# Patient Record
Sex: MALE | HISPANIC OR LATINO | Employment: OTHER | URBAN - METROPOLITAN AREA
[De-identification: names, ages, dates, MRNs, and addresses within clinical notes are randomized per-mention and may not be internally consistent; named-entity substitution may affect disease eponyms.]

---

## 2017-09-09 ENCOUNTER — HOSPITAL ENCOUNTER (OUTPATIENT)
Dept: LAB | Facility: MEDICAL CENTER | Age: 72
End: 2017-09-09
Attending: FAMILY MEDICINE
Payer: MEDICARE

## 2017-09-09 LAB
ALBUMIN SERPL BCP-MCNC: 4.2 G/DL (ref 3.2–4.9)
ALBUMIN/GLOB SERPL: 1.4 G/DL
ALP SERPL-CCNC: 82 U/L (ref 30–99)
ALT SERPL-CCNC: 35 U/L (ref 2–50)
ANION GAP SERPL CALC-SCNC: 6 MMOL/L (ref 0–11.9)
AST SERPL-CCNC: 31 U/L (ref 12–45)
BASOPHILS # BLD AUTO: 0.8 % (ref 0–1.8)
BASOPHILS # BLD: 0.06 K/UL (ref 0–0.12)
BILIRUB SERPL-MCNC: 0.6 MG/DL (ref 0.1–1.5)
BUN SERPL-MCNC: 16 MG/DL (ref 8–22)
CALCIUM SERPL-MCNC: 10.3 MG/DL (ref 8.5–10.5)
CHLORIDE SERPL-SCNC: 105 MMOL/L (ref 96–112)
CHOLEST SERPL-MCNC: 207 MG/DL (ref 100–199)
CO2 SERPL-SCNC: 27 MMOL/L (ref 20–33)
CREAT SERPL-MCNC: 0.84 MG/DL (ref 0.5–1.4)
EOSINOPHIL # BLD AUTO: 0.17 K/UL (ref 0–0.51)
EOSINOPHIL NFR BLD: 2.1 % (ref 0–6.9)
ERYTHROCYTE [DISTWIDTH] IN BLOOD BY AUTOMATED COUNT: 45.1 FL (ref 35.9–50)
GFR SERPL CREATININE-BSD FRML MDRD: >60 ML/MIN/1.73 M 2
GLOBULIN SER CALC-MCNC: 2.9 G/DL (ref 1.9–3.5)
GLUCOSE SERPL-MCNC: 87 MG/DL (ref 65–99)
HCT VFR BLD AUTO: 51.9 % (ref 42–52)
HDLC SERPL-MCNC: 45 MG/DL
HGB BLD-MCNC: 16.8 G/DL (ref 14–18)
IMM GRANULOCYTES # BLD AUTO: 0.03 K/UL (ref 0–0.11)
IMM GRANULOCYTES NFR BLD AUTO: 0.4 % (ref 0–0.9)
LDLC SERPL CALC-MCNC: 140 MG/DL
LYMPHOCYTES # BLD AUTO: 3.22 K/UL (ref 1–4.8)
LYMPHOCYTES NFR BLD: 40.5 % (ref 22–41)
MCH RBC QN AUTO: 33.6 PG (ref 27–33)
MCHC RBC AUTO-ENTMCNC: 32.4 G/DL (ref 33.7–35.3)
MCV RBC AUTO: 103.8 FL (ref 81.4–97.8)
MONOCYTES # BLD AUTO: 0.43 K/UL (ref 0–0.85)
MONOCYTES NFR BLD AUTO: 5.4 % (ref 0–13.4)
NEUTROPHILS # BLD AUTO: 4.04 K/UL (ref 1.82–7.42)
NEUTROPHILS NFR BLD: 50.8 % (ref 44–72)
NRBC # BLD AUTO: 0 K/UL
NRBC BLD AUTO-RTO: 0 /100 WBC
PLATELET # BLD AUTO: 260 K/UL (ref 164–446)
PMV BLD AUTO: 11.9 FL (ref 9–12.9)
POTASSIUM SERPL-SCNC: 4.6 MMOL/L (ref 3.6–5.5)
PROT SERPL-MCNC: 7.1 G/DL (ref 6–8.2)
PSA SERPL-MCNC: 3.92 NG/ML (ref 0–4)
RBC # BLD AUTO: 5 M/UL (ref 4.7–6.1)
SODIUM SERPL-SCNC: 138 MMOL/L (ref 135–145)
TRIGL SERPL-MCNC: 110 MG/DL (ref 0–149)
WBC # BLD AUTO: 8 K/UL (ref 4.8–10.8)

## 2017-09-09 PROCEDURE — 85025 COMPLETE CBC W/AUTO DIFF WBC: CPT

## 2017-09-09 PROCEDURE — 80061 LIPID PANEL: CPT

## 2017-09-09 PROCEDURE — 80053 COMPREHEN METABOLIC PANEL: CPT

## 2017-09-09 PROCEDURE — 84153 ASSAY OF PSA TOTAL: CPT

## 2017-09-09 PROCEDURE — 36415 COLL VENOUS BLD VENIPUNCTURE: CPT

## 2017-09-19 ENCOUNTER — OCCUPATIONAL MEDICINE (OUTPATIENT)
Dept: URGENT CARE | Facility: CLINIC | Age: 72
End: 2017-09-19
Payer: COMMERCIAL

## 2017-09-19 ENCOUNTER — APPOINTMENT (OUTPATIENT)
Dept: RADIOLOGY | Facility: IMAGING CENTER | Age: 72
End: 2017-09-19
Attending: FAMILY MEDICINE
Payer: COMMERCIAL

## 2017-09-19 VITALS
WEIGHT: 150 LBS | BODY MASS INDEX: 27.6 KG/M2 | HEART RATE: 66 BPM | TEMPERATURE: 98.4 F | SYSTOLIC BLOOD PRESSURE: 138 MMHG | HEIGHT: 62 IN | DIASTOLIC BLOOD PRESSURE: 90 MMHG | OXYGEN SATURATION: 99 %

## 2017-09-19 DIAGNOSIS — Z02.1 PRE-EMPLOYMENT DRUG SCREENING: ICD-10-CM

## 2017-09-19 DIAGNOSIS — M25.562 ACUTE PAIN OF LEFT KNEE: ICD-10-CM

## 2017-09-19 LAB
AMP AMPHETAMINE: NORMAL
BREATH ALCOHOL COMMENT: NORMAL
COC COCAINE: NORMAL
INT CON NEG: NEGATIVE
INT CON POS: POSITIVE
MET METHAMPHETAMINES: NORMAL
OPI OPIATES: NORMAL
PCP PHENCYCLIDINE: NORMAL
POC BREATHALIZER: 0 PERCENT (ref 0–0.01)
POC DRUG COMMENT 753798-OCCUPATIONAL HEALTH: NEGATIVE
THC: NORMAL

## 2017-09-19 PROCEDURE — 82075 ASSAY OF BREATH ETHANOL: CPT | Performed by: FAMILY MEDICINE

## 2017-09-19 PROCEDURE — 73564 X-RAY EXAM KNEE 4 OR MORE: CPT | Mod: TC,LT | Performed by: FAMILY MEDICINE

## 2017-09-19 PROCEDURE — 99213 OFFICE O/P EST LOW 20 MIN: CPT | Performed by: FAMILY MEDICINE

## 2017-09-19 PROCEDURE — 80305 DRUG TEST PRSMV DIR OPT OBS: CPT | Performed by: FAMILY MEDICINE

## 2017-09-19 ASSESSMENT — ENCOUNTER SYMPTOMS
JOINT SWELLING: 0
NUMBNESS: 0
WEAKNESS: 0

## 2017-09-19 NOTE — LETTER
"EMPLOYEE’S CLAIM FOR COMPENSATION/ REPORT OF INITIAL TREATMENT  FORM C-4    EMPLOYEE’S CLAIM - PROVIDE ALL INFORMATION REQUESTED   First Name  Mj Last Name  Samara Birthdate                    1945                Sex  male Claim Number   Home Address  [address] Age  72 y.o. Height  1.575 m (5' 2\") Weight  68 kg (150 lb) [facility]     [facility] [place] Zip  [address] Telephone  [phone] (home)    Mailing Address  [address] [facility] [place] Zip  [address] Primary Language Spoken  English    Insurer   Third Party   Employers Insurance   Employee's Occupation (Job Title) When Injury or Occupational Disease Occurred      Employer's Name  [employer]  Telephone  [phone]    Employer Address  [address]  [facility]  Zip  [address]    Date of Injury  9/18/2017               Hour of Injury  2:30 PM Date Employer Notified  9/18/2017 Last Day of Work after Injury or Occupational Disease  9/18/2017 Supervisor to Whom Injury Reported  Daryl Lilly   Address or Location of Accident (if applicable)     What were you doing at the time of accident? (if applicable)  I was using wheat eater when I slipped and hurt my left knee    How did this injury or occupational disease occur? (Be specific an answer in detail. Use additional sheet if necessary)  I slipped and hurt Left knee   If you believe that you have an occupational disease, when did you first have knowledge of the disability and it relationship to your employment?   Witnesses to the Accident  N/A      Nature of Injury or Occupational Disease  Strain  Part(s) of Body Injured or Affected  Knee (L), ,     I certify that the above is true and correct to the best of my knowledge and that I have provided this information in order to obtain the benefits of [place] " [place]  I hereby authorize any physician, chiropractor, surgeon, practitioner, or other person, any hospital, including [facility] or [facility] hospital, any medical service organization, any insurance company, or other institution or organization to release to each other, any medical or other information, including benefits paid or payable, pertinent to this injury or disease, except information relative to diagnosis, treatment and/or counseling for AIDS, psychological conditions, alcohol or controlled substances, for which I must give specific authorization.  A Photostat of this authorization shall be as valid as the original.     Date 9/19/2017   [facility]   Employee’s Signature   THIS REPORT MUST BE COMPLETED AND MAILED WITHIN 3 WORKING DAYS OF TREATMENT   [facility]  Name of Facility  [facility]   Date  9/19/2017 Diagnosis  (M25.562) Acute pain of left knee Is there evidence the injured employee was under the influence of alcohol and/or another controlled substance at the time of accident?   Hour  9:32 AM Description of Injury or Disease  The encounter diagnosis was Acute pain of left knee. No   Treatment  Use over-the-counter pain reliever, such as acetaminophen (Tylenol), ibuprofen (Advil, Motrin) or naproxen (Aleve) as needed; follow package directions for dosing. ACE wrap provided for comfort.  Have you advised the patient to remain off work five days or more? No   X-Ray Findings  Negative   If Yes   From Date  To Date      From information given by the employee, together with medical evidence, can you directly connect this injury or occupational disease as job incurred?  Yes If No Full Duty  Yes Modified Duty     Is additional medical care by a physician indicated?  Yes If Modified Duty, Specify any Limitations / Restrictions     Do you know of any previous injury or disease contributing to this  "condition or occupational disease?                            No   Date  9/19/2017 Print Doctor’s Name Lu Lezama M.D. I certify the employer’s copy of  this form was mailed on:   Address  [address] [employer] Use [employer]     [place] Zip  [address]    Provider’s Tax ID Number  [id number]  Telephone  Dept: [phone]        e-LU Arredondo M.D.   e-Signature: Dr. Heladio Sin, Medical Director Degree  MD        ORIGINAL-TREATING PHYSICIAN OR CHIROPRACTOR    PAGE 2-INSURER/TPA    PAGE 3-EMPLOYER    PAGE 4-EMPLOYEE             Form C-4 (rev10/07)              BRIEF DESCRIPTION OF RIGHTS AND BENEFITS  (Pursuant to [place] 616C.050)    Notice of Injury or Occupational Disease (Incident Report Form C-1): If an injury or occupational disease (OD) arises out of and in the  course of employment, you must provide written notice to your employer as soon as practicable, but no later than 7 days after the accident or  OD. Your employer shall maintain a sufficient supply of the required forms.    Claim for Compensation (Form C-4): If medical treatment is sought, the form C-4 is available at the place of initial treatment. A completed  \"Claim for Compensation\" (Form C-4) must be filed within 90 days after an accident or OD. The treating physician or chiropractor must,  within 3 working days after treatment, complete and mail to the employer, the employer's insurer and third-party , the Claim for  Compensation.    Medical Treatment: If you require medical treatment for your on-the-job injury or OD, you may be required to select a physician or  chiropractor from a list provided by your workers’ compensation insurer, if it has contracted with an Organization for Managed Care (MCO) or  Preferred Provider Organization (PPO) or providers of health care. If your employer has not entered into a contract with an MCO or PPO, you  may select a physician or chiropractor from the Panel of " Physicians and Chiropractors. Any medical costs related to your industrial injury or  OD will be paid by your insurer.    Temporary Total Disability (TTD): If your doctor has certified that you are unable to work for a period of at least 5 consecutive days, or 5  cumulative days in a 20-day period, or places restrictions on you that your employer does not accommodate, you may be entitled to TTD  compensation.    Temporary Partial Disability (TPD): If the wage you receive upon reemployment is less than the compensation for TTD to which you are  entitled, the insurer may be required to pay you TPD compensation to make up the difference. TPD can only be paid for a maximum of 24  months.    Permanent Partial Disability (PPD): When your medical condition is stable and there is an indication of a PPD as a result of your injury or  OD, within 30 days, your insurer must arrange for an evaluation by a rating physician or chiropractor to determine the degree of your PPD. The  amount of your PPD award depends on the date of injury, the results of the PPD evaluation and your age and wage.    Permanent Total Disability (PTD): If you are medically certified by a treating physician or chiropractor as permanently and totally disabled  and have been granted a PTD status by your insurer, you are entitled to receive monthly benefits not to exceed 66 2/3% of your average  monthly wage. The amount of your PTD payments is subject to reduction if you previously received a PPD award.    Vocational Rehabilitation Services: You may be eligible for vocational rehabilitation services if you are unable to return to the job due to a  permanent physical impairment or permanent restrictions as a result of your injury or occupational disease.    Transportation and Per Luc Reimbursement: You may be eligible for travel expenses and per luc associated with medical treatment.    Reopening: You may be able to reopen your claim if your condition  worsens after claim closure.    Appeal Process: If you disagree with a written determination issued by the insurer or the insurer does not respond to your request, you may  appeal to the Department of Administration, , by following the instructions contained in your determination letter. You must  appeal the determination within 70 days from the date of the determination letter at [address], [address], [place], [place]  [address], or [address], [place], [place] [address]. If you disagree with the  decision, you may appeal to the  Department of Administration, . You must file your appeal within 30 days from the date of the  decision  letter at [address], [address], [place], [place] [address], or [address], [address], [place], [place] [address]. If you  disagree with a decision of an , you may file a petition for judicial review with the District Court. You must do so within 30  days of the Appeal Officer’s decision. You may be represented by an  at your own expense or you may contact the [facility] for possible  representation.    [place]  for Injured Workers (NAIW): If you disagree with a  decision, you may request that NAIW represent you  without charge at an  Hearing. For information regarding denial of benefits, you may contact the [facility] at: [address], [place] [address], [phone], or [address], [place] [address], [phone]    To File a Complaint with the Division: If you wish to file a complaint with the  of the Division of Industrial Relations (DIR),  please contact the Workers’ Compensation Section, [address], [address], [place], [place] [address], telephone (969) [phone], or  [address], [place] [address], telephone [phone])  [phone].    For assistance with Workers’ Compensation Issues: you may contact the Office of the Governor Consumer Health Assistance, [address] [place] [address], [address], [place] [address], Toll Free 1-109.616.7152, Web site: http://govcha.[facility].nv., E-mail  [email or web address].nv.                                                                                                                                                                                                                                   __________________________________________________________________                                                                   _________________                Employee Name / Signature                                                                                                                                                       Date                                                                                                                                                                                                     D-2 (rev. 10/07)

## 2017-09-19 NOTE — LETTER
[facility]   [address] [place] [address]  Phone: [phone] - Fax: [phone]        [facility] Progress Report and Disability Certification  Date of Service: 9/19/2017   No Show:  No  Date / Time of Next Visit: 9/22/2017 4:30 PM   Claim Information   Patient Name: Mj Pascual  Claim Number:     Employer: [employer]  Date of Injury: 9/18/2017     Insurer / TPA: Employers Insurance  ID / SSN:     Occupation:   Diagnosis: The encounter diagnosis was Acute pain of left knee.    Medical Information   Related to Industrial Injury? Yes    Subjective Complaints:  DOI 9/18/2017. Patient states he was weed eating with a weed eater and slipped landing on his left knee. Patient noticed intense pain at the time as well as difficulty walking due to pain. No numbness, tingling. No weakness. Pain is exacerbated by walking or bending the left knee. Patient denies previous left knee injuries. No other occupation.   Objective Findings: Tenderness to palpation posterior aspect of left knee more along the medial posterior aspect with mild swelling. Left knee range of motion mildly decreased due to pain. No knee effusion noted.   Pre-Existing Condition(s):     Assessment:   Initial Visit    Status: Additional Care Required  Permanent Disability:No    Plan:      Diagnostics: X-ray    Comments:       Disability Information   Status: Released to Full Duty    From:  9/19/2017  Through: 9/22/2017 Restrictions are: Temporary   Physical Restrictions   Sitting:    Standing:   Stooping:   Bending:     Squatting:   Walking:   Climbing:   Pushing:      Pulling:    Other:    Reaching Above Shoulder (L):   Reaching Above Shoulder (R):       Reaching Below Shoulder (L):    Reaching Below Shoulder (R):      Not to exceed Weight Limits   Carrying(hrs):   Weight Limit(lb):  Lifting(hrs):   Weight  Limit(lb):    Comments:      Repetitive Actions      Hands: i.e. Fine Manipulations from Grasping:     Feet: i.e. Operating Foot Controls:     Driving / Operate Machinery:     Physician Name: Lu Lezama M.D. Physician Signature: LU Salamanca M.D. e-Signature: Dr. Heladio Sin, Medical Director   Clinic Name / Location: [address] [address] Clinic Phone Number: Dept: [phone]   Appointment Time: 8:45 Am Visit Start Time: 9:32 AM   Check-In Time:  8:53 Am Visit Discharge Time: 10:28 PM   Original-Treating Physician or Chiropractor    Page 2-Insurer/TPA    Page 3-Employer    Page 4-Employee

## 2017-09-19 NOTE — PROGRESS NOTES
"Subjective:      Mj Pascual is a 72 y.o. male who presents with Work-Related Injury (fell down at work and hurt L knee,happened yesterday )      DOI 9/18/2017. Patient states he was weed eating with a weed eater and slipped landing on his left knee. Patient noticed intense pain at the time as well as difficulty walking due to pain. No numbness, tingling. No weakness. Pain is exacerbated by walking or bending the left knee. Patient denies previous left knee injuries. No other occupation.     Knee Injury   This is a new problem. The current episode started yesterday. The problem occurs constantly. The problem has been unchanged. Pertinent negatives include no joint swelling, numbness or weakness.       Review of Systems   Musculoskeletal: Negative for joint swelling.   Neurological: Negative for weakness and numbness.          Objective:     /90   Pulse 66   Temp 36.9 °C (98.4 °F)   Ht 1.575 m (5' 2\")   Wt 68 kg (150 lb)   SpO2 99%   BMI 27.44 kg/m²      Physical Exam   Constitutional: He is oriented to person, place, and time. He appears well-developed and well-nourished. No distress.   HENT:   Head: Normocephalic and atraumatic.   Eyes: Conjunctivae are normal. Pupils are equal, round, and reactive to light.   Cardiovascular: Normal rate and regular rhythm.    Pulmonary/Chest: Effort normal and breath sounds normal.   Neurological: He is alert and oriented to person, place, and time.   Skin: Skin is warm and dry.   Psychiatric: He has a normal mood and affect. Thought content normal.   Vitals reviewed.      Tenderness to palpation posterior aspect of left knee more along the medial posterior aspect with mild swelling. Left knee range of motion mildly decreased due to pain. No knee effusion noted.       Assessment/Plan:     1. Acute pain of left knee  Differential diagnosis, natural history, supportive care, and indications for immediate follow-up discussed. Use over-the-counter pain " reliever, such as acetaminophen (Tylenol), ibuprofen (Advil, Motrin) or naproxen (Aleve) as needed; follow package directions for dosing.   - DX-KNEE COMPLETE 4+ LEFT; Future

## 2017-09-22 ENCOUNTER — OCCUPATIONAL MEDICINE (OUTPATIENT)
Dept: URGENT CARE | Facility: CLINIC | Age: 72
End: 2017-09-22
Payer: COMMERCIAL

## 2017-09-22 VITALS
TEMPERATURE: 97.8 F | HEART RATE: 60 BPM | OXYGEN SATURATION: 99 % | BODY MASS INDEX: 27.05 KG/M2 | SYSTOLIC BLOOD PRESSURE: 140 MMHG | HEIGHT: 62 IN | DIASTOLIC BLOOD PRESSURE: 84 MMHG | RESPIRATION RATE: 16 BRPM | WEIGHT: 147 LBS

## 2017-09-22 DIAGNOSIS — S83.92XD SPRAIN OF LEFT KNEE, UNSPECIFIED LIGAMENT, SUBSEQUENT ENCOUNTER: ICD-10-CM

## 2017-09-22 PROCEDURE — 99213 OFFICE O/P EST LOW 20 MIN: CPT | Performed by: PHYSICIAN ASSISTANT

## 2017-09-22 NOTE — LETTER
[facility]  [address] [address]  Phone:  [phone] - Fax:  [phone]   [facility] Progress Report and Disability Certification  Date of Service: 9/22/2017   No Show:  No  Date / Time of Next Visit:  Case Closed   Claim Information   Patient Name: Mj Pascual  Claim Number:     Employer: [employer]  Date of Injury: 9/18/2017     Insurer / TPA: Employers Insurance  ID / SSN:     Occupation:   Diagnosis: The encounter diagnosis was Sprain of left knee, unspecified ligament, subsequent encounter.    Medical Information   Related to Industrial Injury? Yes    Subjective Complaints:  DOI: 9/18/17. Left knee pain following fall. Xrays NEG. Today, much improved. No pain, 0/10. No ROM problems. Taking ASA prn. On work restrictions, has not been to work. But he believes he can do full duty. Ready to be released.    Objective Findings: No tenderness. Range of motion normal. Special tests negative. Patient to walk and perform a full squat without issue. Distal neurovascular intact.   Pre-Existing Condition(s):     Assessment:   Condition Improved    Status: Discharged /  MMI  Permanent Disability:No    Plan:      Diagnostics:      Comments:       Disability Information   Status: Released to Full Duty    From:     Through:   Restrictions are:     Physical Restrictions   Sitting:    Standing:    Stooping:    Bending:      Squatting:    Walking:    Climbing:    Pushing:      Pulling:    Other:    Reaching Above Shoulder (L):   Reaching Above Shoulder (R):       Reaching Below Shoulder (L):    Reaching Below Shoulder (R):      Not to exceed Weight Limits   Carrying(hrs):   Weight Limit(lb):   Lifting(hrs):   Weight  Limit(lb):     Comments:      Repetitive Actions   Hands: i.e. Fine Manipulations from Grasping:     Feet: i.e. Operating Foot Controls:     Driving / Operate Machinery:     Physician Name: Kalyan Ojeda,  OSCAR Physician Signature: NORM Schmidt P.A.-C. e-Signature: Dr. Heladio Sin, Medical Director   Clinic Name / Location: [address] [address] Clinic Phone Number: Dept: [phone]   Appointment Time: 4:30 Pm Visit Start Time: 4:07 PM   Check-In Time:  4:05 Pm Visit Discharge Time:  4:19 PM   Original-Treating Physician or Chiropractor    Page 2-Insurer/TPA    Page 3-Employer    Page 4-Employee

## 2017-09-22 NOTE — PROGRESS NOTES
"Subjective:      Mj Pascual is a 72 y.o. male who presents with Knee Injury ( follow up for Lt. knee)      DOI: 9/18/17. Left knee pain following fall. Xrays NEG. Today, much improved. No pain, 0/10. No ROM problems. Taking ASA prn. On work restrictions, has not been to work. But he believes he can do full duty. Ready to be released.      HPI    ROS    Medications, Allergies, and current problem list reviewed today in Epic     Objective:     /84   Pulse 60   Temp 36.6 °C (97.8 °F)   Resp 16   Ht 1.575 m (5' 2\")   Wt 66.7 kg (147 lb)   SpO2 99%   BMI 26.89 kg/m²      Physical Exam   Constitutional: He is oriented to person, place, and time. He appears well-developed and well-nourished. No distress.   Neurological: He is alert and oriented to person, place, and time.   Skin: Skin is warm and dry. He is not diaphoretic.   Psychiatric: He has a normal mood and affect. His behavior is normal. Judgment and thought content normal.   Nursing note and vitals reviewed.      No tenderness. Range of motion normal. Special tests negative. Patient to walk and perform a full squat without issue. Distal neurovascular intact.       Assessment/Plan:     1. Sprain of left knee, unspecified ligament, subsequent encounter       Exam normal, pain is resolved.  Patient discharged MMI    Please note that this dictation was created using voice recognition software. I have made every reasonable attempt to correct obvious errors, but I expect that there are errors of grammar and possibly content that I did not discover before finalizing the note.    "

## 2018-06-02 ENCOUNTER — HOSPITAL ENCOUNTER (OUTPATIENT)
Dept: LAB | Facility: MEDICAL CENTER | Age: 73
End: 2018-06-02
Attending: FAMILY MEDICINE
Payer: MEDICARE

## 2018-06-02 LAB
ALBUMIN SERPL BCP-MCNC: 4.3 G/DL (ref 3.2–4.9)
ALBUMIN/GLOB SERPL: 1.4 G/DL
ALP SERPL-CCNC: 85 U/L (ref 30–99)
ALT SERPL-CCNC: 26 U/L (ref 2–50)
ANION GAP SERPL CALC-SCNC: 6 MMOL/L (ref 0–11.9)
AST SERPL-CCNC: 25 U/L (ref 12–45)
BASOPHILS # BLD AUTO: 0.8 % (ref 0–1.8)
BASOPHILS # BLD: 0.07 K/UL (ref 0–0.12)
BILIRUB SERPL-MCNC: 0.6 MG/DL (ref 0.1–1.5)
BUN SERPL-MCNC: 17 MG/DL (ref 8–22)
CALCIUM SERPL-MCNC: 9.9 MG/DL (ref 8.5–10.5)
CHLORIDE SERPL-SCNC: 106 MMOL/L (ref 96–112)
CHOLEST SERPL-MCNC: 145 MG/DL (ref 100–199)
CO2 SERPL-SCNC: 28 MMOL/L (ref 20–33)
CREAT SERPL-MCNC: 0.98 MG/DL (ref 0.5–1.4)
CREAT UR-MCNC: 131.6 MG/DL
EOSINOPHIL # BLD AUTO: 0.12 K/UL (ref 0–0.51)
EOSINOPHIL NFR BLD: 1.4 % (ref 0–6.9)
ERYTHROCYTE [DISTWIDTH] IN BLOOD BY AUTOMATED COUNT: 44.7 FL (ref 35.9–50)
EST. AVERAGE GLUCOSE BLD GHB EST-MCNC: 103 MG/DL
GLOBULIN SER CALC-MCNC: 3 G/DL (ref 1.9–3.5)
GLUCOSE SERPL-MCNC: 91 MG/DL (ref 65–99)
HBA1C MFR BLD: 5.2 % (ref 0–5.6)
HCT VFR BLD AUTO: 50.5 % (ref 42–52)
HDLC SERPL-MCNC: 43 MG/DL
HGB BLD-MCNC: 16.2 G/DL (ref 14–18)
IMM GRANULOCYTES # BLD AUTO: 0.04 K/UL (ref 0–0.11)
IMM GRANULOCYTES NFR BLD AUTO: 0.5 % (ref 0–0.9)
LDLC SERPL CALC-MCNC: 79 MG/DL
LYMPHOCYTES # BLD AUTO: 3.37 K/UL (ref 1–4.8)
LYMPHOCYTES NFR BLD: 38 % (ref 22–41)
MCH RBC QN AUTO: 33.5 PG (ref 27–33)
MCHC RBC AUTO-ENTMCNC: 32.1 G/DL (ref 33.7–35.3)
MCV RBC AUTO: 104.6 FL (ref 81.4–97.8)
MICROALBUMIN UR-MCNC: <0.7 MG/DL
MICROALBUMIN/CREAT UR: NORMAL MG/G (ref 0–30)
MONOCYTES # BLD AUTO: 0.5 K/UL (ref 0–0.85)
MONOCYTES NFR BLD AUTO: 5.6 % (ref 0–13.4)
NEUTROPHILS # BLD AUTO: 4.77 K/UL (ref 1.82–7.42)
NEUTROPHILS NFR BLD: 53.7 % (ref 44–72)
NRBC # BLD AUTO: 0 K/UL
NRBC BLD-RTO: 0 /100 WBC
PLATELET # BLD AUTO: 271 K/UL (ref 164–446)
PMV BLD AUTO: 11.6 FL (ref 9–12.9)
POTASSIUM SERPL-SCNC: 4.4 MMOL/L (ref 3.6–5.5)
PROT SERPL-MCNC: 7.3 G/DL (ref 6–8.2)
RBC # BLD AUTO: 4.83 M/UL (ref 4.7–6.1)
SODIUM SERPL-SCNC: 140 MMOL/L (ref 135–145)
TRIGL SERPL-MCNC: 113 MG/DL (ref 0–149)
WBC # BLD AUTO: 8.9 K/UL (ref 4.8–10.8)

## 2018-06-02 PROCEDURE — 83036 HEMOGLOBIN GLYCOSYLATED A1C: CPT

## 2018-06-02 PROCEDURE — 80061 LIPID PANEL: CPT

## 2018-06-02 PROCEDURE — 82570 ASSAY OF URINE CREATININE: CPT

## 2018-06-02 PROCEDURE — 36415 COLL VENOUS BLD VENIPUNCTURE: CPT

## 2018-06-02 PROCEDURE — 85025 COMPLETE CBC W/AUTO DIFF WBC: CPT

## 2018-06-02 PROCEDURE — 82043 UR ALBUMIN QUANTITATIVE: CPT

## 2018-06-02 PROCEDURE — 80053 COMPREHEN METABOLIC PANEL: CPT

## 2019-03-02 ENCOUNTER — HOSPITAL ENCOUNTER (OUTPATIENT)
Dept: LAB | Facility: MEDICAL CENTER | Age: 74
End: 2019-03-02
Attending: FAMILY MEDICINE
Payer: MEDICARE

## 2019-03-02 ENCOUNTER — HOSPITAL ENCOUNTER (OUTPATIENT)
Facility: MEDICAL CENTER | Age: 74
End: 2019-03-02
Payer: MEDICARE

## 2019-03-02 LAB
ALBUMIN SERPL BCP-MCNC: 4.3 G/DL (ref 3.2–4.9)
ALBUMIN/GLOB SERPL: 1.3 G/DL
ALP SERPL-CCNC: 101 U/L (ref 30–99)
ALT SERPL-CCNC: 25 U/L (ref 2–50)
ANION GAP SERPL CALC-SCNC: 8 MMOL/L (ref 0–11.9)
AST SERPL-CCNC: 22 U/L (ref 12–45)
BASOPHILS # BLD AUTO: 0.6 % (ref 0–1.8)
BASOPHILS # BLD: 0.05 K/UL (ref 0–0.12)
BILIRUB SERPL-MCNC: 0.5 MG/DL (ref 0.1–1.5)
BUN SERPL-MCNC: 16 MG/DL (ref 8–22)
CALCIUM SERPL-MCNC: 10.2 MG/DL (ref 8.5–10.5)
CHLORIDE SERPL-SCNC: 106 MMOL/L (ref 96–112)
CHOLEST SERPL-MCNC: 154 MG/DL (ref 100–199)
CO2 SERPL-SCNC: 26 MMOL/L (ref 20–33)
CREAT SERPL-MCNC: 0.81 MG/DL (ref 0.5–1.4)
CREAT UR-MCNC: 148.7 MG/DL
EOSINOPHIL # BLD AUTO: 0.11 K/UL (ref 0–0.51)
EOSINOPHIL NFR BLD: 1.2 % (ref 0–6.9)
ERYTHROCYTE [DISTWIDTH] IN BLOOD BY AUTOMATED COUNT: 46.2 FL (ref 35.9–50)
EST. AVERAGE GLUCOSE BLD GHB EST-MCNC: 103 MG/DL
FASTING STATUS PATIENT QL REPORTED: NORMAL
GLOBULIN SER CALC-MCNC: 3.3 G/DL (ref 1.9–3.5)
GLUCOSE SERPL-MCNC: 98 MG/DL (ref 65–99)
HBA1C MFR BLD: 5.2 % (ref 0–5.6)
HCT VFR BLD AUTO: 52.2 % (ref 42–52)
HDLC SERPL-MCNC: 43 MG/DL
HGB BLD-MCNC: 16.8 G/DL (ref 14–18)
IMM GRANULOCYTES # BLD AUTO: 0.06 K/UL (ref 0–0.11)
IMM GRANULOCYTES NFR BLD AUTO: 0.7 % (ref 0–0.9)
LDLC SERPL CALC-MCNC: 88 MG/DL
LYMPHOCYTES # BLD AUTO: 2.71 K/UL (ref 1–4.8)
LYMPHOCYTES NFR BLD: 30.2 % (ref 22–41)
MCH RBC QN AUTO: 34 PG (ref 27–33)
MCHC RBC AUTO-ENTMCNC: 32.2 G/DL (ref 33.7–35.3)
MCV RBC AUTO: 105.7 FL (ref 81.4–97.8)
MICROALBUMIN UR-MCNC: <0.7 MG/DL
MICROALBUMIN/CREAT UR: NORMAL MG/G (ref 0–30)
MONOCYTES # BLD AUTO: 0.6 K/UL (ref 0–0.85)
MONOCYTES NFR BLD AUTO: 6.7 % (ref 0–13.4)
NEUTROPHILS # BLD AUTO: 5.43 K/UL (ref 1.82–7.42)
NEUTROPHILS NFR BLD: 60.6 % (ref 44–72)
NRBC # BLD AUTO: 0 K/UL
NRBC BLD-RTO: 0 /100 WBC
PLATELET # BLD AUTO: 257 K/UL (ref 164–446)
PMV BLD AUTO: 12.1 FL (ref 9–12.9)
POTASSIUM SERPL-SCNC: 4.6 MMOL/L (ref 3.6–5.5)
PROT SERPL-MCNC: 7.6 G/DL (ref 6–8.2)
PSA SERPL-MCNC: 3.8 NG/ML (ref 0–4)
RBC # BLD AUTO: 4.94 M/UL (ref 4.7–6.1)
SODIUM SERPL-SCNC: 140 MMOL/L (ref 135–145)
TRIGL SERPL-MCNC: 117 MG/DL (ref 0–149)
WBC # BLD AUTO: 9 K/UL (ref 4.8–10.8)

## 2019-03-02 PROCEDURE — 80061 LIPID PANEL: CPT

## 2019-03-02 PROCEDURE — 36415 COLL VENOUS BLD VENIPUNCTURE: CPT

## 2019-03-02 PROCEDURE — 82274 ASSAY TEST FOR BLOOD FECAL: CPT

## 2019-03-02 PROCEDURE — 85025 COMPLETE CBC W/AUTO DIFF WBC: CPT

## 2019-03-02 PROCEDURE — 84153 ASSAY OF PSA TOTAL: CPT

## 2019-03-02 PROCEDURE — 82570 ASSAY OF URINE CREATININE: CPT

## 2019-03-02 PROCEDURE — 83036 HEMOGLOBIN GLYCOSYLATED A1C: CPT

## 2019-03-02 PROCEDURE — 82043 UR ALBUMIN QUANTITATIVE: CPT

## 2019-03-02 PROCEDURE — 80053 COMPREHEN METABOLIC PANEL: CPT

## 2019-03-05 LAB — HEMOCCULT STL QL IA: NEGATIVE

## 2020-02-15 ENCOUNTER — HOSPITAL ENCOUNTER (OUTPATIENT)
Dept: LAB | Facility: MEDICAL CENTER | Age: 75
End: 2020-02-15
Attending: FAMILY MEDICINE
Payer: MEDICARE

## 2020-02-15 LAB
ALBUMIN SERPL BCP-MCNC: 4.1 G/DL (ref 3.2–4.9)
ALBUMIN/GLOB SERPL: 1.4 G/DL
ALP SERPL-CCNC: 91 U/L (ref 30–99)
ALT SERPL-CCNC: 42 U/L (ref 2–50)
ANION GAP SERPL CALC-SCNC: 7 MMOL/L (ref 0–11.9)
AST SERPL-CCNC: 35 U/L (ref 12–45)
BASOPHILS # BLD AUTO: 0.5 % (ref 0–1.8)
BASOPHILS # BLD: 0.04 K/UL (ref 0–0.12)
BILIRUB SERPL-MCNC: 1 MG/DL (ref 0.1–1.5)
BUN SERPL-MCNC: 13 MG/DL (ref 8–22)
CALCIUM SERPL-MCNC: 9.8 MG/DL (ref 8.5–10.5)
CHLORIDE SERPL-SCNC: 108 MMOL/L (ref 96–112)
CHOLEST SERPL-MCNC: 144 MG/DL (ref 100–199)
CO2 SERPL-SCNC: 24 MMOL/L (ref 20–33)
CREAT SERPL-MCNC: 0.7 MG/DL (ref 0.5–1.4)
CREAT UR-MCNC: 82.9 MG/DL
EOSINOPHIL # BLD AUTO: 0.09 K/UL (ref 0–0.51)
EOSINOPHIL NFR BLD: 1.2 % (ref 0–6.9)
ERYTHROCYTE [DISTWIDTH] IN BLOOD BY AUTOMATED COUNT: 47 FL (ref 35.9–50)
EST. AVERAGE GLUCOSE BLD GHB EST-MCNC: 94 MG/DL
FASTING STATUS PATIENT QL REPORTED: NORMAL
GLOBULIN SER CALC-MCNC: 3 G/DL (ref 1.9–3.5)
GLUCOSE SERPL-MCNC: 102 MG/DL (ref 65–99)
HBA1C MFR BLD: 4.9 % (ref 0–5.6)
HCT VFR BLD AUTO: 48.4 % (ref 42–52)
HDLC SERPL-MCNC: 46 MG/DL
HGB BLD-MCNC: 15.7 G/DL (ref 14–18)
IMM GRANULOCYTES # BLD AUTO: 0.02 K/UL (ref 0–0.11)
IMM GRANULOCYTES NFR BLD AUTO: 0.3 % (ref 0–0.9)
LDLC SERPL CALC-MCNC: 79 MG/DL
LYMPHOCYTES # BLD AUTO: 2.41 K/UL (ref 1–4.8)
LYMPHOCYTES NFR BLD: 32.6 % (ref 22–41)
MCH RBC QN AUTO: 34.5 PG (ref 27–33)
MCHC RBC AUTO-ENTMCNC: 32.4 G/DL (ref 33.7–35.3)
MCV RBC AUTO: 106.4 FL (ref 81.4–97.8)
MICROALBUMIN UR-MCNC: <0.7 MG/DL
MICROALBUMIN/CREAT UR: NORMAL MG/G (ref 0–30)
MONOCYTES # BLD AUTO: 0.43 K/UL (ref 0–0.85)
MONOCYTES NFR BLD AUTO: 5.8 % (ref 0–13.4)
NEUTROPHILS # BLD AUTO: 4.4 K/UL (ref 1.82–7.42)
NEUTROPHILS NFR BLD: 59.6 % (ref 44–72)
NRBC # BLD AUTO: 0 K/UL
NRBC BLD-RTO: 0 /100 WBC
PLATELET # BLD AUTO: 261 K/UL (ref 164–446)
PMV BLD AUTO: 11.7 FL (ref 9–12.9)
POTASSIUM SERPL-SCNC: 4.1 MMOL/L (ref 3.6–5.5)
PROT SERPL-MCNC: 7.1 G/DL (ref 6–8.2)
PSA SERPL-MCNC: 4.09 NG/ML (ref 0–4)
RBC # BLD AUTO: 4.55 M/UL (ref 4.7–6.1)
SODIUM SERPL-SCNC: 139 MMOL/L (ref 135–145)
TRIGL SERPL-MCNC: 97 MG/DL (ref 0–149)
WBC # BLD AUTO: 7.4 K/UL (ref 4.8–10.8)

## 2020-02-15 PROCEDURE — 82043 UR ALBUMIN QUANTITATIVE: CPT

## 2020-02-15 PROCEDURE — 83036 HEMOGLOBIN GLYCOSYLATED A1C: CPT

## 2020-02-15 PROCEDURE — 80061 LIPID PANEL: CPT

## 2020-02-15 PROCEDURE — 80053 COMPREHEN METABOLIC PANEL: CPT

## 2020-02-15 PROCEDURE — 84153 ASSAY OF PSA TOTAL: CPT

## 2020-02-15 PROCEDURE — 85025 COMPLETE CBC W/AUTO DIFF WBC: CPT

## 2020-02-15 PROCEDURE — 36415 COLL VENOUS BLD VENIPUNCTURE: CPT

## 2020-02-15 PROCEDURE — 82570 ASSAY OF URINE CREATININE: CPT

## 2020-12-26 ENCOUNTER — HOSPITAL ENCOUNTER (OUTPATIENT)
Dept: LAB | Facility: MEDICAL CENTER | Age: 75
End: 2020-12-26
Attending: FAMILY MEDICINE
Payer: MEDICARE

## 2020-12-26 LAB
ALBUMIN SERPL BCP-MCNC: 4.5 G/DL (ref 3.2–4.9)
ALBUMIN/GLOB SERPL: 1.5 G/DL
ALP SERPL-CCNC: 97 U/L (ref 30–99)
ALT SERPL-CCNC: 31 U/L (ref 2–50)
ANION GAP SERPL CALC-SCNC: 8 MMOL/L (ref 7–16)
AST SERPL-CCNC: 32 U/L (ref 12–45)
BASOPHILS # BLD AUTO: 0.7 % (ref 0–1.8)
BASOPHILS # BLD: 0.06 K/UL (ref 0–0.12)
BILIRUB SERPL-MCNC: 0.7 MG/DL (ref 0.1–1.5)
BUN SERPL-MCNC: 12 MG/DL (ref 8–22)
CALCIUM SERPL-MCNC: 10.1 MG/DL (ref 8.5–10.5)
CHLORIDE SERPL-SCNC: 105 MMOL/L (ref 96–112)
CHOLEST SERPL-MCNC: 153 MG/DL (ref 100–199)
CO2 SERPL-SCNC: 25 MMOL/L (ref 20–33)
CREAT SERPL-MCNC: 0.76 MG/DL (ref 0.5–1.4)
CREAT UR-MCNC: 86.14 MG/DL
EOSINOPHIL # BLD AUTO: 0.13 K/UL (ref 0–0.51)
EOSINOPHIL NFR BLD: 1.5 % (ref 0–6.9)
ERYTHROCYTE [DISTWIDTH] IN BLOOD BY AUTOMATED COUNT: 45.4 FL (ref 35.9–50)
EST. AVERAGE GLUCOSE BLD GHB EST-MCNC: 91 MG/DL
GLOBULIN SER CALC-MCNC: 3 G/DL (ref 1.9–3.5)
GLUCOSE SERPL-MCNC: 94 MG/DL (ref 65–99)
HBA1C MFR BLD: 4.8 % (ref 0–5.6)
HCT VFR BLD AUTO: 53.5 % (ref 42–52)
HDLC SERPL-MCNC: 37 MG/DL
HGB BLD-MCNC: 17.7 G/DL (ref 14–18)
IMM GRANULOCYTES # BLD AUTO: 0.04 K/UL (ref 0–0.11)
IMM GRANULOCYTES NFR BLD AUTO: 0.5 % (ref 0–0.9)
LDLC SERPL CALC-MCNC: 81 MG/DL
LYMPHOCYTES # BLD AUTO: 3.5 K/UL (ref 1–4.8)
LYMPHOCYTES NFR BLD: 39.7 % (ref 22–41)
MCH RBC QN AUTO: 34.7 PG (ref 27–33)
MCHC RBC AUTO-ENTMCNC: 33.1 G/DL (ref 33.7–35.3)
MCV RBC AUTO: 104.9 FL (ref 81.4–97.8)
MICROALBUMIN UR-MCNC: <1.2 MG/DL
MICROALBUMIN/CREAT UR: NORMAL MG/G (ref 0–30)
MONOCYTES # BLD AUTO: 0.45 K/UL (ref 0–0.85)
MONOCYTES NFR BLD AUTO: 5.1 % (ref 0–13.4)
NEUTROPHILS # BLD AUTO: 4.64 K/UL (ref 1.82–7.42)
NEUTROPHILS NFR BLD: 52.5 % (ref 44–72)
NRBC # BLD AUTO: 0 K/UL
NRBC BLD-RTO: 0 /100 WBC
PLATELET # BLD AUTO: 236 K/UL (ref 164–446)
PMV BLD AUTO: 12.1 FL (ref 9–12.9)
POTASSIUM SERPL-SCNC: 4.2 MMOL/L (ref 3.6–5.5)
PROT SERPL-MCNC: 7.5 G/DL (ref 6–8.2)
PSA SERPL-MCNC: 3.34 NG/ML (ref 0–4)
RBC # BLD AUTO: 5.1 M/UL (ref 4.7–6.1)
SODIUM SERPL-SCNC: 138 MMOL/L (ref 135–145)
TRIGL SERPL-MCNC: 174 MG/DL (ref 0–149)
WBC # BLD AUTO: 8.8 K/UL (ref 4.8–10.8)

## 2020-12-26 PROCEDURE — 80053 COMPREHEN METABOLIC PANEL: CPT

## 2020-12-26 PROCEDURE — 83036 HEMOGLOBIN GLYCOSYLATED A1C: CPT

## 2020-12-26 PROCEDURE — 85025 COMPLETE CBC W/AUTO DIFF WBC: CPT

## 2020-12-26 PROCEDURE — 80061 LIPID PANEL: CPT

## 2020-12-26 PROCEDURE — 84153 ASSAY OF PSA TOTAL: CPT

## 2020-12-26 PROCEDURE — 82570 ASSAY OF URINE CREATININE: CPT

## 2020-12-26 PROCEDURE — 36415 COLL VENOUS BLD VENIPUNCTURE: CPT

## 2020-12-26 PROCEDURE — 82043 UR ALBUMIN QUANTITATIVE: CPT

## 2021-01-11 DIAGNOSIS — Z23 NEED FOR VACCINATION: ICD-10-CM

## 2021-01-23 ENCOUNTER — APPOINTMENT (OUTPATIENT)
Dept: RADIOLOGY | Facility: MEDICAL CENTER | Age: 76
DRG: 101 | End: 2021-01-23
Attending: EMERGENCY MEDICINE
Payer: MEDICARE

## 2021-01-23 ENCOUNTER — HOSPITAL ENCOUNTER (OUTPATIENT)
Facility: MEDICAL CENTER | Age: 76
DRG: 101 | End: 2021-01-24
Attending: EMERGENCY MEDICINE | Admitting: STUDENT IN AN ORGANIZED HEALTH CARE EDUCATION/TRAINING PROGRAM
Payer: MEDICARE

## 2021-01-23 ENCOUNTER — OFFICE VISIT (OUTPATIENT)
Dept: URGENT CARE | Facility: CLINIC | Age: 76
End: 2021-01-23
Payer: MEDICARE

## 2021-01-23 VITALS
DIASTOLIC BLOOD PRESSURE: 90 MMHG | SYSTOLIC BLOOD PRESSURE: 150 MMHG | TEMPERATURE: 98 F | HEIGHT: 64 IN | WEIGHT: 152 LBS | BODY MASS INDEX: 25.95 KG/M2 | OXYGEN SATURATION: 96 % | HEART RATE: 72 BPM

## 2021-01-23 DIAGNOSIS — I16.0 HYPERTENSIVE URGENCY: ICD-10-CM

## 2021-01-23 DIAGNOSIS — R53.1 WEAKNESS: ICD-10-CM

## 2021-01-23 DIAGNOSIS — R42 DIZZY SPELLS: ICD-10-CM

## 2021-01-23 DIAGNOSIS — R53.1 GENERAL WEAKNESS: ICD-10-CM

## 2021-01-23 DIAGNOSIS — H53.15 VISUAL DISTORTIONS OF SHAPE AND SIZE: ICD-10-CM

## 2021-01-23 DIAGNOSIS — R20.2 PARESTHESIA: ICD-10-CM

## 2021-01-23 DIAGNOSIS — R42 DIZZINESS: ICD-10-CM

## 2021-01-23 DIAGNOSIS — R03.0 ELEVATED BLOOD PRESSURE READING: ICD-10-CM

## 2021-01-23 DIAGNOSIS — R20.0 NUMBNESS AND TINGLING OF LEFT LEG: ICD-10-CM

## 2021-01-23 DIAGNOSIS — R20.2 NUMBNESS AND TINGLING OF LEFT LEG: ICD-10-CM

## 2021-01-23 PROBLEM — I16.1 HYPERTENSIVE EMERGENCY: Status: ACTIVE | Noted: 2021-01-23

## 2021-01-23 PROBLEM — R79.89 TROPONIN LEVEL ELEVATED: Status: ACTIVE | Noted: 2021-01-23

## 2021-01-23 PROBLEM — D75.1 POLYCYTHEMIA: Status: ACTIVE | Noted: 2021-01-23

## 2021-01-23 PROBLEM — E78.5 DYSLIPIDEMIA: Status: ACTIVE | Noted: 2021-01-23

## 2021-01-23 LAB
25(OH)D3 SERPL-MCNC: 15 NG/ML (ref 30–100)
ALBUMIN SERPL BCP-MCNC: 4.7 G/DL (ref 3.2–4.9)
ALBUMIN/GLOB SERPL: 1.5 G/DL
ALP SERPL-CCNC: 108 U/L (ref 30–99)
ALT SERPL-CCNC: 36 U/L (ref 2–50)
ANION GAP SERPL CALC-SCNC: 8 MMOL/L (ref 7–16)
APPEARANCE UR: CLEAR
APTT PPP: 33.8 SEC (ref 24.7–36)
AST SERPL-CCNC: 34 U/L (ref 12–45)
BASOPHILS # BLD AUTO: 0.6 % (ref 0–1.8)
BASOPHILS # BLD: 0.06 K/UL (ref 0–0.12)
BILIRUB SERPL-MCNC: 0.5 MG/DL (ref 0.1–1.5)
BILIRUB UR QL STRIP.AUTO: NEGATIVE
BUN SERPL-MCNC: 10 MG/DL (ref 8–22)
CALCIUM SERPL-MCNC: 10.5 MG/DL (ref 8.5–10.5)
CHLORIDE SERPL-SCNC: 103 MMOL/L (ref 96–112)
CO2 SERPL-SCNC: 26 MMOL/L (ref 20–33)
COLOR UR: YELLOW
CREAT SERPL-MCNC: 0.77 MG/DL (ref 0.5–1.4)
CREAT UR-MCNC: 31.5 MG/DL
EKG IMPRESSION: NORMAL
EOSINOPHIL # BLD AUTO: 0.17 K/UL (ref 0–0.51)
EOSINOPHIL NFR BLD: 1.8 % (ref 0–6.9)
ERYTHROCYTE [DISTWIDTH] IN BLOOD BY AUTOMATED COUNT: 44.9 FL (ref 35.9–50)
EST. AVERAGE GLUCOSE BLD GHB EST-MCNC: 105 MG/DL
FERRITIN SERPL-MCNC: 517 NG/ML (ref 22–322)
GLOBULIN SER CALC-MCNC: 3.1 G/DL (ref 1.9–3.5)
GLUCOSE SERPL-MCNC: 88 MG/DL (ref 65–99)
GLUCOSE UR STRIP.AUTO-MCNC: NEGATIVE MG/DL
HBA1C MFR BLD: 5.3 % (ref 0–5.6)
HCT VFR BLD AUTO: 53.8 % (ref 42–52)
HGB BLD-MCNC: 17.3 G/DL (ref 14–18)
IMM GRANULOCYTES # BLD AUTO: 0.07 K/UL (ref 0–0.11)
IMM GRANULOCYTES NFR BLD AUTO: 0.7 % (ref 0–0.9)
INR PPP: 1.07 (ref 0.87–1.13)
KETONES UR STRIP.AUTO-MCNC: NEGATIVE MG/DL
LEUKOCYTE ESTERASE UR QL STRIP.AUTO: NEGATIVE
LYMPHOCYTES # BLD AUTO: 3 K/UL (ref 1–4.8)
LYMPHOCYTES NFR BLD: 32.1 % (ref 22–41)
MCH RBC QN AUTO: 33.3 PG (ref 27–33)
MCHC RBC AUTO-ENTMCNC: 32.2 G/DL (ref 33.7–35.3)
MCV RBC AUTO: 103.7 FL (ref 81.4–97.8)
MICRO URNS: NORMAL
MICROALBUMIN UR-MCNC: <1.2 MG/DL
MICROALBUMIN/CREAT UR: NORMAL MG/G (ref 0–30)
MONOCYTES # BLD AUTO: 0.67 K/UL (ref 0–0.85)
MONOCYTES NFR BLD AUTO: 7.2 % (ref 0–13.4)
NEUTROPHILS # BLD AUTO: 5.39 K/UL (ref 1.82–7.42)
NEUTROPHILS NFR BLD: 57.6 % (ref 44–72)
NITRITE UR QL STRIP.AUTO: NEGATIVE
NRBC # BLD AUTO: 0 K/UL
NRBC BLD-RTO: 0 /100 WBC
PH UR STRIP.AUTO: 7 [PH] (ref 5–8)
PLATELET # BLD AUTO: 261 K/UL (ref 164–446)
PMV BLD AUTO: 11.7 FL (ref 9–12.9)
POTASSIUM SERPL-SCNC: 4.3 MMOL/L (ref 3.6–5.5)
PROT SERPL-MCNC: 7.8 G/DL (ref 6–8.2)
PROT UR QL STRIP: NEGATIVE MG/DL
PROTHROMBIN TIME: 14.2 SEC (ref 12–14.6)
RBC # BLD AUTO: 5.19 M/UL (ref 4.7–6.1)
RBC UR QL AUTO: NEGATIVE
SARS-COV-2 RNA RESP QL NAA+PROBE: NOTDETECTED
SODIUM SERPL-SCNC: 137 MMOL/L (ref 135–145)
SP GR UR STRIP.AUTO: 1.01
SPECIMEN SOURCE: NORMAL
T4 FREE SERPL-MCNC: 0.9 NG/DL (ref 0.93–1.7)
TROPONIN T SERPL-MCNC: 39 NG/L (ref 6–19)
TSH SERPL DL<=0.005 MIU/L-ACNC: 5.66 UIU/ML (ref 0.38–5.33)
UROBILINOGEN UR STRIP.AUTO-MCNC: 0.2 MG/DL
VIT B12 SERPL-MCNC: 1341 PG/ML (ref 211–911)
WBC # BLD AUTO: 9.4 K/UL (ref 4.8–10.8)

## 2021-01-23 PROCEDURE — 84439 ASSAY OF FREE THYROXINE: CPT

## 2021-01-23 PROCEDURE — 93005 ELECTROCARDIOGRAM TRACING: CPT

## 2021-01-23 PROCEDURE — U0003 INFECTIOUS AGENT DETECTION BY NUCLEIC ACID (DNA OR RNA); SEVERE ACUTE RESPIRATORY SYNDROME CORONAVIRUS 2 (SARS-COV-2) (CORONAVIRUS DISEASE [COVID-19]), AMPLIFIED PROBE TECHNIQUE, MAKING USE OF HIGH THROUGHPUT TECHNOLOGIES AS DESCRIBED BY CMS-2020-01-R: HCPCS

## 2021-01-23 PROCEDURE — 70498 CT ANGIOGRAPHY NECK: CPT

## 2021-01-23 PROCEDURE — 96372 THER/PROPH/DIAG INJ SC/IM: CPT

## 2021-01-23 PROCEDURE — A9270 NON-COVERED ITEM OR SERVICE: HCPCS | Performed by: STUDENT IN AN ORGANIZED HEALTH CARE EDUCATION/TRAINING PROGRAM

## 2021-01-23 PROCEDURE — 700102 HCHG RX REV CODE 250 W/ 637 OVERRIDE(OP): Performed by: STUDENT IN AN ORGANIZED HEALTH CARE EDUCATION/TRAINING PROGRAM

## 2021-01-23 PROCEDURE — 700117 HCHG RX CONTRAST REV CODE 255: Performed by: EMERGENCY MEDICINE

## 2021-01-23 PROCEDURE — 700101 HCHG RX REV CODE 250: Performed by: EMERGENCY MEDICINE

## 2021-01-23 PROCEDURE — 99214 OFFICE O/P EST MOD 30 MIN: CPT | Performed by: NURSE PRACTITIONER

## 2021-01-23 PROCEDURE — 700111 HCHG RX REV CODE 636 W/ 250 OVERRIDE (IP): Performed by: STUDENT IN AN ORGANIZED HEALTH CARE EDUCATION/TRAINING PROGRAM

## 2021-01-23 PROCEDURE — 99220 PR INITIAL OBSERVATION CARE,LEVL III: CPT | Performed by: STUDENT IN AN ORGANIZED HEALTH CARE EDUCATION/TRAINING PROGRAM

## 2021-01-23 PROCEDURE — 85730 THROMBOPLASTIN TIME PARTIAL: CPT

## 2021-01-23 PROCEDURE — 84443 ASSAY THYROID STIM HORMONE: CPT

## 2021-01-23 PROCEDURE — G0378 HOSPITAL OBSERVATION PER HR: HCPCS

## 2021-01-23 PROCEDURE — 82043 UR ALBUMIN QUANTITATIVE: CPT

## 2021-01-23 PROCEDURE — 99285 EMERGENCY DEPT VISIT HI MDM: CPT

## 2021-01-23 PROCEDURE — 96374 THER/PROPH/DIAG INJ IV PUSH: CPT

## 2021-01-23 PROCEDURE — 36415 COLL VENOUS BLD VENIPUNCTURE: CPT

## 2021-01-23 PROCEDURE — 70496 CT ANGIOGRAPHY HEAD: CPT

## 2021-01-23 PROCEDURE — 82728 ASSAY OF FERRITIN: CPT

## 2021-01-23 PROCEDURE — 82570 ASSAY OF URINE CREATININE: CPT

## 2021-01-23 PROCEDURE — 85025 COMPLETE CBC W/AUTO DIFF WBC: CPT

## 2021-01-23 PROCEDURE — 81003 URINALYSIS AUTO W/O SCOPE: CPT

## 2021-01-23 PROCEDURE — 83036 HEMOGLOBIN GLYCOSYLATED A1C: CPT

## 2021-01-23 PROCEDURE — 80053 COMPREHEN METABOLIC PANEL: CPT

## 2021-01-23 PROCEDURE — 84484 ASSAY OF TROPONIN QUANT: CPT

## 2021-01-23 PROCEDURE — 82607 VITAMIN B-12: CPT

## 2021-01-23 PROCEDURE — 93005 ELECTROCARDIOGRAM TRACING: CPT | Performed by: EMERGENCY MEDICINE

## 2021-01-23 PROCEDURE — 82306 VITAMIN D 25 HYDROXY: CPT

## 2021-01-23 PROCEDURE — 71045 X-RAY EXAM CHEST 1 VIEW: CPT

## 2021-01-23 PROCEDURE — 85610 PROTHROMBIN TIME: CPT

## 2021-01-23 PROCEDURE — U0005 INFEC AGEN DETEC AMPLI PROBE: HCPCS

## 2021-01-23 RX ORDER — LABETALOL HYDROCHLORIDE 5 MG/ML
10 INJECTION, SOLUTION INTRAVENOUS EVERY 4 HOURS PRN
Status: DISCONTINUED | OUTPATIENT
Start: 2021-01-23 | End: 2021-01-24 | Stop reason: HOSPADM

## 2021-01-23 RX ORDER — LISINOPRIL 10 MG/1
10 TABLET ORAL
Status: DISCONTINUED | OUTPATIENT
Start: 2021-01-23 | End: 2021-01-23

## 2021-01-23 RX ORDER — HYDRALAZINE HYDROCHLORIDE 10 MG/1
10 TABLET, FILM COATED ORAL EVERY 8 HOURS PRN
Status: DISCONTINUED | OUTPATIENT
Start: 2021-01-23 | End: 2021-01-24 | Stop reason: HOSPADM

## 2021-01-23 RX ORDER — ASPIRIN 81 MG
81 TABLET,CHEWABLE ORAL EVERY MORNING
COMMUNITY
Start: 2021-01-12 | End: 2022-01-31

## 2021-01-23 RX ORDER — AMOXICILLIN 250 MG
2 CAPSULE ORAL 2 TIMES DAILY
Status: DISCONTINUED | OUTPATIENT
Start: 2021-01-23 | End: 2021-01-24 | Stop reason: HOSPADM

## 2021-01-23 RX ORDER — ATORVASTATIN CALCIUM 10 MG/1
10 TABLET, FILM COATED ORAL EVERY MORNING
COMMUNITY
Start: 2020-12-15 | End: 2021-03-01 | Stop reason: SDUPTHER

## 2021-01-23 RX ORDER — CYANOCOBALAMIN (VITAMIN B-12) 1000 MCG
1000 TABLET, EXTENDED RELEASE ORAL DAILY
COMMUNITY
Start: 2021-01-13 | End: 2021-01-25

## 2021-01-23 RX ORDER — CAPTOPRIL 12.5 MG/1
12.5 TABLET ORAL EVERY 8 HOURS PRN
Status: DISCONTINUED | OUTPATIENT
Start: 2021-01-23 | End: 2021-01-23

## 2021-01-23 RX ORDER — POLYETHYLENE GLYCOL 3350 17 G/17G
1 POWDER, FOR SOLUTION ORAL
Status: DISCONTINUED | OUTPATIENT
Start: 2021-01-23 | End: 2021-01-24 | Stop reason: HOSPADM

## 2021-01-23 RX ORDER — AMLODIPINE BESYLATE 10 MG/1
10 TABLET ORAL
Status: DISCONTINUED | OUTPATIENT
Start: 2021-01-23 | End: 2021-01-24 | Stop reason: HOSPADM

## 2021-01-23 RX ORDER — LABETALOL HYDROCHLORIDE 5 MG/ML
10 INJECTION, SOLUTION INTRAVENOUS
Status: DISCONTINUED | OUTPATIENT
Start: 2021-01-23 | End: 2021-01-23

## 2021-01-23 RX ORDER — LISINOPRIL 10 MG/1
10 TABLET ORAL
Status: DISCONTINUED | OUTPATIENT
Start: 2021-01-23 | End: 2021-01-24 | Stop reason: HOSPADM

## 2021-01-23 RX ORDER — BISACODYL 10 MG
10 SUPPOSITORY, RECTAL RECTAL
Status: DISCONTINUED | OUTPATIENT
Start: 2021-01-23 | End: 2021-01-24 | Stop reason: HOSPADM

## 2021-01-23 RX ORDER — AMLODIPINE BESYLATE 5 MG/1
5 TABLET ORAL
Status: DISCONTINUED | OUTPATIENT
Start: 2021-01-23 | End: 2021-01-23

## 2021-01-23 RX ADMIN — IOHEXOL 80 ML: 350 INJECTION, SOLUTION INTRAVENOUS at 16:18

## 2021-01-23 RX ADMIN — LABETALOL HYDROCHLORIDE 10 MG: 5 INJECTION, SOLUTION INTRAVENOUS at 16:58

## 2021-01-23 RX ADMIN — LISINOPRIL 10 MG: 10 TABLET ORAL at 18:38

## 2021-01-23 RX ADMIN — DOCUSATE SODIUM 50 MG AND SENNOSIDES 8.6 MG 2 TABLET: 8.6; 5 TABLET, FILM COATED ORAL at 18:38

## 2021-01-23 RX ADMIN — AMLODIPINE BESYLATE 10 MG: 10 TABLET ORAL at 18:38

## 2021-01-23 RX ADMIN — ENOXAPARIN SODIUM 40 MG: 40 INJECTION SUBCUTANEOUS at 18:38

## 2021-01-23 ASSESSMENT — ENCOUNTER SYMPTOMS
SPEECH CHANGE: 0
NAUSEA: 0
VOMITING: 0
PHOTOPHOBIA: 0
SENSORY CHANGE: 1
FEVER: 0
GASTROINTESTINAL NEGATIVE: 1
SORE THROAT: 0
SHORTNESS OF BREATH: 0
NECK PAIN: 0
HEADACHES: 1
EYE PAIN: 0
MYALGIAS: 0
EYE DISCHARGE: 0
DOUBLE VISION: 0
CONSTITUTIONAL NEGATIVE: 1
HEADACHES: 1
RESPIRATORY NEGATIVE: 1
CHILLS: 0
WEAKNESS: 0
SENSORY CHANGE: 1
TINGLING: 1
ABDOMINAL PAIN: 0
MUSCULOSKELETAL NEGATIVE: 1
FOCAL WEAKNESS: 0
LOSS OF CONSCIOUSNESS: 0
TINGLING: 1
DIZZINESS: 1
DIZZINESS: 1
CARDIOVASCULAR NEGATIVE: 1
BACK PAIN: 0
EYES NEGATIVE: 1
PSYCHIATRIC NEGATIVE: 1
COUGH: 0
EYE REDNESS: 0
BLURRED VISION: 1
TREMORS: 0

## 2021-01-23 ASSESSMENT — LIFESTYLE VARIABLES
ON A TYPICAL DAY WHEN YOU DRINK ALCOHOL HOW MANY DRINKS DO YOU HAVE: 0
HOW MANY TIMES IN THE PAST YEAR HAVE YOU HAD 5 OR MORE DRINKS IN A DAY: 0
TOTAL SCORE: 0
SUBSTANCE_ABUSE: 0
EVER HAD A DRINK FIRST THING IN THE MORNING TO STEADY YOUR NERVES TO GET RID OF A HANGOVER: NO
TOTAL SCORE: 0
ALCOHOL_USE: NO
EVER FELT BAD OR GUILTY ABOUT YOUR DRINKING: NO
AVERAGE NUMBER OF DAYS PER WEEK YOU HAVE A DRINK CONTAINING ALCOHOL: 0
CONSUMPTION TOTAL: NEGATIVE
HAVE YOU EVER FELT YOU SHOULD CUT DOWN ON YOUR DRINKING: NO
TOTAL SCORE: 0
DO YOU DRINK ALCOHOL: NO
HAVE PEOPLE ANNOYED YOU BY CRITICIZING YOUR DRINKING: NO

## 2021-01-23 ASSESSMENT — PATIENT HEALTH QUESTIONNAIRE - PHQ9
1. LITTLE INTEREST OR PLEASURE IN DOING THINGS: NOT AT ALL
SUM OF ALL RESPONSES TO PHQ9 QUESTIONS 1 AND 2: 0
2. FEELING DOWN, DEPRESSED, IRRITABLE, OR HOPELESS: NOT AT ALL

## 2021-01-23 ASSESSMENT — PAIN SCALES - WONG BAKER: WONGBAKER_NUMERICALRESPONSE: DOESN'T HURT AT ALL

## 2021-01-23 ASSESSMENT — FIBROSIS 4 INDEX
FIB4 SCORE: 1.63
FIB4 SCORE: 1.83
FIB4 SCORE: 1.83

## 2021-01-23 ASSESSMENT — PAIN DESCRIPTION - PAIN TYPE: TYPE: ACUTE PAIN

## 2021-01-23 NOTE — ED NOTES
Pt to red 6, agree with triage note.  Pt son at bedside helping with translation.  Pt son reports that they went to  and are concerned of possible stroke.  Neuro intact.  No c/o dizziness at this time.  No recent falls.  Pt BP elevated, no hx.  IV access obtained.  Blood drawn and sent to lab.   ERP to see.

## 2021-01-23 NOTE — PROGRESS NOTES
"Mj Pascual is a 75 y.o. male who presents for Dizziness (was told by his pcp his bp was high, left foot tingle and numbness, x5days)    [place] [employer]  # [id number] name: Nicole      KAZ dtr in law   HPI this is a new problem.  Mj is a 75-year-old male who presents with complaints of dizziness, high blood pressure, left side of his body tingling with intermittent numbness, headache, generalized weakness for 5 days.  He was seen by his PCP on jan 13.  He was told his BP\" was high\" but that he did not need medications at this time and that he could exercise more.. Yesterday at home his blood pressure was 144/71.  He spoke with his primary care provider about his blood pressure and his symptoms of numbness and tingling and was told that he is \"too stressed\".  He said he was told to stop wearing an continue to exercise.  He has been exercising everyday. He has been walking daily.   But for the past 5 days he is \"feeling bad\".  He has had some vision problems \" seeing things bigger\". . Left side of body and foot numb and tingling.  This occurs every 15 minutes and lasts for a few minutes at a time.  He has been feeling dizzy intermittently.  When he tries to open a door he feels that he is having a hard time seeing where the doorknob is and feels that he is unable to open the door due to lack of strength.  He also reports that he ends up walking toward the right side-- like drifting.  He feels everything that he reaches for he cannot reach out and touch easily.  He does sleep well at night without any difficulty.  Denies chest pain, shortness of breath, nausea, vomiting.  He has had no recent illness.  Treatments tried none.  No other aggravating or alleviating factors.    Review of Systems   Constitutional: Negative for chills, fever and malaise/fatigue.   HENT: Negative for congestion and sore throat.    Eyes: Positive for blurred vision. Negative for double vision, photophobia, pain, " "discharge and redness.   Respiratory: Negative for cough and shortness of breath.    Gastrointestinal: Negative for abdominal pain, nausea and vomiting.   Musculoskeletal: Negative for back pain, myalgias and neck pain.   Neurological: Positive for dizziness, tingling, sensory change and headaches. Negative for speech change and focal weakness.   Endo/Heme/Allergies: Negative for environmental allergies.   Psychiatric/Behavioral: Negative for substance abuse.       No Known Allergies  No past medical history on file.  No past surgical history on file.  No family history on file.  Social History     Tobacco Use   • Smoking status: Never Smoker   • Smokeless tobacco: Never Used   Substance Use Topics   • Alcohol use: No     Patient Active Problem List   Diagnosis   • TIA (transient ischemic attack)   • Cerebral infarction (HCC)     Current Outpatient Medications on File Prior to Visit   Medication Sig Dispense Refill   • atorvastatin (LIPITOR) 10 MG Tab Take 10 mg by mouth.     • ASPIRIN LOW DOSE 81 MG Chew Tab chewable tablet      • SV VITAMIN B-12 ER 1000 MCG Tab CR      • oxcarbazepine (TRILEPTAL) 150 MG TABS Take 1 Tab by mouth 2 times a day. 60 Tab 11   • meloxicam (MOBIC) 7.5 MG Tab Take 1 Tab by mouth every day. (Patient not taking: Reported on 1/23/2021) 20 Tab 0   • carbamazepine SR (TEGRETOL XR) 200 MG TB12 extended-release tablet Take 1 Tab by mouth every 12 hours. (Patient not taking: Reported on 1/23/2021) 60 Tab 3     No current facility-administered medications on file prior to visit.           Objective:     /90 (BP Location: Right arm, Patient Position: Sitting, BP Cuff Size: Adult)   Pulse 72   Temp 36.7 °C (98 °F) (Temporal)   Ht 1.62 m (5' 3.78\")   Wt 68.9 kg (152 lb)   SpO2 96%   BMI 26.27 kg/m²     Physical Exam  Vitals signs and nursing note reviewed.   Constitutional:       General: He is not in acute distress.     Appearance: Normal appearance. He is normal weight. He is not " ill-appearing or toxic-appearing.   HENT:      Head: Normocephalic.      Right Ear: Tympanic membrane, ear canal and external ear normal.      Left Ear: Tympanic membrane, ear canal and external ear normal.      Nose: Nose normal.      Mouth/Throat:      Lips: Pink.      Mouth: Mucous membranes are moist.   Eyes:      Extraocular Movements: Extraocular movements intact.      Conjunctiva/sclera: Conjunctivae normal.      Pupils: Pupils are equal, round, and reactive to light.   Neck:      Musculoskeletal: Full passive range of motion without pain, normal range of motion and neck supple.   Cardiovascular:      Rate and Rhythm: Normal rate and regular rhythm. Occasional extrasystoles are present.     Pulses: Normal pulses.      Heart sounds: Normal heart sounds.   Pulmonary:      Effort: Pulmonary effort is normal.   Musculoskeletal: Normal range of motion.      Right lower leg: No edema.      Left lower leg: No edema.   Lymphadenopathy:      Cervical: No cervical adenopathy.      Upper Body:      Right upper body: No supraclavicular adenopathy.      Left upper body: No supraclavicular adenopathy.   Skin:     General: Skin is warm and dry.      Capillary Refill: Capillary refill takes less than 2 seconds.      Findings: No rash.   Neurological:      General: No focal deficit present.      Mental Status: He is alert and oriented to person, place, and time.      Cranial Nerves: Cranial nerves are intact.      Sensory: Sensation is intact.      Motor: Motor function is intact.      Coordination: Romberg sign negative. Coordination normal. Finger-Nose-Finger Test abnormal. Impaired rapid alternating movements.      Gait: Gait is intact. Gait normal.      Deep Tendon Reflexes:      Reflex Scores:       Patellar reflexes are 2+ on the right side and 2+ on the left side.     Comments: Normal facial expressions  No delay in verbal expression   focuses attention during exam without difficulty  No disorientation   Clear and  coherent speech   Normal coordination  Demonstrates normal anticipated and appropriate emotions    During exam he became somewhat disoriented and had a hard time finding the chair after performing a Romberg test.  He kept turning around in circles instead of sitting down.  He was not following directions.         Psychiatric:         Attention and Perception: Attention and perception normal.         Mood and Affect: Mood normal.         Behavior: Behavior normal.         Thought Content: Thought content normal.         Assessment /Associated Orders:      1. Dizzy spells     2. Visual distortions of shape and size     3. Elevated blood pressure reading     4. Numbness and tingling of left leg     5. General weakness           Medical Decision Making:      VSS at today's acute urgent care visit.  Patient requires further evaluation and management of his acute symptoms.  I believe he requires emergency room evaluation.  I am transferring his care to [facility].    I personally reviewed prior external notes and test results pertinent to today's visit.     [facility] transfer center  called to arrange transfer to higher level of care.  Pt is to be transported via POV with family .   I have reiterated to patient that although an Urgent Care to ER transfer was made this will not necessarily expedite the ER process          Please note that this dictation was created using voice recognition software. I have made a reasonable attempt to correct obvious errors, but I expect that there are errors of grammar and possibly content that I did not discover before finalizing the note.    This note was electronically signed by Isa MARISCAL, Urgent Care

## 2021-01-23 NOTE — ED PROVIDER NOTES
ED Provider Note      CHIEF COMPLAINT  Chief Complaint   Patient presents with   • Dizziness       HPI  Mj Cornejo is a 75 y.o. male with a history of hyperlipidemia who presents with complaints of dizziness along with some numbness and tingling to the left side of his body for the past 1 week.  The patient says it feels like he is off balance, and if he gets worse he might pass out.  He was seen at [facility] with no diagnosis obtained.  The follow-up with his primary care physician yesterday who thought it was due to stress.  The patient was noted be mildly hypertensive with blood pressure in the 140s, but was not started on any blood pressure medications.  The patient went to the urgent care today because of the complaints of numbness and tingling and difficulty with his balance, and was sent to the emerge department for further evaluation.  The patient denies any pain including any headache, neck pain, chest pain, back pain, abdominal pain.  He has had no nausea, vomiting, or diarrhea.  He describes the numbness tingling and is going from the left side of his face all the way down to his left foot.  He has felt off balance and his son notes that he needs some assistance to help walk.  The patient has had no fever, shaking chills, sore throat, cough, congestion, any difficulty breathing.  He has had no urinary symptoms or diarrhea.    REVIEW OF SYSTEMS  See HPI for further details. All other systems are negative.     PAST MEDICAL HISTORY  History reviewed. No pertinent past medical history.    FAMILY HISTORY  History reviewed. No pertinent family history.    SOCIAL HISTORY  Social History     Tobacco Use   • Smoking status: Never Smoker   • Smokeless tobacco: Never Used   Substance Use Topics   • Alcohol use: No   • Drug use: No      Social History     Substance and Sexual Activity   Drug Use No       SURGICAL HISTORY  History reviewed. No pertinent surgical history.    CURRENT  "MEDICATIONS  Home Medications     Reviewed by Morris Loera (Pharmacy Intern) on 01/23/21 at 1634  Med List Status: Complete    Medication Last Dose Status    ASPIRIN LOW DOSE 81 MG Chew Tab chewable tablet 1/22/2021 Active    atorvastatin (LIPITOR) 10 MG Tab 1/23/2021 Active    carbamazepine SR (TEGRETOL XR) 200 MG TB12 extended-release tablet NOT TAKING Active    meloxicam (MOBIC) 7.5 MG Tab NOT TAKING Active    oxcarbazepine (TRILEPTAL) 150 MG TABS 1/23/2021 Active    SV VITAMIN B-12 ER 1000 MCG Tab CR 1/22/2021 Active                ALLERGIES  No Known Allergies    PHYSICAL EXAM0  VITAL SIGNS: Blood Pressure 155/72   Pulse 71   Temperature 36.4 °C (97.6 °F) (Temporal)   Respiration 16   Height 1.626 m (5' 4\")   Weight 71.7 kg (158 lb)   Oxygen Saturation 94%   Body Mass Index 27.12 kg/m²   Constitutional: Awake, alert, in no acute distress, Non-toxic appearance.   HENT: Atraumatic. Bilateral external ears normal, mucous membranes moist, throat nonerythematous without exudates, nose is normal.  Eyes: PERRL, EOMI, conjunctiva moist, noninjected.  Neck: Nontender, Normal range of motion, No nuchal rigidity, No stridor.   Lymphatic: No lymphadenopathy noted.   Cardiovascular: Regular rate and rhythm, no murmurs, rubs, gallops.  Thorax & Lungs:  Good breath sounds bilaterally, no wheezes, rales, or retractions.  No chest tenderness.  Abdomen: Bowel sounds normal, Soft, nontender, nondistended, no rebound, guarding, masses.  Back: No CVA or spinal tenderness.  Extremities: Intact distal pulses, No edema, No tenderness.   Skin: Warm, Dry, No rashes.   Musculoskeletal: No joint swelling or tenderness.  Neurologic: Alert & oriented x 3, cranial nerves II through XII intact, speech is fluent, no facial asymmetry, sensory shows subjective numbness to the left face, left arm, and left lower extremity.  And motor function shows 5/5 strength to the upper and lower extremities, no pronator drift, alternating " finger movements are intact.  Psychiatric: Affect normal, Judgment normal, Mood normal.     EKG  EKG Interpretation:  Interpreted by me    Rhythm:  Normal sinus rhythm   Rate: 77  Intervals: Normal  Axis: Left axis deviation  Ectopy: none  Conduction: Right bundle branch block, left anterior fascicular block  ST Segments: no evidence of elevation or depression  T Waves: no acute change  Q Waves: none  Clinical Impression: No acute injury or ischemic pattern.   Comparison to previous EKG from December 2014 shows no acute changes other than the right bundle branch block.    LABS  Labs Reviewed   CBC WITH DIFFERENTIAL - Abnormal; Notable for the following components:       Result Value    Hematocrit 53.8 (*)     .7 (*)     MCH 33.3 (*)     MCHC 32.2 (*)     All other components within normal limits    Narrative:     Indicate which anticoagulants the patient is on:->UNKNOWN   COMP METABOLIC PANEL - Abnormal; Notable for the following components:    Alkaline Phosphatase 108 (*)     All other components within normal limits    Narrative:     Indicate which anticoagulants the patient is on:->UNKNOWN   TROPONIN - Abnormal; Notable for the following components:    Troponin T 39 (*)     All other components within normal limits    Narrative:     Indicate which anticoagulants the patient is on:->UNKNOWN   PROTHROMBIN TIME    Narrative:     Indicate which anticoagulants the patient is on:->UNKNOWN   APTT    Narrative:     Indicate which anticoagulants the patient is on:->UNKNOWN   SARS-COV-2, PCR (IN-HOUSE)    Narrative:     Have you been in close contact with a person who is suspected  or known to be positive for COVID-19 within the last 30 days  (e.g. last seen that person < 30 days ago)->Unknown   ESTIMATED GFR    Narrative:     Indicate which anticoagulants the patient is on:->UNKNOWN       All labs reviewed by me.      RADIOLOGY/PROCEDURES  CT-CTA HEAD WITH & W/O-POST PROCESS   Final Result      1.  No evidence of  intracranial vascular occlusion or aneurysm.      2.  Again seen calcification within the right posterior parietal region consistent with old postinflammatory change.      CT-CTA NECK WITH & W/O-POST PROCESSING   Final Result      1.  No evidence of carotid or vertebral dissection or occlusion.      2.  Atherosclerotic plaque at the carotid bifurcations bilaterally. This results in less than 50% diameter narrowing.      DX-CHEST-PORTABLE (1 VIEW)   Final Result      Bibasilar atelectasis.          The radiologist's interpretations of all radiological studies have been reviewed by me.        COURSE & MEDICAL DECISION MAKING  Pertinent Labs & Imaging studies reviewed. (See chart for details)  The patient presents with strokelike symptoms.  His symptoms have been going on for the past week, and he is outside the window for TPA.  He was noted to be quite hypertensive on arrival, and his blood pressure trended down without intervention.  His NIH stroke scale at this time is 0.  CT angiogram of the head and neck were negative for any acute findings.  Chest x-ray shows bibasilar atelectasis.  EKG shows a sinus rhythm.    CBC showed a white count 9400, hemoglobin 17.3, normal differential, chemistry profile normal, troponin 39, INR 1.07.  On reexamination the patient remains neurologically intact.  However his blood pressure was elevated in the 180s again and he was given labetalol 10 mg IV.  Findings were discussed with the patient and his son.  Given his symptoms he will be admitted.  Case was discussed with the Hospitalist.    FINAL IMPRESSION  1. Dizziness    2. Paresthesia    3. Weakness    4. Hypertensive urgency          Electronically signed by: Len Herbert M.D., 1/23/2021 2:59 PM

## 2021-01-23 NOTE — ED TRIAGE NOTES
Pt to triage with his son, pt was recently seen at Urgent Care today, c/o lt leg pain / dizziness/ increasing BP, pt has had dizziness that started 1 week ago, was also seen at [facility] yesterday. Pt sent from Urgent Care for further evaluation. EKG done in triage

## 2021-01-23 NOTE — ED NOTES
Pt now c/o left leg numbness.  Same at home with coming and going of numbness.  Decreased strength now to left leg.  No other neurological findings.

## 2021-01-24 VITALS
SYSTOLIC BLOOD PRESSURE: 141 MMHG | DIASTOLIC BLOOD PRESSURE: 78 MMHG | HEIGHT: 64 IN | WEIGHT: 150.13 LBS | OXYGEN SATURATION: 94 % | RESPIRATION RATE: 17 BRPM | BODY MASS INDEX: 25.63 KG/M2 | TEMPERATURE: 98.9 F | HEART RATE: 85 BPM

## 2021-01-24 PROBLEM — I16.1 HYPERTENSIVE EMERGENCY: Status: RESOLVED | Noted: 2021-01-23 | Resolved: 2021-01-24

## 2021-01-24 PROBLEM — R42 DIZZINESS: Status: RESOLVED | Noted: 2021-01-23 | Resolved: 2021-01-24

## 2021-01-24 PROBLEM — R79.89 TROPONIN LEVEL ELEVATED: Status: RESOLVED | Noted: 2021-01-23 | Resolved: 2021-01-24

## 2021-01-24 LAB
ALBUMIN SERPL BCP-MCNC: 4 G/DL (ref 3.2–4.9)
ALBUMIN/GLOB SERPL: 1.5 G/DL
ALP SERPL-CCNC: 97 U/L (ref 30–99)
ALT SERPL-CCNC: 26 U/L (ref 2–50)
ANION GAP SERPL CALC-SCNC: 10 MMOL/L (ref 7–16)
AST SERPL-CCNC: 25 U/L (ref 12–45)
BASOPHILS # BLD AUTO: 0.7 % (ref 0–1.8)
BASOPHILS # BLD: 0.07 K/UL (ref 0–0.12)
BILIRUB SERPL-MCNC: 0.7 MG/DL (ref 0.1–1.5)
BUN SERPL-MCNC: 12 MG/DL (ref 8–22)
CALCIUM SERPL-MCNC: 10.2 MG/DL (ref 8.5–10.5)
CHLORIDE SERPL-SCNC: 105 MMOL/L (ref 96–112)
CHOLEST SERPL-MCNC: 121 MG/DL (ref 100–199)
CO2 SERPL-SCNC: 22 MMOL/L (ref 20–33)
CREAT SERPL-MCNC: 0.73 MG/DL (ref 0.5–1.4)
EOSINOPHIL # BLD AUTO: 0.19 K/UL (ref 0–0.51)
EOSINOPHIL NFR BLD: 2 % (ref 0–6.9)
ERYTHROCYTE [DISTWIDTH] IN BLOOD BY AUTOMATED COUNT: 44.5 FL (ref 35.9–50)
GLOBULIN SER CALC-MCNC: 2.7 G/DL (ref 1.9–3.5)
GLUCOSE SERPL-MCNC: 101 MG/DL (ref 65–99)
HCT VFR BLD AUTO: 49.3 % (ref 42–52)
HDLC SERPL-MCNC: 30 MG/DL
HGB BLD-MCNC: 16.2 G/DL (ref 14–18)
IMM GRANULOCYTES # BLD AUTO: 0.05 K/UL (ref 0–0.11)
IMM GRANULOCYTES NFR BLD AUTO: 0.5 % (ref 0–0.9)
LDLC SERPL CALC-MCNC: 41 MG/DL
LYMPHOCYTES # BLD AUTO: 2.28 K/UL (ref 1–4.8)
LYMPHOCYTES NFR BLD: 24.1 % (ref 22–41)
MCH RBC QN AUTO: 33.7 PG (ref 27–33)
MCHC RBC AUTO-ENTMCNC: 32.9 G/DL (ref 33.7–35.3)
MCV RBC AUTO: 102.5 FL (ref 81.4–97.8)
MONOCYTES # BLD AUTO: 0.71 K/UL (ref 0–0.85)
MONOCYTES NFR BLD AUTO: 7.5 % (ref 0–13.4)
NEUTROPHILS # BLD AUTO: 6.16 K/UL (ref 1.82–7.42)
NEUTROPHILS NFR BLD: 65.2 % (ref 44–72)
NRBC # BLD AUTO: 0 K/UL
NRBC BLD-RTO: 0 /100 WBC
PLATELET # BLD AUTO: 247 K/UL (ref 164–446)
PMV BLD AUTO: 11.5 FL (ref 9–12.9)
POTASSIUM SERPL-SCNC: 3.9 MMOL/L (ref 3.6–5.5)
PROT SERPL-MCNC: 6.7 G/DL (ref 6–8.2)
RBC # BLD AUTO: 4.81 M/UL (ref 4.7–6.1)
SODIUM SERPL-SCNC: 137 MMOL/L (ref 135–145)
TRIGL SERPL-MCNC: 250 MG/DL (ref 0–149)
WBC # BLD AUTO: 9.5 K/UL (ref 4.8–10.8)

## 2021-01-24 PROCEDURE — 85025 COMPLETE CBC W/AUTO DIFF WBC: CPT

## 2021-01-24 PROCEDURE — A9270 NON-COVERED ITEM OR SERVICE: HCPCS | Performed by: STUDENT IN AN ORGANIZED HEALTH CARE EDUCATION/TRAINING PROGRAM

## 2021-01-24 PROCEDURE — 80053 COMPREHEN METABOLIC PANEL: CPT

## 2021-01-24 PROCEDURE — 96372 THER/PROPH/DIAG INJ SC/IM: CPT

## 2021-01-24 PROCEDURE — 700102 HCHG RX REV CODE 250 W/ 637 OVERRIDE(OP): Performed by: NURSE PRACTITIONER

## 2021-01-24 PROCEDURE — G0378 HOSPITAL OBSERVATION PER HR: HCPCS

## 2021-01-24 PROCEDURE — 80061 LIPID PANEL: CPT

## 2021-01-24 PROCEDURE — 700111 HCHG RX REV CODE 636 W/ 250 OVERRIDE (IP): Performed by: STUDENT IN AN ORGANIZED HEALTH CARE EDUCATION/TRAINING PROGRAM

## 2021-01-24 PROCEDURE — 700102 HCHG RX REV CODE 250 W/ 637 OVERRIDE(OP): Performed by: STUDENT IN AN ORGANIZED HEALTH CARE EDUCATION/TRAINING PROGRAM

## 2021-01-24 PROCEDURE — A9270 NON-COVERED ITEM OR SERVICE: HCPCS | Performed by: NURSE PRACTITIONER

## 2021-01-24 PROCEDURE — 99217 PR OBSERVATION CARE DISCHARGE: CPT | Performed by: INTERNAL MEDICINE

## 2021-01-24 RX ORDER — MECLIZINE HYDROCHLORIDE 25 MG/1
25 TABLET ORAL 3 TIMES DAILY PRN
Qty: 30 TAB | Refills: 0 | Status: ON HOLD | OUTPATIENT
Start: 2021-01-24 | End: 2021-01-28

## 2021-01-24 RX ORDER — AMLODIPINE BESYLATE 10 MG/1
5 TABLET ORAL DAILY
Qty: 15 TAB | Refills: 0 | Status: SHIPPED | OUTPATIENT
Start: 2021-01-24 | End: 2021-02-19 | Stop reason: SDUPTHER

## 2021-01-24 RX ORDER — MECLIZINE HYDROCHLORIDE 25 MG/1
25 TABLET ORAL 3 TIMES DAILY PRN
Status: DISCONTINUED | OUTPATIENT
Start: 2021-01-24 | End: 2021-01-24 | Stop reason: HOSPADM

## 2021-01-24 RX ADMIN — ENOXAPARIN SODIUM 40 MG: 40 INJECTION SUBCUTANEOUS at 04:57

## 2021-01-24 RX ADMIN — MECLIZINE HYDROCHLORIDE 25 MG: 25 TABLET ORAL at 10:38

## 2021-01-24 RX ADMIN — HYDRALAZINE HYDROCHLORIDE 10 MG: 10 TABLET, FILM COATED ORAL at 10:43

## 2021-01-24 ASSESSMENT — PATIENT HEALTH QUESTIONNAIRE - PHQ9
SUM OF ALL RESPONSES TO PHQ9 QUESTIONS 1 AND 2: 0
1. LITTLE INTEREST OR PLEASURE IN DOING THINGS: NOT AT ALL
2. FEELING DOWN, DEPRESSED, IRRITABLE, OR HOPELESS: NOT AT ALL

## 2021-01-24 ASSESSMENT — PAIN DESCRIPTION - PAIN TYPE
TYPE: ACUTE PAIN
TYPE: ACUTE PAIN

## 2021-01-24 ASSESSMENT — PAIN SCALES - WONG BAKER
WONGBAKER_NUMERICALRESPONSE: DOESN'T HURT AT ALL
WONGBAKER_NUMERICALRESPONSE: DOESN'T HURT AT ALL

## 2021-01-24 NOTE — DISCHARGE SUMMARY
Discharge Summary    CHIEF COMPLAINT ON ADMISSION  Chief Complaint   Patient presents with   • Dizziness       Reason for Admission  Dizziness     Admission Date  1/23/2021    CODE STATUS  Full Code    HPI & HOSPITAL COURSE  Mr. Cornejo is a 75 y.o. 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  Patient seen recently by PCP where he was noted for hypertension.  As this was his first reading hold off on initiating blood pressure medication and will monitoring patient.  Patient has been taking his blood pressure at home and reports his pressures are in the higher side.  He does correlate worsening of his neurological symptoms with high blood pressure readings. At the emergency department, vital signs with hypertension with SBP in the 190s.  Patient saturating well on room air. CBC without leukocytosis or anemia but noted for polycythemia.  Chemistry normal.  EKG with normal sinus rhythm.  Troponin at 39.  Head and neck CTA normal.  Chest x-ray with bilateral atelectasis.  He received 1 dose of labetalol and was admitted for further work-up and management.    Patient seen and examined prior to discharged.  Meclizine was added in to patient's regimen of which improved patient's dizziness symptoms.  Patient's SBP stabilized within 130-140s.  Patient highly recommended to follow-up with PCP.  Patient given 7-day prescription for meclizine and follow-up with PCP.  Amlodipine was also added for his blood pressure due to admission of hypertensive urgency.  Patient and daughter was given instructions prior to being discharged.  All questions and concerns answered prior to being discharged.  Patient discharged home.        Therefore, he is discharged in good and stable condition to  home with close outpatient follow-up.    The patient recovered much more quickly than anticipated on admission.    Discharge Date  01/24/21      FOLLOW UP ITEMS POST DISCHARGE  Please call [phone] to schedule PCP appointment for patient.    Required specialty appointments include:       Discharge Instructions per JOHANA Guido  -Follow-up with PCP  -Prescribed meclizine for 7 days  -Added amlodipine 5 mg daily  -Patient needs to monitor blood pressure  -Patient instructed to move slowly from one position to the other    DIET: Cardiac    ACTIVITY: As tolerated    DIAGNOSIS: Dizziness    Return to ER if symptoms persist, chest pain, palpitations, shortness of breath, numbness, tingling, weakness, and high fevers.    DISCHARGE DIAGNOSES  Principal Problem (Resolved):    Hypertensive emergency POA: Unknown  Active Problems:    Dyslipidemia POA: Unknown    Polycythemia POA: Unknown  Resolved Problems:    Dizziness POA: Unknown    Troponin level elevated POA: Unknown      FOLLOW UP  No future appointments.  Maryanne Caballero M.D.  [address]  [place] [facility] [address]  [phone]    Schedule an appointment as soon as possible for a visit in 1 week        MEDICATIONS ON DISCHARGE     Medication List      START taking these medications      Instructions   amLODIPine 10 MG Tabs  Commonly known as: NORVASC   Take 0.5 Tabs by mouth every day for 30 days.  Dose: 5 mg     meclizine 25 MG Tabs  Commonly known as: ANTIVERT   Take 1 Tab by mouth 3 times a day as needed for Dizziness for up to 7 days.  Dose: 25 mg        CONTINUE taking these medications      Instructions   Aspirin Low Dose 81 MG Chew chewable tablet  Generic drug: aspirin   Chew 81 mg every day.  Dose: 81 mg     atorvastatin 10 MG Tabs  Commonly known as: LIPITOR   Take 10 mg by mouth every day.  Dose: 10 mg     carbamazepine  MG Tb12 extended-release tablet  Commonly known as: TEGRETOL XR   Take 1 Tab by mouth every 12  hours.  Dose: 200 mg     meloxicam 7.5 MG Tabs  Commonly known as: MOBIC   Take 1 Tab by mouth every day.  Dose: 7.5 mg     OXcarbazepine 150 MG Tabs  Commonly known as: TRILEPTAL   Take 1 Tab by mouth 2 times a day.  Dose: 150 mg     SV Vitamin B-12 ER 1000 MCG Tbcr  Generic drug: Cyanocobalamin   Take 1,000 mcg by mouth every day.  Dose: 1,000 mcg            Allergies  No Known Allergies    DIET  Orders Placed This Encounter   Procedures   • Diet Order Diet: Cardiac     Standing Status:   Standing     Number of Occurrences:   1     Order Specific Question:   Diet:     Answer:   Cardiac [6]       ACTIVITY  As tolerated and directed by skilled nursing.  Weight bearing as tolerated    CONSULTATIONS  NONE    PROCEDURES  NONE    IMAGING    CT-CTA HEAD WITH & W/O-POST PROCESS   Final Result      1.  No evidence of intracranial vascular occlusion or aneurysm.      2.  Again seen calcification within the right posterior parietal region consistent with old postinflammatory change.      CT-CTA NECK WITH & W/O-POST PROCESSING   Final Result      1.  No evidence of carotid or vertebral dissection or occlusion.      2.  Atherosclerotic plaque at the carotid bifurcations bilaterally. This results in less than 50% diameter narrowing.      DX-CHEST-PORTABLE (1 VIEW)   Final Result      Bibasilar atelectasis.            LABORATORY  Lab Results   Component Value Date    SODIUM 137 01/24/2021    POTASSIUM 3.9 01/24/2021    CHLORIDE 105 01/24/2021    CO2 22 01/24/2021    GLUCOSE 101 (H) 01/24/2021    BUN 12 01/24/2021    CREATININE 0.73 01/24/2021        Lab Results   Component Value Date    WBC 9.5 01/24/2021    HEMOGLOBIN 16.2 01/24/2021    HEMATOCRIT 49.3 01/24/2021    PLATELETCT 247 01/24/2021        Total time of the discharge process exceeds 36 minutes.  ====================================================================================  Please note that this dictation was created using voice recognition software. I have made  every reasonable attempt to correct obvious errors, but there may be errors of grammar and possibly content that I did not discover before finalizing the note.    Electronically signed by:  MARCY Berman, MSN, APRN, FNP-C  Hospitalist Services  [facility]  [phone]  [email or web address]  01/24/21    152

## 2021-01-24 NOTE — DISCHARGE INSTRUCTIONS
Discharge Instructions    Discharged to home by car with relative. Discharged via walking, hospital escort: Yes.  Special equipment needed: Not Applicable    Be sure to schedule a follow-up appointment with your primary care doctor or any specialists as instructed.     Discharge Plan:   Diet Plan: Discussed  Activity Level: Discussed  Confirmed Follow up Appointment: Patient to Call and Schedule Appointment  Confirmed Symptoms Management: Discussed  Medication Reconciliation Updated: Yes  Influenza Vaccine Indication: Patient Refuses    I understand that a diet low in cholesterol, fat, and sodium is recommended for good health. Unless I have been given specific instructions below for another diet, I accept this instruction as my diet prescription.   Other diet: Regular Diet    Special Instructions: None    · Is patient discharged on Warfarin / Coumadin?   No     Depression / Suicide Risk    As you are discharged from this [facility] facility, it is important to learn how to keep safe from harming yourself.    Recognize the warning signs:  · Abrupt changes in personality, positive or negative- including increase in energy   · Giving away possessions  · Change in eating patterns- significant weight changes-  positive or negative  · Change in sleeping patterns- unable to sleep or sleeping all the time   · Unwillingness or inability to communicate  · Depression  · Unusual sadness, discouragement and loneliness  · Talk of wanting to die  · Neglect of personal appearance   · Rebelliousness- reckless behavior  · Withdrawal from people/activities they love  · Confusion- inability to concentrate     If you or a loved one observes any of these behaviors or has concerns about self-harm, here's what you can do:  · Talk about it- your feelings and reasons for harming yourself  · Remove any means that you might use to hurt yourself (examples: pills, rope, extension cords, firearm)  · Get professional help from the community  (Mental Health, Substance Abuse, psychological counseling)  · Do not be alone:Call your Safe Contact- someone whom you trust who will be there for you.  · Call your local CRISIS HOTLINE 378-1099 or 588-535-0760  · Call your local Children's Mobile Crisis Response Team [place] (186) 686-0327 or www.myhomemove  · Call the toll free National Suicide Prevention Hotlines   · National Suicide Prevention Lifeline 550-648-TKIN (4784)  · [employer] Network 800-SUICIDE (845-7377)        FOLLOW UP ITEMS POST DISCHARGE  Please call [phone] to schedule PCP appointment for patient.    Required specialty appointments include:       Discharge Instructions per India Berman, A.P.R.N.  -Follow-up with PCP  -Prescribed meclizine for 7 days  -Added amlodipine 5 mg daily  -Patient needs to monitor blood pressure  -Patient instructed to move slowly from one position to the other    DIET: Cardiac    ACTIVITY: As tolerated    DIAGNOSIS: Dizziness    Return to ER if symptoms persist, chest pain, palpitations, shortness of breath, numbness, tingling, weakness, and high fevers.

## 2021-01-24 NOTE — PROGRESS NOTES
Received in bed, aox4, Slovak  speaking,  sr with  RBAFSHAN on monitor. Assessment as per CDU. Call light within reach. Needs attended. Plan of care discussed and understood.

## 2021-01-24 NOTE — PROGRESS NOTES
Discuss plan of care. Talk with his son and discuss medication . Negative orthostatic. Continue monitoring BP.

## 2021-01-24 NOTE — PROGRESS NOTES
Resting in the recliner, comfortable. No complaints made. Call light within reach. To continue to monitor.

## 2021-01-24 NOTE — PROGRESS NOTES
Tele room called, pt. Is having frequent pacs. With complaints of dizziness on ambulation, with 1 person assist. To continue to monitor.

## 2021-01-24 NOTE — ED NOTES
Med rec complete per pt and family at bedside with RX bottles  Bottles reviewed and returned  Allergies reviewed - NKDA  No ABX in last 14 days

## 2021-01-24 NOTE — H&P
Hospital Medicine History & Physical Note    Date of Service  1/23/2021    Primary Care Physician  Maryanne Caballero M.D.    Consultants  None    Code Status  Prior    Chief Complaint  Chief Complaint   Patient presents with   • Dizziness       History of Presenting Illness  75 y.o. 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  Patient seen recently by PCP where he was noted for hypertension.  As this was his first reading hold off on initiating blood pressure medication and will monitoring patient.  Patient has been taking his blood pressure at home and reports his pressures are in the higher side.  He does correlate worsening of his neurological symptoms with high blood pressure readings. At the emergency department, vital signs with hypertension with SBP in the 190s.  Patient saturating well on room air. CBC without leukocytosis or anemia but noted for polycythemia.  Chemistry normal.  EKG with normal sinus rhythm.  Troponin at 39.  Head and neck CTA normal.  Chest x-ray with bilateral atelectasis.  He received 1 dose of labetalol and was admitted for further work-up and management.    Review of Systems  Review of Systems   Constitutional: Negative.    HENT: Negative.    Eyes: Negative.    Respiratory: Negative.    Cardiovascular: Negative.    Gastrointestinal: Negative.    Genitourinary: Negative.    Musculoskeletal: Negative.    Skin: Negative.    Neurological: Positive for dizziness, tingling, sensory change and headaches. Negative for tremors, loss of consciousness and weakness.   Endo/Heme/Allergies: Negative.    Psychiatric/Behavioral: Negative.        Past Medical History  Dyslipidemia    Surgical History  Denied    Family History  Mother had stroke    Social  History   reports that he has never smoked. He has never used smokeless tobacco. He reports that he does not drink alcohol or use drugs.    Allergies  No Known Allergies    Medications  Prior to Admission Medications   Prescriptions Last Dose Informant Patient Reported? Taking?   ASPIRIN LOW DOSE 81 MG Chew Tab chewable tablet 1/22/2021 at unknown Rx Bottle (For Med Information) Yes No   Sig: Chew 81 mg every day.   SV VITAMIN B-12 ER 1000 MCG Tab CR 1/22/2021 at unknown Rx Bottle (For Med Information) Yes No   Sig: Take 1,000 mcg by mouth every day.   atorvastatin (LIPITOR) 10 MG Tab 1/23/2021 at am Rx Bottle (For Med Information) Yes No   Sig: Take 10 mg by mouth every day.   carbamazepine SR (TEGRETOL XR) 200 MG TB12 extended-release tablet NOT TAKING at NOT TAKING Rx Bottle (For Med Information) No No   Sig: Take 1 Tab by mouth every 12 hours.   Patient not taking: Reported on 1/23/2021   meloxicam (MOBIC) 7.5 MG Tab NOT TAKING at NOT TAKING Rx Bottle (For Med Information) No No   Sig: Take 1 Tab by mouth every day.   Patient not taking: Reported on 1/23/2021   oxcarbazepine (TRILEPTAL) 150 MG TABS 1/23/2021 at am Rx Bottle (For Med Information) No No   Sig: Take 1 Tab by mouth 2 times a day.      Facility-Administered Medications: None       Physical Exam  Temp:  [36.4 °C (97.6 °F)] 36.4 °C (97.6 °F)  Pulse:  [66-77] 71  Resp:  [16] 16  BP: (155-193)/() 155/72  SpO2:  [93 %-96 %] 94 %    Physical Exam  Constitutional:       General: He is not in acute distress.     Appearance: Normal appearance. He is not ill-appearing, toxic-appearing or diaphoretic.   HENT:      Head: Normocephalic and atraumatic.      Nose: Nose normal. No congestion.      Mouth/Throat:      Mouth: Mucous membranes are moist.   Eyes:      Extraocular Movements: Extraocular movements intact.      Pupils: Pupils are equal, round, and reactive to light.   Neck:      Musculoskeletal: Normal range of motion and neck supple.    Cardiovascular:      Rate and Rhythm: Normal rate and regular rhythm.      Pulses: Normal pulses.      Heart sounds: Normal heart sounds.   Pulmonary:      Effort: Pulmonary effort is normal.      Breath sounds: Normal breath sounds.   Abdominal:      General: Bowel sounds are normal.      Palpations: Abdomen is soft.      Tenderness: There is no abdominal tenderness.   Musculoskeletal: Normal range of motion.         General: No swelling.   Skin:     General: Skin is warm.      Coloration: Skin is not jaundiced.   Neurological:      General: No focal deficit present.      Mental Status: He is alert and oriented to person, place, and time. Mental status is at baseline.      Cranial Nerves: No cranial nerve deficit.   Psychiatric:         Mood and Affect: Mood normal.         Behavior: Behavior normal.         Thought Content: Thought content normal.         Judgment: Judgment normal.         Laboratory:  Recent Labs     01/23/21  1427   WBC 9.4   RBC 5.19   HEMOGLOBIN 17.3   HEMATOCRIT 53.8*   .7*   MCH 33.3*   MCHC 32.2*   RDW 44.9   PLATELETCT 261   MPV 11.7     Recent Labs     01/23/21  1427   SODIUM 137   POTASSIUM 4.3   CHLORIDE 103   CO2 26   GLUCOSE 88   BUN 10   CREATININE 0.77   CALCIUM 10.5     Recent Labs     01/23/21  1427   ALTSGPT 36   ASTSGOT 34   ALKPHOSPHAT 108*   TBILIRUBIN 0.5   GLUCOSE 88     Recent Labs     01/23/21  1427   APTT 33.8   INR 1.07     No results for input(s): NTPROBNP in the last 72 hours.      Recent Labs     01/23/21  1427   TROPONINT 39*       Imaging:  CT-CTA HEAD WITH & W/O-POST PROCESS   Final Result      1.  No evidence of intracranial vascular occlusion or aneurysm.      2.  Again seen calcification within the right posterior parietal region consistent with old postinflammatory change.      CT-CTA NECK WITH & W/O-POST PROCESSING   Final Result      1.  No evidence of carotid or vertebral dissection or occlusion.      2.  Atherosclerotic plaque at the carotid  bifurcations bilaterally. This results in less than 50% diameter narrowing.      DX-CHEST-PORTABLE (1 VIEW)   Final Result      Bibasilar atelectasis.            Assessment/Plan:  I anticipate this patient will require at least two midnights for appropriate medical management, necessitating inpatient admission.    * Hypertensive emergency  Assessment & Plan  SBP in the 190s at the ED  Complicated by high troponins  Head and neck imaging normal  Received 1 dose of labetalol  Admit to medical observation  Start lisinopril  Start amlodipine  Captopril 3 times daily as needed  Labetalol as needed  Pending UA and microalbumin  Pending lipid profile  Labs in a.m.    Dizziness  Assessment & Plan  Reporting 1 week history of worsening dizziness  Describes as imbalance and feeling the room spinning  Patient and son relating worsening of symptoms with hypertension  Likely secondary to hypertensive emergency  Admit to medical floor  Blood pressure management  PT on a.m.    Troponin level elevated  Assessment & Plan  Troponin at 39  Patient chest pain-free  EKG with normal sinus rhythm  Trend troponin    Dyslipidemia  Assessment & Plan  Continue home statin regimen    Polycythemia  Assessment & Plan  Hct>49 for years  Remote history of smoking  CBC not suggestive of relative phosphatemia  Pulse ox at rest normal  Normal renal and liver function  Etiology still unclear  Current neurological symptoms likely secondary to hypertensive emergency  Control hypertension first, then consider hyperviscosity etiologies  Pending 6-minute walk test    DVT prophylaxis Lovenox   PACU

## 2021-01-24 NOTE — CARE PLAN
Problem: Communication  Goal: The ability to communicate needs accurately and effectively will improve  Outcome: MET     Problem: Safety  Goal: Will remain free from injury  Outcome: MET     Problem: Medication  Goal: Compliance with prescribed medication will improve  Outcome: MET     Problem: Knowledge Deficit  Goal: Knowledge of disease process/condition, treatment plan, diagnostic tests, and medications will improve  Outcome: MET

## 2021-01-24 NOTE — ASSESSMENT & PLAN NOTE
Reporting 1 week history of worsening dizziness  Describes as imbalance and feeling the room spinning  Patient and son relating worsening of symptoms with hypertension  Likely secondary to hypertensive emergency  Admit to medical floor  Blood pressure management  PT on a.m.

## 2021-01-24 NOTE — PROGRESS NOTES
With complaints of dizziness on the way back from  the bathroom bp re- checked 119/64, sr with pac, to continue to monitor.

## 2021-01-24 NOTE — ASSESSMENT & PLAN NOTE
SBP in the 190s at the ED  Complicated by high troponins  Head and neck imaging normal  Received 1 dose of labetalol  Admit to medical observation  Start lisinopril  Start amlodipine  Captopril 3 times daily as needed  Labetalol as needed  Pending UA and microalbumin  Pending lipid profile  Labs in a.m.

## 2021-01-24 NOTE — CARE PLAN
Problem: Safety  Goal: Will remain free from falls  Outcome: MET     Problem: Psychosocial Needs:  Goal: Level of anxiety will decrease  Outcome: MET     Problem: Knowledge Deficit  Goal: Knowledge of the prescribed therapeutic regimen will improve  Outcome: MET

## 2021-01-24 NOTE — ASSESSMENT & PLAN NOTE
Hct>49 for years  Remote history of smoking  CBC not suggestive of relative phosphatemia  Pulse ox at rest normal  Normal renal and liver function  Etiology still unclear  Current neurological symptoms likely secondary to hypertensive emergency  Control hypertension first, then consider hyperviscosity etiologies  Pending 6-minute walk test

## 2021-01-25 ENCOUNTER — HOSPITAL ENCOUNTER (INPATIENT)
Facility: MEDICAL CENTER | Age: 76
LOS: 1 days | DRG: 101 | End: 2021-01-28
Attending: EMERGENCY MEDICINE | Admitting: STUDENT IN AN ORGANIZED HEALTH CARE EDUCATION/TRAINING PROGRAM
Payer: MEDICARE

## 2021-01-25 ENCOUNTER — APPOINTMENT (OUTPATIENT)
Dept: RADIOLOGY | Facility: MEDICAL CENTER | Age: 76
DRG: 101 | End: 2021-01-25
Attending: EMERGENCY MEDICINE
Payer: MEDICARE

## 2021-01-25 DIAGNOSIS — R53.1 WEAKNESS: ICD-10-CM

## 2021-01-25 PROBLEM — R56.9 SEIZURE-LIKE ACTIVITY (HCC): Status: ACTIVE | Noted: 2021-01-25

## 2021-01-25 LAB
ALBUMIN SERPL BCP-MCNC: 4.3 G/DL (ref 3.2–4.9)
ALBUMIN/GLOB SERPL: 1.4 G/DL
ALP SERPL-CCNC: 112 U/L (ref 30–99)
ALT SERPL-CCNC: 29 U/L (ref 2–50)
ANION GAP SERPL CALC-SCNC: 10 MMOL/L (ref 7–16)
APPEARANCE UR: CLEAR
AST SERPL-CCNC: 35 U/L (ref 12–45)
BASOPHILS # BLD AUTO: 0.5 % (ref 0–1.8)
BASOPHILS # BLD: 0.06 K/UL (ref 0–0.12)
BILIRUB SERPL-MCNC: 0.5 MG/DL (ref 0.1–1.5)
BILIRUB UR QL STRIP.AUTO: NEGATIVE
BUN SERPL-MCNC: 14 MG/DL (ref 8–22)
CALCIUM SERPL-MCNC: 10.5 MG/DL (ref 8.5–10.5)
CHLORIDE SERPL-SCNC: 100 MMOL/L (ref 96–112)
CO2 SERPL-SCNC: 22 MMOL/L (ref 20–33)
COLOR UR: YELLOW
CREAT SERPL-MCNC: 1.06 MG/DL (ref 0.5–1.4)
EKG IMPRESSION: NORMAL
EOSINOPHIL # BLD AUTO: 0.16 K/UL (ref 0–0.51)
EOSINOPHIL NFR BLD: 1.4 % (ref 0–6.9)
ERYTHROCYTE [DISTWIDTH] IN BLOOD BY AUTOMATED COUNT: 44 FL (ref 35.9–50)
GLOBULIN SER CALC-MCNC: 3.1 G/DL (ref 1.9–3.5)
GLUCOSE SERPL-MCNC: 99 MG/DL (ref 65–99)
GLUCOSE UR STRIP.AUTO-MCNC: NEGATIVE MG/DL
HCT VFR BLD AUTO: 51.7 % (ref 42–52)
HGB BLD-MCNC: 17.4 G/DL (ref 14–18)
IMM GRANULOCYTES # BLD AUTO: 0.08 K/UL (ref 0–0.11)
IMM GRANULOCYTES NFR BLD AUTO: 0.7 % (ref 0–0.9)
KETONES UR STRIP.AUTO-MCNC: NEGATIVE MG/DL
LEUKOCYTE ESTERASE UR QL STRIP.AUTO: NEGATIVE
LYMPHOCYTES # BLD AUTO: 3.42 K/UL (ref 1–4.8)
LYMPHOCYTES NFR BLD: 29.6 % (ref 22–41)
MCH RBC QN AUTO: 34.1 PG (ref 27–33)
MCHC RBC AUTO-ENTMCNC: 33.7 G/DL (ref 33.7–35.3)
MCV RBC AUTO: 101.4 FL (ref 81.4–97.8)
MICRO URNS: NORMAL
MONOCYTES # BLD AUTO: 0.7 K/UL (ref 0–0.85)
MONOCYTES NFR BLD AUTO: 6.1 % (ref 0–13.4)
NEUTROPHILS # BLD AUTO: 7.15 K/UL (ref 1.82–7.42)
NEUTROPHILS NFR BLD: 61.7 % (ref 44–72)
NITRITE UR QL STRIP.AUTO: NEGATIVE
NRBC # BLD AUTO: 0 K/UL
NRBC BLD-RTO: 0 /100 WBC
PH UR STRIP.AUTO: 6.5 [PH] (ref 5–8)
PLATELET # BLD AUTO: 254 K/UL (ref 164–446)
PMV BLD AUTO: 11.7 FL (ref 9–12.9)
POTASSIUM SERPL-SCNC: 4.3 MMOL/L (ref 3.6–5.5)
PROT SERPL-MCNC: 7.4 G/DL (ref 6–8.2)
PROT UR QL STRIP: NEGATIVE MG/DL
RBC # BLD AUTO: 5.1 M/UL (ref 4.7–6.1)
RBC UR QL AUTO: NEGATIVE
SODIUM SERPL-SCNC: 132 MMOL/L (ref 135–145)
SP GR UR STRIP.AUTO: 1.01
TROPONIN T SERPL-MCNC: 31 NG/L (ref 6–19)
UROBILINOGEN UR STRIP.AUTO-MCNC: 0.2 MG/DL
WBC # BLD AUTO: 11.6 K/UL (ref 4.8–10.8)

## 2021-01-25 PROCEDURE — 96374 THER/PROPH/DIAG INJ IV PUSH: CPT

## 2021-01-25 PROCEDURE — 99220 PR INITIAL OBSERVATION CARE,LEVL III: CPT | Performed by: INTERNAL MEDICINE

## 2021-01-25 PROCEDURE — 700111 HCHG RX REV CODE 636 W/ 250 OVERRIDE (IP): Performed by: EMERGENCY MEDICINE

## 2021-01-25 PROCEDURE — 36415 COLL VENOUS BLD VENIPUNCTURE: CPT

## 2021-01-25 PROCEDURE — 80053 COMPREHEN METABOLIC PANEL: CPT

## 2021-01-25 PROCEDURE — G0378 HOSPITAL OBSERVATION PER HR: HCPCS

## 2021-01-25 PROCEDURE — 81003 URINALYSIS AUTO W/O SCOPE: CPT

## 2021-01-25 PROCEDURE — U0005 INFEC AGEN DETEC AMPLI PROBE: HCPCS

## 2021-01-25 PROCEDURE — 70450 CT HEAD/BRAIN W/O DYE: CPT

## 2021-01-25 PROCEDURE — 71045 X-RAY EXAM CHEST 1 VIEW: CPT

## 2021-01-25 PROCEDURE — 85025 COMPLETE CBC W/AUTO DIFF WBC: CPT

## 2021-01-25 PROCEDURE — 84484 ASSAY OF TROPONIN QUANT: CPT

## 2021-01-25 PROCEDURE — U0003 INFECTIOUS AGENT DETECTION BY NUCLEIC ACID (DNA OR RNA); SEVERE ACUTE RESPIRATORY SYNDROME CORONAVIRUS 2 (SARS-COV-2) (CORONAVIRUS DISEASE [COVID-19]), AMPLIFIED PROBE TECHNIQUE, MAKING USE OF HIGH THROUGHPUT TECHNOLOGIES AS DESCRIBED BY CMS-2020-01-R: HCPCS

## 2021-01-25 PROCEDURE — 93005 ELECTROCARDIOGRAM TRACING: CPT | Performed by: EMERGENCY MEDICINE

## 2021-01-25 PROCEDURE — 99285 EMERGENCY DEPT VISIT HI MDM: CPT

## 2021-01-25 RX ORDER — CARBAMAZEPINE 100 MG/1
200 TABLET, EXTENDED RELEASE ORAL EVERY 12 HOURS
Status: DISCONTINUED | OUTPATIENT
Start: 2021-01-25 | End: 2021-01-26

## 2021-01-25 RX ORDER — BISACODYL 10 MG
10 SUPPOSITORY, RECTAL RECTAL
Status: DISCONTINUED | OUTPATIENT
Start: 2021-01-25 | End: 2021-01-28 | Stop reason: HOSPADM

## 2021-01-25 RX ORDER — ASPIRIN 81 MG/1
81 TABLET, CHEWABLE ORAL EVERY MORNING
Status: DISCONTINUED | OUTPATIENT
Start: 2021-01-26 | End: 2021-01-28 | Stop reason: HOSPADM

## 2021-01-25 RX ORDER — AMOXICILLIN 250 MG
2 CAPSULE ORAL 2 TIMES DAILY
Status: DISCONTINUED | OUTPATIENT
Start: 2021-01-25 | End: 2021-01-28 | Stop reason: HOSPADM

## 2021-01-25 RX ORDER — MELOXICAM 7.5 MG/1
7.5 TABLET ORAL DAILY
Status: DISCONTINUED | OUTPATIENT
Start: 2021-01-26 | End: 2021-01-25

## 2021-01-25 RX ORDER — OXCARBAZEPINE 150 MG/1
150 TABLET, FILM COATED ORAL 2 TIMES DAILY
Status: DISCONTINUED | OUTPATIENT
Start: 2021-01-25 | End: 2021-01-26

## 2021-01-25 RX ORDER — LORAZEPAM 2 MG/ML
4 INJECTION INTRAMUSCULAR
Status: DISCONTINUED | OUTPATIENT
Start: 2021-01-25 | End: 2021-01-28 | Stop reason: HOSPADM

## 2021-01-25 RX ORDER — SODIUM CHLORIDE 9 MG/ML
INJECTION, SOLUTION INTRAVENOUS CONTINUOUS
Status: DISCONTINUED | OUTPATIENT
Start: 2021-01-25 | End: 2021-01-28

## 2021-01-25 RX ORDER — LORAZEPAM 2 MG/ML
1 INJECTION INTRAMUSCULAR ONCE
Status: COMPLETED | OUTPATIENT
Start: 2021-01-25 | End: 2021-01-25

## 2021-01-25 RX ORDER — POLYETHYLENE GLYCOL 3350 17 G/17G
1 POWDER, FOR SOLUTION ORAL
Status: DISCONTINUED | OUTPATIENT
Start: 2021-01-25 | End: 2021-01-28 | Stop reason: HOSPADM

## 2021-01-25 RX ORDER — ATORVASTATIN CALCIUM 10 MG/1
10 TABLET, FILM COATED ORAL EVERY MORNING
Status: DISCONTINUED | OUTPATIENT
Start: 2021-01-26 | End: 2021-01-28 | Stop reason: HOSPADM

## 2021-01-25 RX ADMIN — LORAZEPAM 1 MG: 2 INJECTION INTRAMUSCULAR; INTRAVENOUS at 16:36

## 2021-01-25 ASSESSMENT — ENCOUNTER SYMPTOMS
DIARRHEA: 0
SEIZURES: 0
ORTHOPNEA: 0
SPUTUM PRODUCTION: 0
STRIDOR: 0
COUGH: 0
WEIGHT LOSS: 0
ABDOMINAL PAIN: 0
NAUSEA: 0
HEARTBURN: 0
BLURRED VISION: 0
PALPITATIONS: 0
CHILLS: 0
INSOMNIA: 0
DIZZINESS: 0
EYE DISCHARGE: 0
FOCAL WEAKNESS: 0
EYE REDNESS: 0
NERVOUS/ANXIOUS: 0
BACK PAIN: 0
DEPRESSION: 0
VOMITING: 0
FEVER: 0
EYE PAIN: 0
SHORTNESS OF BREATH: 0
HEADACHES: 0
NECK PAIN: 0
MYALGIAS: 0

## 2021-01-25 ASSESSMENT — FIBROSIS 4 INDEX: FIB4 SCORE: 1.49

## 2021-01-25 NOTE — ED PROVIDER NOTES
ED Provider Note    Scribed for Savage Scott M.D. by Tanner Melchor. 1/25/2021, 3:44 PM.    Primary care provider: Maryanne Caballero M.D.  Means of arrival: Walk-In  History obtained from: Patient  History limited by: None    CHIEF COMPLAINT  Chief Complaint   Patient presents with   • Extremity Weakness     Left leg weakness that started Saturday along with high blood pressure. Seen at [facility] and was discharged. Patient then had a seizure yesterday and fell. Today the patient is presenting with the same left leg weakness       HPI  Mj Cornejo is a 75 y.o. male who presents to the Emergency Department for evaluation of left sided weakness, onset 11PM last night. Since the time of onset the patient has been having intermittent episodes of left sided convulsions and weakness. Patient has history of epilepsy. Taking oxycarbazepine 150mg BID. In addition the patient's son states that he witnessed his father experience a fall today. Patient notes associated dry mouth following the episodes. Patient denies abominal pain, chest pain, or cold symptoms. Patient recently discharged, yesterday, from the hospital for hypertensive emergency where he was experiencing 1 week of dizziness and generalized numbness. During his stay these symptoms were not present.      While at the bedside, the patient's nurse witnessed an episode of the convulsions describing it as his head turning to the left and his left arm became rigid. During this time he was conscious and able to talk.     REVIEW OF SYSTEMS  As above otherwise all other systems are negative    PAST MEDICAL HISTORY   has a past medical history of Hypercholesteremia and Hypertension.    SURGICAL HISTORY  patient denies any surgical history    SOCIAL HISTORY  Social History     Tobacco Use   • Smoking status: Never Smoker   • Smokeless tobacco: Never Used   Substance Use Topics   • Alcohol use: No   • Drug use: No      Social History     Substance and Sexual  "Activity   Drug Use No       FAMILY HISTORY  History reviewed. No pertinent family history.    CURRENT MEDICATIONS  Home Medications     Reviewed by Morris Hightower (Pharmacy Tech) on 01/25/21 at 1823  Med List Status: Complete   Medication Last Dose Status   amLODIPine (NORVASC) 10 MG Tab 1/25/2021 Active   ASPIRIN LOW DOSE 81 MG Chew Tab chewable tablet 1/25/2021 Active   atorvastatin (LIPITOR) 10 MG Tab 1/25/2021 Active   carbamazepine SR (TEGRETOL XR) 200 MG TB12 extended-release tablet Not Taking Active   meclizine (ANTIVERT) 25 MG Tab 1/25/2021 Active   meloxicam (MOBIC) 7.5 MG Tab Not Taking Active   oxcarbazepine (TRILEPTAL) 150 MG TABS 1/25/2021 Active                ALLERGIES  No Known Allergies    PHYSICAL EXAM  VITAL SIGNS: /73   Pulse 100   Temp 37.3 °C (99.1 °F) (Temporal)   Resp 16   Ht 1.626 m (5' 4\")   Wt 70.3 kg (155 lb)   SpO2 94%   BMI 26.61 kg/m²     oxycarbazepine 150 bid.     Left leg weak 3/5 strenght lle 55 ru rl tadeo cranialy nerves heart murmur equivical babinskis in lower extrem.     Constitutional: Well developed, Well nourished, No acute distress, Non-toxic appearance.   HENT: Normocephalic, Atraumatic, Bilateral external ears normal, oropharynx moist, No oral exudates, Nose normal.   Eyes:conjunctiva is normal, there are no signs of exudate.   Neck: Supple, no meningeal signs.  Lymphatic: No lymphadenopathy noted.   Cardiovascular: 3/6 left sternal border murmur, Regular rate and rhythm, gallops or rubs.   Thorax & Lungs: No respiratory distress. Breathing comfortably. Lungs are clear to auscultation bilaterally, there are no wheezes no rales. Chest wall is nontender.  Abdomen: Soft, nontender, nondistended. Bowel sounds are present.   Skin: Warm, Dry, No erythema,   Back: No tenderness, No CVA tenderness.  Musculoskeletal: Good range of motion in all major joints. No tenderness to palpation or major deformities noted. Intact distal pulses, no clubbing, no cyanosis, no " edema,   Neurologic: left leg weakness 3/5 strength, strength in RLE RUE and OLIVER 5/5, Cranial nerves 2-12 intact, equivocal babinski's in lower extremities, Alert & oriented x 3, Moving all extremities. No gross abnormalities.    Psychiatric: Affect normal, Judgment normal, Mood normal.     LABS  Results for orders placed or performed during the hospital encounter of 01/25/21   CBC with Differential   Result Value Ref Range    WBC 11.6 (H) 4.8 - 10.8 K/uL    RBC 5.10 4.70 - 6.10 M/uL    Hemoglobin 17.4 14.0 - 18.0 g/dL    Hematocrit 51.7 42.0 - 52.0 %    .4 (H) 81.4 - 97.8 fL    MCH 34.1 (H) 27.0 - 33.0 pg    MCHC 33.7 33.7 - 35.3 g/dL    RDW 44.0 35.9 - 50.0 fL    Platelet Count 254 164 - 446 K/uL    MPV 11.7 9.0 - 12.9 fL    Neutrophils-Polys 61.70 44.00 - 72.00 %    Lymphocytes 29.60 22.00 - 41.00 %    Monocytes 6.10 0.00 - 13.40 %    Eosinophils 1.40 0.00 - 6.90 %    Basophils 0.50 0.00 - 1.80 %    Immature Granulocytes 0.70 0.00 - 0.90 %    Nucleated RBC 0.00 /100 WBC    Neutrophils (Absolute) 7.15 1.82 - 7.42 K/uL    Lymphs (Absolute) 3.42 1.00 - 4.80 K/uL    Monos (Absolute) 0.70 0.00 - 0.85 K/uL    Eos (Absolute) 0.16 0.00 - 0.51 K/uL    Baso (Absolute) 0.06 0.00 - 0.12 K/uL    Immature Granulocytes (abs) 0.08 0.00 - 0.11 K/uL    NRBC (Absolute) 0.00 K/uL   Complete Metabolic Panel (CMP)   Result Value Ref Range    Sodium 132 (L) 135 - 145 mmol/L    Potassium 4.3 3.6 - 5.5 mmol/L    Chloride 100 96 - 112 mmol/L    Co2 22 20 - 33 mmol/L    Anion Gap 10.0 7.0 - 16.0    Glucose 99 65 - 99 mg/dL    Bun 14 8 - 22 mg/dL    Creatinine 1.06 0.50 - 1.40 mg/dL    Calcium 10.5 8.5 - 10.5 mg/dL    AST(SGOT) 35 12 - 45 U/L    ALT(SGPT) 29 2 - 50 U/L    Alkaline Phosphatase 112 (H) 30 - 99 U/L    Total Bilirubin 0.5 0.1 - 1.5 mg/dL    Albumin 4.3 3.2 - 4.9 g/dL    Total Protein 7.4 6.0 - 8.2 g/dL    Globulin 3.1 1.9 - 3.5 g/dL    A-G Ratio 1.4 g/dL   Troponin STAT   Result Value Ref Range    Troponin T 31 (H) 6 -  19 ng/L   URINALYSIS,CULTURE IF INDICATED    Specimen: Urine   Result Value Ref Range    Color Yellow     Character Clear     Specific Gravity 1.010 <1.035    Ph 6.5 5.0 - 8.0    Glucose Negative Negative mg/dL    Ketones Negative Negative mg/dL    Protein Negative Negative mg/dL    Bilirubin Negative Negative    Urobilinogen, Urine 0.2 Negative    Nitrite Negative Negative    Leukocyte Esterase Negative Negative    Occult Blood Negative Negative    Micro Urine Req see below    ESTIMATED GFR   Result Value Ref Range    GFR If African American >60 >60 mL/min/1.73 m 2    GFR If Non African American >60 >60 mL/min/1.73 m 2   SARS-CoV-2 PCR (24 hour In-House): Collect NP swab in VTM    Specimen: Respirate   Result Value Ref Range    SARS-CoV-2 Source NP Swab    EKG   Result Value Ref Range    Report       [facility] Emergency Dept.    Test Date:  2021  Pt Name:    PAYAL SIEGEL     Department: ER  MRN:        [id number]                      Room:       [facility]  Gender:     Male                         Technician: [id number]  :        1945                   Requested By:KOBI LEAL  Order #:    [id number]                    Reading MD: KOBI LEAL MD    Measurements  Intervals                                Axis  Rate:       85                           P:          58  MI:         140                          QRS:        -57  QRSD:       124                          T:          12  QT:         388  QTc:        462    Interpretive Statements  SINUS RHYTHM  MULTIPLE ATRIAL PREMATURE COMPLEXES  RBBB AND LAFB  Compared to ECG 2021 14:02:19  No significant changes  Electronically Signed On 2021 18:01:19 PST by KOBI LEAL MD       All labs reviewed by me.    EKG  Interpreted by me as shown above.     RADIOLOGY  CT-HEAD W/O   Final Result      1.  No evidence of acute hemorrhage or mass   2.  Redemonstrated RIGHT parietal calcification      DX-CHEST-PORTABLE (1  VIEW)   Final Result      No acute cardiac or pulmonary abnormalities are identified.      MR-BRAIN-W/O    (Results Pending)     The radiologist's interpretation of all radiological studies have been reviewed by me.    COURSE & MEDICAL DECISION MAKING  Pertinent Labs & Imaging studies reviewed. (See chart for details)    3:44 PM - Patient seen and examined at bedside. Discussed the plan of care that includes imaging and blood testing. Informed them the time this will take and my intention to contact neurology. Ordered DX chest, Carbamazepine, UA culture if indicated, CBC w/, CMP, Troponin and EKG to evaluate his symptoms. The differential diagnoses include but are not limited to: Seizure vs. Intercerebral bleed from hypertensive urgency.      3:57 PM - Reviewed CT of head and neck from patient's recent hospitalization.     4:06 PM - Ordered Ativan injection 1mg. Ordered CT head w/o.    5:21 PM - Patient's radiology has been returned and reviewed.     5:24 PM - Patient's labs have been returned and reviewed.     5:25 PM - Reviewed the patient's past and current EKG.     6:01 PM - Ordered SARS CoV-2 for further evaluation.     6:02 PM - Patient was reevaluated at bedside. Discussed lab and radiology results with the patient and informed them of the findings. Discussed that the patient is to be hospitalized for further testing considering his episodes and weakness. Patient understanding and consenting.      6:39 PM Paged Hospitalist.     6:44 PM - I spoke to Dr. Zuleta (hospitalist) regarding the patient’s pertinent abnormalities. Dr. Zuleta agrees to evaluate the patient for hospitalization.    Decision Making:  Patient presents emerge department for evaluation.  Clinically the patient is just weak on the left side unknown timing for this they even have a hard time telling me when this started.  The patient was just recently in the hospital had a CT angiogram of the neck and the head so I did not repeat this I did repeat  the CT of the head is a patient has been having high blood pressure as a possible that the patient may have had an intercerebral hemorrhage.  CT scan shows no signs of hemorrhage.  At this point I did give him some Ativan and he is improved still showing the left-sided weakness so it is possible the patient may have had a CVA so I do the patient should be admitted to the hospital for further evaluation of this.  Patient states she has been having multiple seizures are/muscular spasms he is conscious during the episodes so I am not sure that they are truly seizures.  I did order for Tegretol level is currently pending.  At this point the patient will be admitted for further treatment and care.    DISPOSITION:  Patient will be hospitalized by Dr. Zuleta in guarded condition.     FINAL IMPRESSION  1. Weakness          Tanner DICKERSON (Scribe), am scribing for, and in the presence of, Savage Scott M.D..    Electronically signed by: Tanner Melchor (Estephaniaibe), 1/25/2021    Savage DICKERSON M.D. personally performed the services described in this documentation, as scribed by Tanner Melchor in my presence, and it is both accurate and complete. C    The note accurately reflects work and decisions made by me.  Savage Scott M.D.  1/25/2021  10:16 PM

## 2021-01-25 NOTE — ED TRIAGE NOTES
Chief Complaint   Patient presents with   • Extremity Weakness     Left leg weakness that started Saturday along with high blood pressure. Seen at [facility] and was discharged. Patient then had a seizure yesterday and fell. Today the patient is presenting with the same left leg weakness     Patient BIB EMS for the above complaint. The patient does not have any worse symptoms than when the onset occurred on Saturday. The patient does not have any pain and is neurologically intact other then isolated left leg weakness. IV established and labs drawn.

## 2021-01-26 ENCOUNTER — APPOINTMENT (OUTPATIENT)
Dept: RADIOLOGY | Facility: MEDICAL CENTER | Age: 76
DRG: 101 | End: 2021-01-26
Attending: INTERNAL MEDICINE
Payer: MEDICARE

## 2021-01-26 LAB
ALBUMIN SERPL BCP-MCNC: 3.8 G/DL (ref 3.2–4.9)
ALBUMIN/GLOB SERPL: 1.4 G/DL
ALP SERPL-CCNC: 97 U/L (ref 30–99)
ALT SERPL-CCNC: 27 U/L (ref 2–50)
ANION GAP SERPL CALC-SCNC: 9 MMOL/L (ref 7–16)
AST SERPL-CCNC: 30 U/L (ref 12–45)
BILIRUB SERPL-MCNC: 0.8 MG/DL (ref 0.1–1.5)
BUN SERPL-MCNC: 13 MG/DL (ref 8–22)
CALCIUM SERPL-MCNC: 9.5 MG/DL (ref 8.5–10.5)
CHLORIDE SERPL-SCNC: 103 MMOL/L (ref 96–112)
CO2 SERPL-SCNC: 23 MMOL/L (ref 20–33)
CREAT SERPL-MCNC: 0.8 MG/DL (ref 0.5–1.4)
ERYTHROCYTE [DISTWIDTH] IN BLOOD BY AUTOMATED COUNT: 46.4 FL (ref 35.9–50)
GLOBULIN SER CALC-MCNC: 2.7 G/DL (ref 1.9–3.5)
GLUCOSE SERPL-MCNC: 95 MG/DL (ref 65–99)
HCT VFR BLD AUTO: 50 % (ref 42–52)
HGB BLD-MCNC: 16.5 G/DL (ref 14–18)
MCH RBC QN AUTO: 34.4 PG (ref 27–33)
MCHC RBC AUTO-ENTMCNC: 33 G/DL (ref 33.7–35.3)
MCV RBC AUTO: 104.4 FL (ref 81.4–97.8)
PLATELET # BLD AUTO: 233 K/UL (ref 164–446)
PMV BLD AUTO: 11.2 FL (ref 9–12.9)
POTASSIUM SERPL-SCNC: 3.8 MMOL/L (ref 3.6–5.5)
PROT SERPL-MCNC: 6.5 G/DL (ref 6–8.2)
RBC # BLD AUTO: 4.79 M/UL (ref 4.7–6.1)
SARS-COV-2 RNA RESP QL NAA+PROBE: NOTDETECTED
SODIUM SERPL-SCNC: 135 MMOL/L (ref 135–145)
SPECIMEN SOURCE: NORMAL
WBC # BLD AUTO: 7.9 K/UL (ref 4.8–10.8)

## 2021-01-26 PROCEDURE — 96375 TX/PRO/DX INJ NEW DRUG ADDON: CPT

## 2021-01-26 PROCEDURE — 700105 HCHG RX REV CODE 258: Performed by: INTERNAL MEDICINE

## 2021-01-26 PROCEDURE — G0378 HOSPITAL OBSERVATION PER HR: HCPCS

## 2021-01-26 PROCEDURE — 700111 HCHG RX REV CODE 636 W/ 250 OVERRIDE (IP): Performed by: INTERNAL MEDICINE

## 2021-01-26 PROCEDURE — 36415 COLL VENOUS BLD VENIPUNCTURE: CPT

## 2021-01-26 PROCEDURE — A9270 NON-COVERED ITEM OR SERVICE: HCPCS | Performed by: STUDENT IN AN ORGANIZED HEALTH CARE EDUCATION/TRAINING PROGRAM

## 2021-01-26 PROCEDURE — 700102 HCHG RX REV CODE 250 W/ 637 OVERRIDE(OP): Performed by: INTERNAL MEDICINE

## 2021-01-26 PROCEDURE — 95700 EEG CONT REC W/VID EEG TECH: CPT | Performed by: PSYCHIATRY & NEUROLOGY

## 2021-01-26 PROCEDURE — 85027 COMPLETE CBC AUTOMATED: CPT

## 2021-01-26 PROCEDURE — 4A00X4Z MEASUREMENT OF CENTRAL NERVOUS ELECTRICAL ACTIVITY, EXTERNAL APPROACH: ICD-10-PCS | Performed by: PSYCHIATRY & NEUROLOGY

## 2021-01-26 PROCEDURE — 700105 HCHG RX REV CODE 258: Performed by: STUDENT IN AN ORGANIZED HEALTH CARE EDUCATION/TRAINING PROGRAM

## 2021-01-26 PROCEDURE — 96372 THER/PROPH/DIAG INJ SC/IM: CPT

## 2021-01-26 PROCEDURE — 700111 HCHG RX REV CODE 636 W/ 250 OVERRIDE (IP): Performed by: STUDENT IN AN ORGANIZED HEALTH CARE EDUCATION/TRAINING PROGRAM

## 2021-01-26 PROCEDURE — 95720 EEG PHY/QHP EA INCR W/VEEG: CPT | Performed by: PSYCHIATRY & NEUROLOGY

## 2021-01-26 PROCEDURE — 99205 OFFICE O/P NEW HI 60 MIN: CPT | Mod: 25,GC | Performed by: PSYCHIATRY & NEUROLOGY

## 2021-01-26 PROCEDURE — 99225 PR SUBSEQUENT OBSERVATION CARE,LEVEL II: CPT | Performed by: STUDENT IN AN ORGANIZED HEALTH CARE EDUCATION/TRAINING PROGRAM

## 2021-01-26 PROCEDURE — A9270 NON-COVERED ITEM OR SERVICE: HCPCS | Performed by: INTERNAL MEDICINE

## 2021-01-26 PROCEDURE — 700102 HCHG RX REV CODE 250 W/ 637 OVERRIDE(OP): Performed by: STUDENT IN AN ORGANIZED HEALTH CARE EDUCATION/TRAINING PROGRAM

## 2021-01-26 PROCEDURE — 80053 COMPREHEN METABOLIC PANEL: CPT

## 2021-01-26 PROCEDURE — 95714 VEEG EA 12-26 HR UNMNTR: CPT | Performed by: PSYCHIATRY & NEUROLOGY

## 2021-01-26 RX ORDER — OXCARBAZEPINE 300 MG/1
600 TABLET, FILM COATED ORAL 2 TIMES DAILY
Status: DISCONTINUED | OUTPATIENT
Start: 2021-01-26 | End: 2021-01-27

## 2021-01-26 RX ORDER — LEVETIRACETAM 500 MG/1
500 TABLET ORAL 2 TIMES DAILY
Status: DISCONTINUED | OUTPATIENT
Start: 2021-01-27 | End: 2021-01-27

## 2021-01-26 RX ADMIN — ENOXAPARIN SODIUM 40 MG: 40 INJECTION SUBCUTANEOUS at 06:11

## 2021-01-26 RX ADMIN — ATORVASTATIN CALCIUM 10 MG: 10 TABLET, FILM COATED ORAL at 06:11

## 2021-01-26 RX ADMIN — OXCARBAZEPINE 600 MG: 300 TABLET, FILM COATED ORAL at 20:01

## 2021-01-26 RX ADMIN — CARBAMAZEPINE 200 MG: 100 TABLET, EXTENDED RELEASE ORAL at 11:05

## 2021-01-26 RX ADMIN — DOCUSATE SODIUM 50 MG AND SENNOSIDES 8.6 MG 2 TABLET: 8.6; 5 TABLET, FILM COATED ORAL at 06:12

## 2021-01-26 RX ADMIN — DOCUSATE SODIUM 50 MG AND SENNOSIDES 8.6 MG 2 TABLET: 8.6; 5 TABLET, FILM COATED ORAL at 17:59

## 2021-01-26 RX ADMIN — OXCARBAZEPINE 150 MG: 150 TABLET, FILM COATED ORAL at 08:16

## 2021-01-26 RX ADMIN — ASPIRIN 81 MG: 81 TABLET, CHEWABLE ORAL at 06:11

## 2021-01-26 RX ADMIN — SODIUM CHLORIDE: 9 INJECTION, SOLUTION INTRAVENOUS at 00:33

## 2021-01-26 RX ADMIN — OXCARBAZEPINE 150 MG: 150 TABLET, FILM COATED ORAL at 00:29

## 2021-01-26 RX ADMIN — CARBAMAZEPINE 200 MG: 100 TABLET, EXTENDED RELEASE ORAL at 00:29

## 2021-01-26 RX ADMIN — SODIUM CHLORIDE 2070 MG: 9 INJECTION, SOLUTION INTRAVENOUS at 16:00

## 2021-01-26 ASSESSMENT — COGNITIVE AND FUNCTIONAL STATUS - GENERAL
STANDING UP FROM CHAIR USING ARMS: A LITTLE
MOVING FROM LYING ON BACK TO SITTING ON SIDE OF FLAT BED: A LITTLE
MOBILITY SCORE: 18
WALKING IN HOSPITAL ROOM: A LITTLE
SUGGESTED CMS G CODE MODIFIER MOBILITY: CK
TOILETING: A LITTLE
SUGGESTED CMS G CODE MODIFIER DAILY ACTIVITY: CJ
MOVING TO AND FROM BED TO CHAIR: A LITTLE
CLIMB 3 TO 5 STEPS WITH RAILING: A LOT
HELP NEEDED FOR BATHING: A LITTLE
DAILY ACTIVITIY SCORE: 21
DRESSING REGULAR LOWER BODY CLOTHING: A LITTLE

## 2021-01-26 ASSESSMENT — FIBROSIS 4 INDEX: FIB4 SCORE: 1.86

## 2021-01-26 ASSESSMENT — ENCOUNTER SYMPTOMS
VOMITING: 0
DEPRESSION: 0
HEADACHES: 0
DIZZINESS: 0
FEVER: 0
PALPITATIONS: 0
COUGH: 0
NAUSEA: 0
HEMOPTYSIS: 0

## 2021-01-26 NOTE — PROCEDURES
VIDEO ELECTROENCEPHALOGRAM REPORT      Referring provider: Dr. Jonas.     DOS:   1/26/2021 (recording for 20 hrs and 31 minutes).       INDICATION:  Mj Cornejo 75 y.o. male presenting with seizures.     CURRENT ANTIEPILEPTIC REGIMEN: Carbamazepine, Oxcarbazepine. Levetiracetam was added during the study.      TECHNIQUE: A 30-channel,  continuous video electroencephalogram (VEEG) was performed in accordance with the international 10-20 system. This digital study was reviewed in bipolar and referential montages. The recording examined the patient during wakeful, drowsy and sleep states.     The EEG was set up and taken down by a Neurodiagnostic technologist who performed education to patient and staff.     A minimum but not limited to 23 electrodes and 23 channel recording was setup and performed by Neurodiagnostic technologist.    Impedances, electrode integrity, and technical impressions were documented a minimum of every 2-24 hour period by a Neurodiagnostic Technologist and reviewed by Interpreting physician.     DESCRIPTION OF THE RECORD:  During the awake state, background shows symmetrical 8 Hz alpha activity posteriorly with amplitude of 70 mV.  There was reactivity with eye opening/closure.  Normal anterior-posterior gradient was noted with faster beta frequencies seen anteriorly.  During drowsiness, high-amplitude delta frequencies were seen.    During the sleep state, background shows diffuse high-amplitude 4-5 Hz delta activity.  Symmetrical high-amplitude sleep spindles and vertex sharp activities were seen in the central leads.    ACTIVATION PROCEDURES:   Not performed.     ICTAL AND/OR INTERICTAL FINDINGS:   Over 100 focal right posterior quadrant seizures were captured during the study. No significant changes with addition of Levetiracetam and frequent seizures were still noted overnight. On eeg, seizures start as fast sharply contoured beta / alpha activity in the right posterior  quadrant, with evolution to rhythmic sharply contoured theta / delta activity and some spreading within the right hemisphere anteriorly, but not contralaterally, remaining focalized in the right hemisphere. Seizures lasted an average of 30-60 seconds. There was post ictal attenuated background on the eeg over the right posterior quadrant. Clinically, on video patient exhibits head deviation the left (in most seizures), and may appear less responsive and or mildly confused during the events, but does not lose consciousness and no motor activity noted.     EVENT(S):  See above.     EKG: sampling review of EKG recording demonstrated a sinus arhythmia.       INTERPRETATION:   This is an abnormal continuous video electroencephalogram recording in the awake, drowsy and sleep state.  Over 100 focal right posterior quadrant seizures were captured during the study. The seizures were non convulsive and often exhibited head deviation to the left and/or mild confusion or decreased responsiveness. No significant improvement with addition of Levetiracetam, as frequent seizures were still noted overnight.  The findings suggest an underlying area of increased cortical irritability and structural abnormality. Clinical and radiological correlation is recommended.    Of note, a sinus arrhythmia was noted on the ekg lead.     Updates provided to Dr. Loredo, and Dr. Jonas.         Morris Florence MD   Epilepsy and Neurodiagnostics.   Clinical  of Neurology [facility].   Diplomate in Neurology, Epilepsy, and Electrodiagnostic Medicine.   Office: [phone]  Fax: [phone]

## 2021-01-26 NOTE — ED NOTES
Med rec complete per historical from DC on 1/24/21- reviewed information with family member at bedside. SHARAN

## 2021-01-26 NOTE — CARE PLAN
Problem: Safety  Goal: Will remain free from injury  Outcome: PROGRESSING AS EXPECTED  Note: Safety precautions in place. Patient demonstrates appropriate use of call light.      Problem: Knowledge Deficit  Goal: Knowledge of the prescribed therapeutic regimen will improve  Outcome: PROGRESSING AS EXPECTED  Note: Education provided. All questions answered and concerns addressed at this time. Son at bedside upon arrival to unit. Son used to help translate.  required.

## 2021-01-26 NOTE — CONSULTS
CC:  convulsions    Date of Admission: 1/25/2021    Today's Date: 01/26/21    Consulting Physician: Dr. Gertrude M.D.      HPI:    Mj Cornejo is a 75 y.o. 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 He states he was sleeping at home when his left LE became stiff he became unconscious and started convulsing. Episode lasted ~30 seconds. States he takes his anti-epileptic medications daily and previous episode was 21 years ago. Reports to have chronic decreased sensation and intermittent stiffness of left lower extremity. Denies urinary/fecal incontinence, tongue-biting. No recent hx of illness, denies other neurological symptoms. He has not seen a neurologist in years.    ROS:   PERTINENT POSITIVES IN HPI  All other systems were reviewed and were negative.       Past Medical History:   Past Medical History:   Diagnosis Date   • Hypercholesteremia    • Hypertension        Past Surgical History: History reviewed. No pertinent surgical history.    Social History:   Social History     Socioeconomic History   • Marital status:      Spouse name: Not on file   • Number of children: Not on file   • Years of education: Not on file   • Highest education level: Not on file   Occupational History   • Not on file   Social Needs   • Financial resource strain: Not on file   • Food insecurity     Worry: Not on file     Inability: Not on file   • Transportation needs     Medical: Not on file     Non-medical: Not on file   Tobacco Use   • Smoking status: Never Smoker   • Smokeless tobacco: Never Used   Substance and Sexual Activity   • Alcohol use: No   • Drug use: No   • Sexual activity: Not on file   Lifestyle   • Physical activity     Days per week: Not on file     Minutes per session: Not on file   • Stress: Not on file   Relationships   • Social connections     Talks on phone: Not on file     Gets  together: Not on file     Attends [employer] service: Not on file     Active member of club or organization: Not on file     Attends meetings of clubs or organizations: Not on file     Relationship status: Not on file   • Intimate partner violence     Fear of current or ex partner: Not on file     Emotionally abused: Not on file     Physically abused: Not on file     Forced sexual activity: Not on file   Other Topics Concern   • Not on file   Social History Narrative   • Not on file       Family History: History reviewed. No pertinent family history.    HISTORIES AS ABOVE ARE INCOMPLETE IF PATIENT IS INTUBATED, OR UNABLE TO COMMUNICATE OR ELUCIDATE.    Allergies: No Known Allergies      Current Facility-Administered Medications:   •  aspirin (ASA) chewable tab 81 mg, 81 mg, Oral, Sancho BREWER M.D., 81 mg at 01/26/21 0611  •  atorvastatin (LIPITOR) tablet 10 mg, 10 mg, Oral, DANIELLA, Sancho Zuleta M.D., 10 mg at 01/26/21 0611  •  carBAMazepine SR (TEGRETOL XR) tablet 200 mg, 200 mg, Oral, Q12HRS, Sancho Zuleta M.D., 200 mg at 01/26/21 1105  •  OXcarbazepine (TRILEPTAL) tablet 150 mg, 150 mg, Oral, BID, Sancho Zuleta M.D., 150 mg at 01/26/21 0816  •  senna-docusate (PERICOLACE or SENOKOT S) 8.6-50 MG per tablet 2 Tab, 2 Tab, Oral, BID, 2 Tab at 01/26/21 0612 **AND** polyethylene glycol/lytes (MIRALAX) PACKET 1 Packet, 1 Packet, Oral, QDAY PRN **AND** magnesium hydroxide (MILK OF MAGNESIA) suspension 30 mL, 30 mL, Oral, QDAY PRN **AND** bisacodyl (DULCOLAX) suppository 10 mg, 10 mg, Rectal, QDAY PRN, Sancho Zuleta M.D.  •  NS infusion, , Intravenous, Continuous, Sancho Zuleta M.D., Last Rate: 75 mL/hr at 01/26/21 0033, New Bag at 01/26/21 0033  •  enoxaparin (LOVENOX) inj 40 mg, 40 mg, Subcutaneous, DAILY, Sancho Zuleta M.D., 40 mg at 01/26/21 0611  •  LORazepam (ATIVAN) injection 4 mg, 4 mg, Intravenous, Q10 MIN PRN, Sancho Zuleta M.D.      PHYSICAL EXAM    Vitals:    01/26/21 0000 01/26/21 0400 01/26/21 0600 01/26/21 0755   BP:  "113/71 135/85  132/71   Pulse: 73 72  75   Resp: 14 17  16   Temp: 36.6 °C (97.8 °F) 36.4 °C (97.5 °F)  36.7 °C (98.1 °F)   TempSrc: Temporal Temporal  Temporal   SpO2: 94% 96%  94%   Weight:   69.1 kg (152 lb 5.4 oz)    Height:   1.62 m (5' 3.78\")        AAOx2 (cannot recall time)  Motor: 4/4 LLE and UE  Sense: Decreased sensation to touch LLE  Coordination: Ataxia demonstrated by orbiting of left hand, pronator drift of left hand    Labs:  Recent Labs     01/24/21 0025 01/25/21  1600 01/26/21  0531   WBC 9.5 11.6* 7.9   RBC 4.81 5.10 4.79   HEMOGLOBIN 16.2 17.4 16.5   HEMATOCRIT 49.3 51.7 50.0   .5* 101.4* 104.4*   MCH 33.7* 34.1* 34.4*   MCHC 32.9* 33.7 33.0*   RDW 44.5 44.0 46.4   PLATELETCT 247 254 233   MPV 11.5 11.7 11.2     Recent Labs     01/24/21  0025 01/25/21  1600 01/26/21  0531   SODIUM 137 132* 135   POTASSIUM 3.9 4.3 3.8   CHLORIDE 105 100 103   CO2 22 22 23   GLUCOSE 101* 99 95   BUN 12 14 13   CREATININE 0.73 1.06 0.80   CALCIUM 10.2 10.5 9.5     Recent Labs     01/23/21  1427   APTT 33.8   INR 1.07             Recent Labs     01/24/21  0025   TRIGLYCERIDE 250*   HDL 30*   LDL 41     Recent Labs     01/24/21  0025 01/25/21  1600 01/26/21  0531   SODIUM 137 132* 135   POTASSIUM 3.9 4.3 3.8   CHLORIDE 105 100 103   CO2 22 22 23   GLUCOSE 101* 99 95   BUN 12 14 13     Recent Labs     01/24/21  0025 01/25/21  1600 01/26/21  0531   SODIUM 137 132* 135   POTASSIUM 3.9 4.3 3.8   CHLORIDE 105 100 103   CO2 22 22 23   BUN 12 14 13   CREATININE 0.73 1.06 0.80   CALCIUM 10.2 10.5 9.5     Recent Labs     01/23/21  1427   APTT 33.8   INR 1.07     No results found for this or any previous visit.           Imaging: neuroimaging reviewed and directly visualized by me  CT-HEAD W/O   Final Result      1.  No evidence of acute hemorrhage or mass   2.  Redemonstrated RIGHT parietal calcification      DX-CHEST-PORTABLE (1 VIEW)   Final Result      No acute cardiac or pulmonary abnormalities are identified.    "   MR-BRAIN-W/O    (Results Pending)       Impression:  #focal seizure, occipital lobe  Pt is a 74 y/o with a PMH of htn, hld , focal-occipital lobe epilepsy taking oxcarbamazepine and oxcarbazepine, who presented to the hospital with concerns of epileptic event, CT negative in the ED, metabolic panel wnl    -follow up result of EEG monitoring  -we are placing orders for loading dose of keppra 30mg/kg, followed by 500 mg BID keppra maintenance dose  -continue oxcarbazepine  -discontinue carbamezapine (in same drug class as oxcarbazepine)

## 2021-01-26 NOTE — H&P
Hospital Medicine History & Physical Note    Date of Service  1/25/2021    Primary Care Physician  Maryanne Caballero M.D.    Consultants  neuro    Code Status  Full Code    Chief Complaint  Chief Complaint   Patient presents with   • Extremity Weakness     Left leg weakness that started Saturday along with high blood pressure. Seen at [facility] and was discharged. Patient then had a seizure yesterday and fell. Today the patient is presenting with the same left leg weakness       History of Presenting Illness  75 y.o. male past medical history of hypertension, dyslipidemia who presented 1/25/2021 with seizure-like activity.  Patient is Italian-speaking only.  According to him and family member he started noticing that he had abnormal movement started on the left side and moved towards the whole body.  Episode lasted 20 to 30 sec and resolve on its own.  It happened multiple times since last night.  Never had similar symptoms before.  Patient denies any tongue biting, urinary or bowel incontinence.  But according to the family member they think that he is having seizure.  The patient also stated there before the symptoms started he can feel it is coming.  In the ER CT scan of the head was done and it was negative.  He will be admitted for observation.    Review of Systems  Review of Systems   Constitutional: Negative for chills, fever and weight loss.   HENT: Negative for congestion and nosebleeds.    Eyes: Negative for blurred vision, pain, discharge and redness.   Respiratory: Negative for cough, sputum production, shortness of breath and stridor.    Cardiovascular: Negative for chest pain, palpitations and orthopnea.   Gastrointestinal: Negative for abdominal pain, diarrhea, heartburn, nausea and vomiting.   Genitourinary: Negative for dysuria, frequency and urgency.   Musculoskeletal: Negative for back pain, myalgias and neck pain.   Skin: Negative for itching and rash.   Neurological: Negative for dizziness, focal  weakness, seizures and headaches.   Psychiatric/Behavioral: Negative for depression. The patient is not nervous/anxious and does not have insomnia.        Past Medical History   has a past medical history of Hypercholesteremia and Hypertension.    Surgical History  Reviewed and no pertinent past surgical history    Family History  Reviewed and no pertinent family history    Social History   reports that he has never smoked. He has never used smokeless tobacco. He reports that he does not drink alcohol or use drugs.    Allergies  No Known Allergies    Medications  Prior to Admission Medications   Prescriptions Last Dose Informant Patient Reported? Taking?   ASPIRIN LOW DOSE 81 MG Chew Tab chewable tablet 1/25/2021 at 0900 Rx Bottle (For Med Information) Yes No   Sig: Chew 81 mg every morning.   amLODIPine (NORVASC) 10 MG Tab 1/25/2021 at 0900  No No   Sig: Take 0.5 Tabs by mouth every day for 30 days.   atorvastatin (LIPITOR) 10 MG Tab 1/25/2021 at 0900 Rx Bottle (For Med Information) Yes No   Sig: Take 10 mg by mouth every morning.   carbamazepine SR (TEGRETOL XR) 200 MG TB12 extended-release tablet Not Taking at Unknown time Rx Bottle (For Med Information) No No   Sig: Take 1 Tab by mouth every 12 hours.   Patient not taking: Reported on 1/25/2021   meclizine (ANTIVERT) 25 MG Tab 1/25/2021 at 0900  No No   Sig: Take 1 Tab by mouth 3 times a day as needed for Dizziness for up to 7 days.   meloxicam (MOBIC) 7.5 MG Tab Not Taking at Unknown time Rx Bottle (For Med Information) No No   Sig: Take 1 Tab by mouth every day.   Patient not taking: Reported on 1/25/2021   oxcarbazepine (TRILEPTAL) 150 MG TABS 1/25/2021 at 0900 Rx Bottle (For Med Information) No No   Sig: Take 1 Tab by mouth 2 times a day.      Facility-Administered Medications: None       Physical Exam  Temp:  [37.3 °C (99.1 °F)] 37.3 °C (99.1 °F)  Pulse:  [] 88  Resp:  [16-36] 25  BP: (127-163)/(67-73) 127/67  SpO2:  [91 %-94 %] 93 %    Physical  Exam  Vitals signs reviewed.   Constitutional:       General: He is not in acute distress.     Appearance: Normal appearance.   HENT:      Head: Normocephalic and atraumatic.      Nose: No congestion or rhinorrhea.   Eyes:      Extraocular Movements: Extraocular movements intact.      Pupils: Pupils are equal, round, and reactive to light.   Neck:      Musculoskeletal: Normal range of motion and neck supple.   Cardiovascular:      Rate and Rhythm: Normal rate and regular rhythm.      Pulses: Normal pulses.   Pulmonary:      Effort: Pulmonary effort is normal. No respiratory distress.      Breath sounds: Normal breath sounds.   Abdominal:      General: Bowel sounds are normal. There is no distension.      Palpations: Abdomen is soft.      Tenderness: There is no abdominal tenderness.   Musculoskeletal:         General: No swelling or tenderness.   Skin:     General: Skin is warm.      Findings: No erythema.   Neurological:      General: No focal deficit present.      Mental Status: He is alert and oriented to person, place, and time.         Laboratory:  Recent Labs     01/23/21 1427 01/24/21 0025 01/25/21  1600   WBC 9.4 9.5 11.6*   RBC 5.19 4.81 5.10   HEMOGLOBIN 17.3 16.2 17.4   HEMATOCRIT 53.8* 49.3 51.7   .7* 102.5* 101.4*   MCH 33.3* 33.7* 34.1*   MCHC 32.2* 32.9* 33.7   RDW 44.9 44.5 44.0   PLATELETCT 261 247 254   MPV 11.7 11.5 11.7     Recent Labs     01/23/21 1427 01/24/21  0025 01/25/21  1600   SODIUM 137 137 132*   POTASSIUM 4.3 3.9 4.3   CHLORIDE 103 105 100   CO2 26 22 22   GLUCOSE 88 101* 99   BUN 10 12 14   CREATININE 0.77 0.73 1.06   CALCIUM 10.5 10.2 10.5     Recent Labs     01/23/21  1427 01/24/21  0025 01/25/21  1600   ALTSGPT 36 26 29   ASTSGOT 34 25 35   ALKPHOSPHAT 108* 97 112*   TBILIRUBIN 0.5 0.7 0.5   GLUCOSE 88 101* 99     Recent Labs     01/23/21 1427   APTT 33.8   INR 1.07     No results for input(s): NTPROBNP in the last 72 hours.  Recent Labs     01/24/21  0025   TRIGLYCERIDE  250*   HDL 30*   LDL 41     Recent Labs     01/23/21  1427 01/25/21  1600   TROPONINT 39* 31*       Imaging:  CT-HEAD W/O   Final Result      1.  No evidence of acute hemorrhage or mass   2.  Redemonstrated RIGHT parietal calcification      DX-CHEST-PORTABLE (1 VIEW)   Final Result      No acute cardiac or pulmonary abnormalities are identified.      MR-BRAIN-W/O    (Results Pending)         Assessment/Plan:  I anticipate this patient is appropriate for observation status at this time.    Seizure-like activity (HCC)- (present on admission)  Assessment & Plan  We'll get EEG with video  MRI ordered  Seizure precaution  IV Ativan as needed as needed  ERP consulted neurology    Dyslipidemia- (present on admission)  Assessment & Plan  Continue statin

## 2021-01-26 NOTE — CARE PLAN
Problem: Safety  Goal: Will remain free from injury  Note: Bed locked and in lowest position. Call light and personal belongings in reach. Bed alarm in place. Hourly rounding.       Problem: Venous Thromboembolism (VTW)/Deep Vein Thrombosis (DVT) Prevention:  Goal: Patient will participate in Venous Thrombosis (VTE)/Deep Vein Thrombosis (DVT)Prevention Measures  Note: Lovenox for DVT prophylaxis. Pt currently refusing SCDs, will continue to reinforce education throughout shift. Encouraging mobilization as tolerated.

## 2021-01-26 NOTE — ASSESSMENT & PLAN NOTE
EEG consistent with several occipital lobe seizure.   Evaluated by neurology  Patient placed on continuous EEG monitoring. Loaded with Keppra.   Oxcarbazepine discontinued  Currently on oral Keppra and Depakote  Ativan as needed for seizure

## 2021-01-26 NOTE — PROGRESS NOTES
With patient’s permission (if able), completed daily phone call to designated support person, river Mj  Discussed patient condition and plan of care. All questions answered.  Follow up requested: Test results

## 2021-01-26 NOTE — PROGRESS NOTES
Patient arrived to [facility] via transport. Seizure and fall precautions in place.

## 2021-01-27 ENCOUNTER — PATIENT OUTREACH (OUTPATIENT)
Dept: HEALTH INFORMATION MANAGEMENT | Facility: OTHER | Age: 76
End: 2021-01-27

## 2021-01-27 PROBLEM — I15.9 SECONDARY HYPERTENSION: Status: ACTIVE | Noted: 2021-01-27

## 2021-01-27 PROBLEM — I10 ESSENTIAL HYPERTENSION: Status: ACTIVE | Noted: 2021-01-27

## 2021-01-27 PROCEDURE — 700111 HCHG RX REV CODE 636 W/ 250 OVERRIDE (IP): Performed by: INTERNAL MEDICINE

## 2021-01-27 PROCEDURE — 700105 HCHG RX REV CODE 258: Performed by: INTERNAL MEDICINE

## 2021-01-27 PROCEDURE — A9270 NON-COVERED ITEM OR SERVICE: HCPCS | Performed by: PSYCHIATRY & NEUROLOGY

## 2021-01-27 PROCEDURE — 70551 MRI BRAIN STEM W/O DYE: CPT

## 2021-01-27 PROCEDURE — 770006 HCHG ROOM/CARE - MED/SURG/GYN SEMI*

## 2021-01-27 PROCEDURE — 700102 HCHG RX REV CODE 250 W/ 637 OVERRIDE(OP): Performed by: STUDENT IN AN ORGANIZED HEALTH CARE EDUCATION/TRAINING PROGRAM

## 2021-01-27 PROCEDURE — A9270 NON-COVERED ITEM OR SERVICE: HCPCS | Performed by: INTERNAL MEDICINE

## 2021-01-27 PROCEDURE — 700111 HCHG RX REV CODE 636 W/ 250 OVERRIDE (IP): Performed by: PSYCHIATRY & NEUROLOGY

## 2021-01-27 PROCEDURE — 95720 EEG PHY/QHP EA INCR W/VEEG: CPT | Performed by: PSYCHIATRY & NEUROLOGY

## 2021-01-27 PROCEDURE — 95714 VEEG EA 12-26 HR UNMNTR: CPT | Performed by: PSYCHIATRY & NEUROLOGY

## 2021-01-27 PROCEDURE — 99233 SBSQ HOSP IP/OBS HIGH 50: CPT | Mod: 25,GC | Performed by: PSYCHIATRY & NEUROLOGY

## 2021-01-27 PROCEDURE — 96372 THER/PROPH/DIAG INJ SC/IM: CPT

## 2021-01-27 PROCEDURE — A9270 NON-COVERED ITEM OR SERVICE: HCPCS | Performed by: STUDENT IN AN ORGANIZED HEALTH CARE EDUCATION/TRAINING PROGRAM

## 2021-01-27 PROCEDURE — 700102 HCHG RX REV CODE 250 W/ 637 OVERRIDE(OP): Performed by: INTERNAL MEDICINE

## 2021-01-27 PROCEDURE — 700102 HCHG RX REV CODE 250 W/ 637 OVERRIDE(OP): Performed by: PSYCHIATRY & NEUROLOGY

## 2021-01-27 PROCEDURE — 700105 HCHG RX REV CODE 258: Performed by: PSYCHIATRY & NEUROLOGY

## 2021-01-27 PROCEDURE — 4A00X4Z MEASUREMENT OF CENTRAL NERVOUS ELECTRICAL ACTIVITY, EXTERNAL APPROACH: ICD-10-PCS | Performed by: PSYCHIATRY & NEUROLOGY

## 2021-01-27 RX ORDER — DIVALPROEX SODIUM 500 MG/1
500 TABLET, DELAYED RELEASE ORAL 2 TIMES DAILY
Status: DISCONTINUED | OUTPATIENT
Start: 2021-01-27 | End: 2021-01-28 | Stop reason: HOSPADM

## 2021-01-27 RX ORDER — AMLODIPINE BESYLATE 5 MG/1
10 TABLET ORAL
Status: DISCONTINUED | OUTPATIENT
Start: 2021-01-27 | End: 2021-01-28 | Stop reason: HOSPADM

## 2021-01-27 RX ORDER — LEVETIRACETAM 500 MG/1
1000 TABLET ORAL 2 TIMES DAILY
Status: DISCONTINUED | OUTPATIENT
Start: 2021-01-27 | End: 2021-01-28 | Stop reason: HOSPADM

## 2021-01-27 RX ORDER — ACETAMINOPHEN 325 MG/1
650 TABLET ORAL EVERY 4 HOURS PRN
Status: DISCONTINUED | OUTPATIENT
Start: 2021-01-27 | End: 2021-01-28 | Stop reason: HOSPADM

## 2021-01-27 RX ADMIN — DOCUSATE SODIUM 50 MG AND SENNOSIDES 8.6 MG 2 TABLET: 8.6; 5 TABLET, FILM COATED ORAL at 05:17

## 2021-01-27 RX ADMIN — LEVETIRACETAM 500 MG: 500 TABLET ORAL at 05:17

## 2021-01-27 RX ADMIN — ASPIRIN 81 MG: 81 TABLET, CHEWABLE ORAL at 05:17

## 2021-01-27 RX ADMIN — ACETAMINOPHEN 650 MG: 325 TABLET, FILM COATED ORAL at 15:03

## 2021-01-27 RX ADMIN — ATORVASTATIN CALCIUM 10 MG: 10 TABLET, FILM COATED ORAL at 05:17

## 2021-01-27 RX ADMIN — AMLODIPINE BESYLATE 10 MG: 5 TABLET ORAL at 15:40

## 2021-01-27 RX ADMIN — ENOXAPARIN SODIUM 40 MG: 40 INJECTION SUBCUTANEOUS at 05:19

## 2021-01-27 RX ADMIN — SODIUM CHLORIDE: 9 INJECTION, SOLUTION INTRAVENOUS at 08:12

## 2021-01-27 RX ADMIN — OXCARBAZEPINE 600 MG: 300 TABLET, FILM COATED ORAL at 05:17

## 2021-01-27 RX ADMIN — LEVETIRACETAM 1000 MG: 500 TABLET ORAL at 17:10

## 2021-01-27 RX ADMIN — DIVALPROEX SODIUM 500 MG: 500 TABLET, DELAYED RELEASE ORAL at 18:29

## 2021-01-27 RX ADMIN — SODIUM CHLORIDE 2000 MG: 9 INJECTION, SOLUTION INTRAVENOUS at 11:07

## 2021-01-27 SDOH — ECONOMIC STABILITY: FOOD INSECURITY: WITHIN THE PAST 12 MONTHS, YOU WORRIED THAT YOUR FOOD WOULD RUN OUT BEFORE YOU GOT MONEY TO BUY MORE.: NEVER TRUE

## 2021-01-27 SDOH — ECONOMIC STABILITY: INCOME INSECURITY: HOW HARD IS IT FOR YOU TO PAY FOR THE VERY BASICS LIKE FOOD, HOUSING, MEDICAL CARE, AND HEATING?: SOMEWHAT HARD

## 2021-01-27 SDOH — ECONOMIC STABILITY: TRANSPORTATION INSECURITY
IN THE PAST 12 MONTHS, HAS LACK OF TRANSPORTATION KEPT YOU FROM MEETINGS, WORK, OR FROM GETTING THINGS NEEDED FOR DAILY LIVING?: NO

## 2021-01-27 SDOH — ECONOMIC STABILITY: FOOD INSECURITY: WITHIN THE PAST 12 MONTHS, THE FOOD YOU BOUGHT JUST DIDN'T LAST AND YOU DIDN'T HAVE MONEY TO GET MORE.: NEVER TRUE

## 2021-01-27 SDOH — ECONOMIC STABILITY: TRANSPORTATION INSECURITY
IN THE PAST 12 MONTHS, HAS THE LACK OF TRANSPORTATION KEPT YOU FROM MEDICAL APPOINTMENTS OR FROM GETTING MEDICATIONS?: NO

## 2021-01-27 ASSESSMENT — PAIN DESCRIPTION - PAIN TYPE
TYPE: ACUTE PAIN

## 2021-01-27 NOTE — CARE PLAN
Problem: Safety  Goal: Will remain free from falls  Outcome: PROGRESSING AS EXPECTED  Note: Purposeful hourly rounding in place. Educated pt on use of call light. Call light within reach, bed in lowest and locked position.      Problem: Knowledge Deficit  Goal: Knowledge of disease process/condition, treatment plan, diagnostic tests, and medications will improve  Outcome: PROGRESSING AS EXPECTED  Note: Updated pt on POC for seizures and VEEG monitoring.

## 2021-01-27 NOTE — PROGRESS NOTES
Hospital Medicine Daily Progress Note    Date of Service  1/27/2021    Chief Complaint  75 y.o. male admitted 1/25/2021 with left-sided weakness     Hospital Course  75 male with past medical history of hypertension, dyslipidemia presented to ED with seizure-like episode.  Subsequent CT head in the ED negative for  acute stroke.  Admitted under stroke unit for monitoring     Interval Problem Update  1/26/2020  Labs unremarkable.  EEG consistent with several occipital lobe seizure.  Patient placed on continuous EEG monitoring.  Loaded with Keppra.     1/27/2020- Turkmen  used    No new episode of seizure.  Under continuous EEG monitor.  EEG remarkable for more than 100 seizure overnight.  MRI consistent with a small chronic hemorrhagic at posterior right parietal lobe likely related with seizure activity.  Antiseizure medication adjusted by neurology.   DVT prophylaxis to be continued as hemorrhage  chronic in nature as per neurology.    Discussed the patient's son regarding patient current medical condition and treatment plan.    Consultants/Specialty  Neurology     Code Status  Full Code     Disposition  Acute for now  Depending on PT evaluation at the time of discharge        Review of Systems  Review of Systems   Constitutional: Negative for fever.   Respiratory: Negative for cough and hemoptysis.    Cardiovascular: Negative for chest pain and palpitations.   Gastrointestinal: Negative for nausea and vomiting.   Genitourinary: Negative for dysuria.   Neurological: Negative for dizziness and headaches.   Psychiatric/Behavioral: Negative for depression.         Physical Exam  Temp:  [36.4 °C (97.5 °F)-36.9 °C (98.4 °F)] 36.9 °C (98.4 °F)  Pulse:  [72-88] 77  Resp:  [14-25] 16  BP: (113-135)/(59-85) 126/71  SpO2:  [91 %-96 %] 93 %     Physical Exam  Constitutional:       Appearance: Normal appearance.   HENT:      Head: Normocephalic.      Right Ear: Tympanic membrane normal.      Nose: Nose  normal.      Mouth/Throat:      Mouth: Mucous membranes are moist.   Eyes:      Pupils: Pupils are equal, round, and reactive to light.   Cardiovascular:      Rate and Rhythm: Normal rate and regular rhythm.      Pulses: Normal pulses.   Pulmonary:      Effort: Pulmonary effort is normal.      Breath sounds: Normal breath sounds.   Skin:     General: Skin is warm.   Neurological:      General: No focal deficit present.      Mental Status: He is alert and oriented to person, place, and time.      Cranial Nerves: No cranial nerve deficit.      Sensory: No sensory deficit.   Fluids    Intake/Output Summary (Last 24 hours) at 1/27/2021 1458  Last data filed at 1/27/2021 1000  Gross per 24 hour   Intake 480 ml   Output 500 ml   Net -20 ml       Laboratory  Recent Labs     01/25/21  1600 01/26/21  0531   WBC 11.6* 7.9   RBC 5.10 4.79   HEMOGLOBIN 17.4 16.5   HEMATOCRIT 51.7 50.0   .4* 104.4*   MCH 34.1* 34.4*   MCHC 33.7 33.0*   RDW 44.0 46.4   PLATELETCT 254 233   MPV 11.7 11.2     Recent Labs     01/25/21  1600 01/26/21  0531   SODIUM 132* 135   POTASSIUM 4.3 3.8   CHLORIDE 100 103   CO2 22 23   GLUCOSE 99 95   BUN 14 13   CREATININE 1.06 0.80   CALCIUM 10.5 9.5                   Imaging  MR-BRAIN-W/O   Final Result      1.  Small chronic area of hemorrhage in the posterior right parietal lobe. Surrounding this area is cortical hyperintensity on T2 and DWI images which is likely related to seizure activity given the history.   2.  Age-related cerebral atrophy and chronic microvascular ischemic type changes.   3.  No acute intracranial hemorrhage.      CT-HEAD W/O   Final Result      1.  No evidence of acute hemorrhage or mass   2.  Redemonstrated RIGHT parietal calcification      DX-CHEST-PORTABLE (1 VIEW)   Final Result      No acute cardiac or pulmonary abnormalities are identified.           Assessment/Plan  Essential hypertension  Assessment & Plan  Continue home medication    Seizure-like activity (HCC)-  (present on admission)  Assessment & Plan  EEG consistent with several occipital lobe seizure.   Evaluated by neurology  Patient placed on continuous EEG monitoring. Loaded with Keppra.   Oxcarbazepine discontinued  Currently on oral Keppra and Depakote  Ativan as needed for seizure    Dyslipidemia- (present on admission)  Assessment & Plan  Continue statin       VTE prophylaxis: lovenox

## 2021-01-27 NOTE — PROCEDURES
VIDEO ELECTROENCEPHALOGRAM REPORT        Referring provider: Dr. Jonas.      DOS:   1/27/2021 (recording for 23 hrs and 25 minutes).         INDICATION:  jM Cornejo 75 y.o. male presenting with seizures.      CURRENT ANTIEPILEPTIC REGIMEN: Levetiracetam. Valproic Acid was added during the study.      TECHNIQUE: A 30-channel,  continuous video electroencephalogram (VEEG) was performed in accordance with the international 10-20 system. This digital study was reviewed in bipolar and referential montages. The recording examined the patient during wakeful, drowsy and sleep states.      The EEG was set up and taken down by a Neurodiagnostic technologist who performed education to patient and staff.      A minimum but not limited to 23 electrodes and 23 channel recording was setup and performed by Neurodiagnostic technologist.     Impedances, electrode integrity, and technical impressions were documented a minimum of every 2-24 hour period by a Neurodiagnostic Technologist and reviewed by Interpreting physician.      DESCRIPTION OF THE RECORD:  During the awake state, background shows symmetrical 8 Hz alpha activity posteriorly with amplitude of 70 mV.  There was reactivity with eye opening/closure.  Normal anterior-posterior gradient was noted with faster beta frequencies seen anteriorly.  During drowsiness, high-amplitude delta frequencies were seen.     During the sleep state, background shows diffuse high-amplitude 4-5 Hz delta activity.  Symmetrical high-amplitude sleep spindles and vertex sharp activities were seen in the central leads.     ACTIVATION PROCEDURES:   Not performed.      ICTAL AND/OR INTERICTAL FINDINGS:   A handful of focal right posterior quadrant seizures were captured during the first few hours of the study. On eeg, seizures start as fast sharply contoured beta / alpha activity in the right posterior quadrant, with evolution to rhythmic sharply contoured theta / delta activity and some  spreading within the right hemisphere anteriorly, but not contralaterally, remaining focalized in the right hemisphere. Seizures were brief, and lasted an average of 30 seconds. There was post ictal attenuated background on the eeg over the right posterior quadrant. Clinically, on video patient exhibited head deviation the left (in most seizures), and may appear less responsive and or mildly confused during the events, but did not lose consciousness and no motor activity noted. There has been significant improvement with addition of Valproic Acid, with no seizures captured in the last several hours of the recording.      EVENT(S):  See above.      EKG: sampling review of EKG recording demonstrated a sinus arhythmia.         INTERPRETATION:   This is an abnormal continuous video electroencephalogram recording in the awake, drowsy and sleep state.  A handful of focal right posterior quadrant seizures were captured during the first few hours of the study. Seizures were brief, and lasted an average of 30 seconds. There has been significant improvement with addition of Valproic Acid, with no further seizures captured in the last several hours of the recording. The findings suggest an underlying area of increased cortical irritability and structural abnormality. Clinical and radiological correlation is recommended.     Of note, a sinus arrhythmia was noted on the ekg lead.      Updates provided to Dr. Jonas.            Morris Florence MD   Epilepsy and Neurodiagnostics.   Clinical  of Neurology [facility].   Diplomate in Neurology, Epilepsy, and Electrodiagnostic Medicine.   Office: [phone]  Fax: [phone]

## 2021-01-27 NOTE — CARE PLAN
Problem: Safety  Goal: Will remain free from injury  Outcome: PROGRESSING AS EXPECTED  Note: Safety precautions in place. Continue with hourly rounding.      Problem: Knowledge Deficit  Goal: Knowledge of the prescribed therapeutic regimen will improve  Outcome: PROGRESSING AS EXPECTED  Note:  used for communication. All questions answered and concerns addressed at this time.

## 2021-01-27 NOTE — CONSULTS
CC:  convulsions    Date of Admission: 1/25/2021    Today's Date: 01/26/21    Consulting Physician: Dr. Gertrude M.D.      HPI:    Mj Cornejo is a 75 y.o. 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 He states he was sleeping at home when his left LE became stiff he became unconscious and started convulsing. Episode lasted ~30 seconds. States he takes his anti-epileptic medications daily and previous episode was 21 years ago. Reports to have chronic decreased sensation and intermittent stiffness of left lower extremity. Denies urinary/fecal incontinence, tongue-biting. No recent hx of illness, denies other neurological symptoms. He has not seen a neurologist in years.    ROS:   PERTINENT POSITIVES IN HPI  All other systems were reviewed and were negative.       Past Medical History:   Past Medical History:   Diagnosis Date   • Hypercholesteremia    • Hypertension        Past Surgical History: History reviewed. No pertinent surgical history.    Social History:   Social History     Socioeconomic History   • Marital status:      Spouse name: Not on file   • Number of children: Not on file   • Years of education: Not on file   • Highest education level: Not on file   Occupational History   • Not on file   Social Needs   • Financial resource strain: Not on file   • Food insecurity     Worry: Not on file     Inability: Not on file   • Transportation needs     Medical: Not on file     Non-medical: Not on file   Tobacco Use   • Smoking status: Never Smoker   • Smokeless tobacco: Never Used   Substance and Sexual Activity   • Alcohol use: No   • Drug use: No   • Sexual activity: Not on file   Lifestyle   • Physical activity     Days per week: Not on file     Minutes per session: Not on file   • Stress: Not on file   Relationships   • Social connections     Talks on phone: Not on file     Gets  together: Not on file     Attends [employer] service: Not on file     Active member of club or organization: Not on file     Attends meetings of clubs or organizations: Not on file     Relationship status: Not on file   • Intimate partner violence     Fear of current or ex partner: Not on file     Emotionally abused: Not on file     Physically abused: Not on file     Forced sexual activity: Not on file   Other Topics Concern   • Not on file   Social History Narrative   • Not on file       Family History: History reviewed. No pertinent family history.    HISTORIES AS ABOVE ARE INCOMPLETE IF PATIENT IS INTUBATED, OR UNABLE TO COMMUNICATE OR ELUCIDATE.    Allergies: No Known Allergies      Current Facility-Administered Medications:   •  levETIRAcetam (KEPPRA) tablet 1,000 mg, 1,000 mg, Oral, BID, Lenin Wiggins M.D.  •  OXcarbazepine (TRILEPTAL) tablet 600 mg, 600 mg, Oral, BID, Tess Monzon M.D., 600 mg at 01/27/21 0517  •  aspirin (ASA) chewable tab 81 mg, 81 mg, Oral, Sancho BREWER M.D., 81 mg at 01/27/21 0517  •  atorvastatin (LIPITOR) tablet 10 mg, 10 mg, Oral, Sancho BREWER M.D., 10 mg at 01/27/21 0517  •  senna-docusate (PERICOLACE or SENOKOT S) 8.6-50 MG per tablet 2 Tab, 2 Tab, Oral, BID, 2 Tab at 01/27/21 0517 **AND** polyethylene glycol/lytes (MIRALAX) PACKET 1 Packet, 1 Packet, Oral, QDAY PRN **AND** magnesium hydroxide (MILK OF MAGNESIA) suspension 30 mL, 30 mL, Oral, QDAY PRN **AND** bisacodyl (DULCOLAX) suppository 10 mg, 10 mg, Rectal, QDAY PRN, Sancho Zuleta M.D.  •  NS infusion, , Intravenous, Continuous, Sancho Zuleta M.D., Last Rate: 75 mL/hr at 01/27/21 0812, New Bag at 01/27/21 0812  •  enoxaparin (LOVENOX) inj 40 mg, 40 mg, Subcutaneous, DAILY, Sancho Zuleta M.D., 40 mg at 01/27/21 0519  •  LORazepam (ATIVAN) injection 4 mg, 4 mg, Intravenous, Q10 MIN PRN, Sancho Zuleta M.D.      PHYSICAL EXAM    Vitals:    01/26/21 1620 01/26/21 2000 01/27/21 0400 01/27/21 0800   BP: 129/72 128/59 121/55  133/75   Pulse: 82 82 71 77   Resp: 16 16 17 16   Temp: 37.1 °C (98.8 °F) 36.7 °C (98.1 °F) 36.3 °C (97.4 °F) 36.9 °C (98.5 °F)   TempSrc: Temporal Temporal Temporal Temporal   SpO2: 93% 93% 93% 93%   Weight:       Height:           AAOx2 (cannot recall time)  Motor: 4/5 LLE and UE  Sense: Decreased sensation to touch LLE  Coordination: Ataxia demonstrated by orbiting of left hand, pronator drift of left hand    Labs:  Recent Labs     01/25/21  1600 01/26/21  0531   WBC 11.6* 7.9   RBC 5.10 4.79   HEMOGLOBIN 17.4 16.5   HEMATOCRIT 51.7 50.0   .4* 104.4*   MCH 34.1* 34.4*   MCHC 33.7 33.0*   RDW 44.0 46.4   PLATELETCT 254 233   MPV 11.7 11.2     Recent Labs     01/25/21  1600 01/26/21  0531   SODIUM 132* 135   POTASSIUM 4.3 3.8   CHLORIDE 100 103   CO2 22 23   GLUCOSE 99 95   BUN 14 13   CREATININE 1.06 0.80   CALCIUM 10.5 9.5                     Recent Labs     01/25/21  1600 01/26/21  0531   SODIUM 132* 135   POTASSIUM 4.3 3.8   CHLORIDE 100 103   CO2 22 23   GLUCOSE 99 95   BUN 14 13     Recent Labs     01/25/21  1600 01/26/21  0531   SODIUM 132* 135   POTASSIUM 4.3 3.8   CHLORIDE 100 103   CO2 22 23   BUN 14 13   CREATININE 1.06 0.80   CALCIUM 10.5 9.5         No results found for this or any previous visit.           Imaging: neuroimaging reviewed and directly visualized by me  MR-BRAIN-W/O   Final Result      1.  Small chronic area of hemorrhage in the posterior right parietal lobe. Surrounding this area is cortical hyperintensity on T2 and DWI images which is likely related to seizure activity given the history.   2.  Age-related cerebral atrophy and chronic microvascular ischemic type changes.   3.  No acute intracranial hemorrhage.      CT-HEAD W/O   Final Result      1.  No evidence of acute hemorrhage or mass   2.  Redemonstrated RIGHT parietal calcification      DX-CHEST-PORTABLE (1 VIEW)   Final Result      No acute cardiac or pulmonary abnormalities are identified.          Impression:  #focal  seizure, occipital lobe  Pt is a 74 y/o with a PMH of htn, hld , focal-occipital lobe epilepsy taking oxcarbamazepine and oxcarbazepine, who presented to the hospital with concerns of epileptic event, CT negative in the ED, metabolic panel wnl, EEG remarkable for >100 seizures overnight, MRI consistent with small chronic hemorrhagic area of hemorrhage at posterior right parietal lobe with cortical hyperintensity on T2/DWI related to seizure activity per impression, now started on Keppra instead of Carbamezapine     Recent results as above.    -uptitrating keppra dosing to 1000mg BID  -continue oxcarbazepine

## 2021-01-27 NOTE — PROGRESS NOTES
[facility] Medicine Daily Progress Note    Date of Service  1/26/2021    Chief Complaint  75 y.o. male admitted 1/25/2021 with left-sided weakness    Hospital Course  75 male with past medical history of hypertension, dyslipidemia presented to ED with seizure-like episode.  Subsequent CT head in the ED negative for  acute stroke.  Admitted under stroke unit for monitoring    Interval Problem Update  Vital stable  Labs unremarkable.  EEG consistent with several occipital lobe seizure.  Patient placed on continuous EEG monitoring.  Loaded with Keppra.    Consultants/Specialty  Neurology    Code Status  Full Code    Disposition  Acute for now  Depending on PT evaluation at the time of discharge      Review of Systems  Review of Systems   Constitutional: Negative for fever.   Respiratory: Negative for cough and hemoptysis.    Cardiovascular: Negative for chest pain and palpitations.   Gastrointestinal: Negative for nausea and vomiting.   Genitourinary: Negative for dysuria.   Neurological: Negative for dizziness and headaches.   Psychiatric/Behavioral: Negative for depression.        Physical Exam  Temp:  [36.4 °C (97.5 °F)-36.9 °C (98.4 °F)] 36.9 °C (98.4 °F)  Pulse:  [72-88] 77  Resp:  [14-25] 16  BP: (113-135)/(59-85) 126/71  SpO2:  [91 %-96 %] 93 %    Physical Exam  Constitutional:       Appearance: Normal appearance.   HENT:      Head: Normocephalic.      Right Ear: Tympanic membrane normal.      Nose: Nose normal.      Mouth/Throat:      Mouth: Mucous membranes are moist.   Eyes:      Pupils: Pupils are equal, round, and reactive to light.   Cardiovascular:      Rate and Rhythm: Normal rate and regular rhythm.      Pulses: Normal pulses.   Pulmonary:      Effort: Pulmonary effort is normal.      Breath sounds: Normal breath sounds.   Skin:     General: Skin is warm.   Neurological:      General: No focal deficit present.      Mental Status: He is alert and oriented to person, place, and time.      Cranial Nerves: No  cranial nerve deficit.      Sensory: No sensory deficit.         Fluids    Intake/Output Summary (Last 24 hours) at 1/26/2021 1638  Last data filed at 1/26/2021 1200  Gross per 24 hour   Intake 60 ml   Output 800 ml   Net -740 ml       Laboratory  Recent Labs     01/24/21  0025 01/25/21  1600 01/26/21  0531   WBC 9.5 11.6* 7.9   RBC 4.81 5.10 4.79   HEMOGLOBIN 16.2 17.4 16.5   HEMATOCRIT 49.3 51.7 50.0   .5* 101.4* 104.4*   MCH 33.7* 34.1* 34.4*   MCHC 32.9* 33.7 33.0*   RDW 44.5 44.0 46.4   PLATELETCT 247 254 233   MPV 11.5 11.7 11.2     Recent Labs     01/24/21  0025 01/25/21  1600 01/26/21  0531   SODIUM 137 132* 135   POTASSIUM 3.9 4.3 3.8   CHLORIDE 105 100 103   CO2 22 22 23   GLUCOSE 101* 99 95   BUN 12 14 13   CREATININE 0.73 1.06 0.80   CALCIUM 10.2 10.5 9.5             Recent Labs     01/24/21  0025   TRIGLYCERIDE 250*   HDL 30*   LDL 41       Imaging  CT-HEAD W/O   Final Result      1.  No evidence of acute hemorrhage or mass   2.  Redemonstrated RIGHT parietal calcification      DX-CHEST-PORTABLE (1 VIEW)   Final Result      No acute cardiac or pulmonary abnormalities are identified.      MR-BRAIN-W/O    (Results Pending)        Assessment/Plan  Seizure-like activity (HCC)- (present on admission)  Assessment & Plan  EEG consistent with several occipital lobe seizure.   Evaluated by neurology  Patient placed on continuous EEG monitoring. Loaded with Keppra.   Currently on oral Keppra and oxcarbazepine  Ativan as needed for seizure    Dyslipidemia- (present on admission)  Assessment & Plan  Continue statin       VTE prophylaxis: lovenox

## 2021-01-28 ENCOUNTER — TELEPHONE (OUTPATIENT)
Dept: NEUROLOGY | Facility: MEDICAL CENTER | Age: 76
End: 2021-01-28

## 2021-01-28 ENCOUNTER — PHARMACY VISIT (OUTPATIENT)
Dept: PHARMACY | Facility: MEDICAL CENTER | Age: 76
End: 2021-01-28
Payer: COMMERCIAL

## 2021-01-28 VITALS
OXYGEN SATURATION: 93 % | DIASTOLIC BLOOD PRESSURE: 70 MMHG | TEMPERATURE: 97 F | BODY MASS INDEX: 26.01 KG/M2 | HEART RATE: 83 BPM | HEIGHT: 64 IN | RESPIRATION RATE: 17 BRPM | WEIGHT: 152.34 LBS | SYSTOLIC BLOOD PRESSURE: 127 MMHG

## 2021-01-28 DIAGNOSIS — G40.109 LOCALIZATION-RELATED EPILEPSY (HCC): ICD-10-CM

## 2021-01-28 DIAGNOSIS — Z09 HOSPITAL DISCHARGE FOLLOW-UP: ICD-10-CM

## 2021-01-28 PROBLEM — R56.9 SEIZURE-LIKE ACTIVITY (HCC): Status: RESOLVED | Noted: 2021-01-25 | Resolved: 2021-01-28

## 2021-01-28 LAB
ANION GAP SERPL CALC-SCNC: 12 MMOL/L (ref 7–16)
BASOPHILS # BLD AUTO: 0.7 % (ref 0–1.8)
BASOPHILS # BLD: 0.06 K/UL (ref 0–0.12)
BUN SERPL-MCNC: 7 MG/DL (ref 8–22)
CALCIUM SERPL-MCNC: 9.1 MG/DL (ref 8.5–10.5)
CHLORIDE SERPL-SCNC: 107 MMOL/L (ref 96–112)
CK SERPL-CCNC: 308 U/L (ref 0–154)
CO2 SERPL-SCNC: 21 MMOL/L (ref 20–33)
CREAT SERPL-MCNC: 0.7 MG/DL (ref 0.5–1.4)
EOSINOPHIL # BLD AUTO: 0.19 K/UL (ref 0–0.51)
EOSINOPHIL NFR BLD: 2.3 % (ref 0–6.9)
ERYTHROCYTE [DISTWIDTH] IN BLOOD BY AUTOMATED COUNT: 44.7 FL (ref 35.9–50)
GLUCOSE SERPL-MCNC: 91 MG/DL (ref 65–99)
HCT VFR BLD AUTO: 44.6 % (ref 42–52)
HGB BLD-MCNC: 15 G/DL (ref 14–18)
IMM GRANULOCYTES # BLD AUTO: 0.04 K/UL (ref 0–0.11)
IMM GRANULOCYTES NFR BLD AUTO: 0.5 % (ref 0–0.9)
LYMPHOCYTES # BLD AUTO: 2.19 K/UL (ref 1–4.8)
LYMPHOCYTES NFR BLD: 26.6 % (ref 22–41)
MCH RBC QN AUTO: 34.7 PG (ref 27–33)
MCHC RBC AUTO-ENTMCNC: 33.6 G/DL (ref 33.7–35.3)
MCV RBC AUTO: 103.2 FL (ref 81.4–97.8)
MONOCYTES # BLD AUTO: 0.67 K/UL (ref 0–0.85)
MONOCYTES NFR BLD AUTO: 8.1 % (ref 0–13.4)
NEUTROPHILS # BLD AUTO: 5.08 K/UL (ref 1.82–7.42)
NEUTROPHILS NFR BLD: 61.8 % (ref 44–72)
NRBC # BLD AUTO: 0 K/UL
NRBC BLD-RTO: 0 /100 WBC
PLATELET # BLD AUTO: 208 K/UL (ref 164–446)
PMV BLD AUTO: 11.4 FL (ref 9–12.9)
POTASSIUM SERPL-SCNC: 4.2 MMOL/L (ref 3.6–5.5)
RBC # BLD AUTO: 4.32 M/UL (ref 4.7–6.1)
SODIUM SERPL-SCNC: 140 MMOL/L (ref 135–145)
WBC # BLD AUTO: 8.2 K/UL (ref 4.8–10.8)

## 2021-01-28 PROCEDURE — A9270 NON-COVERED ITEM OR SERVICE: HCPCS | Performed by: PSYCHIATRY & NEUROLOGY

## 2021-01-28 PROCEDURE — 97165 OT EVAL LOW COMPLEX 30 MIN: CPT

## 2021-01-28 PROCEDURE — 99232 SBSQ HOSP IP/OBS MODERATE 35: CPT | Performed by: PSYCHIATRY & NEUROLOGY

## 2021-01-28 PROCEDURE — A9270 NON-COVERED ITEM OR SERVICE: HCPCS | Performed by: INTERNAL MEDICINE

## 2021-01-28 PROCEDURE — 700111 HCHG RX REV CODE 636 W/ 250 OVERRIDE (IP): Performed by: INTERNAL MEDICINE

## 2021-01-28 PROCEDURE — 85025 COMPLETE CBC W/AUTO DIFF WBC: CPT

## 2021-01-28 PROCEDURE — RXMED WILLOW AMBULATORY MEDICATION CHARGE: Performed by: STUDENT IN AN ORGANIZED HEALTH CARE EDUCATION/TRAINING PROGRAM

## 2021-01-28 PROCEDURE — 80048 BASIC METABOLIC PNL TOTAL CA: CPT

## 2021-01-28 PROCEDURE — 36415 COLL VENOUS BLD VENIPUNCTURE: CPT

## 2021-01-28 PROCEDURE — 82550 ASSAY OF CK (CPK): CPT

## 2021-01-28 PROCEDURE — 99239 HOSP IP/OBS DSCHRG MGMT >30: CPT | Performed by: STUDENT IN AN ORGANIZED HEALTH CARE EDUCATION/TRAINING PROGRAM

## 2021-01-28 PROCEDURE — 700102 HCHG RX REV CODE 250 W/ 637 OVERRIDE(OP): Performed by: INTERNAL MEDICINE

## 2021-01-28 PROCEDURE — 700102 HCHG RX REV CODE 250 W/ 637 OVERRIDE(OP): Performed by: STUDENT IN AN ORGANIZED HEALTH CARE EDUCATION/TRAINING PROGRAM

## 2021-01-28 PROCEDURE — A9270 NON-COVERED ITEM OR SERVICE: HCPCS | Performed by: STUDENT IN AN ORGANIZED HEALTH CARE EDUCATION/TRAINING PROGRAM

## 2021-01-28 PROCEDURE — 97161 PT EVAL LOW COMPLEX 20 MIN: CPT

## 2021-01-28 PROCEDURE — 700102 HCHG RX REV CODE 250 W/ 637 OVERRIDE(OP): Performed by: PSYCHIATRY & NEUROLOGY

## 2021-01-28 RX ORDER — LEVETIRACETAM 1000 MG/1
1000 TABLET ORAL 2 TIMES DAILY
Qty: 60 TAB | Refills: 0 | Status: SHIPPED | OUTPATIENT
Start: 2021-01-28 | End: 2021-02-02

## 2021-01-28 RX ORDER — DIVALPROEX SODIUM 500 MG/1
500 TABLET, DELAYED RELEASE ORAL 2 TIMES DAILY
Qty: 60 TAB | Refills: 0 | Status: SHIPPED | OUTPATIENT
Start: 2021-01-28 | End: 2021-02-02

## 2021-01-28 RX ADMIN — AMLODIPINE BESYLATE 10 MG: 5 TABLET ORAL at 04:27

## 2021-01-28 RX ADMIN — DIVALPROEX SODIUM 500 MG: 500 TABLET, DELAYED RELEASE ORAL at 04:28

## 2021-01-28 RX ADMIN — ACETAMINOPHEN 650 MG: 325 TABLET, FILM COATED ORAL at 01:49

## 2021-01-28 RX ADMIN — LEVETIRACETAM 1000 MG: 500 TABLET ORAL at 04:28

## 2021-01-28 RX ADMIN — ASPIRIN 81 MG: 81 TABLET, CHEWABLE ORAL at 04:28

## 2021-01-28 RX ADMIN — ENOXAPARIN SODIUM 40 MG: 40 INJECTION SUBCUTANEOUS at 04:27

## 2021-01-28 RX ADMIN — ATORVASTATIN CALCIUM 10 MG: 10 TABLET, FILM COATED ORAL at 04:28

## 2021-01-28 ASSESSMENT — COGNITIVE AND FUNCTIONAL STATUS - GENERAL
HELP NEEDED FOR BATHING: A LITTLE
STANDING UP FROM CHAIR USING ARMS: A LITTLE
CLIMB 3 TO 5 STEPS WITH RAILING: A LITTLE
MOBILITY SCORE: 21
SUGGESTED CMS G CODE MODIFIER DAILY ACTIVITY: CJ
SUGGESTED CMS G CODE MODIFIER MOBILITY: CJ
WALKING IN HOSPITAL ROOM: A LITTLE
DRESSING REGULAR LOWER BODY CLOTHING: A LITTLE
DAILY ACTIVITIY SCORE: 21
TOILETING: A LITTLE

## 2021-01-28 ASSESSMENT — PAIN DESCRIPTION - PAIN TYPE
TYPE: ACUTE PAIN

## 2021-01-28 ASSESSMENT — GAIT ASSESSMENTS
DISTANCE (FEET): 150
GAIT LEVEL OF ASSIST: SUPERVISED
DEVIATION: SHUFFLED GAIT

## 2021-01-28 ASSESSMENT — ACTIVITIES OF DAILY LIVING (ADL): TOILETING: INDEPENDENT

## 2021-01-28 NOTE — THERAPY
Physical Therapy   Initial Evaluation     Patient Name: Mj Cornejo  Age:  75 y.o., Sex:  male  Medical Record #: [id number]  Today's Date: 1/28/2021     Precautions: Fall Risk    Assessment  Patient is 75 y.o. male who presented 1/25/2021 with seizure-like activity.  Upon admit, pt found to have EEG consistent with several occipital lobe seizure.  Patient with PMH including HTN and dislipidemia.  Patient is primarily [place] speaking.  Today patient with B LE strength grossly 5/5.  Patient able to ambulate approx. 150 feet in hallway without AD.  Patient's son reports that patient walks with FWW at baseline but patient declined use today.  Patient has no further mobility needs.    Plan    Recommend Physical Therapy for Evaluation only.    DC Equipment Recommendations: None (Pt owns FWW)  Discharge Recommendations: Anticipate that the patient will have no further physical therapy needs after discharge from the hospital.        Objective     01/28/21 1401   Prior Living Situation   Prior Services Continuous (24 Hour) Care Giving Family   Housing / Facility 1 Story House   Steps Into Home 0   Equipment Owned Front-Wheel Walker   Lives with - Patient's Self Care Capacity Spouse;Adult Children   Prior Level of Functional Mobility   Bed Mobility Independent   Transfer Status Independent   Ambulation Independent   Distance Ambulation (Feet) (household)   Assistive Devices Used Front-Wheel Walker   Cognition    Cognition / Consciousness WDL   Level of Consciousness Alert   Active ROM Lower Body    Active ROM Lower Body  WDL   Strength Lower Body   Comments B LE grossly 5/5    Neurological Concerns   Neurological Concerns No   Balance Assessment   Sitting Balance (Static) Good   Sitting Balance (Dynamic) Fair +   Standing Balance (Static) Fair +   Standing Balance (Dynamic) Fair +   Weight Shift Sitting Good   Weight Shift Standing Fair   Gait Analysis   Gait Level Of Assist Supervised   Assistive Device None    Distance (Feet) 150   # of Times Distance was Traveled 1   Deviation Shuffled Gait   Weight Bearing Status No restrictions   Bed Mobility    Supine to Sit (Up in chair pre session)   Sit to Supine (up in chair post session)   Functional Mobility   Sit to Stand Supervised   Bed, Chair, Wheelchair Transfer Supervised   Transfer Method Stand Step   How much help from another person does the patient currently need...   6 clicks Mobility Score 21

## 2021-01-28 NOTE — CARE PLAN
Problem: Communication  Goal: The ability to communicate needs accurately and effectively will improve  Outcome: PROGRESSING AS EXPECTED  Note: Patient communicates needs with staff appropriately.  needed.      Problem: Safety  Goal: Will remain free from injury  Outcome: PROGRESSING AS EXPECTED  Note: Safety precautions in place. Patient demonstrates appropriate use of call light.

## 2021-01-28 NOTE — CARE PLAN
Problem: Venous Thromboembolism (VTW)/Deep Vein Thrombosis (DVT) Prevention:  Goal: Patient will participate in Venous Thrombosis (VTE)/Deep Vein Thrombosis (DVT)Prevention Measures  Outcome: PROGRESSING AS EXPECTED  Intervention: Assess and monitor for anticoagulation complications  Note: Lovenox implemented for dvt prophylaxis, no s/s of complications     Problem: Pain Management  Goal: Pain level will decrease to patient's comfort goal  Outcome: PROGRESSING AS EXPECTED  Intervention: Follow pain managment plan developed in collaboration with patient and Interdisciplinary Team  Note: Declined pain, resting in bed comfortably

## 2021-01-28 NOTE — DISCHARGE PLANNING
Meds-to-Beds: Discharge prescription orders listed below delivered to patient's bedside. RN notified. Patient counseled in [place] with assistance of his son Mj. Patient elected to have co-payment billed to patient account.      Mj Orona   Home Medication Instructions MAURICIO:[id number]    Printed on:01/28/21 7894   Medication Information                      divalproex (DEPAKOTE) 500 MG Tablet Delayed Response  Take 1 Tab by mouth 2 Times a Day for 30 days.             levetiracetam (KEPPRA) 1000 MG tablet  Take 1 Tab by mouth 2 Times a Day for 30 days.               Yareli Broussard, PharmD

## 2021-01-28 NOTE — DISCHARGE SUMMARY
Discharge Summary    CHIEF COMPLAINT ON ADMISSION  Chief Complaint   Patient presents with   • Extremity Weakness     Left leg weakness that started Saturday along with high blood pressure. Seen at [facility] and was discharged. Patient then had a seizure yesterday and fell. Today the patient is presenting with the same left leg weakness       Reason for Admission  EMS     Admission Date  1/25/2021    CODE STATUS  Full Code    HPI & HOSPITAL COURSE  75 male with past medical history of hypertension, dyslipidemia presented to ED with seizure-like episode.  Subsequent CT head in the ED negative for  acute stroke.  Admitted under stroke unit for monitoring.  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  Medication regimen was adjusted by neurology to Keppra and Depakote .  Cleared by neurology for discharge with outpatient follow-up.  Patient evaluated by PT/OT recommended no further therapy.  Discharge plan was discussed with the patient and patient's son in details.      Therefore, he is discharged in good and stable condition to home with close outpatient follow-up.    The patient met 2-midnight criteria for an inpatient stay at the time of discharge.    Discharge Date  1/28/2021          DISCHARGE DIAGNOSES  Active Problems:    Dyslipidemia POA: Yes    Secondary hypertension POA: Unknown      Overview: Continue home medication    Essential hypertension POA: Unknown  Resolved Problems:    Seizure-like activity (HCC) POA: Yes      FOLLOW UP  Future Appointments   Date Time Provider Department Center   2/1/2021 11:00 AM Morris Florence M.D. [facility] None     No follow-up provider specified.    MEDICATIONS ON DISCHARGE     Medication List      START taking these medications      Instructions   divalproex 500 MG Tbec  Commonly known as: DEPAKOTE   Take 1 Tab by  mouth 2 Times a Day for 30 days.  Dose: 500 mg     levetiracetam 1000 MG tablet  Commonly known as: KEPPRA   Take 1 Tab by mouth 2 Times a Day for 30 days.  Dose: 1,000 mg        CONTINUE taking these medications      Instructions   amLODIPine 10 MG Tabs  Commonly known as: NORVASC   Take 0.5 Tabs by mouth every day for 30 days.  Dose: 5 mg     Aspirin Low Dose 81 MG Chew chewable tablet  Generic drug: aspirin   Chew 81 mg every morning.  Dose: 81 mg     atorvastatin 10 MG Tabs  Commonly known as: LIPITOR   Take 10 mg by mouth every morning.  Dose: 10 mg        STOP taking these medications    carbamazepine  MG Tb12 extended-release tablet  Commonly known as: TEGRETOL XR     meclizine 25 MG Tabs  Commonly known as: ANTIVERT     meloxicam 7.5 MG Tabs  Commonly known as: MOBIC     OXcarbazepine 150 MG Tabs  Commonly known as: TRILEPTAL            Allergies  No Known Allergies    DIET  Orders Placed This Encounter   Procedures   • Diet Order Diet: Regular     Standing Status:   Standing     Number of Occurrences:   1     Order Specific Question:   Diet:     Answer:   Regular [1]       ACTIVITY  As tolerated.  Weight bearing as tolerated    CONSULTATIONS  Neurology     PROCEDURES  EEG     LABORATORY  Lab Results   Component Value Date    SODIUM 140 01/28/2021    POTASSIUM 4.2 01/28/2021    CHLORIDE 107 01/28/2021    CO2 21 01/28/2021    GLUCOSE 91 01/28/2021    BUN 7 (L) 01/28/2021    CREATININE 0.70 01/28/2021        Lab Results   Component Value Date    WBC 8.2 01/28/2021    HEMOGLOBIN 15.0 01/28/2021    HEMATOCRIT 44.6 01/28/2021    PLATELETCT 208 01/28/2021        Total time of the discharge process exceeds 32  minutes.

## 2021-01-28 NOTE — DISCHARGE INSTRUCTIONS
Seizure, Adult  A seizure is a sudden burst of abnormal electrical activity in the brain. Seizures usually last from 30 seconds to 2 minutes. They can cause many different symptoms.  Usually, seizures are not harmful unless they last a long time.  What are the causes?  Common causes of this condition include:  · Fever or infection.  · Conditions that affect the brain, such as:  ? A brain abnormality that you were born with.  ? A brain or head injury.  ? Bleeding in the brain.  ? A tumor.  ? Stroke.  ? Brain disorders such as autism or cerebral palsy.  · Low blood sugar.  · Conditions that are passed from parent to child (are inherited).  · Problems with substances, such as:  ? Having a reaction to a drug or a medicine.  ? Suddenly stopping the use of a substance (withdrawal).  In some cases, the cause may not be known. A person who has repeated seizures over time without a clear cause has a condition called epilepsy.  What increases the risk?  You are more likely to get this condition if you have:  · A family history of epilepsy.  · Had a seizure in the past.  · A brain disorder.  · A history of head injury, lack of oxygen at birth, or strokes.  What are the signs or symptoms?  There are many types of seizures. The symptoms vary depending on the type of seizure you have. Examples of symptoms during a seizure include:  · Shaking (convulsions).  · Stiffness in the body.  · Passing out (losing consciousness).  · Head nodding.  · Staring.  · Not responding to sound or touch.  · Loss of bladder control and bowel control.  Some people have symptoms right before and right after a seizure happens.  Symptoms before a seizure may include:  · Fear.  · Worry (anxiety).  · Feeling like you may vomit (nauseous).  · Feeling like the room is spinning (vertigo).  · Feeling like you saw or heard something before (déjà vu).  · Odd tastes or smells.  · Changes in how you see. You may see flashing lights or spots.  Symptoms after a  seizure happens can include:  · Confusion.  · Sleepiness.  · Headache.  · Weakness on one side of the body.  How is this treated?  Most seizures will stop on their own in under 5 minutes. In these cases, no treatment is needed. Seizures that last longer than 5 minutes will usually need treatment. Treatment can include:  · Medicines given through an IV tube.  · Avoiding things that are known to cause your seizures. These can include medicines that you take for another condition.  · Medicines to treat epilepsy.  · Surgery to stop the seizures. This may be needed if medicines do not help.  Follow these instructions at home:  Medicines  · Take over-the-counter and prescription medicines only as told by your doctor.  · Do not eat or drink anything that may keep your medicine from working, such as alcohol.  Activity  · Do not do any activities that would be dangerous if you had another seizure, like driving or swimming. Wait until your doctor says it is safe for you to do them.  · If you live in the [place] ask your local DMV (department of [place]) when you can drive.  · Get plenty of rest.  Teaching others  Teach friends and family what to do when you have a seizure. They should:  · Lay you on the ground.  · Protect your head and body.  · Loosen any tight clothing around your neck.  · Turn you on your side.  · Not hold you down.  · Not put anything into your mouth.  · Know whether or not you need emergency care.  · Stay with you until you are better.    General instructions  · Contact your doctor each time you have a seizure.  · Avoid anything that gives you seizures.  · Keep a seizure diary. Write down:  ? What you think caused each seizure.  ? What you remember about each seizure.  · Keep all follow-up visits as told by your doctor. This is important.  Contact a doctor if:  · You have another seizure.  · You have seizures more often.  · There is any change in what happens during your seizures.  · You keep having  seizures with treatment.  · You have symptoms of being sick or having an infection.  Get help right away if:  · You have a seizure that:  ? Lasts longer than 5 minutes.  ? Is different than seizures you had before.  ? Makes it harder to breathe.  ? Happens after you hurt your head.  · You have any of these symptoms after a seizure:  ? Not being able to speak.  ? Not being able to use a part of your body.  ? Confusion.  ? A bad headache.  · You have two or more seizures in a row.  · You do not wake up right after a seizure.  · You get hurt during a seizure.  These symptoms may be an emergency. Do not wait to see if the symptoms will go away. Get medical help right away. Call your local emergency services (911 in the [place] Do not drive yourself to the hospital.  Summary  · Seizures usually last from 30 seconds to 2 minutes. Usually, they are not harmful unless they last a long time.  · Do not eat or drink anything that may keep your medicine from working, such as alcohol.  · Teach friends and family what to do when you have a seizure.  · Contact your doctor each time you have a seizure.  This information is not intended to replace advice given to you by your health care provider. Make sure you discuss any questions you have with your health care provider.  Document Released: 06/05/2009 Document Revised: 03/06/2020 Document Reviewed: 03/06/2020  Elsevier Patient Education © 2020 Elsevier Inc.  Convulsiones en los adultos  Seizure, Adult  Theresa convulsión es theresa ráfaga repentina de actividad eléctrica anormal en el cerebro. Las convulsiones generalmente ortiz entre 30 segundos y 2 minutos. Pueden provocar muchos síntomas diferentes.  Generalmente, las convulsiones no causan daño, excepto si ortiz mucho tiempo.  ¿Cuáles son las causas?  Las causas más frecuentes de esta afección incluyen las siguientes:  · Fiebre o infección.  · Trastornos que afectan el cerebro, yovana los siguientes:  ? Theresa anormalidad cerebral con la que  se nació.  ? Zoraida lesión en la priscilla o el cerebro.  ? Hemorragia cerebral.  ? Un tumor.  ? Accidente cerebrovascular.  ? Trastornos cerebrales tales yovana autismo o parálisis cerebral.  · Bajo nivel de glucosa en la sal.  · Enfermedades que se transmiten de padres a hijos (son hereditarias).  · Problemas con sustancias, por ejemplo:  ? Tener zoraida reacción a zoraida droga o un medicamento.  ? Interrupción repentina del uso de zoraida sustancia (abstinencia).  En algunos casos, es posible que la causa se desconozca. Zoraida persona que tiene convulsiones que se repiten con el transcurso del tiempo sin zoraida causa aparente sufre un trastorno llamado “epilepsia”.  ¿Qué incrementa el riesgo?  Es más probable que tenga esta afección si:  · Tiene antecedentes familiares de epilepsia.  · Tuvo zoraida convulsión en el pasado.  · Tiene un trastorno cerebral.  · Tiene antecedentes de lesión en la priscilla, falta de oxígeno en el nacimiento o accidentes cerebrovasculares.  ¿Cuáles son los signos o los síntomas?  Hay muchos tipos de convulsiones. Los síntomas varían según el tipo de convulsión que tenga. Algunos ejemplos de síntomas que se producen george zoraida convulsión son los siguientes:  · Sacudidas (convulsiones).  · Rigidez del cuerpo.  · Desmayo (pérdida de la conciencia).  · Movimientos de asentimiento con la priscilla.  · Mirar fijamente.  · No responder a los sonidos o al tacto.  · Pérdida del control del intestino y de la vejiga.  Algunas personas tienen síntomas inmediatamente antes e inmediatamente después de que se produzca la convulsión.  Los síntomas previos a zoraida convulsión pueden ser los siguientes:  · Temor.  · Preocupación (ansiedad).  · Sensación de que va a vomitar (náuseas).  · Sensación de que la habitación da vueltas (vértigo).  · Sensación de que ya zhao u oyó algo antes (déjà vu).  · Percepción de sabores u olores extraños.  · Cambios en la forma de christina. Quizás madhuri manchas o luces intermitentes.  Los síntomas que se producen  después de zoraida convulsión pueden incluir:  · Confusión.  · Somnolencia.  · Dolor de priscilla.  · Debilidad en un lado del cuerpo.  ¿Cómo se trata?  La mayoría de las convulsiones se detienen solas en menos de 5 minutos. En algunos los casos, no se necesita tratamiento. Las convulsiones que ortiz más de 5 minutos generalmente necesitan tratamiento. Entre las opciones de tratamiento se incluyen las siguientes:  · Medicamentos a través de zoraida vía intravenosa (IV).  · Evitar las cosas que se sabe que causan las convulsiones. Estas pueden incluir medicamentos que sourav para otra afección.  · Medicamentos para tratar la epilepsia.  · Cirugía para detener las convulsiones. Puede ser necesaria si los medicamentos no son eficaces.  Siga estas indicaciones en tello casa:  Medicamentos  · [place] los medicamentos de venta parminder y los recetados solamente yovana se lo haya indicado el médico.  · No coma ni gerber nada que pueda alterar el funcionamiento del medicamento, yovana el alcohol.  Actividad  · No oscar ninguna actividad que sería peligrosa si tuviera otra convulsión, yovana conducir o nadar. Espere hasta que el médico le diga que es seguro que oscar esas actividades.  · Si vive en los [place], consulte al DMV (Departamento de Vehículos Motorizados) local cuándo puede volver a conducir.  · Descanse mucho.  Enseñarles a otros  Enseñe a juan amigos y familiares lo que deben hacer si usted tiene zoraida convulsión. Ellos deben hacer lo siguiente:  · Recostarlo en el suelo.  · Protegerle la priscilla y el cuerpo.  · Aflojar la ropa apretada alrededor de tello soto.  · Recostarlo sobre un lado.  · No sostenerlo presionándolo hacia abajo.  · No colocarle nada en la boca.  · Saber si usted necesita atención de emergencia o no.  · Permanecer con usted hasta que se sienta mejor.    Indicaciones generales  · Comuníquese con el médico cada vez que tenga zoraida convulsión.  · Evite todo lo que le cause convulsiones.  · Lleve un diario de juan convulsiones.  Escriba los siguientes datos:  ? Lo que usted tyron que causó cada convulsión.  ? Lo que recuerda sobre cada convulsión.  · Concurra a todas las visitas de seguimiento yovana se lo haya indicado el médico. [place] es importante.  Comuníquese con un médico si:  · Tiene otra convulsión.  · Tiene convulsiones con mayor frecuencia.  · Se producen cambios en lo que ocurre george las convulsiones.  · Continúa teniendo convulsiones con el tratamiento.  · Tiene síntomas de estar enfermo o de tener zoraida infección.  Solicite ayuda inmediatamente si:  · Tiene zoraida convulsión que:  ? Dura más de 5 minutos.  ? Es diferente a las convulsiones que tuvo antes.  ? Le dificulta la respiración.  ? Sucede después de lastimarse la priscilla.  · Tiene alguno de estos síntomas después de zoraida convulsión:  ? Imposibilidad de hablar.  ? Imposibilidad de usar zoraida parte del cuerpo.  ? Confusión.  ? Dolor de priscilla muy intenso.  · Tiene dos o más convulsiones consecutivas.  · No se despierta inmediatamente después de zoraida convulsión.  · Se lesiona george zoraida convulsión.  Estos síntomas pueden indicar zoraida emergencia. No espere a christina si los síntomas desaparecen. Solicite atención médica de inmediato. Comuníquese con el servicio de emergencias de tello localidad (911 en los [place]). No conduzca por juan propios medios hasta el hospital.  Resumen  · Las convulsiones generalmente ortiz entre 30 segundos y 2 minutos. No suelen ser dañinas, excepto si ortiz mucho tiempo.  · No coma ni gerber nada que pueda alterar el funcionamiento del medicamento, yovana el alcohol.  · Enseñe a juan amigos y familiares lo que deben hacer si usted tiene zoraida convulsión.  · Comuníquese con el médico cada vez que tenga zoraida convulsión.  Esta información no tiene yovana fin reemplazar el consejo del médico. Asegúrese de hacerle al médico cualquier pregunta que tenga.  Document Released: 01/20/2012 Document Revised: 04/08/2020 Document Reviewed: 04/08/2020  Elsevier Patient Education ©  2020 Elsevier Inc.  Discharge Instructions    Discharged to home by car with escort. Discharged via wheelchair, hospital escort: Yes.  Special equipment needed: Not Applicable    Be sure to schedule a follow-up appointment with your primary care doctor or any specialists as instructed.     Discharge Plan:   Diet Plan: Discussed  Activity Level: Discussed  Confirmed Follow up Appointment: No (Comments)  Confirmed Symptoms Management: Discussed  Medication Reconciliation Updated: Yes    I understand that a diet low in cholesterol, fat, and sodium is recommended for good health. Unless I have been given specific instructions below for another diet, I accept this instruction as my diet prescription.   Other diet: regular    Special Instructions: None    · Is patient discharged on Warfarin / Coumadin?   No     Depression / Suicide Risk    As you are discharged from this [facility] facility, it is important to learn how to keep safe from harming yourself.    Recognize the warning signs:  · Abrupt changes in personality, positive or negative- including increase in energy   · Giving away possessions  · Change in eating patterns- significant weight changes-  positive or negative  · Change in sleeping patterns- unable to sleep or sleeping all the time   · Unwillingness or inability to communicate  · Depression  · Unusual sadness, discouragement and loneliness  · Talk of wanting to die  · Neglect of personal appearance   · Rebelliousness- reckless behavior  · Withdrawal from people/activities they love  · Confusion- inability to concentrate     If you or a loved one observes any of these behaviors or has concerns about self-harm, here's what you can do:  · Talk about it- your feelings and reasons for harming yourself  · Remove any means that you might use to hurt yourself (examples: pills, rope, extension cords, firearm)  · Get professional help from the community (Mental Health, Substance Abuse, psychological  counseling)  · Do not be alone:Call your Safe Contact- someone whom you trust who will be there for you.  · Call your local CRISIS HOTLINE 370-9805 or 574-429-4547  · Call your local Children's Mobile Crisis Response Team [place] (561) 129-9719 or www.[employer]  · Call the toll free National Suicide Prevention Hotlines   · National Suicide Prevention Lifeline 182-300-BQGF (9149)  · National Hope Line Network 800-SUICIDE (383-8547)

## 2021-01-28 NOTE — PROGRESS NOTES
[facility] Neurology Progress Note:     Interval History: No acute events overnight. No complaints today. States feels well.     Objective:   Vitals:    01/27/21 1700 01/27/21 2000 01/28/21 0400 01/28/21 0736   BP: 131/63 126/68 124/63 127/70   Pulse: 80 71 76 83   Resp: 16 16 17 17   Temp: 36.8 °C (98.3 °F) 36.2 °C (97.2 °F) 36.4 °C (97.5 °F) 36.1 °C (97 °F)   TempSrc: Temporal Temporal Temporal Oral   SpO2: 95% 94% 92% 93%   Weight:       Height:           Labs:     Lab Results   Component Value Date/Time    PROTHROMBTM 14.2 01/23/2021 02:27 PM    INR 1.07 01/23/2021 02:27 PM      Lab Results   Component Value Date/Time    WBC 8.2 01/28/2021 02:13 AM    RBC 4.32 (L) 01/28/2021 02:13 AM    HEMOGLOBIN 15.0 01/28/2021 02:13 AM    HEMATOCRIT 44.6 01/28/2021 02:13 AM    .2 (H) 01/28/2021 02:13 AM    MCH 34.7 (H) 01/28/2021 02:13 AM    MCHC 33.6 (L) 01/28/2021 02:13 AM    MPV 11.4 01/28/2021 02:13 AM    NEUTSPOLYS 61.80 01/28/2021 02:13 AM    LYMPHOCYTES 26.60 01/28/2021 02:13 AM    MONOCYTES 8.10 01/28/2021 02:13 AM    EOSINOPHILS 2.30 01/28/2021 02:13 AM    BASOPHILS 0.70 01/28/2021 02:13 AM      Lab Results   Component Value Date/Time    SODIUM 140 01/28/2021 02:13 AM    POTASSIUM 4.2 01/28/2021 02:13 AM    CHLORIDE 107 01/28/2021 02:13 AM    CO2 21 01/28/2021 02:13 AM    GLUCOSE 91 01/28/2021 02:13 AM    BUN 7 (L) 01/28/2021 02:13 AM    CREATININE 0.70 01/28/2021 02:13 AM      Lab Results   Component Value Date/Time    CHOLSTRLTOT 121 01/24/2021 12:25 AM    LDL 41 01/24/2021 12:25 AM    HDL 30 (A) 01/24/2021 12:25 AM    TRIGLYCERIDE 250 (H) 01/24/2021 12:25 AM       Lab Results   Component Value Date/Time    ALKPHOSPHAT 97 01/26/2021 05:31 AM    ASTSGOT 30 01/26/2021 05:31 AM    ALTSGPT 27 01/26/2021 05:31 AM    TBILIRUBIN 0.8 01/26/2021 05:31 AM        Imaging/Testing:   No new imaging to review    cEEG from 1/27/21 through 1/28/21 reviewed and discussed w/ interpreting neurologist. Resolution of seizures  noted.     Physical Exam:     General: 76 y/o male in bed in NAD  Cardio: Normal S1/S2. No peripheral edema.   Pulm: CTAX2. No respiratory distress.   Skin: Warm, dry, no rashes or lesions   Psychiatric: Appropriate affect. No active psychosis.  HEENT: Atraumatic head, normal sclera and conjunctiva, moist oral mucosa. No lid lag.  Abdomen: Soft, non tender. No masses or hepatosplenomegaly.    Neurologic:  Mental Status:  AAOx4. Able to follow commands/cross midline. Speech fluent/nondysarthric. Language functions intact. No neglect/apraxia.  Cranial Nerves:  PERRL. EOMi. Face symmetric  Motor:  Normal muscle tone and bulk. Moves all extremities symmetrically.   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 Medication regimen was changed to Keppra/Depakote with resolution of seizures. Will continue this regimen as an outpatient. Will be followed up by neurology as outpatient.    Plan:  -Continue Depakote 500mg BID.  -Continue Keppra 1000mg BID.  -Signing off. Please call w/ further questions or concerns.  -From a neurological perspective no reason to keep the patient in house.   -Plan discussed with consulting physician and patient's nurse.     Lenin Wiggins M.D., Diplomat of the American Board of Psychiatry and Neurology  Diplomat of [facility]   Assistant Clinical Professor, [facility] Neurology Consultant

## 2021-01-28 NOTE — THERAPY
Occupational Therapy   Initial Evaluation     Patient Name: Mj Cornejo  Age:  75 y.o., Sex:  male  Medical Record #: [id number]  Today's Date: 1/28/2021     Precautions  Precautions: Fall Risk    Assessment  Patient is 75 y.o. 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  Appears to be near baseline, son present, pt and son deny any further concerns.    Plan    Recommend Occupational Therapy for Evaluation only     DC Equipment Recommendations: None  Discharge Recommendations: Anticipate that the patient will have no further occupational therapy needs after discharge from the hospital     Subjective    Pleasant and cooperative     Objective       01/28/21 1321   Prior Living Situation   Prior Services Home-Independent   Housing / Facility 1 Story House   Steps Into Home 1   Bathroom Set up Bathtub / Shower Combination;Walk In Shower;Shower Curtain;Shower Glass Doors;Shower Chair;Grab Bars   Equipment Owned Tub / Shower Seat;Grab Bar(s) In Tub / Shower   Lives with - Patient's Self Care Capacity Adult Children;Spouse   Comments Pt lives with his wife, son, son's wife. Someone is home all the time   Prior Level of ADL Function   Self Feeding Independent   Grooming / Hygiene Independent   Bathing Independent   Dressing Independent   Toileting Independent   Prior Level of IADL Function   Medication Management Independent   Laundry Independent   Kitchen Mobility Independent   Finances Independent   Home Management Independent   Shopping Requires Assist   Prior Level Of Mobility Independent Without Device in Community;Independent Without Device in Home   Driving / Transportation Relatives / Others Provide Transportation   Occupation (Pre-Hospital Vocational) Retired Due To Age   Cognition    Cognition / Consciousness WDL   Level of Consciousness Alert   Comments very pleasant and cooperative, utilized  [id number]   Balance Assessment   Sitting Balance (Static) Fair  +   Sitting Balance (Dynamic) Fair   Standing Balance (Static) Fair +   Standing Balance (Dynamic) Fair   Weight Shift Sitting Fair   Weight Shift Standing Fair   Comments no AD   Bed Mobility    Comments in chair pre/post   ADL Assessment   Grooming Supervision;Standing  (oral care)   Lower Body Dressing Supervision  (don pajama pants, don/doff shoes)   Toileting Supervision  (simulated)   Functional Mobility   Sit to Stand Supervised   Bed, Chair, Wheelchair Transfer Supervised   Toilet Transfers Supervised   Mobility Chair>BR>Chair   Comments no AD, able to demo item retrieval from low surface w/o LOB

## 2021-01-28 NOTE — TELEPHONE ENCOUNTER
Called to confirm appt daughter in law states pt is still in hospital. Gave sons number Mj [phone] he answered and then hung up, called again and went to voicemail. LV confirming appt.

## 2021-01-29 ENCOUNTER — PATIENT OUTREACH (OUTPATIENT)
Dept: HEALTH INFORMATION MANAGEMENT | Facility: OTHER | Age: 76
End: 2021-01-29

## 2021-02-02 ENCOUNTER — OFFICE VISIT (OUTPATIENT)
Dept: NEUROLOGY | Facility: MEDICAL CENTER | Age: 76
End: 2021-02-02
Attending: PSYCHIATRY & NEUROLOGY
Payer: MEDICARE

## 2021-02-02 VITALS
OXYGEN SATURATION: 97 % | BODY MASS INDEX: 25.97 KG/M2 | DIASTOLIC BLOOD PRESSURE: 64 MMHG | SYSTOLIC BLOOD PRESSURE: 120 MMHG | HEIGHT: 64 IN | TEMPERATURE: 97.4 F | WEIGHT: 152.12 LBS | HEART RATE: 75 BPM

## 2021-02-02 DIAGNOSIS — T42.75XA ADVERSE EFFECT OF ANTIEPILEPTIC, INITIAL ENCOUNTER: ICD-10-CM

## 2021-02-02 DIAGNOSIS — R53.81 MALAISE AND FATIGUE: ICD-10-CM

## 2021-02-02 DIAGNOSIS — E55.9 VITAMIN D DEFICIENCY: ICD-10-CM

## 2021-02-02 DIAGNOSIS — H02.403 PTOSIS, BILATERAL: ICD-10-CM

## 2021-02-02 DIAGNOSIS — Z09 HOSPITAL DISCHARGE FOLLOW-UP: ICD-10-CM

## 2021-02-02 DIAGNOSIS — G40.109 LOCALIZATION-RELATED EPILEPSY (HCC): ICD-10-CM

## 2021-02-02 DIAGNOSIS — G40.119 PHARMACORESISTANT INTRACTABLE FOCAL EPILEPSY (HCC): ICD-10-CM

## 2021-02-02 DIAGNOSIS — D75.89 MACROCYTOSIS WITHOUT ANEMIA: ICD-10-CM

## 2021-02-02 DIAGNOSIS — R53.83 MALAISE AND FATIGUE: ICD-10-CM

## 2021-02-02 PROCEDURE — 99215 OFFICE O/P EST HI 40 MIN: CPT | Performed by: PSYCHIATRY & NEUROLOGY

## 2021-02-02 PROCEDURE — G2212 PROLONG OUTPT/OFFICE VIS: HCPCS | Performed by: PSYCHIATRY & NEUROLOGY

## 2021-02-02 RX ORDER — LEVETIRACETAM 500 MG/1
2000 TABLET, FILM COATED, EXTENDED RELEASE ORAL DAILY
Qty: 120 TAB | Refills: 11 | Status: SHIPPED | OUTPATIENT
Start: 2021-02-02 | End: 2021-07-22 | Stop reason: SDUPTHER

## 2021-02-02 RX ORDER — DIVALPROEX SODIUM 500 MG/1
1000 TABLET, EXTENDED RELEASE ORAL
Qty: 60 TAB | Refills: 11 | Status: SHIPPED | OUTPATIENT
Start: 2021-02-02 | End: 2021-07-22 | Stop reason: SDUPTHER

## 2021-02-02 ASSESSMENT — FIBROSIS 4 INDEX: FIB4 SCORE: 2.08

## 2021-02-02 ASSESSMENT — PATIENT HEALTH QUESTIONNAIRE - PHQ9: CLINICAL INTERPRETATION OF PHQ2 SCORE: 0

## 2021-02-02 NOTE — PROGRESS NOTES
Community Health Worker Intake    • Contact information provided to Mj Cornejo   • Has PCP appointment scheduled for: Wednesday, February 3 at 4:40 pm   • Outpatient assessment completed.  • Did the patient receive medications post discharge: Yes    VERONIQUE De La Fuente reached out to pt via TC to f/u after d/c. Pt mentioned he was doing well and taking his medications as directed, no side-effects so far. He was notified about power assistance application and food bank flyer being mailed and was very appreciative. He has yet to receive a call from [facility] regarding the in-home doctor visit but does have a PCP with Dr. Caballero tomorrow for a f/u. Pt also has an appt later today with neurology and will have transportation there. Pt declined speaking with CCM RN and pharmacists and does not need additional resources/services at this time.      Plan: d/c due to all goals met 2/2.

## 2021-02-03 NOTE — PROGRESS NOTES
Chief Complaint   Patient presents with   • New Patient     epilepsy       Problem List Items Addressed This Visit     None      Visit Diagnoses     Pharmacoresistant intractable focal epilepsy (HCC)        Relevant Medications    levetiracetam (KEPPRA) 500 MG TABLET SR 24 HR    divalproex ER (DEPAKOTE ER) 500 MG TABLET SR 24 HR    Localization-related epilepsy (HCC)        Relevant Medications    levetiracetam (KEPPRA) 500 MG TABLET SR 24 HR    divalproex ER (DEPAKOTE ER) 500 MG TABLET SR 24 HR    Other Relevant Orders    CBC WITH DIFFERENTIAL    Comp Metabolic Panel    VITAMIN D,25 HYDROXY    AMMONIA    VALPROIC ACID    REFERRAL TO NEURODIAGNOSTICS (EEG,EP,EMG/NCS/DBS)    VITAMIN B1    VITAMIN B6    TSH    FOLATE    Vitamin D deficiency        Relevant Orders    VITAMIN D,25 HYDROXY    VITAMIN B1    VITAMIN B6    TSH    FOLATE    Ptosis, bilateral        Relevant Orders    Miscellaneous Test    MUSK ANTIBODY    VITAMIN B1    VITAMIN B6    TSH    FOLATE    Macrocytosis without anemia        Relevant Orders    VITAMIN B1    VITAMIN B6    TSH    FOLATE    Malaise and fatigue        Relevant Orders    TSH    Adverse effect of antiepileptic, initial encounter        Hospital discharge follow-up              History of present illness:  Mj Cornejo 75 y.o. male presents today to establish care in the epilepsy clinic after hospital discharge.  He presents here today in the company of his daughter-in-law.  He lives at home with his son and his daughter-in-law.  He is originally from [place].  He states he started having seizures about 10 years ago.  At that time he has had 2 types of seizures once that were focal, involving abnormal sensations in the left leg and also sometimes also producing jerking movements of the left side.  He is rarely had generalized tonic-clonic seizures, but did have some onset in the remote past.  He indicates that his seizures were very well controlled for many years, he was on a  combination of carbamazepine and oxcarbazepine.  He was seen in neurology clinic many years ago, but was lost to follow-up thereafter, and has not seen a neurologist in many years.  About a couple weeks ago, he started having frequent seizures, initially with abnormal ascending sensation involving electricity-like feeling from his left foot all the way up to involve the entire leg.  Then he also developed jerking movements of the left side, during some of these episodes he appeared to be confused and not interactive with family, but is still was conscious.  Then he had a bowel 5 back-to-back generalized tonic-clonic seizures without recovery in about an hour of time, and was eventually brought to the emergency department for evaluation at that time.  He was found to be in focal right posterior quadrant status epilepticus, and was monitored inpatient with video EEG for couple of days.  He also underwent a repeated CT of the brain and a repeated MRI of the brain.  He was evaluated inpatient by Dr. Sumi Wiggins, and his medications were eventually switched to a combination of levetiracetam at 1000 mg twice a day and valproic acid 500 mg twice a day, with significant documented seizure improvement on EEG.  His imaging in the hospital was abnormal, however the patient denies any history of stroke or hemorrhages.  He does not suffer from headaches, and denies any history of clear head injuries in the past.  He is at baseline at this point, but he believes that he is having side effects from the new medications, he appears to be excessively drowsy and tired during the daytime.  Both the patient and daughter-in-law are wondering if we can switch the medications around to adjust for the side effects.  His mood is good, he is not depressed or suicidal.  He does not drive at this point, since admission to the hospital.  He works as a , currently out of a job until somewhere in April.  He denies any other neurological  complaints, also denies any respiratory symptoms or febrile symptoms recently.    The patient gave no apparent reason and of a possible trigger for worsening of the seizures recently, to warrant acute admission.  The patient denies the use of alcohol or any illegal drugs, but admits to having a remote history of alcohol abuse, but has been sober for over 10 years now.        Past medical history:   Past Medical History:   Diagnosis Date   • Hypercholesteremia    • Hypertension        Past surgical history:   History reviewed. No pertinent surgical history.    Family history:   History reviewed. No pertinent family history.    Social history:   Social History     Socioeconomic History   • Marital status:      Spouse name: Not on file   • Number of children: Not on file   • Years of education: Not on file   • Highest education level: Not on file   Occupational History   • Not on file   Social Needs   • Financial resource strain: Somewhat hard   • Food insecurity     Worry: Never true     Inability: Never true   • Transportation needs     Medical: No     Non-medical: No   Tobacco Use   • Smoking status: Never Smoker   • Smokeless tobacco: Never Used   Substance and Sexual Activity   • Alcohol use: No   • Drug use: No   • Sexual activity: Not on file   Lifestyle   • Physical activity     Days per week: Not on file     Minutes per session: Not on file   • Stress: Not on file   Relationships   • Social connections     Talks on phone: Not on file     Gets together: Not on file     Attends Buddhism service: Not on file     Active member of club or organization: Not on file     Attends meetings of clubs or organizations: Not on file     Relationship status: Not on file   • Intimate partner violence     Fear of current or ex partner: Not on file     Emotionally abused: Not on file     Physically abused: Not on file     Forced sexual activity: Not on file   Other Topics Concern   • Not on file   Social History  "Narrative   • Not on file       Current medications:   Current Outpatient Medications   Medication   • levetiracetam (KEPPRA) 500 MG TABLET SR 24 HR   • divalproex ER (DEPAKOTE ER) 500 MG TABLET SR 24 HR   • amLODIPine (NORVASC) 10 MG Tab   • atorvastatin (LIPITOR) 10 MG Tab   • ASPIRIN LOW DOSE 81 MG Chew Tab chewable tablet     No current facility-administered medications for this visit.        Medication Allergy:  No Known Allergies      Review of systems:   As reviewed in today's history.    Physical examination:   Vitals:    02/02/21 1302   BP: 120/64   BP Location: Left arm   Patient Position: Sitting   BP Cuff Size: Adult   Pulse: 75   Temp: 36.3 °C (97.4 °F)   TempSrc: Temporal   SpO2: 97%   Weight: 69 kg (152 lb 1.9 oz)   Height: 1.62 m (5' 3.78\")     General: Patient in no acute distress, pleasant and cooperative.  HEENT: Normocephalic, no signs of acute trauma.   Neck: supple, no meningeal signs or carotid bruits. There is normal range of motion. No tenderness on exam.   Chest: clear to auscultation. No cough.   CV: RRR, no murmurs.   Abdomen: soft, non distended or tender.   Skin: no signs of acute rashes or trauma.   Musculoskeletal: joints exhibit full range of motion, without any pain to palpation. There are no signs of joint or muscle swelling. There is no tenderness to deep palpation of muscles.   Psychiatric: No hallucinatory behavior. Denies symptoms of depression or suicidal ideation. Mood and affect appear normal on exam.     NEUROLOGICAL EXAM:   Mental status, orientation: Awake, alert and fully oriented.   Speech and language: speech is clear and fluent. The patient is able to name, repeat and comprehend.   Memory: There is intact recollection of recent and remote events.   Cranial nerve exam: Pupils are 3-4 mm bilaterally and equally reactive to light and accommodation. Visual fields are intact by confrontation. There is no nystagmus on primary or secondary gaze. Intact full EOM in all " directions of gaze. Face appears symmetric. Sensation in the face is intact to light touch. Uvula is midline. Palate elevates symmetrically. Tongue is midline and without any signs of tongue biting or fasciculations.Sternocleidomastoid muscles exhibit is normal strength bilaterally. Shoulder shrug is intact bilaterally.   Motor exam: Strength is 5/5 in all extremities. Tone is normal. No abnormal movements were seen on exam.   Sensory exam reveals normal sense of light touch, proprioception, vibration and pinprick in all extremities.   Deep tendon reflexes: Diminished throughout. Plantar responses are flexor. There is no clonus.   Coordination: shows a normal finger-nose-finger. Normal rapidly alternating movements.   Gait: The patient was able to get up from seated position on first attempt without requiring assistance. Found to be steady when walking. Movements showed decreased arm swing bilaterally. The patient was able to turn without difficulties or tendency to fall. Romberg exam showed mild swaying in all directions.        ANCILLARY DATA REVIEWED:       Lab Data Review:  Reviewed in chart.  Microcytosis was noted on CBC.    Records reviewed:   Chart reviewed, including admission records.  I have discussed this case with Dr. Sumi Wiggins.    Imaging:   MRI brain without, 1/27/2021:  1.  Small chronic area of hemorrhage in the posterior right parietal lobe. Surrounding this area is cortical hyperintensity on T2 and DWI images which is likely related to seizure activity given the history.  2.  Age-related cerebral atrophy and chronic microvascular ischemic type changes.  3.  No acute intracranial hemorrhage.    CT brain without, 1/25/2021:  1.  No evidence of acute hemorrhage or mass  2.  Redemonstrated RIGHT parietal calcification      EEG:  Continuous video EEG, 1/26/2021:  INTERPRETATION:   This is an abnormal continuous video electroencephalogram   recording in the awake, drowsy and sleep state.  Over 100  focal   right posterior quadrant seizures were captured during the study.   The seizures were non convulsive and often exhibited head   deviation to the left and/or mild confusion or decreased   responsiveness. No significant improvement with addition of   Levetiracetam, as frequent seizures were still noted overnight.     The findings suggest an underlying area of increased cortical   irritability and structural abnormality. Clinical and   radiological correlation is recommended.     Of note, a sinus arrhythmia was noted on the ekg lead.           ASSESSMENT AND PLAN:  1. Pharmacoresistant intractable focal epilepsy (HCC)    2. Localization-related epilepsy (HCC)  - CBC WITH DIFFERENTIAL; Future  - Comp Metabolic Panel; Future  - VITAMIN D,25 HYDROXY; Future  - AMMONIA; Future  - VALPROIC ACID; Future  - levetiracetam (KEPPRA) 500 MG TABLET SR 24 HR; Take 4 Tabs by mouth every day. At bedtime.  Dispense: 120 Tab; Refill: 11  - divalproex ER (DEPAKOTE ER) 500 MG TABLET SR 24 HR; Take 2 Tabs by mouth every bedtime.  Dispense: 60 Tab; Refill: 11  - REFERRAL TO NEURODIAGNOSTICS (EEG,EP,EMG/NCS/DBS)  - VITAMIN B1; Future  - VITAMIN B6; Future  - TSH; Future  - FOLATE; Future    3. Vitamin D deficiency  - VITAMIN D,25 HYDROXY; Future  - VITAMIN B1; Future  - VITAMIN B6; Future  - TSH; Future  - FOLATE; Future    4. Ptosis, bilateral  - Miscellaneous Test; Future  - MUSK ANTIBODY; Future  - VITAMIN B1; Future  - VITAMIN B6; Future  - TSH; Future  - FOLATE; Future    5. Macrocytosis without anemia  - VITAMIN B1; Future  - VITAMIN B6; Future  - TSH; Future  - FOLATE; Future    6. Malaise and fatigue  - TSH; Future    7. Adverse effect of antiepileptic, initial encounter    8. Hospital discharge follow-up       CLINICAL DISCUSSION:   Suspect focal onset pharmaco-resistant epilepsy.  Epilepsy to believed to be structural in nature, likely related to abnormality on imaging on the right occipital region.  I personally reviewed  images of MRI of the brain and CT of the brain recently obtained in the hospital admission, and compared them to prior studies that were done on this patient.  The findings appear to be chronic and stable.  Overwhelmingly I believe that this was a calcification rather than a bleed.  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  The patient and family were educated on the process.  The patient had been seen by neurology in the past, but unfortunately was lost to follow-up.  He was previously on a combination of carbamazepine and oxcarbazepine, however presented in status epilepticus to [facility].  He had several back-to-back generalized tonic-clonic seizures, and then frequent focal seizures were captured in the right posterior quadrant, with intermittent episodes of focal right posterior status epilepticus.  The patient was ultimately switched from oxcarbazepine and carbamazepine to levetiracetam and valproic acid.  He is currently on levetiracetam at 1000 mg twice a day, and valproic acid 500 mg twice a day.  He is experiencing daytime side effects related to the morning doses of levetiracetam and valproic acid, consisting of increased drowsiness, and fatigue.  We discussed adjustments on his seizure medications, unfortunately given recent status epilepticus and the fact that these medications appear to be working, I do not recommend lowering the dose at this point.  We could potentially switch him over to other medications that may not be associated with daytime drowsiness, or we can change his IR formulations for ER formulations to take at bedtime, with the hope that his daytime (morning dose related) side effects will resolve, as expected.  The patient and his family have agreed to switch from IR to ER formulations, new  prescriptions were sent to the pharmacy.    New AED regimen:  Levetiracetam ER 2000 mg p.o. nightly  Valproic acid ER 1000 mg p.o. nightly    Instructions were provided in detail to patient and family as to transitioning IR formulation to ER formulations.  They verbalized understanding.    The patient will undergo blood work, including blood work for macrocytosis without anemia.  He has a history of alcohol abuse, but has been sober for over 10 years.  We will be checking B related vitamins.  I would also like to obtain ammonia level and Depakote level.    The patient will undergo a 24-hour ambulatory EEG in order to rule out subclinical seizures, and to assess efficacy of current AED regimen.    He is not driving at this time, and will remain as such.    Compliance was discussed extensively.    The patient has authorized his daughter-in-law to access his medical information, and for us to contact her to relay any medical information to and also information regarding appointments.        FOLLOW-UP:   6 weeks or sooner.        EDUCATION AND COUNSELING:  -Education was provided to the patient and/or family regarding diagnosis and prognosis. The chronic and unpredictable nature of the condition were discussed. There is increased risk for additional events, which may carry potential for significant injuries and death. Discussed frequent seizure triggers: sleep deprivation, medication non-compliance, use of illegal drugs/alcohol, stress, and others.   -We reviewed in detail the current antiepileptic regimen. Potential side effects of antiepileptics were discussed at length, including but no limited to: hypersensitivity reactions (rash and others, some of which can be fatal), visual field changes (some of which may be irreversible), glaucoma, diplopia, kidney stones, osteopenia/osteoporosis/bone fractures, hyperthermia/anhydrosis, hyponatremia, tremors/abnormal movements, ataxia, dizziness, fatigue, increased risk for  falls, risk for cardiac arrhythmias and syncope, weight changes, hair loss, gastrointestinal side effects(hepatitis, pancreatitis, gastritis, ulcers, gingival hypertrophy/bleeding, drowsiness, sedation, memory loss, trouble finding words, anxiety/nervousness, increased risk for suicide, increased risk for depression, and psychosis.   -We also reviewed drug-drug interactions and their potential effect on seizure control and medication side effects.    -Recommend chronic vitamin D supplementation and regular exercise (if not contraindicated).   -Patient/family educated on risk for SUDEP (Sudden Death in Epilepsy). Counseling was provided on the importance of strict medication and follow up compliance. The patient/family understand the risks associated with non-adherence with the medical plan as outlined, including but not limited to an increased risk for breakthrough seizures, which may contribute to injuries, disability, status epilepticus, and even death.   -Counseling was also provided on potential effects of alcohol and other drugs, which may lower seizure threshold and/or affect the metabolism of antiepileptic drugs. We recommend avoidance of alcohol and illegal drugs.  Denies use.  -Avoid sleep deprivation.   -We extensively discussed the aspects related to safety in drivers who suffer from epilepsy. The patient is encourage to report to the Division of Motor Vehicles of any condition and/or spells related to confusion, disorientation, and/or loss of awareness and/or loss of consciousness; as these may pose a safety issue if they occur while operating a motor vehicle. The patient and/or family are ultimately responsible for exercising caution and abiding to regulations in place.  The patient has been advised not to drive for the time being, until cleared by me.  -Other seizure precautions were discussed at length, including no diving, no skydiving, no climbing or exposure to unprotected heights, no unsupervised  swimming, no Jacuzzi or bathing in bathtubs or deep bodies of water. The patient/family have been advised about risks for operating any machinery while suffering from seizures / syncope / epilepsy and/or while taking antiepileptic drugs.   -The patient understands and agrees that due to the complexity of his/her diagnosis, results of any testing and further recommendations will typically be discussed/made during a face to face encounter in my office. The patient and/or family further understands it is their responsibility to keep proper follow up.     Patient/family agree with plan, as outlined.         Morris Florence MD   Epilepsy and Neurodiagnostics.   Clinical  of Neurology [facility].   Diplomate in Neurology, Epilepsy, and Electrodiagnostic Medicine.   Office: [phone]  Fax: [phone]    BILLING DOCUMENTATION:     I have performed physical exam and reviewed and updated ROS and plan today 2/2/2021. In review of that note, there are no new changes except as documented above.     Counseling:  I spent greater than 50% time face-to-face time of a total of 75 minutes visit. Over 50% of the time of the visit today was spent on counseling and or coordination of care wtih the patient and/or family, with greater than 50% of the total discussing my assessment and plan as stated above.

## 2021-02-19 ENCOUNTER — TELEPHONE (OUTPATIENT)
Dept: NEUROLOGY | Facility: MEDICAL CENTER | Age: 76
End: 2021-02-19

## 2021-02-19 DIAGNOSIS — I10 ESSENTIAL HYPERTENSION: ICD-10-CM

## 2021-02-19 RX ORDER — AMLODIPINE BESYLATE 10 MG/1
10 TABLET ORAL
Qty: 30 TABLET | Refills: 2 | Status: SHIPPED | OUTPATIENT
Start: 2021-02-19 | End: 2021-03-01

## 2021-02-19 NOTE — TELEPHONE ENCOUNTER
Pt/ daughter in law are requesting a refill of the amlodipine 10 mg, pt is currently in process of switching pcp's and has 4 days left of the medication. Are you able to fill?

## 2021-02-26 ENCOUNTER — TELEPHONE (OUTPATIENT)
Dept: SCHEDULING | Facility: IMAGING CENTER | Age: 76
End: 2021-02-26

## 2021-03-01 ENCOUNTER — HOSPITAL ENCOUNTER (OUTPATIENT)
Dept: LAB | Facility: MEDICAL CENTER | Age: 76
End: 2021-03-01
Attending: PSYCHIATRY & NEUROLOGY
Payer: MEDICARE

## 2021-03-01 ENCOUNTER — OFFICE VISIT (OUTPATIENT)
Dept: MEDICAL GROUP | Facility: PHYSICIAN GROUP | Age: 76
End: 2021-03-01
Payer: MEDICARE

## 2021-03-01 VITALS
SYSTOLIC BLOOD PRESSURE: 160 MMHG | TEMPERATURE: 98 F | RESPIRATION RATE: 18 BRPM | BODY MASS INDEX: 26.46 KG/M2 | OXYGEN SATURATION: 96 % | DIASTOLIC BLOOD PRESSURE: 80 MMHG | HEIGHT: 64 IN | WEIGHT: 155 LBS | HEART RATE: 82 BPM

## 2021-03-01 DIAGNOSIS — G40.109 LOCALIZATION-RELATED EPILEPSY (HCC): ICD-10-CM

## 2021-03-01 DIAGNOSIS — Z12.11 SCREENING FOR COLORECTAL CANCER: ICD-10-CM

## 2021-03-01 DIAGNOSIS — E78.5 DYSLIPIDEMIA: ICD-10-CM

## 2021-03-01 DIAGNOSIS — Z12.12 SCREENING FOR COLORECTAL CANCER: ICD-10-CM

## 2021-03-01 DIAGNOSIS — Z76.89 ESTABLISHING CARE WITH NEW DOCTOR, ENCOUNTER FOR: ICD-10-CM

## 2021-03-01 DIAGNOSIS — R53.81 MALAISE AND FATIGUE: ICD-10-CM

## 2021-03-01 DIAGNOSIS — I10 ESSENTIAL HYPERTENSION: ICD-10-CM

## 2021-03-01 DIAGNOSIS — D75.89 MACROCYTOSIS WITHOUT ANEMIA: ICD-10-CM

## 2021-03-01 DIAGNOSIS — E55.9 VITAMIN D DEFICIENCY: ICD-10-CM

## 2021-03-01 DIAGNOSIS — R53.83 MALAISE AND FATIGUE: ICD-10-CM

## 2021-03-01 DIAGNOSIS — G40.119 PHARMACORESISTANT INTRACTABLE FOCAL EPILEPSY (HCC): ICD-10-CM

## 2021-03-01 DIAGNOSIS — H02.403 PTOSIS, BILATERAL: ICD-10-CM

## 2021-03-01 LAB
ALBUMIN SERPL BCP-MCNC: 4.2 G/DL (ref 3.2–4.9)
ALBUMIN/GLOB SERPL: 1.2 G/DL
ALP SERPL-CCNC: 102 U/L (ref 30–99)
ALT SERPL-CCNC: 26 U/L (ref 2–50)
AMMONIA PLAS-SCNC: 22 UMOL/L (ref 11–45)
ANION GAP SERPL CALC-SCNC: 13 MMOL/L (ref 7–16)
AST SERPL-CCNC: 24 U/L (ref 12–45)
BASOPHILS # BLD AUTO: 1.2 % (ref 0–1.8)
BASOPHILS # BLD: 0.11 K/UL (ref 0–0.12)
BILIRUB SERPL-MCNC: 0.4 MG/DL (ref 0.1–1.5)
BUN SERPL-MCNC: 13 MG/DL (ref 8–22)
CALCIUM SERPL-MCNC: 10.3 MG/DL (ref 8.5–10.5)
CHLORIDE SERPL-SCNC: 103 MMOL/L (ref 96–112)
CO2 SERPL-SCNC: 25 MMOL/L (ref 20–33)
CREAT SERPL-MCNC: 0.68 MG/DL (ref 0.5–1.4)
EOSINOPHIL # BLD AUTO: 0.27 K/UL (ref 0–0.51)
EOSINOPHIL NFR BLD: 3 % (ref 0–6.9)
ERYTHROCYTE [DISTWIDTH] IN BLOOD BY AUTOMATED COUNT: 45.7 FL (ref 35.9–50)
FASTING STATUS PATIENT QL REPORTED: NORMAL
GLOBULIN SER CALC-MCNC: 3.6 G/DL (ref 1.9–3.5)
GLUCOSE SERPL-MCNC: 103 MG/DL (ref 65–99)
HCT VFR BLD AUTO: 54.5 % (ref 42–52)
HGB BLD-MCNC: 17.5 G/DL (ref 14–18)
IMM GRANULOCYTES # BLD AUTO: 0.07 K/UL (ref 0–0.11)
IMM GRANULOCYTES NFR BLD AUTO: 0.8 % (ref 0–0.9)
LYMPHOCYTES # BLD AUTO: 3.74 K/UL (ref 1–4.8)
LYMPHOCYTES NFR BLD: 41.5 % (ref 22–41)
MCH RBC QN AUTO: 34.2 PG (ref 27–33)
MCHC RBC AUTO-ENTMCNC: 32.1 G/DL (ref 33.7–35.3)
MCV RBC AUTO: 106.4 FL (ref 81.4–97.8)
MONOCYTES # BLD AUTO: 0.59 K/UL (ref 0–0.85)
MONOCYTES NFR BLD AUTO: 6.5 % (ref 0–13.4)
NEUTROPHILS # BLD AUTO: 4.24 K/UL (ref 1.82–7.42)
NEUTROPHILS NFR BLD: 47 % (ref 44–72)
NRBC # BLD AUTO: 0 K/UL
NRBC BLD-RTO: 0 /100 WBC
PLATELET # BLD AUTO: 215 K/UL (ref 164–446)
PMV BLD AUTO: 13.2 FL (ref 9–12.9)
POTASSIUM SERPL-SCNC: 3.7 MMOL/L (ref 3.6–5.5)
PROT SERPL-MCNC: 7.8 G/DL (ref 6–8.2)
RBC # BLD AUTO: 5.12 M/UL (ref 4.7–6.1)
SODIUM SERPL-SCNC: 141 MMOL/L (ref 135–145)
VALPROATE SERPL-MCNC: 70.3 UG/ML (ref 50–100)
WBC # BLD AUTO: 9 K/UL (ref 4.8–10.8)

## 2021-03-01 PROCEDURE — 82140 ASSAY OF AMMONIA: CPT

## 2021-03-01 PROCEDURE — 84207 ASSAY OF VITAMIN B-6: CPT

## 2021-03-01 PROCEDURE — 36415 COLL VENOUS BLD VENIPUNCTURE: CPT

## 2021-03-01 PROCEDURE — 82306 VITAMIN D 25 HYDROXY: CPT

## 2021-03-01 PROCEDURE — 82746 ASSAY OF FOLIC ACID SERUM: CPT

## 2021-03-01 PROCEDURE — 80164 ASSAY DIPROPYLACETIC ACD TOT: CPT

## 2021-03-01 PROCEDURE — 83516 IMMUNOASSAY NONANTIBODY: CPT

## 2021-03-01 PROCEDURE — 85025 COMPLETE CBC W/AUTO DIFF WBC: CPT

## 2021-03-01 PROCEDURE — 84443 ASSAY THYROID STIM HORMONE: CPT

## 2021-03-01 PROCEDURE — 99214 OFFICE O/P EST MOD 30 MIN: CPT | Performed by: FAMILY MEDICINE

## 2021-03-01 PROCEDURE — 83519 RIA NONANTIBODY: CPT

## 2021-03-01 PROCEDURE — 84425 ASSAY OF VITAMIN B-1: CPT

## 2021-03-01 PROCEDURE — 80053 COMPREHEN METABOLIC PANEL: CPT

## 2021-03-01 RX ORDER — AMLODIPINE BESYLATE 5 MG/1
5 TABLET ORAL DAILY
Qty: 100 TABLET | Refills: 3 | Status: SHIPPED | OUTPATIENT
Start: 2021-03-01 | End: 2021-10-27

## 2021-03-01 RX ORDER — ATORVASTATIN CALCIUM 10 MG/1
10 TABLET, FILM COATED ORAL EVERY MORNING
Qty: 100 TABLET | Refills: 3 | Status: SHIPPED | OUTPATIENT
Start: 2021-03-01 | End: 2022-03-28

## 2021-03-01 ASSESSMENT — FIBROSIS 4 INDEX: FIB4 SCORE: 2.08

## 2021-03-01 NOTE — PROGRESS NOTES
Annual Health Assessment Questions:    1.  Are you currently engaging in any exercise or physical activity? No    2.  How would you describe your mood or emotional well-being today? good    3.  Have you had any falls in the last year? No    4.  Have you noticed any problems with your balance or had difficulty walking? No    5.  In the last six months have you experienced any leakage of urine? No    6. DPA/Advanced Directive: Patient does not have an Advanced Directive.  A packet and workshop information was given on Advanced Directives.      CC:    Chief Complaint   Patient presents with   • Medication Refill       HISTORY OF THE PRESENT ILLNESS: Patient is a 75 y.o. male. This pleasant patient is here today to establish care with his daughter in law and discuss refills. His prior PCP was Dr Maryanne Caballero.    Essential hypertension  Chronic issue.  Checks bp at home at home with systolic  range of 140s. Denies any headaches or chest pain.    Pharmacoresistant intractable focal epilepsy (HCC)  Chronic.states he has had epilepsy for the last 25 years.  Was hospitalized in January when he was having frequent seizures.  Antiepileptic medications were adjusted and states hasn't had a seizure since then.  Continues to tolerate these meds.  Denies any somnolence or fatigue.     Dyslipidemia  Chronic medical diagnosis.  Continues to take low-dose aspirin and Lipitor 10 mg daily.      Allergies: Patient has no known allergies.    Current Outpatient Medications Ordered in Epic   Medication Sig Dispense Refill   • amLODIPine (NORVASC) 5 MG Tab Take 1 tablet by mouth every day. 100 tablet 3   • atorvastatin (LIPITOR) 10 MG Tab Take 1 tablet by mouth every morning. 100 tablet 3   • levetiracetam (KEPPRA) 500 MG TABLET SR 24 HR Take 4 Tabs by mouth every day. At bedtime. 120 Tab 11   • divalproex ER (DEPAKOTE ER) 500 MG TABLET SR 24 HR Take 2 Tabs by mouth every bedtime. 60 Tab 11   • ASPIRIN LOW DOSE 81 MG Chew Tab chewable  "tablet Chew 81 mg every morning.       No current [facility]-ordered facility-administered medications on file.       Past Medical History:   Diagnosis Date   • Hypercholesteremia    • Hypertension    • Seizure (HCC)        History reviewed. No pertinent surgical history.    Social History     Tobacco Use   • Smoking status: Former Smoker     Types: Cigarettes     Quit date:      Years since quittin.1   • Smokeless tobacco: Never Used   Substance Use Topics   • Alcohol use: No   • Drug use: No       Social History     Social History Narrative   • Not on file       Family History   Problem Relation Age of Onset   • Stroke Mother        Health Maintenance: FIt test ordered today      Objective:     Exam:   /80 (BP Location: Left arm, Patient Position: Sitting, BP Cuff Size: Adult)   Pulse 82   Temp 36.7 °C (98 °F) (Temporal)   Resp 18   Ht 1.626 m (5' 4\")   Wt 70.3 kg (155 lb)   SpO2 96%   BMI 26.61 kg/m²   Body mass index is 26.61 kg/m².  Wt Readings from Last 4 Encounters:   21 70.3 kg (155 lb)   21 69 kg (152 lb 1.9 oz)   21 69.1 kg (152 lb 5.4 oz)   21 69.1 kg (152 lb 5.4 oz)     General: Normal appearing. No distress. Appropriately groomed.  HEENT: Normocephalic. Eyes conjunctiva clear lids   Neck: Supple, Thyroid is not enlarged.   Pulmonary: Clear to ausculation.  Normal effort. No rales, ronchi, or wheezing.  Cardiovascular: Regular rate and rhythm, no lower extremity edema  Abdomen: Soft, nontender, nondistended. Normal bowel sounds.   Neurologic: Grossly nonfocal  Skin: Warm and dry.  No obvious lesions.  Musculoskeletal: Normal gait. No extremity cyanosis, clubbing, or edema.  Psych: Normal mood and affect. Alert and oriented x3. Judgment and insight is normal.      Assessment & Plan:   75 y.o. male with the following -    1. Establishing care with new doctor, encounter for  Transferring care over from Dr Caballero.  We will obtain records.     2. Dyslipidemia  -Chronic " medical diagnosis.  Previous labs from January 21 have total cholesterol of 121 and LDL at 41 with elevation to triglycerides to 250.  Has been taking Lipitor 10 mg daily.  Requesting refills.    Atorvastatin (LIPITOR) 10 MG Tab; Take 1 tablet by mouth every morning.  Dispense: 100 tablet; Refill: 3    3. Essential hypertension  Chronic medical diagnosis.  Blood pressure today is elevated at 160/80.  Daughter-in-law states that patient did not take his medications this morning as he was fasting and had to have labs done.  Precautions discussed with both.  Amlodipine refilled.  Encouraged to start checking blood pressures at home follow-up with me in 4 to 6 weeks.  - amLODIPine (NORVASC) 5 MG Tab; Take 1 tablet by mouth every day.  Dispense: 100 tablet; Refill: 3    4. Pharmacoresistant intractable focal epilepsy (HCC)  Chronic medical diagnosis.  Continues to follow-up with neurology, Dr. Florence.  Has an appointment coming up on March 8.  Continues to take Depakote  mg 2 tablets at bedtime.  Also taking Keppra 500 mg 4 tablets at bedtime.    5. Screening for colorectal cancer  - OCCULT BLOOD FECES IMMUNOASSAY (FIT); Future       Return in about 6 weeks (around 4/12/2021).    Please note that this dictation was created using voice recognition software. I have made every reasonable attempt to correct obvious errors, but I expect that there are errors of grammar and possibly content that I did not discover before finalizing the note.

## 2021-03-01 NOTE — LETTER
[facility]  Viviana Whitfield M.D.  [address]  [place] [address]  Fax: [phone]   Authorization for Release/Disclosure of   Protected Health Information   Name: MJ CORNEJO : 1945 SSN: [id number]   Address: [address]   [place] [address] Phone:    [phone] (home)    I authorize the entity listed below to release/disclose the PHI below to:   [facility]/Viviana Whitfield M.D. and Viviana Whitfield M.D.   Provider or Entity Name:  Maryanne Caballero MD   Address   City, State, Zip   Phone:      Fax:     Reason for request: continuity of care   Information to be released:    [  ] LAST COLONOSCOPY,  including any PATH REPORT and follow-up  [  ] LAST FIT/COLOGUARD RESULT [  ] LAST DEXA  [  ] LAST MAMMOGRAM  [  ] LAST PAP  [  ] LAST LABS [  ] RETINA EXAM REPORT  [  ] IMMUNIZATION RECORDS  [  y] Release all info      [  ] Check here and initial the line next to each item to release ALL health information INCLUDING  _____ Care and treatment for drug and / or alcohol abuse  _____ HIV testing, infection status, or AIDS  _____ Genetic Testing    DATES OF SERVICE OR TIME PERIOD TO BE DISCLOSED: _____________  I understand and acknowledge that:  * This Authorization may be revoked at any time by you in writing, except if your health information has already been used or disclosed.  * Your health information that will be used or disclosed as a result of you signing this authorization could be re-disclosed by the recipient. If this occurs, your re-disclosed health information may no longer be protected by State or Federal laws.  * You may refuse to sign this Authorization. Your refusal will not affect your ability to obtain treatment.  * This Authorization becomes effective upon signing and will  on (date) __________.      If no date is indicated, this Authorization will  one (1) year from the signature date.    Name: Mj Cornejo    Signature:   Date:     3/1/2021       PLEASE  FAX REQUESTED RECORDS BACK TO: [phone]

## 2021-03-01 NOTE — ASSESSMENT & PLAN NOTE
Chronic issue.  Checks bp at home at home with systolic  range of 140s. Denies any headaches or chest pain.

## 2021-03-02 ENCOUNTER — TELEPHONE (OUTPATIENT)
Dept: NEUROLOGY | Facility: MEDICAL CENTER | Age: 76
End: 2021-03-02

## 2021-03-02 ENCOUNTER — NON-PROVIDER VISIT (OUTPATIENT)
Dept: NEUROLOGY | Facility: MEDICAL CENTER | Age: 76
End: 2021-03-02
Attending: PSYCHIATRY & NEUROLOGY
Payer: MEDICARE

## 2021-03-02 ENCOUNTER — TELEPHONE (OUTPATIENT)
Dept: MEDICAL GROUP | Facility: PHYSICIAN GROUP | Age: 76
End: 2021-03-02

## 2021-03-02 DIAGNOSIS — G40.109 LOCALIZATION-RELATED EPILEPSY (HCC): ICD-10-CM

## 2021-03-02 DIAGNOSIS — E55.9 VITAMIN D DEFICIENCY: ICD-10-CM

## 2021-03-02 DIAGNOSIS — R79.89 ELEVATED TSH: ICD-10-CM

## 2021-03-02 LAB
25(OH)D3 SERPL-MCNC: 16 NG/ML (ref 30–100)
FOLATE SERPL-MCNC: 7 NG/ML
TSH SERPL DL<=0.005 MIU/L-ACNC: 9.9 UIU/ML (ref 0.38–5.33)

## 2021-03-02 PROCEDURE — 95719 EEG PHYS/QHP EA INCR W/O VID: CPT | Performed by: PSYCHIATRY & NEUROLOGY

## 2021-03-02 PROCEDURE — 95700 EEG CONT REC W/VID EEG TECH: CPT | Performed by: PSYCHIATRY & NEUROLOGY

## 2021-03-02 PROCEDURE — 95708 EEG WO VID EA 12-26HR UNMNTR: CPT | Performed by: PSYCHIATRY & NEUROLOGY

## 2021-03-02 RX ORDER — ERGOCALCIFEROL 1.25 MG/1
50000 CAPSULE ORAL
Qty: 12 CAPSULE | Refills: 1 | Status: SHIPPED | OUTPATIENT
Start: 2021-03-02 | End: 2021-07-22 | Stop reason: SDUPTHER

## 2021-03-02 NOTE — TELEPHONE ENCOUNTER
Please call patient's daughter and let her know that I have reviewed Dr russo's notes.  His thyroid labs were elevated.  I have placed orders for further testing to see if he needs to be on medication for his thyroid.  These are nonfasting labs he  can get these drawn at his earliest convenience.  Once we have these results, we can decide on medication.    Thank You,  Dr Whitfield

## 2021-03-02 NOTE — TELEPHONE ENCOUNTER
Good afternoon Dr. Whitfield,    Pt's daughter called stating that, pt was informed by the neurologist,that his labs were abnormal. So, he recommended to schedule an appt with you. However, are not appts available at this time. So, pt would like to know, if you can prescribe him something.Please advice.      Thank you,    Daughter requested a call back at :[phone]

## 2021-03-02 NOTE — TELEPHONE ENCOUNTER
From: Morris Florence M.D.   Sent: 3/2/2021  12:49 PM PST   To: Shirley Buckner, Terrence Ass't   Subject: labs                                             Please call pt's daughter and notify that thyroid level is abnormal and likely he has hypothyroidism and will need supplement and fu for that and will need to see pcp soon for it. Not something we treat.     Also, Vit D is low and I sent RX to pharmacy for weekly supplement for six months.   Thanks,   RA       Called spoke with Amy (wife) ,let her know to have son or daughter call us back.

## 2021-03-02 NOTE — PROCEDURES
24 HR AMBULATORY ELECTROENCEPHALOGRAM REPORT      Referring provider: Dr. Whitfield.     DOS:   3/2/2021 (recorded for 23 hours and 3 minutes).    INDICATION:  Mj Cornejo 75 y.o. male presenting with seizures, history of status epilepticus.  Rule out subclinical seizures.    CURRENT ANTIEPILEPTIC REGIMEN: Levetiracetam ER 2000 mg nightly, valproic acid ER 1000 mg nightly.     TECHNIQUE: A 30-channel, 24 hrs ambulatory electroencephalogram (aEEG) was performed in accordance with the international 10-20 system. This digital study was reviewed in bipolar and referential montages. The recording examined the patient during wakeful, drowsy and sleep states.     The EEG was set up and taken down by a Neurodiagnostic technologist who performed education to patient and staff.     A minimum but not limited to 23 electrodes and 23 channel recording was setup and performed by Neurodiagnostic technologist.    Impedances, electrode integrity, and technical impressions were documented a minimum of every 2-24 hour period by a Neurodiagnostic Technologist and reviewed by Interpreting physician.       DESCRIPTION OF THE RECORD:  During the awake state, background shows symmetrical 9 Hz alpha activity posteriorly with amplitude of 70 mV.  There was reactivity with eye opening/closure.  Normal anterior-posterior gradient was noted with faster beta frequencies seen anteriorly.  During drowsiness, high-amplitude delta frequencies were seen.    During the sleep state, background shows diffuse high-amplitude 4-5 Hz delta activity.  Symmetrical high-amplitude sleep spindles and vertex sharp activities were seen in the central leads.    ACTIVATION PROCEDURES:   Not performed.    ICTAL AND/OR INTERICTAL FINDINGS:   Rare left temporal sharps were noted, with phase reversal and maximal amplitude at T3.  The patient is known to have had documented EEG seizures on the right posterior quadrant, none were captured during this study.      EVENT(S): None.    EKG: sampling review of EKG recording demonstrated a sinus arhythmia.       INTERPRETATION:   This is an abnormal 24 hours ambulatory electroencephalogram recording in the awake, drowsy and sleep state. Rare left temporal sharps were noted, with phase reversal and maximal amplitude at T3.  The patient is known to have had documented EEG seizures on the right posterior quadrant in past EEGs, however none were captured during this study.  The findings suggest underlying area of increased cortical irritability.  No events were reported during the study. Clinical and radiological correlation is recommended.      Morris Florence MD   Epilepsy and Neurodiagnostics.   Clinical  of Neurology [facility].   Diplomate in Neurology, Epilepsy, and Electrodiagnostic Medicine.   Office: [phone]  Fax: [phone]

## 2021-03-02 NOTE — TELEPHONE ENCOUNTER
Aliyah  [phone]  Called spoke with daughter and let her know per dr Florence, verbally understood.

## 2021-03-03 ENCOUNTER — NON-PROVIDER VISIT (OUTPATIENT)
Dept: NEUROLOGY | Facility: MEDICAL CENTER | Age: 76
End: 2021-03-03
Attending: PSYCHIATRY & NEUROLOGY
Payer: MEDICARE

## 2021-03-03 DIAGNOSIS — G40.109 LOCALIZATION-RELATED EPILEPSY (HCC): ICD-10-CM

## 2021-03-03 LAB
ACHR BLOCK AB/ACHR TOTAL SFR SER: 0 % (ref 0–26)
MUSK AB SER-SCNC: 0 NMOL/L (ref 0–0.03)

## 2021-03-03 PROCEDURE — 95708 EEG WO VID EA 12-26HR UNMNTR: CPT | Performed by: PSYCHIATRY & NEUROLOGY

## 2021-03-03 PROCEDURE — 99999 PR NO CHARGE: CPT | Performed by: PSYCHIATRY & NEUROLOGY

## 2021-03-03 NOTE — TELEPHONE ENCOUNTER
Rosa called patients daughter and translated for Nepali to inform her that Mj needs to go and get this thyroid tested at the lab.

## 2021-03-04 LAB
VIT B1 BLD-MCNC: 186 NMOL/L (ref 70–180)
VIT B6 SERPL-MCNC: 24.9 NMOL/L (ref 20–125)

## 2021-03-08 ENCOUNTER — OFFICE VISIT (OUTPATIENT)
Dept: NEUROLOGY | Facility: MEDICAL CENTER | Age: 76
End: 2021-03-08
Attending: PSYCHIATRY & NEUROLOGY
Payer: MEDICARE

## 2021-03-08 VITALS
TEMPERATURE: 98.1 F | WEIGHT: 156.53 LBS | OXYGEN SATURATION: 94 % | SYSTOLIC BLOOD PRESSURE: 120 MMHG | HEIGHT: 60 IN | HEART RATE: 74 BPM | RESPIRATION RATE: 16 BRPM | BODY MASS INDEX: 30.73 KG/M2 | DIASTOLIC BLOOD PRESSURE: 68 MMHG

## 2021-03-08 DIAGNOSIS — I49.9 CARDIAC ARRHYTHMIA, UNSPECIFIED CARDIAC ARRHYTHMIA TYPE: ICD-10-CM

## 2021-03-08 DIAGNOSIS — I10 ESSENTIAL HYPERTENSION: ICD-10-CM

## 2021-03-08 DIAGNOSIS — Z13.31 SCREENING FOR DEPRESSION: ICD-10-CM

## 2021-03-08 DIAGNOSIS — D75.89 MACROCYTOSIS WITHOUT ANEMIA: ICD-10-CM

## 2021-03-08 DIAGNOSIS — G40.109 LOCALIZATION-RELATED EPILEPSY (HCC): ICD-10-CM

## 2021-03-08 DIAGNOSIS — T45.515D ADVERSE EFFECT OF ANTICOAGULANT, SUBSEQUENT ENCOUNTER: ICD-10-CM

## 2021-03-08 DIAGNOSIS — Z02.4 ENCOUNTER FOR EXAMINATION FOR DRIVING LICENSE: ICD-10-CM

## 2021-03-08 DIAGNOSIS — G40.119 PHARMACORESISTANT INTRACTABLE FOCAL EPILEPSY (HCC): ICD-10-CM

## 2021-03-08 DIAGNOSIS — R79.89 ABNORMAL THYROID BLOOD TEST: ICD-10-CM

## 2021-03-08 DIAGNOSIS — E55.9 VITAMIN D DEFICIENCY: ICD-10-CM

## 2021-03-08 DIAGNOSIS — H02.403 PTOSIS, BILATERAL: ICD-10-CM

## 2021-03-08 DIAGNOSIS — Z09 HOSPITAL DISCHARGE FOLLOW-UP: ICD-10-CM

## 2021-03-08 DIAGNOSIS — E66.3 OVERWEIGHT: ICD-10-CM

## 2021-03-08 PROCEDURE — 99215 OFFICE O/P EST HI 40 MIN: CPT | Performed by: PSYCHIATRY & NEUROLOGY

## 2021-03-08 PROCEDURE — 99211 OFF/OP EST MAY X REQ PHY/QHP: CPT | Performed by: PSYCHIATRY & NEUROLOGY

## 2021-03-08 ASSESSMENT — FIBROSIS 4 INDEX: FIB4 SCORE: 1.64

## 2021-03-09 NOTE — PROGRESS NOTES
Chief Complaint   Patient presents with   • Follow-Up     Pharmacoresistant intractable focal epilepsy (       Problem List Items Addressed This Visit     Essential hypertension    Pharmacoresistant intractable focal epilepsy (HCC)      Other Visit Diagnoses     Overweight        Relevant Orders    Patient identified as having weight management issue.  Appropriate orders and counseling given.    Localization-related epilepsy (HCC)        Vitamin D deficiency        Macrocytosis without anemia        Hospital discharge follow-up        Ptosis, bilateral        Abnormal thyroid blood test        Screening for depression        Encounter for examination for driving license        Adverse effect of anticoagulant, subsequent encounter        Cardiac arrhythmia, unspecified cardiac arrhythmia type        Relevant Orders    REFERRAL TO CARDIOLOGY          Interim history:  Mj Cornejo returns to the office in follow-up, in the company of his son.  The patient had blood work, as well as an EEG ambulatory.  He remains seizure-free.  We switch his Keppra and Depakote from IR to ER formulations, he indicates he is tolerating the medications a lot better, and denies any current side effects.  He is fully compliant with the medications, uses a pillbox at home.  He is not driving, but would like to resume driving.  He only drive to and back from work, scheduled to start working towards the end of the month.  The patient's son attest to compliance and seizure freedom.  The patient started vitamin D supplementation.  His mood is good, he is not depressed or suicidal.  No chest pain no palpitations.  Denies any other complaints.          Past medical history:   Past Medical History:   Diagnosis Date   • Hypercholesteremia    • Hypertension    • Seizure (HCC)        Past surgical history:   History reviewed. No pertinent surgical history.    Family history:   Family History   Problem Relation Age of Onset   • Stroke  Mother        Social history:   Social History     Socioeconomic History   • Marital status:      Spouse name: Not on file   • Number of children: Not on file   • Years of education: Not on file   • Highest education level: Not on file   Occupational History   • Not on file   Tobacco Use   • Smoking status: Former Smoker     Types: Cigarettes     Quit date:      Years since quittin.1   • Smokeless tobacco: Never Used   Substance and Sexual Activity   • Alcohol use: No   • Drug use: No   • Sexual activity: Not on file     Comment: , lives with wife and son, 2 sons   Other Topics Concern   • Not on file   Social History Narrative   • Not on file     Social Determinants of Health     Financial Resource Strain: Medium Risk   • Difficulty of Paying Living Expenses: Somewhat hard   Food Insecurity: No Food Insecurity   • Worried About Running Out of Food in the Last Year: Never true   • Ran Out of Food in the Last Year: Never true   Transportation Needs: No Transportation Needs   • Lack of Transportation (Medical): No   • Lack of Transportation (Non-Medical): No   Physical Activity:    • Days of Exercise per Week:    • Minutes of Exercise per Session:    Stress:    • Feeling of Stress :    Social Connections:    • Frequency of Communication with Friends and Family:    • Frequency of Social Gatherings with Friends and Family:    • Attends Mu-ism Services:    • Active Member of Clubs or Organizations:    • Attends Club or Organization Meetings:    • Marital Status:    Intimate Partner Violence:    • Fear of Current or Ex-Partner:    • Emotionally Abused:    • Physically Abused:    • Sexually Abused:        Current medications:   Current Outpatient Medications   Medication   • vitamin D, Ergocalciferol, (DRISDOL) 1.25 MG (72784 UT) Cap capsule   • amLODIPine (NORVASC) 5 MG Tab   • atorvastatin (LIPITOR) 10 MG Tab   • levetiracetam (KEPPRA) 500 MG TABLET SR 24 HR   • divalproex ER (DEPAKOTE ER) 500  MG TABLET SR 24 HR   • ASPIRIN LOW DOSE 81 MG Chew Tab chewable tablet     No current facility-administered medications for this visit.       Medication Allergy:  No Known Allergies      Review of systems:   As reviewed in today's history.    Physical examination:   Vitals:    03/08/21 1119   BP: 120/68   BP Location: Left arm   Patient Position: Sitting   BP Cuff Size: Adult   Pulse: 74   Resp: 16   Temp: 36.7 °C (98.1 °F)   TempSrc: Temporal   SpO2: 94%   Weight: 71 kg (156 lb 8.4 oz)   Height: 1.524 m (5')     General: Patient in no acute distress, pleasant and cooperative.  HEENT: Normocephalic, no signs of acute trauma.   Neck: supple, no meningeal signs or carotid bruits. There is normal range of motion. No tenderness on exam.   Chest: clear to auscultation. No cough.   CV: RRR, no murmurs.   Skin: no signs of acute rashes or trauma.   Musculoskeletal: joints exhibit full range of motion, without any pain to palpation. There are no signs of joint or muscle swelling. There is no tenderness to deep palpation of muscles.   Psychiatric: No hallucinatory behavior. Denies symptoms of depression or suicidal ideation. Mood and affect appear normal on exam.     NEUROLOGICAL EXAM:   Mental status, orientation: Awake, alert and fully oriented.   Speech and language: speech is clear and fluent. The patient is able to name, repeat and comprehend.   Memory: There is intact recollection of recent and remote events.   Cranial nerve exam: Pupils are 3-4 mm bilaterally and equally reactive to light and accommodation. Visual fields are intact by confrontation. There is no nystagmus on primary or secondary gaze. Intact full EOM in all directions of gaze. Face appears symmetric. Sensation in the face is intact to light touch. Uvula is midline. Palate elevates symmetrically. Tongue is midline and without any signs of tongue biting or fasciculations.Sternocleidomastoid muscles exhibit is normal strength bilaterally. Shoulder shrug  is intact bilaterally.   Motor exam: Strength is 5/5 in all extremities. Tone is normal. No abnormal movements were seen on exam.   Sensory exam reveals normal sense of light touch in all extremities.   Deep tendon reflexes: Diminished throughout. Plantar responses are flexor. There is no clonus.   Coordination: shows a normal finger-nose-finger. Normal rapidly alternating movements.   Gait: The patient was able to get up from seated position on first attempt without requiring assistance. Found to be steady when walking. Movements were fluid with normal arm swing. The patient was able to turn without difficulties or tendency to fall.         ANCILLARY DATA REVIEWED:       Lab Data Review:  Reviewed in chart.  Vitamin D deficiency, started supplementation.  Elevated TSH.    Records reviewed:       Imaging:   MRI brain without, 1/27/2021:  1.  Small chronic area of hemorrhage in the posterior right parietal lobe. Surrounding this area is cortical hyperintensity on T2 and DWI images which is likely related to seizure activity given the history.  2.  Age-related cerebral atrophy and chronic microvascular ischemic type changes.  3.  No acute intracranial hemorrhage.     CT brain without, 1/25/2021:  1.  No evidence of acute hemorrhage or mass  2.  Redemonstrated RIGHT parietal calcification        EEG:  Continuous video EEG, 1/26/2021:  INTERPRETATION:   This is an abnormal continuous video electroencephalogram   recording in the awake, drowsy and sleep state.  Over 100 focal   right posterior quadrant seizures were captured during the study.   The seizures were non convulsive and often exhibited head   deviation to the left and/or mild confusion or decreased   responsiveness. No significant improvement with addition of   Levetiracetam, as frequent seizures were still noted overnight.     The findings suggest an underlying area of increased cortical   irritability and structural abnormality. Clinical and   radiological  correlation is recommended.   Of note, a sinus arrhythmia was noted on the ekg lead.      24-hour ambulatory EEG, 3/2/2021:  INTERPRETATION:   This is an abnormal 24 hours ambulatory electroencephalogram   recording in the awake, drowsy and sleep state. Rare left   temporal sharps were noted, with phase reversal and maximal   amplitude at T3.  The patient is known to have had documented EEG   seizures on the right posterior quadrant in past EEGs, however   none were captured during this study.  The findings suggest   underlying area of increased cortical irritability.  No events   were reported during the study. Clinical and radiological   correlation is recommended.   EKG: sampling review of EKG recording demonstrated a sinus arhythmia.               ASSESSMENT AND PLAN:  1. Overweight  - Patient identified as having weight management issue.  Appropriate orders and counseling given.    2. Localization-related epilepsy (HCC)    3. Vitamin D deficiency    4. Macrocytosis without anemia    5. Essential hypertension    6. Hospital discharge follow-up    7. Ptosis, bilateral    8. Abnormal thyroid blood test    9. Pharmacoresistant intractable focal epilepsy (HCC)    10. Screening for depression    11. Encounter for examination for driving license    12. Adverse effect of anticoagulant, subsequent encounter    13. Cardiac arrhythmia, unspecified cardiac arrhythmia type  - REFERRAL TO CARDIOLOGY       CLINICAL DISCUSSION:   Focal onset, suspected pharmaco-resistant epilepsy.  Epilepsy to believed to be structural in nature, likely related to abnormality on imaging on the right occipital region.  I personally reviewed images of MRI of the brain and CT of the brain recently obtained in the hospital admission, and compared them to prior studies that were done on this patient.  The findings appear to be chronic and stable.  Overwhelmingly I believe that this was a calcification rather than a bleed.  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  The patient and family were educated on the process.  The patient had been seen by neurology in the past, but unfortunately was lost to follow-up.  He was previously on a combination of carbamazepine and oxcarbazepine, however presented in status epilepticus to [facility].  He had several back-to-back generalized tonic-clonic seizures, and then frequent focal seizures were captured in the right posterior quadrant, with intermittent episodes of focal right posterior status epilepticus.  The patient was ultimately switched from oxcarbazepine and carbamazepine to levetiracetam and valproic acid.    When I saw him last, he was on levetiracetam at 1000 mg twice a day, and valproic acid 500 mg twice a day, but was experiencing side effects, particularly daytime drowsiness.  The medications were switched over to an ER formulation, and takes them at bedtime, previously reported side effects are now resolved.     Current AED regimen:  Levetiracetam ER 2000 mg p.o. nightly  Valproic acid ER 1000 mg p.o. nightly    Valproic acid was therapeutic.     Microcytosis without anemia.  Denies recent alcohol consumption.  Denies chronic alcohol use.  This may be related to Depakote?     Recent ambulatory EEG did not show any seizures, and significant improvement from prior EEGs.  Incidentally contralateral sharps were noted on EEG.     He is not currently driving, indicates that he was never reported to the DMV, he would like to resume driving towards the end of the month, as he needs to return to work and is his only means to get to work.  Patient is fully compliant, and remains seizure-free.  Okay to resume driving at the end of March 2021, as discussed.  Patient to stop driving immediately, should he have any episodes of confusion and or  seizure activity.  Patient and son agreed with plan.     Compliance was discussed extensively.  Uses pillbox at home.     Patient has authorized his son and daughter-in-law to have access to his health information, and to be able to make appointments on his behalf.     TSH was elevated, following with PCP.  Suggestive of hypothyroidism.    Vitamin D deficiency, started with supplementation.    Incidental, mild cardiac arrhythmia was captured on ambulatory EEG.  Patient has been referred to cardiology, this was also present on prior EEGs.  Patient appears asymptomatic at this point.         FOLLOW-UP:   Return in about 3 months (around 6/8/2021).      EDUCATION AND COUNSELING:  -Education was provided to the patient and/or family regarding diagnosis and prognosis. The chronic and unpredictable nature of the condition were discussed. There is increased risk for additional events, which may carry potential for significant injuries and death. Discussed frequent seizure triggers: sleep deprivation, medication non-compliance, use of illegal drugs/alcohol, stress, and others.   -We reviewed in detail the current antiepileptic regimen. Potential side effects of antiepileptics were discussed at length, including but no limited to: hypersensitivity reactions (rash and others, some of which can be fatal), visual field changes (some of which may be irreversible), glaucoma, diplopia, kidney stones, osteopenia/osteoporosis/bone fractures, hyperthermia/anhydrosis, hyponatremia, tremors/abnormal movements, ataxia, dizziness, fatigue, increased risk for falls, risk for cardiac arrhythmias and syncope, weight changes, hair loss, gastrointestinal side effects(hepatitis, pancreatitis, gastritis, ulcers, gingival hypertrophy/bleeding, drowsiness, sedation, memory loss, trouble finding words, anxiety/nervousness, increased risk for suicide, increased risk for depression, and psychosis.   -We also reviewed drug-drug interactions and their  potential effect on seizure control and medication side effects.    -Recommend chronic vitamin D supplementation and regular exercise (if not contraindicated).   -Patient/family educated on risk for SUDEP (Sudden Death in Epilepsy). Counseling was provided on the importance of strict medication and follow up compliance. The patient/family understand the risks associated with non-adherence with the medical plan as outlined, including but not limited to an increased risk for breakthrough seizures, which may contribute to injuries, disability, status epilepticus, and even death.   -Counseling was also provided on potential effects of alcohol and other drugs, which may lower seizure threshold and/or affect the metabolism of antiepileptic drugs. We recommend avoidance of alcohol and illegal drugs.  Denies use.  -Avoid sleep deprivation.   -We extensively discussed the aspects related to safety in drivers who suffer from epilepsy. The patient is encourage to report to the Division of Motor Vehicles of any condition and/or spells related to confusion, disorientation, and/or loss of awareness and/or loss of consciousness; as these may pose a safety issue if they occur while operating a motor vehicle. The patient and/or family are ultimately responsible for exercising caution and abiding to regulations in place.   -Other seizure precautions were discussed at length, including no diving, no skydiving, no climbing or exposure to unprotected heights, no unsupervised swimming, no Jacuzzi or bathing in bathtubs or deep bodies of water. The patient/family have been advised about risks for operating any machinery while suffering from seizures / syncope / epilepsy and/or while taking antiepileptic drugs.   -The patient understands and agrees that due to the complexity of his/her diagnosis, results of any testing and further recommendations will typically be discussed/made during a face to face encounter in my office. The patient  and/or family further understands it is their responsibility to keep proper follow up.     Patient/family agree with plan, as outlined.         Morris Florence MD   Epilepsy and Neurodiagnostics.   Clinical  of Neurology [facility].   Diplomate in Neurology, Epilepsy, and Electrodiagnostic Medicine.   Office: [phone]  Fax: [phone]      BILLING DOCUMENTATION:     I have performed physical exam and reviewed and updated ROS and plan today 3/8/2021. In review of that note, there are no new changes except as documented above.     Counseling:  I spent greater than 50% time face-to-face time of a total of 45 minutes visit. Over 50% of the time of the visit today was spent on counseling and or coordination of care wtih the patient and/or family, with greater than 50% of the total discussing my assessment and plan as stated above.

## 2021-03-11 ENCOUNTER — OFFICE VISIT (OUTPATIENT)
Dept: CARDIOLOGY | Facility: MEDICAL CENTER | Age: 76
End: 2021-03-11
Attending: PSYCHIATRY & NEUROLOGY
Payer: MEDICARE

## 2021-03-11 VITALS
HEIGHT: 64 IN | DIASTOLIC BLOOD PRESSURE: 72 MMHG | SYSTOLIC BLOOD PRESSURE: 142 MMHG | HEART RATE: 71 BPM | BODY MASS INDEX: 26.46 KG/M2 | WEIGHT: 155 LBS | OXYGEN SATURATION: 94 % | RESPIRATION RATE: 14 BRPM

## 2021-03-11 DIAGNOSIS — I49.9 CARDIAC ARRHYTHMIA, UNSPECIFIED CARDIAC ARRHYTHMIA TYPE: ICD-10-CM

## 2021-03-11 LAB — EKG IMPRESSION: NORMAL

## 2021-03-11 PROCEDURE — 99204 OFFICE O/P NEW MOD 45 MIN: CPT | Performed by: INTERNAL MEDICINE

## 2021-03-11 PROCEDURE — 93000 ELECTROCARDIOGRAM COMPLETE: CPT | Performed by: INTERNAL MEDICINE

## 2021-03-11 ASSESSMENT — ENCOUNTER SYMPTOMS
NAUSEA: 0
FEVER: 0
EYE DISCHARGE: 0
BRUISES/BLEEDS EASILY: 0
LOSS OF CONSCIOUSNESS: 0
MYALGIAS: 0
CHILLS: 0
HEMOPTYSIS: 0
VOMITING: 0
EYE PAIN: 0
BLURRED VISION: 0
COUGH: 0
NERVOUS/ANXIOUS: 0
WHEEZING: 0
SEIZURES: 1
DEPRESSION: 0
PALPITATIONS: 0
ABDOMINAL PAIN: 0
SPEECH CHANGE: 0

## 2021-03-11 ASSESSMENT — FIBROSIS 4 INDEX: FIB4 SCORE: 1.64

## 2021-03-12 NOTE — PROGRESS NOTES
Chief Complaint   Patient presents with   • Arrhythmia       Subjective:   Mj Cornejo is a 75 y.o. male who presents today in consult on request of Dr. Florence secondary to asymptomatic sinus arrhythmia seen on EEG.  Denies any syncope or presyncope.  History of seizures.  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  Again patient without symptoms.  Denies smoking or excess alcohol use.  Accompanied by his daughter.  We did use a .    Past Medical History:   Diagnosis Date   • Hypercholesteremia    • Hypertension    • Seizure (HCC)      History reviewed. No pertinent surgical history.  Family History   Problem Relation Age of Onset   • Stroke Mother      Social History     Socioeconomic History   • Marital status:      Spouse name: Not on file   • Number of children: Not on file   • Years of education: Not on file   • Highest education level: Not on file   Occupational History   • Not on file   Tobacco Use   • Smoking status: Former Smoker     Types: Cigarettes     Quit date:      Years since quittin.2   • Smokeless tobacco: Never Used   Substance and Sexual Activity   • Alcohol use: No   • Drug use: No   • Sexual activity: Not on file     Comment: , lives with wife and son, 2 sons   Other Topics Concern   • Not on file   Social History Narrative   • Not on file     Social Determinants of Health     Financial Resource Strain: Medium Risk   • Difficulty of Paying Living Expenses: Somewhat hard   Food Insecurity: No Food Insecurity   • Worried About Running Out of Food in the Last Year: Never true   • Ran Out of Food in the Last Year: Never true   Transportation Needs: No Transportation Needs   • Lack of Transportation (Medical): No   • Lack of Transportation (Non-Medical): No   Physical Activity:    • Days of Exercise per Week:    • Minutes of Exercise per Session:    Stress:    • Feeling of Stress :    Social Connections:    •  Frequency of Communication with Friends and Family:    • Frequency of Social Gatherings with Friends and Family:    • Attends Bahai Services:    • Active Member of Clubs or Organizations:    • Attends Club or Organization Meetings:    • Marital Status:    Intimate Partner Violence:    • Fear of Current or Ex-Partner:    • Emotionally Abused:    • Physically Abused:    • Sexually Abused:      No Known Allergies  Outpatient Encounter Medications as of 3/11/2021   Medication Sig Dispense Refill   • vitamin D, Ergocalciferol, (DRISDOL) 1.25 MG (65126 UT) Cap capsule Take 1 capsule by mouth every 7 days for 180 days. 12 capsule 1   • amLODIPine (NORVASC) 5 MG Tab Take 1 tablet by mouth every day. 100 tablet 3   • atorvastatin (LIPITOR) 10 MG Tab Take 1 tablet by mouth every morning. 100 tablet 3   • levetiracetam (KEPPRA) 500 MG TABLET SR 24 HR Take 4 Tabs by mouth every day. At bedtime. 120 Tab 11   • divalproex ER (DEPAKOTE ER) 500 MG TABLET SR 24 HR Take 2 Tabs by mouth every bedtime. 60 Tab 11   • ASPIRIN LOW DOSE 81 MG Chew Tab chewable tablet Chew 81 mg every morning.       No facility-administered encounter medications on file as of 3/11/2021.     Review of Systems   Constitutional: Negative for chills and fever.   HENT: Negative for congestion.    Eyes: Negative for blurred vision, pain and discharge.   Respiratory: Negative for cough, hemoptysis and wheezing.    Cardiovascular: Negative for chest pain and palpitations.   Gastrointestinal: Negative for abdominal pain, nausea and vomiting.   Musculoskeletal: Negative for joint pain and myalgias.   Skin: Negative for itching and rash.   Neurological: Positive for seizures. Negative for speech change and loss of consciousness.   Endo/Heme/Allergies: Does not bruise/bleed easily.   Psychiatric/Behavioral: Negative for depression. The patient is not nervous/anxious.    All other systems reviewed and are negative.       Objective:   /72 (BP Location: Right  "arm, Patient Position: Sitting, BP Cuff Size: Adult)   Pulse 71   Resp 14   Ht 1.626 m (5' 4\")   Wt 70.3 kg (155 lb)   SpO2 94%   BMI 26.61 kg/m²     Physical Exam   Constitutional: He is oriented to person, place, and time. He appears well-developed and well-nourished.   HENT:   Head: Normocephalic and atraumatic.   Eyes: EOM are normal.   Cardiovascular: Normal rate, regular rhythm and intact distal pulses.  Occasional extrasystoles are present. Exam reveals no gallop and no friction rub.   No murmur heard.  Pulmonary/Chest: Effort normal and breath sounds normal.   Abdominal: Soft.   Musculoskeletal:         General: No edema. Normal range of motion.      Cervical back: Normal range of motion and neck supple.   Neurological: He is alert and oriented to person, place, and time.   Skin: Skin is warm and dry.   Psychiatric: He has a normal mood and affect. His behavior is normal. Judgment and thought content normal.       Assessment:     1. Cardiac arrhythmia, unspecified cardiac arrhythmia type  EKG    ZIO PATCH MONITOR    EC-ECHOCARDIOGRAM COMPLETE W/O CONT       Medical Decision Making:  Today's Assessment / Status / Plan:   1.  Asymptomatic sinus arrhythmia with some PACs.  Check Zio patch. Doubt anything needs to be done.  2.  Right bundle branch block left anterior block check echocardiogram.  3.  Question previous CVA.  May benefit from an ILR if recommended by neurology to r/o asymptomatic a fib.    "

## 2021-04-08 ENCOUNTER — TELEPHONE (OUTPATIENT)
Dept: MEDICAL GROUP | Facility: PHYSICIAN GROUP | Age: 76
End: 2021-04-08

## 2021-04-08 NOTE — TELEPHONE ENCOUNTER
ESTABLISHED PATIENT PRE-VISIT PLANNING     Patient was NOT contacted to complete PVP.     Note: Patient will not be contacted if there is no indication to call.     1.  Reviewed notes from the last few office visits within the medical group: Yes    2.  If any orders were placed at last visit or intended to be done for this visit (i.e. 6 mos follow-up), do we have Results/Consult Notes?         •  Labs - Colorectal screening not yet completed  Note: If patient appointment is for lab review and patient did not complete labs, check with provider if OK to reschedule patient until labs completed.       •  Imaging - Imaging was not ordered at last office visit.       •  Referrals - No referrals were ordered at last office visit.    3. Is this appointment scheduled as a Hospital Follow-Up? No    4.  Immunizations were updated in Epic using Reconcile Outside Information activity? Yes    5.  Patient is due for the following Health Maintenance Topics:   Health Maintenance Due   Topic Date Due   • IMM DTaP/Tdap/Td Vaccine (1 - Tdap) Never done   • IMM ZOSTER VACCINES (1 of 2) Never done   • IMM PNEUMOCOCCAL VACCINE: 65+ Years (1 of 1 - PPSV23) Never done   • COLON CANCER SCREENING ANNUAL FIT  03/02/2020       6.  AHA (Pulse8) form printed for Provider? Email sent to [facility] requesting form

## 2021-04-10 ENCOUNTER — HOSPITAL ENCOUNTER (OUTPATIENT)
Dept: LAB | Facility: MEDICAL CENTER | Age: 76
End: 2021-04-10
Attending: FAMILY MEDICINE
Payer: MEDICARE

## 2021-04-10 ENCOUNTER — HOSPITAL ENCOUNTER (OUTPATIENT)
Facility: MEDICAL CENTER | Age: 76
End: 2021-04-10
Attending: FAMILY MEDICINE
Payer: MEDICARE

## 2021-04-10 DIAGNOSIS — R79.89 ELEVATED TSH: ICD-10-CM

## 2021-04-10 LAB — TSH SERPL DL<=0.005 MIU/L-ACNC: 4.12 UIU/ML (ref 0.38–5.33)

## 2021-04-10 PROCEDURE — 84443 ASSAY THYROID STIM HORMONE: CPT

## 2021-04-10 PROCEDURE — 82274 ASSAY TEST FOR BLOOD FECAL: CPT

## 2021-04-10 PROCEDURE — 36415 COLL VENOUS BLD VENIPUNCTURE: CPT

## 2021-04-12 ENCOUNTER — OFFICE VISIT (OUTPATIENT)
Dept: MEDICAL GROUP | Facility: PHYSICIAN GROUP | Age: 76
End: 2021-04-12
Payer: MEDICARE

## 2021-04-12 VITALS
SYSTOLIC BLOOD PRESSURE: 144 MMHG | WEIGHT: 157 LBS | OXYGEN SATURATION: 96 % | DIASTOLIC BLOOD PRESSURE: 88 MMHG | BODY MASS INDEX: 26.8 KG/M2 | RESPIRATION RATE: 14 BRPM | HEIGHT: 64 IN | TEMPERATURE: 99 F | HEART RATE: 75 BPM

## 2021-04-12 DIAGNOSIS — G89.29 CHRONIC RIGHT-SIDED LOW BACK PAIN WITH RIGHT-SIDED SCIATICA: ICD-10-CM

## 2021-04-12 DIAGNOSIS — R94.6 ABNORMAL RESULTS OF THYROID FUNCTION STUDIES: ICD-10-CM

## 2021-04-12 DIAGNOSIS — I10 ESSENTIAL HYPERTENSION: ICD-10-CM

## 2021-04-12 DIAGNOSIS — M54.41 CHRONIC RIGHT-SIDED LOW BACK PAIN WITH RIGHT-SIDED SCIATICA: ICD-10-CM

## 2021-04-12 PROBLEM — M54.40 CHRONIC RIGHT-SIDED LOW BACK PAIN WITH SCIATICA: Status: ACTIVE | Noted: 2021-04-12

## 2021-04-12 PROCEDURE — 99214 OFFICE O/P EST MOD 30 MIN: CPT | Performed by: FAMILY MEDICINE

## 2021-04-12 ASSESSMENT — FIBROSIS 4 INDEX: FIB4 SCORE: 1.64

## 2021-04-12 NOTE — ASSESSMENT & PLAN NOTE
Chronic.   Going on for the last 2 years.  No hx injury or trauma.  Noticed it at work. 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

## 2021-04-12 NOTE — PROGRESS NOTES
CC:   Chief Complaint   Patient presents with   • Hypertension   • Results     labs          HPI:   Mj presents today with his daughter for follow up for hypertension and lab results. In the interim, he Saw cardiology, Dr. Jacques Lehman on March 11 and Robyn patch and echocardiogram ordered.  Saw neurology, Dr. Campos on March 8 and was referred to cardiology at that time  Currently taking Keppra ER 2000 mg nightly and valproic ER 1000 nightly.  Labs were noted to have increase in TSH and asked to follow-up with PCP.    Essential hypertension  Chronic medical diagnosis.  Blood pressure today is 144/88.takes amlodipine 5mg in the morning.  Home systolics in the 130-140s.     Chronic right-sided low back pain with sciatica  Chronic.   Going on for the last 2 years.  No hx injury or trauma.  Noticed it at work. 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     Abnormal results of thyroid function studies  Chronic medical diagnosis.  His recent thyroid labs 2 days ago do have TSH in normal range.  States that he has never been on any sort of thyroid medications in the past.      Current Outpatient Medications Ordered in Epic   Medication Sig Dispense Refill   • vitamin D, Ergocalciferol, (DRISDOL) 1.25 MG (14040 UT) Cap capsule Take 1 capsule by mouth every 7 days for 180 days. 12 capsule 1   • amLODIPine (NORVASC) 5 MG Tab Take 1 tablet by mouth every day. 100 tablet 3   • atorvastatin (LIPITOR) 10 MG Tab Take 1 tablet by mouth every morning. 100 tablet 3   • levetiracetam (KEPPRA) 500 MG TABLET SR 24 HR Take 4 Tabs by mouth every day. At bedtime. 120 Tab 11   • divalproex ER (DEPAKOTE ER) 500 MG TABLET SR 24 HR Take 2 Tabs by mouth every bedtime. 60 Tab 11   • ASPIRIN LOW DOSE 81 MG Chew Tab chewable tablet Chew 81 mg every morning.       No current [facility]-ordered facility-administered medications on file.       Past Medical History:   Diagnosis Date   • Hypercholesteremia    • Hypertension    •  "Seizure (HCC)        Social History     Tobacco Use   • Smoking status: Former Smoker     Types: Cigarettes     Quit date:      Years since quittin.2   • Smokeless tobacco: Never Used   Substance Use Topics   • Alcohol use: No   • Drug use: No       Allergies:  Patient has no known allergies.      Objective:       Exam:  /88 (BP Location: Left arm, Patient Position: Sitting, BP Cuff Size: Adult)   Pulse 75   Temp 37.2 °C (99 °F) (Temporal)   Resp 14   Ht 1.626 m (5' 4\")   Wt 71.2 kg (157 lb)   SpO2 96%   BMI 26.95 kg/m²   Body mass index is 26.95 kg/m².  Wt Readings from Last 4 Encounters:   21 71.2 kg (157 lb)   21 70.3 kg (155 lb)   21 71 kg (156 lb 8.4 oz)   21 70.3 kg (155 lb)       Gen: Alert and oriented, No apparent distress. Appropriately groomed.  Neck: Neck is supple without lymphadenopathy.No thyromegaly.   Lungs: Normal effort, CTA bilaterally, no wheezes, rhonchi, or rales  CV: Regular rate and rhythm. No Lower extremity edema  Skin: No rash noted.      Assessment & Plan:     75 y.o. male with the following -     1. Essential hypertension  Chronic. Not well controlled.  bp parameters d/w patient and daughter.  Encouraged to start checking blood pressures at home and to MyChart me the readings.  For now continue with amlodipine 5 mg daily.   - CBC WITH DIFFERENTIAL; Future    2. Chronic right-sided low back pain with right-sided sciatica  Chronic medical diagnosis.  This has been going on for the last 2 years.  His pain is worse in a seated position and improved with standing.  States that he mainly notices it when he is at work and is sitting down for approximately 8 hours.  Encouraged to stand up and walk around. .  Did discuss with patient imaging and/or physical therapy.  For now, he would like to just monitor.    3. Abnormal results of thyroid function studies  Chronic medical diagnosis.  His thyroid labs over the last few months were reviewed.  His TSH " was increased to 5.6 and free T4 suppressed to 0.9 in January.  His TSH was repeated in March and it was further elevated to 9.9.  Repeat TSH this month is in normal range.  - TSH; Future  - FREE THYROXINE; Future        Return in about 2 months (around 6/12/2021).    Please note that this dictation was created using voice recognition software. I have made every reasonable attempt to correct obvious errors, but I expect that there are errors of grammar and possibly content that I did not discover before finalizing the note.

## 2021-04-12 NOTE — ASSESSMENT & PLAN NOTE
Chronic medical diagnosis.  His recent thyroid labs 2 days ago do have TSH in normal range.  States that he has never been on any sort of thyroid medications in the past.

## 2021-04-12 NOTE — ASSESSMENT & PLAN NOTE
Chronic medical diagnosis.  Blood pressure today is 144/88.takes amlodipine 5mg in the morning.  Home systolics in the 130-140s.

## 2021-04-14 DIAGNOSIS — Z12.11 SCREENING FOR COLORECTAL CANCER: ICD-10-CM

## 2021-04-14 DIAGNOSIS — Z12.12 SCREENING FOR COLORECTAL CANCER: ICD-10-CM

## 2021-04-16 LAB — IMM ASSAY OCC BLD FITOB: NEGATIVE

## 2021-04-19 ENCOUNTER — TELEPHONE (OUTPATIENT)
Dept: MEDICAL GROUP | Facility: PHYSICIAN GROUP | Age: 76
End: 2021-04-19

## 2021-04-19 NOTE — TELEPHONE ENCOUNTER
Phone Number Called: [phone] (home)       Call outcome: Spoke to patient regarding message below.    Message: Gave results to daughter.

## 2021-04-19 NOTE — TELEPHONE ENCOUNTER
----- Message from Viviana Whitfield M.D. sent at 4/19/2021  3:28 PM PDT -----  Please call patient   I reviewed the results of your recent stool test. The findings were negative. A negative test means that no blood was found in your stool sample at the time of the test.     thanks    Viviana Whitfield M.D.

## 2021-04-29 ENCOUNTER — NON-PROVIDER VISIT (OUTPATIENT)
Dept: CARDIOLOGY | Facility: MEDICAL CENTER | Age: 76
End: 2021-04-29
Attending: INTERNAL MEDICINE
Payer: MEDICARE

## 2021-04-29 ENCOUNTER — TELEPHONE (OUTPATIENT)
Dept: CARDIOLOGY | Facility: MEDICAL CENTER | Age: 76
End: 2021-04-29

## 2021-04-29 DIAGNOSIS — I49.9 CARDIAC ARRHYTHMIA, UNSPECIFIED CARDIAC ARRHYTHMIA TYPE: ICD-10-CM

## 2021-04-29 DIAGNOSIS — I47.10 SVT (SUPRAVENTRICULAR TACHYCARDIA) (HCC): ICD-10-CM

## 2021-04-29 PROCEDURE — 93306 TTE W/DOPPLER COMPLETE: CPT

## 2021-04-29 NOTE — TELEPHONE ENCOUNTER
Patient enrolled in the 14 day Zio XT Patch Holter monitoring program per Jacques Lehman MD.  In-Clinic hookup.    >Currently pending EOS.

## 2021-04-30 LAB
LV EJECT FRACT  99904: 65
LV EJECT FRACT MOD 2C 99903: 72.19
LV EJECT FRACT MOD 4C 99902: 63.48
LV EJECT FRACT MOD BP 99901: 69.95

## 2021-04-30 PROCEDURE — 93306 TTE W/DOPPLER COMPLETE: CPT | Mod: 26 | Performed by: INTERNAL MEDICINE

## 2021-05-28 PROCEDURE — 93248 EXT ECG>7D<15D REV&INTERPJ: CPT | Performed by: INTERNAL MEDICINE

## 2021-05-28 PROCEDURE — 93246 EXT ECG>7D<15D RECORDING: CPT | Performed by: INTERNAL MEDICINE

## 2021-06-16 ENCOUNTER — PATIENT OUTREACH (OUTPATIENT)
Dept: MEDICAL GROUP | Facility: MEDICAL CENTER | Age: 76
End: 2021-06-16

## 2021-07-02 ENCOUNTER — PATIENT OUTREACH (OUTPATIENT)
Dept: HEALTH INFORMATION MANAGEMENT | Facility: OTHER | Age: 76
End: 2021-07-02

## 2021-07-02 NOTE — PROGRESS NOTES
Outcome: spoke with wife she requested a call back since patient wasn't home     Please transfer to Patient Outreach Team at [phone] when patient returns call.      Attempt # 1

## 2021-07-14 ENCOUNTER — TELEPHONE (OUTPATIENT)
Dept: MEDICAL GROUP | Facility: PHYSICIAN GROUP | Age: 76
End: 2021-07-14

## 2021-07-17 ENCOUNTER — HOSPITAL ENCOUNTER (OUTPATIENT)
Dept: LAB | Facility: MEDICAL CENTER | Age: 76
End: 2021-07-17
Attending: FAMILY MEDICINE
Payer: MEDICARE

## 2021-07-17 DIAGNOSIS — I10 ESSENTIAL HYPERTENSION: ICD-10-CM

## 2021-07-17 DIAGNOSIS — R94.6 ABNORMAL RESULTS OF THYROID FUNCTION STUDIES: ICD-10-CM

## 2021-07-17 LAB
BASOPHILS # BLD AUTO: 0.8 % (ref 0–1.8)
BASOPHILS # BLD: 0.06 K/UL (ref 0–0.12)
EOSINOPHIL # BLD AUTO: 0.15 K/UL (ref 0–0.51)
EOSINOPHIL NFR BLD: 2 % (ref 0–6.9)
ERYTHROCYTE [DISTWIDTH] IN BLOOD BY AUTOMATED COUNT: 45.4 FL (ref 35.9–50)
HCT VFR BLD AUTO: 53.4 % (ref 42–52)
HGB BLD-MCNC: 17.4 G/DL (ref 14–18)
IMM GRANULOCYTES # BLD AUTO: 0.04 K/UL (ref 0–0.11)
IMM GRANULOCYTES NFR BLD AUTO: 0.5 % (ref 0–0.9)
LYMPHOCYTES # BLD AUTO: 2.88 K/UL (ref 1–4.8)
LYMPHOCYTES NFR BLD: 38.7 % (ref 22–41)
MCH RBC QN AUTO: 33.5 PG (ref 27–33)
MCHC RBC AUTO-ENTMCNC: 32.6 G/DL (ref 33.7–35.3)
MCV RBC AUTO: 102.9 FL (ref 81.4–97.8)
MONOCYTES # BLD AUTO: 0.58 K/UL (ref 0–0.85)
MONOCYTES NFR BLD AUTO: 7.8 % (ref 0–13.4)
NEUTROPHILS # BLD AUTO: 3.74 K/UL (ref 1.82–7.42)
NEUTROPHILS NFR BLD: 50.2 % (ref 44–72)
NRBC # BLD AUTO: 0 K/UL
NRBC BLD-RTO: 0 /100 WBC
PLATELET # BLD AUTO: 184 K/UL (ref 164–446)
PMV BLD AUTO: 12.6 FL (ref 9–12.9)
RBC # BLD AUTO: 5.19 M/UL (ref 4.7–6.1)
T4 FREE SERPL-MCNC: 1.11 NG/DL (ref 0.93–1.7)
TSH SERPL DL<=0.005 MIU/L-ACNC: 4.27 UIU/ML (ref 0.38–5.33)
WBC # BLD AUTO: 7.5 K/UL (ref 4.8–10.8)

## 2021-07-17 PROCEDURE — 84443 ASSAY THYROID STIM HORMONE: CPT

## 2021-07-17 PROCEDURE — 85025 COMPLETE CBC W/AUTO DIFF WBC: CPT

## 2021-07-17 PROCEDURE — 84439 ASSAY OF FREE THYROXINE: CPT

## 2021-07-17 PROCEDURE — 36415 COLL VENOUS BLD VENIPUNCTURE: CPT

## 2021-07-19 ENCOUNTER — OFFICE VISIT (OUTPATIENT)
Dept: MEDICAL GROUP | Facility: PHYSICIAN GROUP | Age: 76
End: 2021-07-19
Payer: MEDICARE

## 2021-07-19 ENCOUNTER — HOSPITAL ENCOUNTER (OUTPATIENT)
Facility: MEDICAL CENTER | Age: 76
End: 2021-07-19
Attending: FAMILY MEDICINE
Payer: MEDICARE

## 2021-07-19 VITALS
OXYGEN SATURATION: 95 % | HEART RATE: 62 BPM | TEMPERATURE: 99.2 F | BODY MASS INDEX: 25.49 KG/M2 | HEIGHT: 64 IN | DIASTOLIC BLOOD PRESSURE: 66 MMHG | WEIGHT: 149.3 LBS | SYSTOLIC BLOOD PRESSURE: 126 MMHG | RESPIRATION RATE: 18 BRPM

## 2021-07-19 DIAGNOSIS — E78.5 DYSLIPIDEMIA: ICD-10-CM

## 2021-07-19 DIAGNOSIS — M54.2 NECK PAIN: ICD-10-CM

## 2021-07-19 DIAGNOSIS — G40.119 PHARMACORESISTANT INTRACTABLE FOCAL EPILEPSY (HCC): ICD-10-CM

## 2021-07-19 DIAGNOSIS — R73.01 ELEVATED FASTING GLUCOSE: ICD-10-CM

## 2021-07-19 DIAGNOSIS — I10 ESSENTIAL HYPERTENSION: ICD-10-CM

## 2021-07-19 DIAGNOSIS — G89.29 CHRONIC BILATERAL LOW BACK PAIN WITHOUT SCIATICA: ICD-10-CM

## 2021-07-19 DIAGNOSIS — E55.9 VITAMIN D DEFICIENCY: ICD-10-CM

## 2021-07-19 DIAGNOSIS — M54.50 CHRONIC BILATERAL LOW BACK PAIN WITHOUT SCIATICA: ICD-10-CM

## 2021-07-19 DIAGNOSIS — R25.1 TREMOR OF BOTH HANDS: ICD-10-CM

## 2021-07-19 DIAGNOSIS — N53.9 MALE SEXUAL DYSFUNCTION: ICD-10-CM

## 2021-07-19 PROCEDURE — 99215 OFFICE O/P EST HI 40 MIN: CPT | Performed by: FAMILY MEDICINE

## 2021-07-19 PROCEDURE — 82306 VITAMIN D 25 HYDROXY: CPT

## 2021-07-19 PROCEDURE — 82607 VITAMIN B-12: CPT

## 2021-07-19 PROCEDURE — 83036 HEMOGLOBIN GLYCOSYLATED A1C: CPT

## 2021-07-19 PROCEDURE — 80053 COMPREHEN METABOLIC PANEL: CPT

## 2021-07-19 ASSESSMENT — FIBROSIS 4 INDEX: FIB4 SCORE: 1.94

## 2021-07-19 NOTE — ASSESSMENT & PLAN NOTE
Chronic medical diagnosis.  Blood pressure today in the office is 126/66.  Currently taking amlodipine 5 mg daily.

## 2021-07-19 NOTE — ASSESSMENT & PLAN NOTE
Chronic issue.  Last vitamin D level 16 in march.    Alert-The patient is alert, awake and responds to voice. The patient is oriented to time, place, and person. The triage nurse is able to obtain subjective information.

## 2021-07-19 NOTE — ASSESSMENT & PLAN NOTE
New medical problem.  This has been going on for approximately the last 6 months.  States that he is unable to have relations with his wife.  Last PSA checked in December 2020 was in normal range.  Denies any urinary complaints.

## 2021-07-19 NOTE — ASSESSMENT & PLAN NOTE
Noted bilateral hand tremors x 6 months. Worse in right hand.  Notices it when he's going to eat, brush teeth,

## 2021-07-19 NOTE — PROGRESS NOTES
CC:   Chief Complaint   Patient presents with   • Follow-Up     2 months.    • Lab Results         HPI:   Mj presents today with his daughter in law for a 3 month f/u visit.     Pharmacoresistant intractable focal epilepsy (HCC)  Chronic issue.  Last seizure was in January.  Has appt with neuro, dr Florence, this week.     Neck pain  Chronic issue.     Elevated fasting glucose  New issue  March labs with fasting glucose of 103.     Vitamin D deficiency  Chronic issue.  Last vitamin D level 16 in march.     Dyslipidemia  Chronic.  Taking lipitor 10mg.     Tremor of both hands  Noted bilateral hand tremors x 6 months. Worse in right hand.  Notices it when he's going to eat, brush teeth,     Chronic bilateral low back pain without sciatica  Chronic issue. Going on for the last 3 years.  States that he has pain from his neck all the way down to his lower back.  Denies any numbness or tingling.  Does complain of occasional bilateral lower extremity cramps.  Denies any bowel or bladder dysfunction.    Essential hypertension  Chronic medical diagnosis.  Blood pressure today in the office is 126/66.  Currently taking amlodipine 5 mg daily.     Male sexual dysfunction  New medical problem.  This has been going on for approximately the last 6 months.  States that he is unable to have relations with his wife.  Last PSA checked in December 2020 was in normal range.  Denies any urinary complaints.        Current Outpatient Medications Ordered in Epic   Medication Sig Dispense Refill   • vitamin D, Ergocalciferol, (DRISDOL) 1.25 MG (80721 UT) Cap capsule Take 1 capsule by mouth every 7 days for 180 days. 12 capsule 1   • amLODIPine (NORVASC) 5 MG Tab Take 1 tablet by mouth every day. 100 tablet 3   • atorvastatin (LIPITOR) 10 MG Tab Take 1 tablet by mouth every morning. 100 tablet 3   • levetiracetam (KEPPRA) 500 MG TABLET SR 24 HR Take 4 Tabs by mouth every day. At bedtime. 120 Tab 11   • divalproex ER (DEPAKOTE ER) 500 MG  "TABLET SR 24 HR Take 2 Tabs by mouth every bedtime. 60 Tab 11   • ASPIRIN LOW DOSE 81 MG Chew Tab chewable tablet Chew 81 mg every morning.       No current [facility]-ordered facility-administered medications on file.       Past Medical History:   Diagnosis Date   • Hypercholesteremia    • Hypertension    • Seizure (HCC)        Social History     Tobacco Use   • Smoking status: Former Smoker     Types: Cigarettes     Quit date:      Years since quittin.5   • Smokeless tobacco: Never Used   Vaping Use   • Vaping Use: Never used   Substance Use Topics   • Alcohol use: No   • Drug use: No       Allergies:  Patient has no known allergies.      Objective:       Exam:  /66 (BP Location: Left arm, Patient Position: Sitting, BP Cuff Size: Adult)   Pulse 62   Temp 37.3 °C (99.2 °F) (Temporal)   Resp 18   Ht 1.626 m (5' 4\")   Wt 67.7 kg (149 lb 4.8 oz)   SpO2 95%   BMI 25.63 kg/m²   Body mass index is 25.63 kg/m².  Wt Readings from Last 4 Encounters:   21 67.7 kg (149 lb 4.8 oz)   21 71.2 kg (157 lb)   21 70.3 kg (155 lb)   21 71 kg (156 lb 8.4 oz)       Gen: Alert and oriented, No apparent distress. Appropriately groomed.  Neck: Neck is supple without lymphadenopathy.No thyromegaly. Full range of motion  Lungs: Normal effort, CTA bilaterally, no wheezes, rhonchi, or rales  CV: Regular rate and rhythm. No Lower extremity edema  Skin: No rash noted.  Neuro: bilateral intentional hand tremor, not present at rest.       Assessment & Plan:     76 y.o. male with the following -     1. Pharmacoresistant intractable focal epilepsy (HCC)  2. Tremor of both hands  -chronic issue.  Continue with current meds as prescribed by neurology.  Follow-up with Dr. Florence on .  Has not had a seizure since January when these medications were prescribed.  He does notice that over the last 6 months he has started having a bilateral hand tremor.  It is worse on his dominant, the right side.  Check labs " in office today.  .  Metabolic Panel; Future  - VITAMIN B12; Future  - VALPROIC ACID; Future    3. Neck pain  4.  Chronic bilateral low back pain without sciatica  Chronic issue going on for the last 2-3 years. Denies any hx injury or trauma.  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  Has never had any imaging done. Doesn't take any meds for this. dtr in law states that he may take an occasional naprosyn as needed.  - DX-CERVICAL SPINE-2 OR 3 VIEWS; Future  - DX-THORACOLUMBAR SPINE-2 VIEWS; Future    5. Elevated fasting glucose  -Chronic medical diagnosis.  Previous data labs from March she had fasting glucose at 103.    HEMOGLOBIN A1C; Future    6. Vitamin D deficiency  -Chronic medical diagnosis.  His previous vitamin D level from March was low at 16.  He has been taking vitamin D 50,000 weekly.    VITAMIN D,25 HYDROXY; Future    7. Dyslipidemia  Chronic medical diagnosis.  Currently taking Lipitor 10 mg daily.  His previous labs from January had total cholesterol 121, triglycerides 250, HDL 30, and LDL at 41  - Lipid Profile; Future    8. Essential hypertension  Chronic medical diagnosis.  Stable.  Continue with amlodipine 5 mg daily.    9. Male sexual dysfunction  New medical problem going on for the last 6 months.  PSA levels from December 2020 were in normal range at 3.34.  Denies any urinary symptoms.  Discussed with him that we will start up by checking labs and then can discuss further at his next appointment with me in 6 weeks      I spent a total of 47 minutes with record review, exam, communication with the patient, and documentation of this encounter.      Return in about 6 weeks (around 8/30/2021).    Please note that this dictation was created using voice recognition software. I have made every reasonable attempt to correct obvious errors, but I expect that there are errors of grammar and possibly content that I did not discover before finalizing the note.

## 2021-07-19 NOTE — ASSESSMENT & PLAN NOTE
Chronic issue. Going on for the last 3 years.  States that he has pain from his neck all the way down to his lower back.  Denies any numbness or tingling.  Does complain of occasional bilateral lower extremity cramps.  Denies any bowel or bladder dysfunction.

## 2021-07-20 LAB
25(OH)D3 SERPL-MCNC: 38 NG/ML (ref 30–100)
ALBUMIN SERPL BCP-MCNC: 4.1 G/DL (ref 3.2–4.9)
ALBUMIN/GLOB SERPL: 1.5 G/DL
ALP SERPL-CCNC: 73 U/L (ref 30–99)
ALT SERPL-CCNC: 49 U/L (ref 2–50)
ANION GAP SERPL CALC-SCNC: 11 MMOL/L (ref 7–16)
AST SERPL-CCNC: 53 U/L (ref 12–45)
BILIRUB SERPL-MCNC: 0.5 MG/DL (ref 0.1–1.5)
BUN SERPL-MCNC: 15 MG/DL (ref 8–22)
CALCIUM SERPL-MCNC: 10 MG/DL (ref 8.5–10.5)
CHLORIDE SERPL-SCNC: 107 MMOL/L (ref 96–112)
CO2 SERPL-SCNC: 24 MMOL/L (ref 20–33)
CREAT SERPL-MCNC: 0.75 MG/DL (ref 0.5–1.4)
EST. AVERAGE GLUCOSE BLD GHB EST-MCNC: 100 MG/DL
GLOBULIN SER CALC-MCNC: 2.8 G/DL (ref 1.9–3.5)
GLUCOSE SERPL-MCNC: 82 MG/DL (ref 65–99)
HBA1C MFR BLD: 5.1 % (ref 4–5.6)
POTASSIUM SERPL-SCNC: 4.1 MMOL/L (ref 3.6–5.5)
PROT SERPL-MCNC: 6.9 G/DL (ref 6–8.2)
SODIUM SERPL-SCNC: 142 MMOL/L (ref 135–145)
VIT B12 SERPL-MCNC: 1182 PG/ML (ref 211–911)

## 2021-07-20 NOTE — RESULT ENCOUNTER NOTE
Spoke to pt.s wife. She will inform her . Wife is very appreciative and happy with the services provided by Dr. Whitfield

## 2021-07-21 ENCOUNTER — TELEPHONE (OUTPATIENT)
Dept: MEDICAL GROUP | Facility: PHYSICIAN GROUP | Age: 76
End: 2021-07-21

## 2021-07-21 NOTE — TELEPHONE ENCOUNTER
VOICEMAIL  1. Caller Name: Edilia                      Call Back Number: [phone]    2. Message: Edilia called requesting a recollect on Mj Cornejo: [phone].    3. Patient approves office to leave a detailed voicemail/MyChart message: yes

## 2021-07-22 ENCOUNTER — TELEPHONE (OUTPATIENT)
Dept: NEUROLOGY | Facility: MEDICAL CENTER | Age: 76
End: 2021-07-22

## 2021-07-22 ENCOUNTER — OFFICE VISIT (OUTPATIENT)
Dept: NEUROLOGY | Facility: MEDICAL CENTER | Age: 76
End: 2021-07-22
Attending: PSYCHIATRY & NEUROLOGY
Payer: MEDICARE

## 2021-07-22 VITALS
RESPIRATION RATE: 14 BRPM | SYSTOLIC BLOOD PRESSURE: 128 MMHG | HEART RATE: 35 BPM | DIASTOLIC BLOOD PRESSURE: 58 MMHG | WEIGHT: 149.47 LBS | TEMPERATURE: 97.4 F | BODY MASS INDEX: 25.66 KG/M2 | OXYGEN SATURATION: 94 %

## 2021-07-22 DIAGNOSIS — G40.109 LOCALIZATION-RELATED EPILEPSY (HCC): ICD-10-CM

## 2021-07-22 DIAGNOSIS — R25.1 TREMORS OF NERVOUS SYSTEM: ICD-10-CM

## 2021-07-22 DIAGNOSIS — R79.89 ABNORMAL LFTS: ICD-10-CM

## 2021-07-22 DIAGNOSIS — Z02.4 ENCOUNTER FOR EXAMINATION FOR DRIVING LICENSE: ICD-10-CM

## 2021-07-22 DIAGNOSIS — H51.0 PALSY OF CONJUGATE GAZE: ICD-10-CM

## 2021-07-22 DIAGNOSIS — G40.119 PHARMACORESISTANT INTRACTABLE FOCAL EPILEPSY (HCC): ICD-10-CM

## 2021-07-22 DIAGNOSIS — T45.515D ADVERSE EFFECT OF ANTICOAGULANT, SUBSEQUENT ENCOUNTER: ICD-10-CM

## 2021-07-22 DIAGNOSIS — D75.89 MACROCYTOSIS WITHOUT ANEMIA: ICD-10-CM

## 2021-07-22 DIAGNOSIS — E55.9 VITAMIN D DEFICIENCY: ICD-10-CM

## 2021-07-22 DIAGNOSIS — I49.9 CARDIAC ARRHYTHMIA, UNSPECIFIED CARDIAC ARRHYTHMIA TYPE: ICD-10-CM

## 2021-07-22 DIAGNOSIS — Z13.31 SCREENING FOR DEPRESSION: ICD-10-CM

## 2021-07-22 DIAGNOSIS — H02.403 PTOSIS, BILATERAL: ICD-10-CM

## 2021-07-22 PROCEDURE — 99215 OFFICE O/P EST HI 40 MIN: CPT | Performed by: PSYCHIATRY & NEUROLOGY

## 2021-07-22 PROCEDURE — 99212 OFFICE O/P EST SF 10 MIN: CPT | Performed by: PSYCHIATRY & NEUROLOGY

## 2021-07-22 RX ORDER — ERGOCALCIFEROL 1.25 MG/1
50000 CAPSULE ORAL
Qty: 12 CAPSULE | Refills: 1 | Status: SHIPPED | OUTPATIENT
Start: 2021-07-22 | End: 2021-10-21 | Stop reason: SDUPTHER

## 2021-07-22 RX ORDER — DIVALPROEX SODIUM 500 MG/1
1000 TABLET, EXTENDED RELEASE ORAL
Qty: 60 TABLET | Refills: 11 | Status: SHIPPED | OUTPATIENT
Start: 2021-07-22 | End: 2022-04-21 | Stop reason: SDUPTHER

## 2021-07-22 RX ORDER — LEVETIRACETAM 500 MG/1
2000 TABLET, FILM COATED, EXTENDED RELEASE ORAL DAILY
Qty: 120 TABLET | Refills: 11 | Status: SHIPPED | OUTPATIENT
Start: 2021-07-22 | End: 2021-10-21 | Stop reason: SDUPTHER

## 2021-07-22 ASSESSMENT — FIBROSIS 4 INDEX: FIB4 SCORE: 3.13

## 2021-07-22 NOTE — TELEPHONE ENCOUNTER
I spoke with his Daughter, let her know that you placed a referral to go see Dr Crum and that shes okay to call the [phone] to schedule an apt, also let her know that he has labs that need to be done within a month for you.     She said thank you for your help!

## 2021-07-22 NOTE — PROGRESS NOTES
Chief Complaint   Patient presents with   • Follow-Up     Pharmacoresistant intractable focal epilepsy        Problem List Items Addressed This Visit     Pharmacoresistant intractable focal epilepsy (HCC)    Relevant Medications    levetiracetam (KEPPRA) 500 MG TABLET SR 24 HR    divalproex ER (DEPAKOTE ER) 500 MG TABLET SR 24 HR    Vitamin D deficiency    Relevant Medications    vitamin D, Ergocalciferol, (DRISDOL) 1.25 MG (29603 UT) Cap capsule      Other Visit Diagnoses     Localization-related epilepsy (HCC)        Relevant Medications    levetiracetam (KEPPRA) 500 MG TABLET SR 24 HR    divalproex ER (DEPAKOTE ER) 500 MG TABLET SR 24 HR    Macrocytosis without anemia        Screening for depression        Adverse effect of anticoagulant, subsequent encounter        Cardiac arrhythmia, unspecified cardiac arrhythmia type        Encounter for examination for driving license        Ptosis, bilateral              History of present illness:  Mj Cornejo returns in  in the company of his daughter in law. No seizures. Tolerating both Keppra and Depakote well, but c/o mild tremor on the right hand with action, but does not interfere with any activities so far. He started driving, and denies any issues, indicates that he is a safe . He is fully compliant with his seizure medications and indicates that he does not skip any doses. He denies headaches. He suffers from chronic ptosis of bilateral eyes, this does not fluctuate throughout the day, he denies any episodes of diplopia, trouble swallowing or generalized weakness. 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 He denies shoulder or thigh weakness, and has no trouble getting up from a seated position. He was seen by cardiology, had a Holter monitor, and was told that everything  looked fine, he denies any complaints at this point. He has no respiratory symptoms and no fevers.        Past medical history:   Past Medical History:   Diagnosis Date   • Hypercholesteremia    • Hypertension    • Seizure (HCC)        Past surgical history:   History reviewed. No pertinent surgical history.    Family history:   Family History   Problem Relation Age of Onset   • Stroke Mother        Social history:   Social History     Socioeconomic History   • Marital status:      Spouse name: Not on file   • Number of children: Not on file   • Years of education: Not on file   • Highest education level: Not on file   Occupational History   • Not on file   Tobacco Use   • Smoking status: Former Smoker     Types: Cigarettes     Quit date:      Years since quittin.5   • Smokeless tobacco: Never Used   Vaping Use   • Vaping Use: Never used   Substance and Sexual Activity   • Alcohol use: No   • Drug use: No   • Sexual activity: Not on file     Comment: , lives with wife and son, 2 sons   Other Topics Concern   • Not on file   Social History Narrative   • Not on file     Social Determinants of Health     Financial Resource Strain: Medium Risk   • Difficulty of Paying Living Expenses: Somewhat hard   Food Insecurity: No Food Insecurity   • Worried About Running Out of Food in the Last Year: Never true   • Ran Out of Food in the Last Year: Never true   Transportation Needs: No Transportation Needs   • Lack of Transportation (Medical): No   • Lack of Transportation (Non-Medical): No   Physical Activity:    • Days of Exercise per Week:    • Minutes of Exercise per Session:    Stress:    • Feeling of Stress :    Social Connections:    • Frequency of Communication with Friends and Family:    • Frequency of Social Gatherings with Friends and Family:    • Attends [employer] Services:    • Active Member of Clubs or Organizations:    • Attends Club or Organization Meetings:    • Marital Status:    Intimate  Partner Violence:    • Fear of Current or Ex-Partner:    • Emotionally Abused:    • Physically Abused:    • Sexually Abused:        Current medications:   Current Outpatient Medications   Medication   • vitamin D, Ergocalciferol, (DRISDOL) 1.25 MG (52263 UT) Cap capsule   • levetiracetam (KEPPRA) 500 MG TABLET SR 24 HR   • divalproex ER (DEPAKOTE ER) 500 MG TABLET SR 24 HR   • amLODIPine (NORVASC) 5 MG Tab   • atorvastatin (LIPITOR) 10 MG Tab   • ASPIRIN LOW DOSE 81 MG Chew Tab chewable tablet     No current facility-administered medications for this visit.       Medication Allergy:  No Known Allergies      Review of systems:   As reviewed in today's history.       Physical examination:   Vitals:    07/22/21 1002   BP: 128/58   BP Location: Left arm   Patient Position: Sitting   BP Cuff Size: Adult   Pulse: (!) 35   Resp: 14   Temp: 36.3 °C (97.4 °F)   TempSrc: Temporal   SpO2: 94%   Weight: 67.8 kg (149 lb 7.6 oz)     General: Patient in no acute distress, pleasant and cooperative.  HEENT: Normocephalic, no signs of acute trauma.   Neck: supple, no meningeal signs or carotid bruits. There is normal range of motion. No tenderness on exam.   Chest: clear to auscultation. No cough.   CV: RRR, no murmurs.   Skin: no signs of acute rashes or trauma.   Musculoskeletal: joints exhibit full range of motion, without any pain to palpation. There are no signs of joint or muscle swelling. There is no tenderness to deep palpation of muscles.   Psychiatric: No hallucinatory behavior. Denies symptoms of depression or suicidal ideation. Mood and affect appear normal on exam.     NEUROLOGICAL EXAM:   Mental status, orientation: Awake, alert and fully oriented.   Speech and language: speech is clear and fluent. The patient is able to name, repeat and comprehend.   Memory: There is intact recollection of recent and remote events.   Cranial nerve exam: Pupils are 3-4 mm bilaterally and equally reactive to light and accommodation.  Visual fields are intact by confrontation. There is no nystagmus on primary or secondary gaze. He has deficits in upgaze. He also has bilateral ptosis. There are no elements of fatigability on exam. Face appears otherwise symmetric. Sensation in the face is intact to light touch. Uvula is midline. Palate elevates symmetrically. Tongue is midline and without any signs of tongue biting or fasciculations.Sternocleidomastoid muscles exhibit is normal strength bilaterally. Shoulder shrug is intact bilaterally.   Motor exam: Strength is 5/5 in all extremities. Tone is normal. No abnormal movements were seen on exam.   Sensory exam reveals normal sense of light touch, proprioception, vibration and pinprick in all extremities.   Deep tendon reflexes:  2+ throughout, with absent ankle jerks. Plantar responses are flexor. There is no clonus.   Coordination: shows a normal finger-nose-finger. Normal rapidly alternating movements.   Gait: The patient was able to get up from seated position on first attempt without requiring assistance. Found to be steady when walking. Movements were fluid with normal arm swing. The patient was able to turn without difficulties or tendency to fall.         ANCILLARY DATA REVIEWED:       Lab Data Review:  Reviewed in chart. Mildly elevated AST. History of hypothyroidism, corrected.    Records reviewed:   Chart reviewed.    Imaging:   MRI brain without, 1/27/2021:  1.  Small chronic area of hemorrhage in the posterior right parietal lobe. Surrounding this area is cortical hyperintensity on T2 and DWI images which is likely related to seizure activity given the history.  2.  Age-related cerebral atrophy and chronic microvascular ischemic type changes.  3.  No acute intracranial hemorrhage.     CT brain without, 1/25/2021:  1.  No evidence of acute hemorrhage or mass  2.  Redemonstrated RIGHT parietal calcification        EEG:  Continuous video EEG, 1/26/2021:  INTERPRETATION:   This is an abnormal  continuous video electroencephalogram   recording in the awake, drowsy and sleep state.  Over 100 focal   right posterior quadrant seizures were captured during the study.   The seizures were non convulsive and often exhibited head   deviation to the left and/or mild confusion or decreased   responsiveness. No significant improvement with addition of   Levetiracetam, as frequent seizures were still noted overnight.     The findings suggest an underlying area of increased cortical   irritability and structural abnormality. Clinical and   radiological correlation is recommended.   Of note, a sinus arrhythmia was noted on the ekg lead.      24-hour ambulatory EEG, 3/2/2021:  INTERPRETATION:   This is an abnormal 24 hours ambulatory electroencephalogram   recording in the awake, drowsy and sleep state. Rare left   temporal sharps were noted, with phase reversal and maximal   amplitude at T3.  The patient is known to have had documented EEG   seizures on the right posterior quadrant in past EEGs, however   none were captured during this study.  The findings suggest   underlying area of increased cortical irritability.  No events   were reported during the study. Clinical and radiological   correlation is recommended.   EKG: sampling review of EKG recording demonstrated a sinus arhythmia.               ASSESSMENT AND PLAN:  1. Pharmacoresistant intractable focal epilepsy (HCC)    2. Vitamin D deficiency  - vitamin D, Ergocalciferol, (DRISDOL) 1.25 MG (30111 UT) Cap capsule; Take 1 capsule by mouth every 7 days for 180 days.  Dispense: 12 capsule; Refill: 1    3. Localization-related epilepsy (HCC)  - levetiracetam (KEPPRA) 500 MG TABLET SR 24 HR; Take 4 Tablets by mouth every day. At bedtime.  Dispense: 120 tablet; Refill: 11  - divalproex ER (DEPAKOTE ER) 500 MG TABLET SR 24 HR; Take 2 Tablets by mouth at bedtime.  Dispense: 60 tablet; Refill: 11    4. Macrocytosis without anemia    5. Screening for depression    6.  Adverse effect of anticoagulant, subsequent encounter    7. Cardiac arrhythmia, unspecified cardiac arrhythmia type    8. Encounter for examination for driving license    9. Ptosis, bilateral       CLINICAL DISCUSSION:   Focal onset, suspected pharmaco-resistant epilepsy.  Epilepsy to believed to be structural in nature, likely related to abnormality on imaging on the right occipital region.  I personally reviewed images of MRI of the brain and CT of the brain recently obtained in the hospital admission, and compared them to prior studies that were done on this patient.  The findings appear to be chronic and stable.  Overwhelmingly I believe that this was a calcification rather than a bleed.  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  The patient and family were educated on the process.  The patient had been seen by neurology in the past, but unfortunately was lost to follow-up.  He was previously on a combination of carbamazepine and oxcarbazepine, however presented in status epilepticus to [facility].  He had several back-to-back generalized tonic-clonic seizures, and then frequent focal seizures were captured in the right posterior quadrant, with intermittent episodes of focal right posterior status epilepticus.  The patient was ultimately switched from oxcarbazepine and carbamazepine to levetiracetam and valproic acid.    When I saw him, he was on levetiracetam at 1000 mg twice a day, and valproic acid 500 mg twice a day, but was experiencing side effects, particularly daytime drowsiness related to am dose.  The medications were switched over to an ER formulation, and takes them at bedtime, previously reported side effects are now resolved.     Current AED regimen:  Levetiracetam ER 2000 mg p.o. nightly  Valproic acid ER 1000 mg p.o.  nightly     Valproic acid was therapeutic last time checked.     Macrocytosis without anemia.  Mildly elevated transamise. Denies recent alcohol consumption.  Denies chronic alcohol use.  This may be related to Depakote? Will repeat CMP.      Most recent ambulatory EEG did not show any seizures, and significant improvement from prior EEGs.  Incidentally contralateral sharps were noted on EEG.     He is driving, and denies any issues.  Patient agrees to stop driving immediately, should he have any episodes of confusion and or seizure activity.       Compliance was discussed extensively.       Patient has authorized his son and daughter-in-law to have access to his health information, and to be able to make appointments on his behalf.     TSH was elevated, following with PCP, now normal.       Vitamin D deficiency, continue with supplementation.     Incidental, mild cardiac arrhythmia was captured on ambulatory EEG.  pt seen by cardiology, per pt/family no concern.     Chronic bilateral ptosis and upgaze deficit. No fatigability on exam.  Denies any other elements that may suggest MG. States findings are chronic for many years and do not fluctuate throughout the day, partial serology was negative (Ach blocking ab and anti musk ab negative). Will refer to neuro ophthalmology.     New onset of muscle pain in the bilateral shoulder blade regions and posterior thighs, he believed this to be related to new job as a , he is thinking about retiring. No weakness. Refused blood work: ESR, CK. If pain continues, he will let us know.         FOLLOW-UP:   Return in about 3 months (around 10/22/2021).      EDUCATION AND COUNSELING:  -Education was provided to the patient and/or family regarding diagnosis and prognosis. The chronic and unpredictable nature of the condition were discussed. There is increased risk for additional events, which may carry potential for significant injuries and death. Discussed frequent seizure  triggers: sleep deprivation, medication non-compliance, use of illegal drugs/alcohol, stress, and others.   -We reviewed in detail the current antiepileptic regimen. Potential side effects of antiepileptics were discussed at length, including but no limited to: hypersensitivity reactions (rash and others, some of which can be fatal), visual field changes (some of which may be irreversible), glaucoma, diplopia, kidney stones, osteopenia/osteoporosis/bone fractures, hyperthermia/anhydrosis, hyponatremia, tremors/abnormal movements, ataxia, dizziness, fatigue, increased risk for falls, risk for cardiac arrhythmias and syncope, weight changes, hair loss, gastrointestinal side effects(hepatitis, pancreatitis, gastritis, ulcers, gingival hypertrophy/bleeding, drowsiness, sedation, memory loss, trouble finding words, anxiety/nervousness, increased risk for suicide, increased risk for depression, and psychosis.   -We also reviewed drug-drug interactions and their potential effect on seizure control and medication side effects.    -Recommend chronic vitamin D supplementation and regular exercise (if not contraindicated).   -Patient/family educated on risk for SUDEP (Sudden Death in Epilepsy). Counseling was provided on the importance of strict medication and follow up compliance. The patient/family understand the risks associated with non-adherence with the medical plan as outlined, including but not limited to an increased risk for breakthrough seizures, which may contribute to injuries, disability, status epilepticus, and even death.   -Counseling was also provided on potential effects of alcohol and other drugs, which may lower seizure threshold and/or affect the metabolism of antiepileptic drugs. We recommend avoidance of alcohol and illegal drugs. Denies use.  -Avoid sleep deprivation.   -We extensively discussed the aspects related to safety in drivers who suffer from epilepsy. The patient is encourage to report to the  Division of Motor Vehicles of any condition and/or spells related to confusion, disorientation, and/or loss of awareness and/or loss of consciousness; as these may pose a safety issue if they occur while operating a motor vehicle. The patient and/or family are ultimately responsible for exercising caution and abiding to regulations in place. The patient understands that only the Department of Motor Vehicles (DMV) is able to authorize an individual to drive, even after medical clearance, DMV clearance must be obtained.   -Other seizure precautions were discussed at length, including no diving, no skydiving, no climbing or exposure to unprotected heights, no unsupervised swimming, no Jacuzzi or bathing in bathtubs or deep bodies of water. The patient/family have been advised about risks for operating any machinery while suffering from seizures / syncope / epilepsy and/or while taking antiepileptic drugs.   -The patient understands and agrees that due to the complexity of his/her diagnosis, results of any testing and further recommendations will typically be discussed/made during a face to face encounter in my office. The patient and/or family further understands it is their responsibility to keep proper follow up.     Patient/family agree with plan, as outlined.         Morris Florence MD   Epilepsy and Neurodiagnostics.   Clinical  of Neurology [facility].   Diplomate in Neurology, Epilepsy, and Electrodiagnostic Medicine.   Office: [phone]  Fax: [phone]      BILLING DOCUMENTATION:     I have performed physical exam and reviewed and updated ROS and plan today 7/21/2021. In review of that note, there are no new changes except as documented above.     Counseling:  I spent greater than 50% time face-to-face time of a total of 50 minutes visit. Over 50% of the time of the visit today was spent on counseling and or coordination of care [facility] the  patient and/or family, with greater than 50% of the total discussing my assessment and plan as stated above.

## 2021-08-30 ENCOUNTER — TELEPHONE (OUTPATIENT)
Dept: MEDICAL GROUP | Facility: PHYSICIAN GROUP | Age: 76
End: 2021-08-30

## 2021-08-30 NOTE — TELEPHONE ENCOUNTER
ESTABLISHED PATIENT PRE-VISIT PLANNING     Patient was NOT contacted to complete PVP.     Note: Patient will not be contacted if there is no indication to call.     1.  Reviewed notes from the last few office visits within the medical group: Yes    2.  If any orders were placed at last visit or intended to be done for this visit (i.e. 6 mos follow-up), do we have Results/Consult Notes?         •  Labs - Labs ordered, NOT completed. Patient advised to complete prior to next appointment.  Note: If patient appointment is for lab review and patient did not complete labs, check with provider if OK to reschedule patient until labs completed.       •  Imaging - Imaging ordered, NOT completed. Patient advised to complete prior to next appointment. Pt has upcoming appt on Sept 14, 2021        •  Referrals - No referrals were ordered at last office visit.    3. Is this appointment scheduled as a Hospital Follow-Up? No    4.  Immunizations were updated in Epic using Reconcile Outside Information activity? Yes    5.  Patient is due for the following Health Maintenance Topics:   Health Maintenance Due   Topic Date Due   • IMM DTaP/Tdap/Td Vaccine (1 - Tdap) Never done   • IMM ZOSTER VACCINES (1 of 2) Never done   • IMM PNEUMOCOCCAL VACCINE: 65+ Years (1 of 1 - PPSV23) Never done   • IMM INFLUENZA (1) 09/01/2021       6.  AHA (Pulse8) form printed for Provider? No, already completed

## 2021-08-31 ENCOUNTER — OFFICE VISIT (OUTPATIENT)
Dept: MEDICAL GROUP | Facility: PHYSICIAN GROUP | Age: 76
End: 2021-08-31
Payer: MEDICARE

## 2021-08-31 VITALS
SYSTOLIC BLOOD PRESSURE: 104 MMHG | HEART RATE: 68 BPM | OXYGEN SATURATION: 97 % | HEIGHT: 64 IN | BODY MASS INDEX: 24.7 KG/M2 | TEMPERATURE: 98.3 F | WEIGHT: 144.7 LBS | RESPIRATION RATE: 14 BRPM | DIASTOLIC BLOOD PRESSURE: 58 MMHG

## 2021-08-31 DIAGNOSIS — M54.50 CHRONIC BILATERAL LOW BACK PAIN WITHOUT SCIATICA: ICD-10-CM

## 2021-08-31 DIAGNOSIS — G40.119 PHARMACORESISTANT INTRACTABLE FOCAL EPILEPSY (HCC): ICD-10-CM

## 2021-08-31 DIAGNOSIS — G89.29 CHRONIC BILATERAL LOW BACK PAIN WITHOUT SCIATICA: ICD-10-CM

## 2021-08-31 DIAGNOSIS — I10 ESSENTIAL HYPERTENSION: ICD-10-CM

## 2021-08-31 PROCEDURE — 99214 OFFICE O/P EST MOD 30 MIN: CPT | Performed by: FAMILY MEDICINE

## 2021-08-31 ASSESSMENT — FIBROSIS 4 INDEX: FIB4 SCORE: 3.13

## 2021-08-31 NOTE — PROGRESS NOTES
CC:   Chief Complaint   Patient presents with   • Follow-Up     6 weeks.          HPI:   Mj presents today with his grandson for a follow-up visit.  He was recently seen by neurology, Dr. Florence.  His consultation notes were reviewed.  Recommendations were to continue with current meds and to follow-up with him in 3 months. States that his Last seizure was in January when he was off his meds.       Essential hypertension  Chronic.  bp today is 104/58. Continues to take amlodipine 5mg daily.  Denies any headaches or chest pain.    Chronic bilateral low back pain without sciatica  Chronic medical problem.  Continues to have right lower back pain which radiates down to the right leg.  Going on for the last 2 years. He .has this every time he is at his job [place].  States that his symptoms do improve when he is not working and resting.  Does not like to take any medications and hence has not tried any NSAIDs or Tylenol for the pain.  He does not have any symptoms on the left side.  At his previous visit orders for x-rays were given to the patient.  He has an appointment to get these completed on September 14.  Denies any urinary or bowel complaints.      Current Outpatient Medications Ordered in Epic   Medication Sig Dispense Refill   • vitamin D, Ergocalciferol, (DRISDOL) 1.25 MG (71747 UT) Cap capsule Take 1 capsule by mouth every 7 days for 180 days. 12 capsule 1   • levetiracetam (KEPPRA) 500 MG TABLET SR 24 HR Take 4 Tablets by mouth every day. At bedtime. 120 tablet 11   • divalproex ER (DEPAKOTE ER) 500 MG TABLET SR 24 HR Take 2 Tablets by mouth at bedtime. 60 tablet 11   • amLODIPine (NORVASC) 5 MG Tab Take 1 tablet by mouth every day. 100 tablet 3   • atorvastatin (LIPITOR) 10 MG Tab Take 1 tablet by mouth every morning. 100 tablet 3   • ASPIRIN LOW DOSE 81 MG Chew Tab chewable tablet Chew 81 mg every morning.       No current [facility]-ordered facility-administered medications on file.       Past  "Medical History:   Diagnosis Date   • Hypercholesteremia    • Hypertension    • Seizure (HCC)        Social History     Tobacco Use   • Smoking status: Former Smoker     Types: Cigarettes     Quit date:      Years since quittin.6   • Smokeless tobacco: Never Used   Vaping Use   • Vaping Use: Never used   Substance Use Topics   • Alcohol use: No   • Drug use: No       Allergies:  Patient has no known allergies.    Health Maintenance: encouraged to receive flu vaccine when available.       Objective:       Exam:  /58 (BP Location: Left arm, Patient Position: Sitting, BP Cuff Size: Adult)   Pulse 68   Temp 36.8 °C (98.3 °F) (Temporal)   Resp 14   Ht 1.626 m (5' 4\")   Wt 65.6 kg (144 lb 11.2 oz)   SpO2 97%   BMI 24.84 kg/m²   Body mass index is 24.84 kg/m².  Wt Readings from Last 4 Encounters:   21 65.6 kg (144 lb 11.2 oz)   21 67.8 kg (149 lb 7.6 oz)   21 67.7 kg (149 lb 4.8 oz)   21 71.2 kg (157 lb)       Gen: Alert and oriented, No apparent distress. Appropriately groomed.  Neck: Neck is supple without lymphadenopathy.No thyromegaly.   Lungs: Normal effort, CTA bilaterally, no wheezes, rhonchi, or rales  CV: Regular rate and rhythm. No Lower extremity edema  Skin: No rash noted.      Assessment & Plan:     76 y.o. male with the following -     1. Essential hypertension  Chronic issue. Stable.  C/w amlodipine 5mg daily    2. Chronic bilateral low back pain without sciatica  Chronic issue. Symptoms remain the same and stable.  Continues to work and declining trying any OTC meds such as nsaids or tylenol.  Discussed with him referral to PT.  For now, he would like to complete his x-rays on the  and then proceed from there.    3. Epilepsy  Chronic. Stable. Has been seizure free since starting meds. Continue with depakote and keppra. F/u neurology in in October as scheduled..      Return in about 2 months (around 10/31/2021).    Please note that this dictation was created " using voice recognition software. I have made every reasonable attempt to correct obvious errors, but I expect that there are errors of grammar and possibly content that I did not discover before finalizing the note.

## 2021-09-01 PROBLEM — I15.9 SECONDARY HYPERTENSION: Status: RESOLVED | Noted: 2021-01-27 | Resolved: 2021-09-01

## 2021-09-01 NOTE — ASSESSMENT & PLAN NOTE
Chronic medical problem.  Continues to have right lower back pain which radiates down to the right leg.  Going on for the last 2 years. He .has this every time he is at his [place].  States that his symptoms do improve when he is not working and resting.  Does not like to take any medications and hence has not tried any NSAIDs or Tylenol for the pain.  He does not have any symptoms on the left side.  At his previous visit orders for x-rays were given to the patient.  He has an appointment to get these completed on September 14.  Denies any urinary or bowel complaints.  
Chronic.  bp today is 104/58. Continues to take amlodipine 5mg daily.  Denies any headaches or chest pain.  
Statement Selected

## 2021-09-14 ENCOUNTER — APPOINTMENT (OUTPATIENT)
Dept: RADIOLOGY | Facility: MEDICAL CENTER | Age: 76
End: 2021-09-14
Attending: FAMILY MEDICINE
Payer: MEDICARE

## 2021-10-21 ENCOUNTER — OFFICE VISIT (OUTPATIENT)
Dept: NEUROLOGY | Facility: MEDICAL CENTER | Age: 76
End: 2021-10-21
Attending: REGISTERED NURSE
Payer: MEDICARE

## 2021-10-21 VITALS
TEMPERATURE: 98.1 F | SYSTOLIC BLOOD PRESSURE: 110 MMHG | DIASTOLIC BLOOD PRESSURE: 78 MMHG | OXYGEN SATURATION: 96 % | HEART RATE: 70 BPM | BODY MASS INDEX: 24.84 KG/M2 | WEIGHT: 145.5 LBS | HEIGHT: 64 IN

## 2021-10-21 DIAGNOSIS — G40.109 LOCALIZATION-RELATED EPILEPSY (HCC): ICD-10-CM

## 2021-10-21 DIAGNOSIS — E55.9 VITAMIN D DEFICIENCY: ICD-10-CM

## 2021-10-21 DIAGNOSIS — G40.119 PHARMACORESISTANT INTRACTABLE FOCAL EPILEPSY (HCC): ICD-10-CM

## 2021-10-21 DIAGNOSIS — R25.1 TREMORS OF NERVOUS SYSTEM: ICD-10-CM

## 2021-10-21 DIAGNOSIS — H02.403 PTOSIS OF EYELID, BILATERAL: ICD-10-CM

## 2021-10-21 PROCEDURE — 99215 OFFICE O/P EST HI 40 MIN: CPT | Performed by: REGISTERED NURSE

## 2021-10-21 PROCEDURE — 99211 OFF/OP EST MAY X REQ PHY/QHP: CPT | Performed by: REGISTERED NURSE

## 2021-10-21 RX ORDER — LEVETIRACETAM 500 MG/1
2000 TABLET, FILM COATED, EXTENDED RELEASE ORAL DAILY
Qty: 120 TABLET | Refills: 11 | Status: SHIPPED | OUTPATIENT
Start: 2021-10-21 | End: 2022-04-21 | Stop reason: SDUPTHER

## 2021-10-21 RX ORDER — ERGOCALCIFEROL 1.25 MG/1
50000 CAPSULE ORAL
Qty: 12 CAPSULE | Refills: 4 | Status: SHIPPED | OUTPATIENT
Start: 2021-10-21 | End: 2022-04-19

## 2021-10-21 ASSESSMENT — FIBROSIS 4 INDEX: FIB4 SCORE: 3.13

## 2021-10-21 NOTE — PROGRESS NOTES
Chief Complaint   Patient presents with   • Follow-Up     epilepsy       Problem List Items Addressed This Visit     Pharmacoresistant intractable focal epilepsy (HCC)    Relevant Medications    levetiracetam (KEPPRA) 500 MG TABLET SR 24 HR    Other Relevant Orders    CBC WITH DIFFERENTIAL    Comp Metabolic Panel    VITAMIN D,25 HYDROXY    Vitamin D deficiency    Relevant Medications    vitamin D2, Ergocalciferol, (DRISDOL) 1.25 MG (97865 UT) Cap capsule    Other Relevant Orders    CBC WITH DIFFERENTIAL    Comp Metabolic Panel    VITAMIN D,25 HYDROXY      Other Visit Diagnoses     Tremors of nervous system        Relevant Orders    CBC WITH DIFFERENTIAL    Comp Metabolic Panel    VITAMIN D,25 HYDROXY    Localization-related epilepsy (HCC)        Relevant Medications    levetiracetam (KEPPRA) 500 MG TABLET SR 24 HR    Other Relevant Orders    CBC WITH DIFFERENTIAL    Comp Metabolic Panel    VITAMIN D,25 HYDROXY    Ptosis of eyelid, bilateral              History of present illness:  Mj Cornejo 76 y.o. male presents today for a history of seizures.  He was last seen 07/22/2021 with Dr. Florence.   At that time he complained of a mild tremor on the right hand.      He  Is accompanied by his daughter and presents today with the following:    No seizures since ~last February.  C/O of tremors to bilateral hands. with R>L.  Shaking is noticible when eating.      Currently doing well with current regimen:  Levetiracetam ER 2000 mg p.o. nightly  Valproic acid ER 1000 mg p.o. 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    He presents today with the following:    He is seizure free on current AED regimen.   C/O  tremors to hands with  R>L.  Shakes when eating    Seizure onset:  21 years ago    Seizure semiology:   shaking    Seizure types:  Pharmacoresistant intractable focal epilepsy    Last seizure: February 2021    Past AED’s:      Current AED regimen: Levetiracetam ER 2000 mg p.o. nightly  Valproic acid ER 1000 mg p.o. nightly        Prior EEG’s: 2021  INTERPRETATION:   This is an abnormal 24 hours ambulatory electroencephalogram   recording in the awake, drowsy and sleep state. Rare left   temporal sharps were noted, with phase reversal and maximal   amplitude at T3.  The patient is known to have had documented EEG   seizures on the right posterior quadrant in past EEGs, however   none were captured during this study.  The findings suggest   underlying area of increased cortical irritability.  No events   were reported during the study. Clinical and radiological   correlation is recommended.     Prior brain imagin2021  IMPRESSION:     1.  Small chronic area of hemorrhage in the posterior right parietal lobe. Surrounding this area is cortical hyperintensity on T2 and DWI images which is likely related to seizure activity given the history.  2.  Age-related cerebral atrophy and chronic microvascular ischemic type changes.  3.  No acute intracranial hemorrhage        Past medical history:   Past Medical History:   Diagnosis Date   • Hypercholesteremia    • Hypertension    • Seizure (HCC)        Past surgical history:   History reviewed. No pertinent surgical history.    Family history:   Family History   Problem Relation Age of Onset   • Stroke Mother        Social history:   Social History     Socioeconomic History   • Marital status:      Spouse name: Not on file   • Number of children: Not on file   • Years of education: Not on file   • Highest education level: Not on file   Occupational History   • Not on file   Tobacco Use   • Smoking status: Former Smoker     Types: Cigarettes     Quit date:      Years since quittin.8   • Smokeless tobacco: Never Used   Vaping Use   • Vaping Use: Never used   Substance and Sexual Activity   • Alcohol use: No   • Drug use: No   • Sexual activity: Not on file     Comment:  , lives with wife and son, 2 sons   Other Topics Concern   • Not on file   Social History Narrative   • Not on file     Social Determinants of Health     Financial Resource Strain: Medium Risk   • Difficulty of Paying Living Expenses: Somewhat hard   Food Insecurity: No Food Insecurity   • Worried About Running Out of Food in the Last Year: Never true   • Ran Out of Food in the Last Year: Never true   Transportation Needs: No Transportation Needs   • Lack of Transportation (Medical): No   • Lack of Transportation (Non-Medical): No   Physical Activity:    • Days of Exercise per Week:    • Minutes of Exercise per Session:    Stress:    • Feeling of Stress :    Social Connections:    • Frequency of Communication with Friends and Family:    • Frequency of Social Gatherings with Friends and Family:    • Attends [employer]ae[employer] Services:    • Active Member of Clubs or Organizations:    • Attends Club or Organization Meetings:    • Marital Status:    Intimate Partner Violence:    • Fear of Current or Ex-Partner:    • Emotionally Abused:    • Physically Abused:    • Sexually Abused:        Current medications:   Current Outpatient Medications   Medication   • levetiracetam (KEPPRA) 500 MG TABLET SR 24 HR   • vitamin D2, Ergocalciferol, (DRISDOL) 1.25 MG (98734 UT) Cap capsule   • divalproex ER (DEPAKOTE ER) 500 MG TABLET SR 24 HR   • amLODIPine (NORVASC) 5 MG Tab   • atorvastatin (LIPITOR) 10 MG Tab   • ASPIRIN LOW DOSE 81 MG Chew Tab chewable tablet     No current facility-administered medications for this visit.       Medication Allergy:  No Known Allergies      Review of systems:   Constitutional: denies fever, night sweats, weight loss, or malaise/fatigue.   Eyes: denies acute vision change, eye pain or secretion.   Ears, Nose, Mouth, Throat: denies nasal secretion, nasal bleeding, difficulty swallowing, hearing loss, tinnitus, vertigo, ear pain, oral ulcers or lesions.   Endocrine: denies recent weight changes, heat  "or cold intolerance, polyuria, polydypsia, polyphagia,abnormal hair growth.  Cardiovascular: denies new onset of chest pain, palpitations, syncope, lower extremity edema, or dyspnea of exertion.  Pulmonary: denies shortness of breath, new onset of cough, hemoptysis, wheezing, chest pain or flu-like symptoms.   GI: denies nausea, vomiting, diarrhea, GI bleeding, change in appetite, abdominal pain, and change in bowel habits.  : denies urinary incontinence, retention or hematuria.  Heme/oncology: denies history of easy bruising or bleeding. No history of cancer. Allergy/immunology: denies hives/urticarial.  Dermatologic: denies new rash.  Musculoskeletal:denies joint swelling or pain, muscle pain, neck and back pain. Neurologic: denies headaches, acute visual changes, facial droopiness, muscle weakness (focal or generalized), paresthesias, anesthesia, ataxia, change in speech or language, memory loss, abnormal movements, seizures, loss of consciousness, or episodes of confusion.   Psychiatric: denies symptoms of depression, anxiety, hallucinations, mood swings or changes, suicidal or homicidal thoughts.     Physical examination:   /78 (BP Location: Left arm, Patient Position: Sitting, BP Cuff Size: Adult)   Pulse 70   Temp 36.7 °C (98.1 °F) (Temporal)   Ht 1.626 m (5' 4\")   Wt 66 kg (145 lb 8.1 oz)   SpO2 96%   BMI 24.98 kg/m²     General: Patient in no acute distress, pleasant and cooperative.  HEENT: Normocephalic, no signs of acute trauma.   Neck: supple, no meningeal signs or carotid bruits. There is normal range of motion. No tenderness on exam.   Chest: clear to auscultation. No cough.   CV: RRR, no murmurs.   Abdomen: soft, non distended or tender.   Skin: no signs of acute rashes or trauma.   Musculoskeletal: joints exhibit full range of motion, without any pain to palpation. There are no signs of joint or muscle swelling. There is no tenderness to deep palpation of muscles.   Psychiatric: No " hallucinatory behavior. Denies symptoms of depression or suicidal ideation. Mood and affect appear normal on exam.     NEUROLOGICAL EXAM:   Mental status, orientation: Awake, alert and fully oriented.   Speech and language: speech is clear and fluent. The patient is able to name, repeat and comprehend.   Memory: There is intact recollection of recent and remote events.   Cranial nerve exam: Pupils are 3-4 mm bilaterally and equally reactive to light and accommodation. Visual fields are intact by confrontation. There is no nystagmus on primary or secondary gaze. Intact full EOM in all directions of gaze. Face appears symmetric. Sensation in the face is intact to light touch. Uvula is midline. Palate elevates symmetrically. Tongue is midline and without any signs of tongue biting or fasciculations.Sternocleidomastoid muscles exhibit is normal strength bilaterally. Shoulder shrug is intact bilaterally.   Motor exam: Strength is 5/5 in all extremities. Tone is normal. No abnormal movements were seen on exam.   Sensory exam reveals normal sense of light touch, proprioception, vibration and pinprick in all extremities.   Deep tendon reflexes:  1+ throughout. Plantar responses are flexor. There is no clonus.   Coordination: shows + dysmetria finger-nose-finger. Normal rapidly alternating movements.   Gait: The patient was able to get up from seated position on first attempt without requiring assistance. Found to be steady when walking. Movements were fluid with normal arm swing. The patient was able to turn without difficulties or tendency to fall. Romberg exam was negative.          ANCILLARY DATA REVIEWED:       Lab Data Review:  Results for PAYAL SIEGEL (MRN [id number]) as of 10/21/2021 07:52   Ref. Range 7/17/2021 07:03   WBC Latest Ref Range: 4.8 - 10.8 K/uL 7.5   RBC Latest Ref Range: 4.70 - 6.10 M/uL 5.19   Hemoglobin Latest Ref Range: 14.0 - 18.0 g/dL 17.4   Hematocrit Latest Ref Range: 42.0 - 52.0 % 53.4 (H)    MCV Latest Ref Range: 81.4 - 97.8 fL 102.9 (H)   MCH Latest Ref Range: 27.0 - 33.0 pg 33.5 (H)   MCHC Latest Ref Range: 33.7 - 35.3 g/dL 32.6 (L)   RDW Latest Ref Range: 35.9 - 50.0 fL 45.4   Platelet Count Latest Ref Range: 164 - 446 K/uL 184   MPV Latest Ref Range: 9.0 - 12.9 fL 12.6   Neutrophils-Polys Latest Ref Range: 44.00 - 72.00 % 50.20   Neutrophils (Absolute) Latest Ref Range: 1.82 - 7.42 K/uL 3.74   Lymphocytes Latest Ref Range: 22.00 - 41.00 % 38.70   Lymphs (Absolute) Latest Ref Range: 1.00 - 4.80 K/uL 2.88   Monocytes Latest Ref Range: 0.00 - 13.40 % 7.80   Monos (Absolute) Latest Ref Range: 0.00 - 0.85 K/uL 0.58   Eosinophils Latest Ref Range: 0.00 - 6.90 % 2.00   Eos (Absolute) Latest Ref Range: 0.00 - 0.51 K/uL 0.15   Basophils Latest Ref Range: 0.00 - 1.80 % 0.80   Baso (Absolute) Latest Ref Range: 0.00 - 0.12 K/uL 0.06   Immature Granulocytes Latest Ref Range: 0.00 - 0.90 % 0.50   Immature Granulocytes (abs) Latest Ref Range: 0.00 - 0.11 K/uL 0.04   Nucleated RBC Latest Units: /100 WBC 0.00   NRBC (Absolute) Latest Units: K/uL 0.00     Results for ROBBIN PAYAL NÉSTOR (MRN [id number]) as of 10/21/2021 07:52   Ref. Range 7/19/2021 13:28   Sodium Latest Ref Range: 135 - 145 mmol/L 142   Potassium Latest Ref Range: 3.6 - 5.5 mmol/L 4.1   Chloride Latest Ref Range: 96 - 112 mmol/L 107   Co2 Latest Ref Range: 20 - 33 mmol/L 24   Anion Gap Latest Ref Range: 7.0 - 16.0  11.0   Glucose Latest Ref Range: 65 - 99 mg/dL 82   Bun Latest Ref Range: 8 - 22 mg/dL 15   Creatinine Latest Ref Range: 0.50 - 1.40 mg/dL 0.75   GFR If  Latest Ref Range: >60 mL/min/1.73 m 2 >60   GFR If Non  Latest Ref Range: >60 mL/min/1.73 m 2 >60   Calcium Latest Ref Range: 8.5 - 10.5 mg/dL 10.0   AST(SGOT) Latest Ref Range: 12 - 45 U/L 53 (H)   ALT(SGPT) Latest Ref Range: 2 - 50 U/L 49   Alkaline Phosphatase Latest Ref Range: 30 - 99 U/L 73   Total Bilirubin Latest Ref Range: 0.1 - 1.5 mg/dL 0.5   Albumin  Latest Ref Range: 3.2 - 4.9 g/dL 4.1   Total Protein Latest Ref Range: 6.0 - 8.2 g/dL 6.9   Globulin Latest Ref Range: 1.9 - 3.5 g/dL 2.8   A-G Ratio Latest Units: g/dL 1.5   Results for PAYAL SIEGEL (MRN [id number]) as of 10/21/2021 07:52   Ref. Range 2021 13:28   25-Hydroxy   Vitamin D 25 Latest Ref Range: 30 - 100 ng/mL 38   Vitamin B12 -True Cobalamin Latest Ref Range: 211 - 911 pg/mL 1182 (H)       Records reviewed:       Imagin2021  IMPRESSION:     1.  Small chronic area of hemorrhage in the posterior right parietal lobe. Surrounding this area is cortical hyperintensity on T2 and DWI images which is likely related to seizure activity given the history.  2.  Age-related cerebral atrophy and chronic microvascular ischemic type changes.  3.  No acute intracranial hemorrhage.      EE2021  INTERPRETATION:   This is an abnormal 24 hours ambulatory electroencephalogram   recording in the awake, drowsy and sleep state. Rare left   temporal sharps were noted, with phase reversal and maximal   amplitude at T3.  The patient is known to have had documented EEG   seizures on the right posterior quadrant in past EEGs, however   none were captured during this study.  The findings suggest   underlying area of increased cortical irritability.  No events   were reported during the study. Clinical and radiological   correlation is recommended.       ASSESSMENT AND PLAN:  Payal Siegel is a 76 year old male with a history of seizures, bilateral ptosis, and tremor.  He has been seizure free since February per daughter on current regimen.  Here today with complaints of tremor to bilateral hands with the right greater than the left.  Tremor is more noticeable when eating.  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  He does not elicit rigidity or stooped posture.        1. Pharmacoresistant intractable focal epilepsy (HCC)    - levetiracetam (KEPPRA) 500 MG TABLET SR 24 HR;  Take 4 Tablets by mouth every day. At bedtime.  Dispense: 120 Tablet; Refill: 11  - CBC WITH DIFFERENTIAL; Future  - Comp Metabolic Panel; Future  - VITAMIN D,25 HYDROXY; Future    2. Vitamin D deficiency    - vitamin D2, Ergocalciferol, (DRISDOL) 1.25 MG (96159 UT) Cap capsule; Take 1 Capsule by mouth every 7 days for 180 days.  Dispense: 12 Capsule; Refill: 4  - CBC WITH DIFFERENTIAL; Future  - Comp Metabolic Panel; Future  - VITAMIN D,25 HYDROXY; Future    3. Tremors of nervous system    - CBC WITH DIFFERENTIAL; Future  - Comp Metabolic Panel; Future  - VITAMIN D,25 HYDROXY; Future    4. Localization-related epilepsy (HCC)    - levetiracetam (KEPPRA) 500 MG TABLET SR 24 HR; Take 4 Tablets by mouth every day. At bedtime.  Dispense: 120 Tablet; Refill: 11  - CBC WITH DIFFERENTIAL; Future  - Comp Metabolic Panel; Future  - VITAMIN D,25 HYDROXY; Future    5. Ptosis of eyelid, bilateral  Previous referral to Neuropthalmology-daughter to follow-up with appt.         FOLLOW-UP:   Six months     My total time spent caring for the patient on the day of the encounter was 40 minutes.     This does not include time spent on separately billable procedures/tests.        EDUCATION AND COUNSELING:  -Education was provided to the patient and/or family regarding diagnosis and prognosis. The chronic and unpredictable nature of the condition were discussed. There is increased risk for additional events, which may carry potential for significant injuries and death. Discussed frequent seizure triggers: sleep deprivation, medication non-compliance, use of illegal drugs/alcohol, stress, and others.   -We reviewed in detail the current antiepileptic regimen. Potential side effects of antiepileptics were discussed at length, including but no limited to: hypersensitivity reactions (rash and others, some of which can be fatal), visual field changes (some of which may be irreversible), glaucoma, diplopia, kidney stones,  osteopenia/osteoporosis/bone fractures, hyperthermia/anhydrosis, hyponatremia, tremors/abnormal movements, ataxia, dizziness, fatigue, increased risk for falls, risk for cardiac arrhythmias and syncope, weight changes, hair loss, gastrointestinal side effects(hepatitis, pancreatitis, gastritis, ulcers, gingival hypertrophy/bleeding, drowsiness, sedation, memory loss, trouble finding words, anxiety/nervousness, increased risk for suicide, increased risk for depression, and psychosis.   -We also reviewed drug-drug interactions and their potential effect on seizure control and medication side effects.    -We also discussed in detail potential effects of seizures, epilepsy, and medications during pregnancy, including but not limited to fetal malformations, child developmental/intellectual disability, fetal/ risk for hemorrhages, stillbirth, maternal death, premature birth, and others. The patient/family aware that pregnancy should be avoided, unless desired, in which case we recommend discussing with us at least a year prior to planned conception. To avoid undesired pregnancy while on antiepileptics, we recommend dual contraception.   -Folic acid 2 mg is recommended for all females in childbearing age (12-44 years of age).   -Recommend chronic vitamin D supplementation and regular exercise (if not contraindicated).   -Patient/family educated on risk for SUDEP (Sudden Death in Epilepsy). Counseling was provided on the importance of strict medication and follow up compliance. The patient/family understand the risks associated with non-adherence with the medical plan as outlined, including but not limited to an increased risk for breakthrough seizures, which may contribute to injuries, disability, status epilepticus, and even death.   -Counseling was also provided on potential effects of alcohol and other drugs, which may lower seizure threshold and/or affect the metabolism of antiepileptic drugs. We recommend  avoidance of alcohol and illegal drugs.  -Avoid sleep deprivation.   -We extensively discussed the aspects related to safety in drivers who suffer from epilepsy. The patient is encourage to report to the Division of Motor Vehicles of any condition and/or spells related to confusion, disorientation, and/or loss of awareness and/or loss of consciousness; as these may pose a safety issue if they occur while operating a motor vehicle. The patient and/or family are ultimately responsible for exercising caution and abiding to regulations in place. The patient understands that only the Department of Motor Vehicles (DMV) is able to authorize an individual to drive, even after medical clearance, DMV clearance must be obtained.   -Other seizure precautions were discussed at length, including no diving, no skydiving, no climbing or exposure to unprotected heights, no unsupervised swimming, no Jacuzzi or bathing in bathtubs or deep bodies of water. The patient/family have been advised about risks for operating any machinery while suffering from seizures / syncope / epilepsy and/or while taking antiepileptic drugs.   -The patient understands and agrees that due to the complexity of his/her diagnosis, results of any testing and further recommendations will typically be discussed/made during a face to face encounter in my office. The patient and/or family further understands it is their responsibility to keep proper follow up.     Patient/family agree with plan, as outlined.     My total time spent caring for the patient on the day of the encounter was 40 minutes.   This does not include time spent on separately billable procedures/tests.        Olga MARISCAL, FNP-C, MSCNP

## 2021-10-25 ENCOUNTER — TELEPHONE (OUTPATIENT)
Dept: MEDICAL GROUP | Facility: PHYSICIAN GROUP | Age: 76
End: 2021-10-25

## 2021-10-27 ENCOUNTER — OFFICE VISIT (OUTPATIENT)
Dept: MEDICAL GROUP | Facility: PHYSICIAN GROUP | Age: 76
End: 2021-10-27
Payer: MEDICARE

## 2021-10-27 VITALS
SYSTOLIC BLOOD PRESSURE: 114 MMHG | HEIGHT: 64 IN | TEMPERATURE: 98.6 F | HEART RATE: 67 BPM | RESPIRATION RATE: 12 BRPM | OXYGEN SATURATION: 96 % | WEIGHT: 144 LBS | DIASTOLIC BLOOD PRESSURE: 70 MMHG | BODY MASS INDEX: 24.59 KG/M2

## 2021-10-27 DIAGNOSIS — E78.5 DYSLIPIDEMIA: ICD-10-CM

## 2021-10-27 DIAGNOSIS — I10 ESSENTIAL HYPERTENSION: ICD-10-CM

## 2021-10-27 DIAGNOSIS — R25.1 TREMOR OF BOTH HANDS: ICD-10-CM

## 2021-10-27 DIAGNOSIS — Z23 NEED FOR VACCINATION: ICD-10-CM

## 2021-10-27 PROCEDURE — 99214 OFFICE O/P EST MOD 30 MIN: CPT | Mod: 25 | Performed by: FAMILY MEDICINE

## 2021-10-27 PROCEDURE — G0008 ADMIN INFLUENZA VIRUS VAC: HCPCS | Performed by: FAMILY MEDICINE

## 2021-10-27 PROCEDURE — 90662 IIV NO PRSV INCREASED AG IM: CPT | Performed by: FAMILY MEDICINE

## 2021-10-27 RX ORDER — AMLODIPINE BESYLATE 2.5 MG/1
2.5 TABLET ORAL DAILY
Qty: 100 TABLET | Refills: 3 | Status: SHIPPED | OUTPATIENT
Start: 2021-10-27 | End: 2022-01-31

## 2021-10-27 ASSESSMENT — FIBROSIS 4 INDEX: FIB4 SCORE: 3.13

## 2021-10-27 NOTE — ASSESSMENT & PLAN NOTE
Chronic. Currently taking amlodipine 5mg daily.  Would like to get off his meds.  bp has been 104/58, 110/78, and 114/70 the last few times.  He'll start checking bps at home.  Will send new prescription for 2.5mg.

## 2021-10-27 NOTE — PROGRESS NOTES
CC:   Chief Complaint   Patient presents with   • Follow-Up     2 months     HPI:   Mj presents today with his daughter for a 2-month follow-up visit.  He was seen by neurology last week.  Consultation notes were reviewed.  He was referred to neuro-ophthalmology for his bilateral ptosis.  Phone # to Dr Crum provided to patient.     Essential hypertension  Chronic. Currently taking amlodipine 5mg daily.  Would like to get off his meds.  bp has been 104/58, 110/78, and 114/70 the last few times.  He'll start checking bps at home.  Will send new prescription for 2.5mg.    Dyslipidemia  Chronic issue.  Most recent labs from January had total cholesterol 121, elevated triglycerides 250, HDL 30, and LDL 41.  Takes lipitor 10mg daily    Tremor of both hands  Chronic issue. Was told by neuro that ths should improve as its from his seizure meds.  He is right-handed and notices the tremor more on his right side when he is eating.      Current Outpatient Medications Ordered in Epic   Medication Sig Dispense Refill   • amLODIPine (NORVASC) 2.5 MG Tab Take 1 Tablet by mouth every day. 100 Tablet 3   • levetiracetam (KEPPRA) 500 MG TABLET SR 24 HR Take 4 Tablets by mouth every day. At bedtime. 120 Tablet 11   • vitamin D2, Ergocalciferol, (DRISDOL) 1.25 MG (35035 UT) Cap capsule Take 1 Capsule by mouth every 7 days for 180 days. 12 Capsule 4   • divalproex ER (DEPAKOTE ER) 500 MG TABLET SR 24 HR Take 2 Tablets by mouth at bedtime. 60 tablet 11   • atorvastatin (LIPITOR) 10 MG Tab Take 1 tablet by mouth every morning. 100 tablet 3   • ASPIRIN LOW DOSE 81 MG Chew Tab chewable tablet Chew 81 mg every morning.       No current [facility]-ordered facility-administered medications on file.       Past Medical History:   Diagnosis Date   • Hypercholesteremia    • Hypertension    • Seizure (HCC)        Social History     Tobacco Use   • Smoking status: Former Smoker     Types: Cigarettes     Quit date: 2005     Years since quitting:  "16.8   • Smokeless tobacco: Never Used   Vaping Use   • Vaping Use: Never used   Substance Use Topics   • Alcohol use: No   • Drug use: No       Allergies:  Patient has no known allergies.    Health Maintenance: flu vaccine today      Objective:       Exam:  /70 (BP Location: Left arm, Patient Position: Sitting, BP Cuff Size: Adult)   Pulse 67   Temp 37 °C (98.6 °F) (Temporal)   Resp 12   Ht 1.626 m (5' 4\")   Wt 65.3 kg (144 lb)   SpO2 96%   BMI 24.72 kg/m²   Body mass index is 24.72 kg/m².  Wt Readings from Last 4 Encounters:   10/27/21 65.3 kg (144 lb)   10/21/21 66 kg (145 lb 8.1 oz)   08/31/21 65.6 kg (144 lb 11.2 oz)   07/22/21 67.8 kg (149 lb 7.6 oz)       Gen: Alert and oriented, No apparent distress. Appropriately groomed.  Neck: Neck is supple without lymphadenopathy.No thyromegaly.   Lungs: Normal effort, CTA bilaterally, no wheezes, rhonchi, or rales  CV: Regular rate and rhythm. No Lower extremity edema  Skin: No rash noted.      Assessment & Plan:     76 y.o. male with the following -     1. Essential hypertension  Chronic stable with norvasc 5mg.  Would like to discontinue meds.  For now, will decrease dose to 2.5mg.  He'll start checking bps at home and will call/drop off bp readings in one week.   - amLODIPine (NORVASC) 2.5 MG Tab; Take 1 Tablet by mouth every day.  Dispense: 100 Tablet; Refill: 3    2. Need for vaccination  - INFLUENZA VACCINE, HIGH DOSE (65+ ONLY)    3. Dyslipidemia  Chronic. C/w lipitor 10mg daily. Recheck labs when he goes to get labs drawn for Dr Florence.     4. Tremor of both hands  Chronic issue.  Stable. Continues to f/u neurology.       Return in about 3 months (around 1/27/2022) for Hypertension.    Please note that this dictation was created using voice recognition software. I have made every reasonable attempt to correct obvious errors, but I expect that there are errors of grammar and possibly content that I did not discover before finalizing the note.      "

## 2021-11-06 ENCOUNTER — HOSPITAL ENCOUNTER (OUTPATIENT)
Dept: LAB | Facility: MEDICAL CENTER | Age: 76
End: 2021-11-06
Attending: REGISTERED NURSE
Payer: MEDICARE

## 2021-11-06 DIAGNOSIS — G40.109 LOCALIZATION-RELATED EPILEPSY (HCC): ICD-10-CM

## 2021-11-06 DIAGNOSIS — G40.119 PHARMACORESISTANT INTRACTABLE FOCAL EPILEPSY (HCC): ICD-10-CM

## 2021-11-06 DIAGNOSIS — R25.1 TREMORS OF NERVOUS SYSTEM: ICD-10-CM

## 2021-11-06 DIAGNOSIS — E55.9 VITAMIN D DEFICIENCY: ICD-10-CM

## 2021-11-06 LAB
ALBUMIN SERPL BCP-MCNC: 4.3 G/DL (ref 3.2–4.9)
ALBUMIN/GLOB SERPL: 1.5 G/DL
ALP SERPL-CCNC: 74 U/L (ref 30–99)
ALT SERPL-CCNC: 30 U/L (ref 2–50)
ANION GAP SERPL CALC-SCNC: 7 MMOL/L (ref 7–16)
AST SERPL-CCNC: 34 U/L (ref 12–45)
BASOPHILS # BLD AUTO: 1 % (ref 0–1.8)
BASOPHILS # BLD: 0.07 K/UL (ref 0–0.12)
BILIRUB SERPL-MCNC: 0.7 MG/DL (ref 0.1–1.5)
BUN SERPL-MCNC: 13 MG/DL (ref 8–22)
CALCIUM SERPL-MCNC: 10 MG/DL (ref 8.5–10.5)
CHLORIDE SERPL-SCNC: 108 MMOL/L (ref 96–112)
CO2 SERPL-SCNC: 26 MMOL/L (ref 20–33)
CREAT SERPL-MCNC: 0.82 MG/DL (ref 0.5–1.4)
EOSINOPHIL # BLD AUTO: 0.14 K/UL (ref 0–0.51)
EOSINOPHIL NFR BLD: 2 % (ref 0–6.9)
ERYTHROCYTE [DISTWIDTH] IN BLOOD BY AUTOMATED COUNT: 44.4 FL (ref 35.9–50)
FASTING STATUS PATIENT QL REPORTED: NORMAL
GLOBULIN SER CALC-MCNC: 2.8 G/DL (ref 1.9–3.5)
GLUCOSE SERPL-MCNC: 82 MG/DL (ref 65–99)
HCT VFR BLD AUTO: 51.3 % (ref 42–52)
HGB BLD-MCNC: 16.7 G/DL (ref 14–18)
IMM GRANULOCYTES # BLD AUTO: 0.03 K/UL (ref 0–0.11)
IMM GRANULOCYTES NFR BLD AUTO: 0.4 % (ref 0–0.9)
LYMPHOCYTES # BLD AUTO: 2.93 K/UL (ref 1–4.8)
LYMPHOCYTES NFR BLD: 42.6 % (ref 22–41)
MCH RBC QN AUTO: 33.7 PG (ref 27–33)
MCHC RBC AUTO-ENTMCNC: 32.6 G/DL (ref 33.7–35.3)
MCV RBC AUTO: 103.4 FL (ref 81.4–97.8)
MONOCYTES # BLD AUTO: 0.54 K/UL (ref 0–0.85)
MONOCYTES NFR BLD AUTO: 7.9 % (ref 0–13.4)
NEUTROPHILS # BLD AUTO: 3.16 K/UL (ref 1.82–7.42)
NEUTROPHILS NFR BLD: 46.1 % (ref 44–72)
NRBC # BLD AUTO: 0 K/UL
NRBC BLD-RTO: 0 /100 WBC
PLATELET # BLD AUTO: 189 K/UL (ref 164–446)
PMV BLD AUTO: 12.7 FL (ref 9–12.9)
POTASSIUM SERPL-SCNC: 4.5 MMOL/L (ref 3.6–5.5)
PROT SERPL-MCNC: 7.1 G/DL (ref 6–8.2)
RBC # BLD AUTO: 4.96 M/UL (ref 4.7–6.1)
SODIUM SERPL-SCNC: 141 MMOL/L (ref 135–145)
WBC # BLD AUTO: 6.9 K/UL (ref 4.8–10.8)

## 2021-11-06 PROCEDURE — 85025 COMPLETE CBC W/AUTO DIFF WBC: CPT

## 2021-11-06 PROCEDURE — 82306 VITAMIN D 25 HYDROXY: CPT

## 2021-11-06 PROCEDURE — 36415 COLL VENOUS BLD VENIPUNCTURE: CPT

## 2021-11-06 PROCEDURE — 80053 COMPREHEN METABOLIC PANEL: CPT

## 2021-11-10 LAB — 25(OH)D3 SERPL-MCNC: 56 NG/ML (ref 30–80)

## 2021-12-17 NOTE — NON-PROVIDER
Comprehensive Health Assessment. Call back tomorrow. Member needs translation help.      Attempt #2

## 2022-01-24 ENCOUNTER — TELEPHONE (OUTPATIENT)
Dept: MEDICAL GROUP | Facility: PHYSICIAN GROUP | Age: 77
End: 2022-01-24

## 2022-01-31 ENCOUNTER — OFFICE VISIT (OUTPATIENT)
Dept: MEDICAL GROUP | Facility: PHYSICIAN GROUP | Age: 77
End: 2022-01-31
Payer: MEDICARE

## 2022-01-31 VITALS
RESPIRATION RATE: 16 BRPM | SYSTOLIC BLOOD PRESSURE: 130 MMHG | HEIGHT: 64 IN | WEIGHT: 139 LBS | DIASTOLIC BLOOD PRESSURE: 70 MMHG | TEMPERATURE: 99.4 F | HEART RATE: 60 BPM | OXYGEN SATURATION: 96 % | BODY MASS INDEX: 23.73 KG/M2

## 2022-01-31 DIAGNOSIS — G40.119 PHARMACORESISTANT INTRACTABLE FOCAL EPILEPSY (HCC): ICD-10-CM

## 2022-01-31 DIAGNOSIS — R79.89 ELEVATED FERRITIN: ICD-10-CM

## 2022-01-31 DIAGNOSIS — I10 ESSENTIAL HYPERTENSION: ICD-10-CM

## 2022-01-31 DIAGNOSIS — E78.5 DYSLIPIDEMIA: ICD-10-CM

## 2022-01-31 DIAGNOSIS — R25.1 TREMOR OF BOTH HANDS: ICD-10-CM

## 2022-01-31 PROCEDURE — 99214 OFFICE O/P EST MOD 30 MIN: CPT | Performed by: FAMILY MEDICINE

## 2022-01-31 RX ORDER — AMLODIPINE BESYLATE 5 MG/1
5 TABLET ORAL DAILY
Qty: 100 TABLET | Refills: 3 | Status: SHIPPED | OUTPATIENT
Start: 2022-01-31 | End: 2022-04-27 | Stop reason: SDUPTHER

## 2022-01-31 ASSESSMENT — PATIENT HEALTH QUESTIONNAIRE - PHQ9: CLINICAL INTERPRETATION OF PHQ2 SCORE: 0

## 2022-01-31 ASSESSMENT — FIBROSIS 4 INDEX: FIB4 SCORE: 2.5

## 2022-01-31 NOTE — PROGRESS NOTES
Annual Health Assessment Questions:    1.  Are you currently engaging in any exercise or physical activity? No    2.  How would you describe your mood or emotional well-being today? good    3.  Have you had any falls in the last year? No    4.  Have you noticed any problems with your balance or had difficulty walking? No    5.  In the last six months have you experienced any leakage of urine? No    6. DPA/Advanced Directive: Patient does not have an Advanced Directive.  A packet and workshop information was given on Advanced Directives.    CC:   Chief Complaint   Patient presents with   • Hypertension Follow-up         HPI:   Mj presents today for a 3 month f/u visit with his daughter..        Pharmacoresistant intractable focal epilepsy (HCC)  Chronic medical diagnosis.  Continues to follow-up with neurology.  Currently taking Keppra 2000 mg at bedtime along with Depakote ER 1000 mg at bedtime.  His last seizure was over a year ago.    Essential hypertension  Chronic. Home bps were in the range 130s/70s.  He was on amlodipine 5 mg and this was lowered to 2.5 mg 3 months ago.  At that time he was wondering if his blood pressure medications could be discontinued.  Denies any headaches or chest pain.    Tremor of both hands  Chronic medical problem.  This has been going on for approximately the last 6 months.  h he was seen by neurology in October and was told that this may improve with his seizure medications.  Has [id number] he has noticed more tremor with his righ hand. dtr states that it got better and that over the last few weeks it has gotten worse.  Thinks that it may be due to stress.  She does mention that he has been under more stress than before.    Dyslipidemia  Chronic issue. Taking lipitor 10mg.  Previous data labs from last year had total cholesterol 121, triglycerides 250, and LDL at 41.    Elevated ferritin  Chronic medical diagnosis.  His previous ferritin level from last year was elevated  "at 517.      Current Outpatient Medications Ordered in Epic   Medication Sig Dispense Refill   • amLODIPine (NORVASC) 5 MG Tab Take 1 Tablet by mouth every day. 100 Tablet 3   • levetiracetam (KEPPRA) 500 MG TABLET SR 24 HR Take 4 Tablets by mouth every day. At bedtime. 120 Tablet 11   • vitamin D2, Ergocalciferol, (DRISDOL) 1.25 MG (94646 UT) Cap capsule Take 1 Capsule by mouth every 7 days for 180 days. 12 Capsule 4   • divalproex ER (DEPAKOTE ER) 500 MG TABLET SR 24 HR Take 2 Tablets by mouth at bedtime. 60 tablet 11   • atorvastatin (LIPITOR) 10 MG Tab Take 1 tablet by mouth every morning. 100 tablet 3     No current Epic-ordered facility-administered medications on file.       Past Medical History:   Diagnosis Date   • Hypercholesteremia    • Hypertension    • Seizure (HCC)        Social History     Tobacco Use   • Smoking status: Former Smoker     Types: Cigarettes     Quit date:      Years since quittin.0   • Smokeless tobacco: Never Used   Vaping Use   • Vaping Use: Never used   Substance Use Topics   • Alcohol use: No   • Drug use: No       Allergies:  Patient has no known allergies.      Objective:       Exam:  /70 (BP Location: Right arm, Patient Position: Sitting, BP Cuff Size: Adult)   Pulse 60   Temp 37.4 °C (99.4 °F) (Temporal)   Resp 16   Ht 1.626 m (5' 4\")   Wt 63 kg (139 lb)   SpO2 96%   BMI 23.86 kg/m²   Body mass index is 23.86 kg/m².  Wt Readings from Last 4 Encounters:   22 63 kg (139 lb)   10/27/21 65.3 kg (144 lb)   10/21/21 66 kg (145 lb 8.1 oz)   21 65.6 kg (144 lb 11.2 oz)       Gen: Alert and oriented, No apparent distress. Appropriately groomed.  Neck: Neck is supple without lymphadenopathy.No thyromegaly.   Lungs: Normal effort, CTA bilaterally, no wheezes, rhonchi, or rales  CV: Regular rate and rhythm. No Lower extremity edema  Skin: No rash noted.  Neuro: bilateral intentional tremor, r> l      Assessment & Plan:     76 y.o. male with the following - "     1. Pharmacoresistant intractable focal epilepsy (HCC)  -Chronic medical diagnosis.  Stable with meds.  Last seizure was approximately a year ago.  Has been compliant with meds and will be following up with neurology in April.    VALPROIC ACID; Future  - KEPPRA; Future    2. Essential hypertension  Chronic medical diagnosis.  Blood pressure has been upper end of normal.  He will increase his amlodipine to 5 mg daily.  - amLODIPine (NORVASC) 5 MG Tab; Take 1 Tablet by mouth every day.  Dispense: 100 Tablet; Refill: 3  - CBC WITH DIFFERENTIAL; Future  - Comp Metabolic Panel; Future    3. Tremor of both hands  -Chronic medical diagnosis.  Not well controlled.  Has been under increased stress and has noticed increased tremor over the last few weeks.  Start up by checking labs and he will follow-up with neurology in April as scheduled.    TSH WITH REFLEX TO FT4; Future    4. Dyslipidemia  -Chronic medical diagnosis.  Improving.  We will recheck labs.  Continue with Lipitor 10 mg daily.    Lipid Profile; Future    5. Elevated ferritin  Chronic medical diagnosis from a year ago.  We will recheck these labs.  - FERRITIN; Future        Return in about 3 months (around 4/30/2022) for Hypertension.    Please note that this dictation was created using voice recognition software. I have made every reasonable attempt to correct obvious errors, but I expect that there are errors of grammar and possibly content that I did not discover before finalizing the note.

## 2022-01-31 NOTE — ASSESSMENT & PLAN NOTE
Chronic medical diagnosis.  Continues to follow-up with neurology.  Currently taking Keppra 2000 mg at bedtime along with Depakote ER 1000 mg at bedtime.  His last seizure was over a year ago.

## 2022-01-31 NOTE — ASSESSMENT & PLAN NOTE
Chronic. Home bps were in the range 130s/70s.  He was on amlodipine 5 mg and this was lowered to 2.5 mg 3 months ago.  At that time he was wondering if his blood pressure medications could be discontinued.  Denies any headaches or chest pain.

## 2022-01-31 NOTE — ASSESSMENT & PLAN NOTE
Chronic medical problem.  This has been going on for approximately the last 6 months.  h he was seen by neurology in October and was told that this may improve with his seizure medications.  Has [id number] he has noticed more tremor with his righ hand. dtr states that it got better and that over the last few weeks it has gotten worse.  Thinks that it may be due to stress.  She does mention that he has been under more stress than before.

## 2022-03-16 NOTE — TELEPHONE ENCOUNTER
ESTABLISHED PATIENT PRE-VISIT PLANNING     Patient was NOT contacted to complete PVP.     Note: Patient will not be contacted if there is no indication to call.     1.  Reviewed notes from the last few office visits within the medical group: Yes    2.  If any orders were placed at last visit or intended to be done for this visit (i.e. 6 mos follow-up), do we have Results/Consult Notes?         •  Labs - Labs were not ordered at last office visit.  Note: If patient appointment is for lab review and patient did not complete labs, check with provider if OK to reschedule patient until labs completed.       •  Imaging - Imaging was not ordered at last office visit.       •  Referrals - No referrals were ordered at last office visit.    3. Is this appointment scheduled as a Hospital Follow-Up? No    4.  Immunizations were updated in Epic using Reconcile Outside Information activity? Yes    5.  Patient is due for the following Health Maintenance Topics:   Health Maintenance Due   Topic Date Due   • COVID-19 Vaccine (3 - Booster for Pfizer series) 08/03/2021       6.  AHA (Pulse8) form printed for Provider? Email sent to [facility] requesting form       Posterior Auricular Interpolation Flap Text: A decision was made to reconstruct the defect utilizing an interpolation axial flap and a staged reconstruction.  A telfa template was made of the defect.  This telfa template was then used to outline the posterior auricular interpolation flap.  The donor area for the pedicle flap was then injected with anesthesia.  The flap was excised through the skin and subcutaneous tissue down to the layer of the underlying musculature.  The pedicle flap was carefully excised within this deep plane to maintain its blood supply.  The edges of the donor site were undermined.   The donor site was closed in a primary fashion.  The pedicle was then rotated into position and sutured.  Once the tube was sutured into place, adequate blood supply was confirmed with blanching and refill.  The pedicle was then wrapped with xeroform gauze and dressed appropriately with a telfa and gauze bandage to ensure continued blood supply and protect the attached pedicle.

## 2022-03-26 DIAGNOSIS — E78.5 DYSLIPIDEMIA: ICD-10-CM

## 2022-03-28 RX ORDER — ATORVASTATIN CALCIUM 10 MG/1
TABLET, FILM COATED ORAL
Qty: 100 TABLET | Refills: 3 | Status: SHIPPED | OUTPATIENT
Start: 2022-03-28 | End: 2022-04-27 | Stop reason: SDUPTHER

## 2022-03-28 NOTE — TELEPHONE ENCOUNTER
Requested Prescriptions     Pending Prescriptions Disp Refills   • atorvastatin (LIPITOR) 10 MG Tab [Pharmacy Med Name: Atorvastatin Calcium 10 MG Oral Tablet] 100 Tablet 3     Sig: TAKE 1 TABLET BY MOUTH ONCE DAILY IN THE MORNING   Viviana Whitfield M.D.

## 2022-04-16 ENCOUNTER — HOSPITAL ENCOUNTER (OUTPATIENT)
Dept: LAB | Facility: MEDICAL CENTER | Age: 77
End: 2022-04-16
Attending: FAMILY MEDICINE
Payer: MEDICARE

## 2022-04-16 DIAGNOSIS — R25.1 TREMOR OF BOTH HANDS: ICD-10-CM

## 2022-04-16 DIAGNOSIS — R79.89 ELEVATED FERRITIN: ICD-10-CM

## 2022-04-16 DIAGNOSIS — I10 ESSENTIAL HYPERTENSION: ICD-10-CM

## 2022-04-16 DIAGNOSIS — G40.119 PHARMACORESISTANT INTRACTABLE FOCAL EPILEPSY (HCC): ICD-10-CM

## 2022-04-16 DIAGNOSIS — E78.5 DYSLIPIDEMIA: ICD-10-CM

## 2022-04-16 LAB
ALBUMIN SERPL BCP-MCNC: 4.3 G/DL (ref 3.2–4.9)
ALBUMIN/GLOB SERPL: 1.4 G/DL
ALP SERPL-CCNC: 80 U/L (ref 30–99)
ALT SERPL-CCNC: 26 U/L (ref 2–50)
ANION GAP SERPL CALC-SCNC: 9 MMOL/L (ref 7–16)
AST SERPL-CCNC: 33 U/L (ref 12–45)
BASOPHILS # BLD AUTO: 0.6 % (ref 0–1.8)
BASOPHILS # BLD: 0.05 K/UL (ref 0–0.12)
BILIRUB SERPL-MCNC: 0.7 MG/DL (ref 0.1–1.5)
BUN SERPL-MCNC: 10 MG/DL (ref 8–22)
CALCIUM SERPL-MCNC: 10.4 MG/DL (ref 8.5–10.5)
CHLORIDE SERPL-SCNC: 103 MMOL/L (ref 96–112)
CHOLEST SERPL-MCNC: 117 MG/DL (ref 100–199)
CO2 SERPL-SCNC: 26 MMOL/L (ref 20–33)
CREAT SERPL-MCNC: 0.74 MG/DL (ref 0.5–1.4)
EOSINOPHIL # BLD AUTO: 0.03 K/UL (ref 0–0.51)
EOSINOPHIL NFR BLD: 0.4 % (ref 0–6.9)
ERYTHROCYTE [DISTWIDTH] IN BLOOD BY AUTOMATED COUNT: 44.2 FL (ref 35.9–50)
FASTING STATUS PATIENT QL REPORTED: NORMAL
FERRITIN SERPL-MCNC: 482 NG/ML (ref 22–322)
GFR SERPLBLD CREATININE-BSD FMLA CKD-EPI: 93 ML/MIN/1.73 M 2
GLOBULIN SER CALC-MCNC: 3.1 G/DL (ref 1.9–3.5)
GLUCOSE SERPL-MCNC: 79 MG/DL (ref 65–99)
HCT VFR BLD AUTO: 48.2 % (ref 42–52)
HDLC SERPL-MCNC: 41 MG/DL
HGB BLD-MCNC: 15.5 G/DL (ref 14–18)
IMM GRANULOCYTES # BLD AUTO: 0.03 K/UL (ref 0–0.11)
IMM GRANULOCYTES NFR BLD AUTO: 0.4 % (ref 0–0.9)
LDLC SERPL CALC-MCNC: 61 MG/DL
LYMPHOCYTES # BLD AUTO: 2.87 K/UL (ref 1–4.8)
LYMPHOCYTES NFR BLD: 34.1 % (ref 22–41)
MCH RBC QN AUTO: 33.4 PG (ref 27–33)
MCHC RBC AUTO-ENTMCNC: 32.2 G/DL (ref 33.7–35.3)
MCV RBC AUTO: 103.9 FL (ref 81.4–97.8)
MONOCYTES # BLD AUTO: 0.5 K/UL (ref 0–0.85)
MONOCYTES NFR BLD AUTO: 5.9 % (ref 0–13.4)
NEUTROPHILS # BLD AUTO: 4.93 K/UL (ref 1.82–7.42)
NEUTROPHILS NFR BLD: 58.6 % (ref 44–72)
NRBC # BLD AUTO: 0 K/UL
NRBC BLD-RTO: 0 /100 WBC
PLATELET # BLD AUTO: 197 K/UL (ref 164–446)
PMV BLD AUTO: 12.4 FL (ref 9–12.9)
POTASSIUM SERPL-SCNC: 4.1 MMOL/L (ref 3.6–5.5)
PROT SERPL-MCNC: 7.4 G/DL (ref 6–8.2)
RBC # BLD AUTO: 4.64 M/UL (ref 4.7–6.1)
SODIUM SERPL-SCNC: 138 MMOL/L (ref 135–145)
T4 FREE SERPL-MCNC: 1.21 NG/DL (ref 0.93–1.7)
TRIGL SERPL-MCNC: 74 MG/DL (ref 0–149)
TSH SERPL DL<=0.005 MIU/L-ACNC: 5.44 UIU/ML (ref 0.38–5.33)
VALPROATE SERPL-MCNC: 97.1 UG/ML (ref 50–100)
WBC # BLD AUTO: 8.4 K/UL (ref 4.8–10.8)

## 2022-04-16 PROCEDURE — 80061 LIPID PANEL: CPT

## 2022-04-16 PROCEDURE — 84443 ASSAY THYROID STIM HORMONE: CPT

## 2022-04-16 PROCEDURE — 80177 DRUG SCRN QUAN LEVETIRACETAM: CPT

## 2022-04-16 PROCEDURE — 36415 COLL VENOUS BLD VENIPUNCTURE: CPT

## 2022-04-16 PROCEDURE — 82728 ASSAY OF FERRITIN: CPT

## 2022-04-16 PROCEDURE — 84439 ASSAY OF FREE THYROXINE: CPT

## 2022-04-16 PROCEDURE — 85025 COMPLETE CBC W/AUTO DIFF WBC: CPT

## 2022-04-16 PROCEDURE — 80053 COMPREHEN METABOLIC PANEL: CPT

## 2022-04-16 PROCEDURE — 80164 ASSAY DIPROPYLACETIC ACD TOT: CPT

## 2022-04-18 LAB — LEVETIRACETAM SERPL-MCNC: 25 UG/ML (ref 10–40)

## 2022-04-21 ENCOUNTER — OFFICE VISIT (OUTPATIENT)
Dept: NEUROLOGY | Facility: MEDICAL CENTER | Age: 77
End: 2022-04-21
Attending: PSYCHIATRY & NEUROLOGY
Payer: MEDICARE

## 2022-04-21 ENCOUNTER — HOSPITAL ENCOUNTER (OUTPATIENT)
Dept: LAB | Facility: MEDICAL CENTER | Age: 77
End: 2022-04-21
Attending: PSYCHIATRY & NEUROLOGY
Payer: MEDICARE

## 2022-04-21 VITALS
BODY MASS INDEX: 24.09 KG/M2 | WEIGHT: 141.09 LBS | HEART RATE: 69 BPM | SYSTOLIC BLOOD PRESSURE: 140 MMHG | TEMPERATURE: 99.3 F | HEIGHT: 64 IN | DIASTOLIC BLOOD PRESSURE: 64 MMHG | OXYGEN SATURATION: 95 %

## 2022-04-21 DIAGNOSIS — D75.89 MACROCYTOSIS WITHOUT ANEMIA: ICD-10-CM

## 2022-04-21 DIAGNOSIS — R25.1 TREMORS OF NERVOUS SYSTEM: ICD-10-CM

## 2022-04-21 DIAGNOSIS — Z13.31 SCREENING FOR DEPRESSION: ICD-10-CM

## 2022-04-21 DIAGNOSIS — G40.119 PHARMACORESISTANT INTRACTABLE FOCAL EPILEPSY (HCC): ICD-10-CM

## 2022-04-21 DIAGNOSIS — E55.9 VITAMIN D DEFICIENCY: ICD-10-CM

## 2022-04-21 DIAGNOSIS — G40.109 LOCALIZATION-RELATED EPILEPSY (HCC): ICD-10-CM

## 2022-04-21 DIAGNOSIS — T45.515D ADVERSE EFFECT OF ANTICOAGULANT, SUBSEQUENT ENCOUNTER: ICD-10-CM

## 2022-04-21 DIAGNOSIS — H02.403 PTOSIS OF EYELID, BILATERAL: ICD-10-CM

## 2022-04-21 LAB — 25(OH)D3 SERPL-MCNC: 40 NG/ML (ref 30–100)

## 2022-04-21 PROCEDURE — 99215 OFFICE O/P EST HI 40 MIN: CPT | Performed by: PSYCHIATRY & NEUROLOGY

## 2022-04-21 PROCEDURE — 82306 VITAMIN D 25 HYDROXY: CPT

## 2022-04-21 PROCEDURE — 36415 COLL VENOUS BLD VENIPUNCTURE: CPT

## 2022-04-21 PROCEDURE — 99211 OFF/OP EST MAY X REQ PHY/QHP: CPT | Performed by: PSYCHIATRY & NEUROLOGY

## 2022-04-21 RX ORDER — LEVETIRACETAM 500 MG/1
2000 TABLET, FILM COATED, EXTENDED RELEASE ORAL DAILY
Qty: 360 TABLET | Refills: 3 | Status: SHIPPED | OUTPATIENT
Start: 2022-04-21 | End: 2023-04-21

## 2022-04-21 RX ORDER — ERGOCALCIFEROL 1.25 MG/1
CAPSULE ORAL
COMMUNITY
End: 2022-04-21 | Stop reason: SDUPTHER

## 2022-04-21 RX ORDER — ERGOCALCIFEROL 1.25 MG/1
50000 CAPSULE ORAL
Qty: 12 CAPSULE | Refills: 1 | Status: SHIPPED | OUTPATIENT
Start: 2022-04-21 | End: 2022-10-18

## 2022-04-21 RX ORDER — DIVALPROEX SODIUM 500 MG/1
1000 TABLET, EXTENDED RELEASE ORAL
Qty: 180 TABLET | Refills: 3 | Status: SHIPPED | OUTPATIENT
Start: 2022-04-21 | End: 2023-04-21

## 2022-04-21 ASSESSMENT — FIBROSIS 4 INDEX: FIB4 SCORE: 2.5

## 2022-04-21 NOTE — PROGRESS NOTES
Chief Complaint   Patient presents with   • Follow-Up     Seizures       Problem List Items Addressed This Visit     Pharmacoresistant intractable focal epilepsy (HCC)    Relevant Medications    vitamin D2, Ergocalciferol, (DRISDOL) 1.25 MG (28283 UT) Cap capsule    levetiracetam (KEPPRA) 500 MG TABLET SR 24 HR    divalproex ER (DEPAKOTE ER) 500 MG TABLET SR 24 HR    Vitamin D deficiency    Relevant Medications    vitamin D2, Ergocalciferol, (DRISDOL) 1.25 MG (00930 UT) Cap capsule    Other Relevant Orders    VITAMIN D,25 HYDROXY      Other Visit Diagnoses     Localization-related epilepsy (HCC)        Relevant Medications    levetiracetam (KEPPRA) 500 MG TABLET SR 24 HR    divalproex ER (DEPAKOTE ER) 500 MG TABLET SR 24 HR    Tremors of nervous system        Ptosis of eyelid, bilateral        Macrocytosis without anemia        Adverse effect of anticoagulant, subsequent encounter        Screening for depression              Interim history:  Mj Cornejo returns to the clinic in follow-up, in the company of his daughter.  He is doing very well, remains on a combination of Keppra and Depakote.  Denies any side effects other than mild tremors in both hands, but these are stable and not progressive.  No rash.  Memory is good.  He remains active, drives and works, denies any issues driving.  No spells of any kind.  They are very happy with the outcome.  The patient is fully compliant with his medications.  Remains on vitamin D supplementation.  Follows with the primary care physician.  Mood is good, not depressed or suicidal.  Denies any complaints.    Previous complaints of muscle and shoulder pain have resolved.    Past medical history:   Past Medical History:   Diagnosis Date   • Hypercholesteremia    • Hypertension    • Seizure (HCC)        Past surgical history:   History reviewed. No pertinent surgical history.    Family history:   Family History   Problem Relation Age of Onset   • Stroke Mother   "      Social history:   Social History     Socioeconomic History   • Marital status:      Spouse name: Not on file   • Number of children: Not on file   • Years of education: Not on file   • Highest education level: Not on file   Occupational History   • Not on file   Tobacco Use   • Smoking status: Former Smoker     Types: Cigarettes     Quit date:      Years since quittin.3   • Smokeless tobacco: Never Used   Vaping Use   • Vaping Use: Never used   Substance and Sexual Activity   • Alcohol use: No   • Drug use: No   • Sexual activity: Not on file     Comment: , lives with wife and son, 2 sons   Other Topics Concern   • Not on file   Social History Narrative   • Not on file     Social Determinants of Health     Financial Resource Strain: Not on file   Food Insecurity: Not on file   Transportation Needs: Not on file   Physical Activity: Not on file   Stress: Not on file   Social Connections: Not on file   Intimate Partner Violence: Not on file   Housing Stability: Not on file       Current medications:   Current Outpatient Medications   Medication   • vitamin D2, Ergocalciferol, (DRISDOL) 1.25 MG (94707 UT) Cap capsule   • levetiracetam (KEPPRA) 500 MG TABLET SR 24 HR   • divalproex ER (DEPAKOTE ER) 500 MG TABLET SR 24 HR   • atorvastatin (LIPITOR) 10 MG Tab   • amLODIPine (NORVASC) 5 MG Tab     No current facility-administered medications for this visit.       Medication Allergy:  No Known Allergies      Review of systems:   As reviewed in today's history.    Physical examination:   Vitals:    22 1025   BP: 140/64   BP Location: Right arm   Patient Position: Sitting   BP Cuff Size: Adult   Pulse: 69   Temp: 37.4 °C (99.3 °F)   TempSrc: Temporal   SpO2: 95%   Weight: 64 kg (141 lb 1.5 oz)   Height: 1.626 m (5' 4\")     General: Patient in no acute distress, pleasant and cooperative.  HEENT: Normocephalic, no signs of acute trauma.   Neck: supple, no meningeal signs or carotid bruits. " There is normal range of motion. No tenderness on exam.   Chest: clear to auscultation. No cough.   CV: RRR, no murmurs.   Skin: no signs of acute rashes or trauma.   Musculoskeletal: joints exhibit full range of motion, without any pain to palpation. There are no signs of joint or muscle swelling. There is no tenderness to deep palpation of muscles.   Psychiatric: No hallucinatory behavior. Denies symptoms of depression or suicidal ideation. Mood and affect appear normal on exam.     NEUROLOGICAL EXAM:   Mental status, orientation: Awake, alert and fully oriented.   Speech and language: speech is clear and fluent. The patient is able to name, repeat and comprehend.   Memory: There is intact recollection of recent and remote events.   Cranial nerve exam: Pupils are 3-4 mm bilaterally and equally reactive to light and accommodation. Visual fields are intact by confrontation. There is no nystagmus on primary or secondary gaze. Intact full EOM in all directions of gaze. Face appears symmetric. Sensation in the face is intact to light touch. Uvula is midline. Palate elevates symmetrically. Tongue is midline and without any signs of tongue biting or fasciculations.Sternocleidomastoid muscles exhibit is normal strength bilaterally. Shoulder shrug is intact bilaterally.   Motor exam: Strength is 5/5 in all extremities. Tone is normal.  Mild action tremors with both hands.  Sensory exam reveals normal sense of light touch, proprioception, vibration and pinprick in all extremities.   Deep tendon reflexes:  2+ throughout. Plantar responses are flexor. There is no clonus.   Coordination: shows a normal finger-nose-finger. Normal rapidly alternating movements.   Gait: The patient was able to get up from seated position on first attempt without requiring assistance. Found to be steady when walking. Movements were fluid with normal arm swing. The patient was able to turn without difficulties or tendency to fall.         ANCILLARY  DATA REVIEWED:       Lab Data Review:  Reviewed in chart.    Records reviewed:   Chart reviewed.        ASSESSMENT AND PLAN:  1. Pharmacoresistant intractable focal epilepsy (HCC)  - vitamin D2, Ergocalciferol, (DRISDOL) 1.25 MG (47045 UT) Cap capsule; Take 1 Capsule by mouth every 7 days for 180 days.  Dispense: 12 Capsule; Refill: 1  - levetiracetam (KEPPRA) 500 MG TABLET SR 24 HR; Take 4 Tablets by mouth every day. At bedtime.  Dispense: 360 Tablet; Refill: 3    2. Localization-related epilepsy (HCC)  - levetiracetam (KEPPRA) 500 MG TABLET SR 24 HR; Take 4 Tablets by mouth every day. At bedtime.  Dispense: 360 Tablet; Refill: 3  - divalproex ER (DEPAKOTE ER) 500 MG TABLET SR 24 HR; Take 2 Tablets by mouth at bedtime.  Dispense: 180 Tablet; Refill: 3    3. Vitamin D deficiency  - vitamin D2, Ergocalciferol, (DRISDOL) 1.25 MG (12676 UT) Cap capsule; Take 1 Capsule by mouth every 7 days for 180 days.  Dispense: 12 Capsule; Refill: 1  - VITAMIN D,25 HYDROXY; Future    4. Tremors of nervous system    5. Ptosis of eyelid, bilateral    6. Macrocytosis without anemia    7. Adverse effect of anticoagulant, subsequent encounter    8. 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  Epilepsy to believed to be structural in nature, likely related to abnormality on imaging on the right occipital region.  I personally reviewed images of MRI of the brain and CT of the brain recently obtained in the hospital admission, and compared them to prior studies that were done on this patient.  The findings appeared to be chronic and stable.  Overwhelmingly I believe that this was a calcification rather than a bleed.  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  The patient and family were educated on the process.  The  patient had been seen by neurology in the past, but unfortunately was lost to follow-up.  He was previously on a combination of carbamazepine and oxcarbazepine, however presented in status epilepticus to [facility].  He had several back-to-back generalized tonic-clonic seizures, and then frequent focal seizures were captured in the right posterior quadrant, with intermittent episodes of focal right posterior status epilepticus.  The patient was ultimately switched from oxcarbazepine and carbamazepine to levetiracetam and valproic acid.    When I saw him, he was on levetiracetam at 1000 mg twice a day, and valproic acid 500 mg twice a day, but was experiencing side effects, particularly daytime drowsiness related to am dose.  The medications were switched over to an ER formulation, and takes them at bedtime, previously reported side effects are now resolved.     Current AED regimen:  Levetiracetam ER 2000 mg p.o. nightly  Valproic acid ER 1000 mg p.o. nightly     Valproic acid was therapeutic last time checked.     Macrocytosis without anemia.  Mildly elevated transamise, following with PCP, most recent CMP was normal. Denies recent alcohol consumption.  Denies chronic alcohol use.  This may be related to Depakote.     Most recent ambulatory EEG did not show any seizures, and significant improvement from prior EEGs.  Incidentally contralateral sharps were noted on EEG.     He is driving, and denies any issues.  Patient agrees to stop driving immediately, should he have any episodes of confusion and or seizure activity.  This was discussed again during today's appointment.   Compliance was discussed extensively.       Vitamin D deficiency, continue with supplementation.  He will repeat levels soon.     Incidental, mild cardiac arrhythmia was captured on ambulatory EEG.  pt seen by cardiology, per pt/family no concern.      Chronic bilateral ptosis and upgaze deficit. No fatigability on exam.  Denies any  other elements that may suggest MG. States findings are chronic for many years and do not fluctuate throughout the day, partial serology was negative (Ach blocking ab and anti musk ab negative).  Referred to neuro ophthalmology.     The patient will keep us posted of any spells of pain or side effects.    FOLLOW-UP:   Return in about 6 months (around 10/21/2022).  He will establish with DAVEY Medeiros.      EDUCATION AND COUNSELING:  -Education was provided to the patient and/or family regarding diagnosis and prognosis. The chronic and unpredictable nature of the condition were discussed. There is increased risk for additional events, which may carry potential for significant injuries and death. Discussed frequent seizure triggers: sleep deprivation, medication non-compliance, use of illegal drugs/alcohol, stress, and others.   -We reviewed in detail the current antiepileptic regimen. Potential side effects of antiepileptics were discussed at length, including but no limited to: hypersensitivity reactions (rash and others, some of which can be fatal), visual field changes (some of which may be irreversible), glaucoma, diplopia, kidney stones, osteopenia/osteoporosis/bone fractures, hyperthermia/anhydrosis, hyponatremia, tremors/abnormal movements, ataxia, dizziness, fatigue, increased risk for falls, risk for cardiac arrhythmias and syncope, weight changes, hair loss, gastrointestinal side effects(hepatitis, pancreatitis, gastritis, ulcers, gingival hypertrophy/bleeding, drowsiness, sedation, memory loss, trouble finding words, anxiety/nervousness, increased risk for suicide, increased risk for depression, and psychosis.   -We also reviewed drug-drug interactions and their potential effect on seizure control and medication side effects.    -Recommend chronic vitamin D supplementation and regular exercise (if not contraindicated).   -Patient/family educated on risk for SUDEP (Sudden Death in Epilepsy).  Counseling was provided on the importance of strict medication and follow up compliance. The patient/family understand the risks associated with non-adherence with the medical plan as outlined, including but not limited to an increased risk for breakthrough seizures, which may contribute to injuries, disability, status epilepticus, and even death.   -Counseling was also provided on potential effects of alcohol and other drugs, which may lower seizure threshold and/or affect the metabolism of antiepileptic drugs. We recommend avoidance of alcohol and illegal drugs.  Denies use.  -Avoid sleep deprivation.   -We extensively discussed the aspects related to safety in drivers who suffer from epilepsy. The patient is encourage to report to the Division of Motor Vehicles of any condition and/or spells related to confusion, disorientation, and/or loss of awareness and/or loss of consciousness; as these may pose a safety issue if they occur while operating a motor vehicle. The patient and/or family are ultimately responsible for exercising caution and abiding to regulations in place. The patient understands that only the Department of Motor Vehicles (DMV) is able to authorize an individual to drive, even after medical clearance, DMV clearance must be obtained.   -Other seizure precautions were discussed at length, including no diving, no skydiving, no climbing or exposure to unprotected heights, no unsupervised swimming, no Jacuzzi or bathing in bathtubs or deep bodies of water. The patient/family have been advised about risks for operating any machinery while suffering from seizures / syncope / epilepsy and/or while taking antiepileptic drugs.   -The patient understands and agrees that due to the complexity of his/her diagnosis, results of any testing and further recommendations will typically be discussed/made during a face to face encounter in my office. The patient and/or family further understands it is their  responsibility to keep proper follow up.     Patient/family agree with plan, as outlined.         Morris Florence MD   Epilepsy and Neurodiagnostics.   Clinical  of Neurology [facility].   Diplomate in Neurology, Epilepsy, and Electrodiagnostic Medicine.   Office: [phone]  Fax: [phone]      BILLING DOCUMENTATION:     I have performed physical exam and reviewed and updated ROS and plan today 4/21/2022. In review of that note, there are no new changes except as documented above.     Counseling:  I spent greater than 50% time face-to-face time of a total of 40 minutes visit. Over 50% of the time of the visit today was spent on counseling and or coordination of care wtih the patient and/or family, with greater than 50% of the total discussing my assessment and plan as stated above.

## 2022-04-26 ENCOUNTER — TELEPHONE (OUTPATIENT)
Dept: MEDICAL GROUP | Facility: PHYSICIAN GROUP | Age: 77
End: 2022-04-26
Payer: MEDICARE

## 2022-04-26 NOTE — TELEPHONE ENCOUNTER
ESTABLISHED PATIENT PRE-VISIT PLANNING     Patient was NOT contacted to complete PVP.     Note: Patient will not be contacted if there is no indication to call.     1.  Reviewed notes from the last few office visits within the medical group: Yes    2.  If any orders were placed at last visit or intended to be done for this visit (i.e. 6 mos follow-up), do we have Results/Consult Notes?         •  Labs - Labs ordered, completed on 4/16/2022 and results are in chart.  Note: If patient appointment is for lab review and patient did not complete labs, check with provider if OK to reschedule patient until labs completed.       •  Imaging - Imaging was not ordered at last office visit.       •  Referrals - No referrals were ordered at last office visit.    3. Is this appointment scheduled as a Hospital Follow-Up? No    4.  Immunizations were updated in Epic using Reconcile Outside Information activity? Yes    5.  Patient is due for the following Health Maintenance Topics:   Health Maintenance Due   Topic Date Due   • IMM ZOSTER VACCINES (1 of 2) Never done         6.  AHA (Pulse8) form printed for Provider? No, already completed

## 2022-04-27 ENCOUNTER — OFFICE VISIT (OUTPATIENT)
Dept: MEDICAL GROUP | Facility: PHYSICIAN GROUP | Age: 77
End: 2022-04-27
Payer: MEDICARE

## 2022-04-27 VITALS
BODY MASS INDEX: 23.39 KG/M2 | HEIGHT: 64 IN | WEIGHT: 137 LBS | OXYGEN SATURATION: 97 % | HEART RATE: 68 BPM | RESPIRATION RATE: 14 BRPM | TEMPERATURE: 97.8 F | SYSTOLIC BLOOD PRESSURE: 120 MMHG | DIASTOLIC BLOOD PRESSURE: 66 MMHG

## 2022-04-27 DIAGNOSIS — Z12.5 SCREENING FOR MALIGNANT NEOPLASM OF PROSTATE: ICD-10-CM

## 2022-04-27 DIAGNOSIS — I10 ESSENTIAL HYPERTENSION: ICD-10-CM

## 2022-04-27 DIAGNOSIS — D75.89 MACROCYTOSIS WITHOUT ANEMIA: ICD-10-CM

## 2022-04-27 DIAGNOSIS — Z23 NEED FOR VACCINATION: ICD-10-CM

## 2022-04-27 DIAGNOSIS — E78.5 DYSLIPIDEMIA: ICD-10-CM

## 2022-04-27 DIAGNOSIS — E03.8 SUBCLINICAL HYPOTHYROIDISM: ICD-10-CM

## 2022-04-27 PROCEDURE — 90472 IMMUNIZATION ADMIN EACH ADD: CPT | Performed by: FAMILY MEDICINE

## 2022-04-27 PROCEDURE — 90715 TDAP VACCINE 7 YRS/> IM: CPT | Performed by: FAMILY MEDICINE

## 2022-04-27 PROCEDURE — 99214 OFFICE O/P EST MOD 30 MIN: CPT | Mod: 25 | Performed by: FAMILY MEDICINE

## 2022-04-27 PROCEDURE — 90732 PPSV23 VACC 2 YRS+ SUBQ/IM: CPT | Performed by: FAMILY MEDICINE

## 2022-04-27 PROCEDURE — G0009 ADMIN PNEUMOCOCCAL VACCINE: HCPCS | Performed by: FAMILY MEDICINE

## 2022-04-27 RX ORDER — DIVALPROEX SODIUM 500 MG/1
1000 TABLET, EXTENDED RELEASE ORAL
Qty: 180 TABLET | Refills: 0 | Status: CANCELLED | OUTPATIENT
Start: 2022-04-27 | End: 2023-04-27

## 2022-04-27 RX ORDER — AMLODIPINE BESYLATE 5 MG/1
5 TABLET ORAL DAILY
Qty: 100 TABLET | Refills: 3 | Status: SHIPPED | OUTPATIENT
Start: 2022-04-27 | End: 2023-02-02 | Stop reason: SDUPTHER

## 2022-04-27 RX ORDER — AMLODIPINE BESYLATE 5 MG/1
5 TABLET ORAL 2 TIMES DAILY
Refills: 0 | Status: CANCELLED | OUTPATIENT
Start: 2022-04-27

## 2022-04-27 RX ORDER — ZOSTER VACCINE RECOMBINANT, ADJUVANTED 50 MCG/0.5
0.5 KIT INTRAMUSCULAR ONCE
Qty: 0.5 ML | Refills: 0 | Status: SHIPPED | OUTPATIENT
Start: 2022-04-27 | End: 2022-04-27

## 2022-04-27 RX ORDER — ATORVASTATIN CALCIUM 10 MG/1
10 TABLET, FILM COATED ORAL EVERY MORNING
Qty: 100 TABLET | Refills: 3 | Status: SHIPPED | OUTPATIENT
Start: 2022-04-27 | End: 2023-10-23

## 2022-04-27 RX ORDER — LEVETIRACETAM 500 MG/1
2000 TABLET, FILM COATED, EXTENDED RELEASE ORAL DAILY
Qty: 360 TABLET | Refills: 0 | Status: CANCELLED | OUTPATIENT
Start: 2022-04-27 | End: 2023-04-27

## 2022-04-27 ASSESSMENT — FIBROSIS 4 INDEX: FIB4 SCORE: 2.5

## 2022-04-27 NOTE — ASSESSMENT & PLAN NOTE
Chronic medical diagnosis.    Results for CLAUS SIEGEL PAYAL (MRN [id number]) as of 4/28/2022 12:42   Ref. Range 4/16/2022 10:46   Cholesterol,Tot Latest Ref Range: 100 - 199 mg/dL 117   Triglycerides Latest Ref Range: 0 - 149 mg/dL 74   HDL Latest Ref Range: >=40 mg/dL 41   LDL Latest Ref Range: <100 mg/dL 61

## 2022-04-27 NOTE — ASSESSMENT & PLAN NOTE
Chronic issue. Has never been on any thyroid meds in the past. He has had abnormal thyroid function test dating back January 2021.  Most recent labs have TSH elevated at 5.44 and normal free T4 at 1.21.

## 2022-04-27 NOTE — ASSESSMENT & PLAN NOTE
Chronic medical diagnosis.  BP today in office is 120/66  Currently taking amlodipine 2.5mg, two tabs pm.  Our records indicated that he had been taking 2 tablets of  5 mg.  Denies any headaches or chest pain.

## 2022-04-27 NOTE — ASSESSMENT & PLAN NOTE
Chronic medical diagnosis.   This has been present since at least 2014.  Recent labs with MCV elevated at 103.9, MCH elevated at 33.4.

## 2022-04-27 NOTE — PROGRESS NOTES
CC:   Chief Complaint   Patient presents with   • Follow-Up     3 months   • Lab Results         HPI:   Mj presents today with his daughter for a f/u visit.  Last visit was in October 27.21.   Saw neurology,Dr Florence on 4/21. Doing well with current meds.  Will be following up with neurology in 6 months.      Essential hypertension  Chronic medical diagnosis.  BP today in office is 120/66  Currently taking amlodipine 2.5mg, two tabs pm.  Our records indicated that he had been taking 2 tablets of  5 mg.  Denies any headaches or chest pain.           Subclinical hypothyroidism  Chronic issue. Has never been on any thyroid meds in the past. He has had abnormal thyroid function test dating back January 2021.  Most recent labs have TSH elevated at 5.44 and normal free T4 at 1.21.      Dyslipidemia  Chronic medical diagnosis.  Currently taking Lipitor 10 mg daily     Ref. 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   This has been present since at least 2014.  Recent labs with MCV elevated at 103.9, MCH elevated at 33.4.      Current Outpatient Medications Ordered in Epic   Medication Sig Dispense Refill   • atorvastatin (LIPITOR) 10 MG Tab Take 1 Tablet by mouth every morning. 100 Tablet 3   • amLODIPine (NORVASC) 5 MG Tab Take 1 Tablet by mouth every day. 100 Tablet 3   • vitamin D2, Ergocalciferol, (DRISDOL) 1.25 MG (87084 UT) Cap capsule Take 1 Capsule by mouth every 7 days for 180 days. 12 Capsule 1   • levetiracetam (KEPPRA) 500 MG TABLET SR 24 HR Take 4 Tablets by mouth every day. At bedtime. 360 Tablet 3   • divalproex ER (DEPAKOTE ER) 500 MG TABLET SR 24 HR Take 2 Tablets by mouth at bedtime. 180 Tablet 3     No current Epic-ordered facility-administered medications on file.       Past  "Medical History:   Diagnosis Date   • Hypercholesteremia    • Hypertension    • Seizure (HCC)        Social History     Tobacco Use   • Smoking status: Former Smoker     Types: Cigarettes     Quit date:      Years since quittin.3   • Smokeless tobacco: Never Used   Vaping Use   • Vaping Use: Never used   Substance Use Topics   • Alcohol use: No   • Drug use: No       Allergies:  Patient has no known allergies.    Health Maintenance: received the covid booster #3 in oct-2021.     Objective:       Exam:  /66 (BP Location: Left arm, Patient Position: Sitting, BP Cuff Size: Adult)   Pulse 68   Temp 36.6 °C (97.8 °F) (Temporal)   Resp 14   Ht 1.626 m (5' 4\")   Wt 62.1 kg (137 lb)   SpO2 97%   BMI 23.52 kg/m²   Body mass index is 23.52 kg/m².  Wt Readings from Last 4 Encounters:   22 62.1 kg (137 lb)   22 64 kg (141 lb 1.5 oz)   22 63 kg (139 lb)   10/27/21 65.3 kg (144 lb)       Gen: Alert and oriented, No apparent distress. Appropriately groomed.  Neck: Neck is supple without lymphadenopathy.No thyromegaly.   Lungs: Normal effort, CTA bilaterally, no wheezes, rhonchi, or rales  CV: Regular rate and rhythm. No Lower extremity edema  Skin: No rash noted.      Assessment & Plan:     76 y.o. male with the following -     1. Dyslipidemia  Chronic medical diagnosis.  Stable.  Continue with Lipitor 10 mg daily.  His recent labs do have improvement in triglyceride levels.  - atorvastatin (LIPITOR) 10 MG Tab; Take 1 Tablet by mouth every morning.  Dispense: 100 Tablet; Refill: 3  - CBC WITH DIFFERENTIAL; Future    2. Essential hypertension  -Chronic medical diagnosis.  Stable.  Daughter did clarify that he is taking amlodipine 2.5 mg, 2 tablets nightly.    amLODIPine (NORVASC) 5 MG Tab; Take 1 Tablet by mouth every day.  Dispense: 100 Tablet; Refill: 3  - URINALYSIS,CULTURE IF INDICATED; Future    3. Need for vaccination  - Tdap =>8yo IM  - Pneumococal Polysaccharide Vaccine 23-Valent " =>3YO SQ/IM  - Zoster Vac Recomb Adjuvanted (SHINGRIX) 50 MCG/0.5ML Recon Susp; Inject 0.5 mL into the shoulder, thigh, or buttocks one time for 1 dose.  Dispense: 0.5 mL; Refill: 0    4. Subclinical hypothyroidism  -Chronic medical diagnosis.  This is been going on for several years.  He is not on any thyroid medications.  We will continue to monitor.    TSH WITH REFLEX TO FT4; Future    5. Screening for malignant neoplasm of prostate  - Prostate Specific Ag Screening; Future    6. Macrocytosis without anemia  Chronic medical diagnosis.  We will continue to monitor.  May be due to his seizure medications.    I spent a total of 32minutes with record review, exam, communication with the patient, communication with other providers, and documentation of this encounter.      Return in about 6 weeks (around 6/8/2022) for bph.    Please note that this dictation was created using voice recognition software. I have made every reasonable attempt to correct obvious errors, but I expect that there are errors of grammar and possibly content that I did not discover before finalizing the note.

## 2022-05-19 ENCOUNTER — TELEPHONE (OUTPATIENT)
Dept: HEALTH INFORMATION MANAGEMENT | Facility: OTHER | Age: 77
End: 2022-05-19

## 2022-06-18 ENCOUNTER — HOSPITAL ENCOUNTER (OUTPATIENT)
Dept: LAB | Facility: MEDICAL CENTER | Age: 77
End: 2022-06-18
Attending: FAMILY MEDICINE
Payer: MEDICARE

## 2022-06-18 DIAGNOSIS — E78.5 DYSLIPIDEMIA: ICD-10-CM

## 2022-06-18 DIAGNOSIS — Z12.5 SCREENING FOR MALIGNANT NEOPLASM OF PROSTATE: ICD-10-CM

## 2022-06-18 DIAGNOSIS — I10 ESSENTIAL HYPERTENSION: ICD-10-CM

## 2022-06-18 DIAGNOSIS — E03.8 SUBCLINICAL HYPOTHYROIDISM: ICD-10-CM

## 2022-06-18 LAB
APPEARANCE UR: CLEAR
BASOPHILS # BLD AUTO: 0.5 % (ref 0–1.8)
BASOPHILS # BLD: 0.04 K/UL (ref 0–0.12)
BILIRUB UR QL STRIP.AUTO: NEGATIVE
CHOLEST SERPL-MCNC: 129 MG/DL (ref 100–199)
COLOR UR: YELLOW
EOSINOPHIL # BLD AUTO: 0.08 K/UL (ref 0–0.51)
EOSINOPHIL NFR BLD: 1.1 % (ref 0–6.9)
ERYTHROCYTE [DISTWIDTH] IN BLOOD BY AUTOMATED COUNT: 45.1 FL (ref 35.9–50)
FASTING STATUS PATIENT QL REPORTED: NORMAL
GLUCOSE UR STRIP.AUTO-MCNC: NEGATIVE MG/DL
HCT VFR BLD AUTO: 49.9 % (ref 42–52)
HDLC SERPL-MCNC: 39 MG/DL
HGB BLD-MCNC: 16.2 G/DL (ref 14–18)
IMM GRANULOCYTES # BLD AUTO: 0.02 K/UL (ref 0–0.11)
IMM GRANULOCYTES NFR BLD AUTO: 0.3 % (ref 0–0.9)
KETONES UR STRIP.AUTO-MCNC: NEGATIVE MG/DL
LDLC SERPL CALC-MCNC: 74 MG/DL
LEUKOCYTE ESTERASE UR QL STRIP.AUTO: NEGATIVE
LYMPHOCYTES # BLD AUTO: 3.21 K/UL (ref 1–4.8)
LYMPHOCYTES NFR BLD: 43.2 % (ref 22–41)
MCH RBC QN AUTO: 33.9 PG (ref 27–33)
MCHC RBC AUTO-ENTMCNC: 32.5 G/DL (ref 33.7–35.3)
MCV RBC AUTO: 104.4 FL (ref 81.4–97.8)
MICRO URNS: NORMAL
MONOCYTES # BLD AUTO: 0.54 K/UL (ref 0–0.85)
MONOCYTES NFR BLD AUTO: 7.3 % (ref 0–13.4)
NEUTROPHILS # BLD AUTO: 3.54 K/UL (ref 1.82–7.42)
NEUTROPHILS NFR BLD: 47.6 % (ref 44–72)
NITRITE UR QL STRIP.AUTO: NEGATIVE
NRBC # BLD AUTO: 0 K/UL
NRBC BLD-RTO: 0 /100 WBC
PH UR STRIP.AUTO: 7 [PH] (ref 5–8)
PLATELET # BLD AUTO: 199 K/UL (ref 164–446)
PMV BLD AUTO: 13 FL (ref 9–12.9)
PROT UR QL STRIP: NEGATIVE MG/DL
PSA SERPL-MCNC: 4.17 NG/ML (ref 0–4)
RBC # BLD AUTO: 4.78 M/UL (ref 4.7–6.1)
RBC UR QL AUTO: NEGATIVE
SP GR UR STRIP.AUTO: 1.02
T4 FREE SERPL-MCNC: 1.11 NG/DL (ref 0.93–1.7)
TRIGL SERPL-MCNC: 82 MG/DL (ref 0–149)
TSH SERPL DL<=0.005 MIU/L-ACNC: 5.57 UIU/ML (ref 0.38–5.33)
UROBILINOGEN UR STRIP.AUTO-MCNC: 1 MG/DL
WBC # BLD AUTO: 7.4 K/UL (ref 4.8–10.8)

## 2022-06-18 PROCEDURE — 85025 COMPLETE CBC W/AUTO DIFF WBC: CPT

## 2022-06-18 PROCEDURE — 81003 URINALYSIS AUTO W/O SCOPE: CPT

## 2022-06-18 PROCEDURE — 84153 ASSAY OF PSA TOTAL: CPT

## 2022-06-18 PROCEDURE — 80061 LIPID PANEL: CPT

## 2022-06-18 PROCEDURE — 84439 ASSAY OF FREE THYROXINE: CPT

## 2022-06-18 PROCEDURE — 36415 COLL VENOUS BLD VENIPUNCTURE: CPT

## 2022-06-18 PROCEDURE — 84443 ASSAY THYROID STIM HORMONE: CPT

## 2022-06-27 ENCOUNTER — OFFICE VISIT (OUTPATIENT)
Dept: MEDICAL GROUP | Facility: PHYSICIAN GROUP | Age: 77
End: 2022-06-27
Payer: MEDICARE

## 2022-06-27 VITALS
SYSTOLIC BLOOD PRESSURE: 108 MMHG | DIASTOLIC BLOOD PRESSURE: 62 MMHG | WEIGHT: 136.9 LBS | TEMPERATURE: 98.2 F | OXYGEN SATURATION: 95 % | HEART RATE: 57 BPM | HEIGHT: 64 IN | BODY MASS INDEX: 23.37 KG/M2 | RESPIRATION RATE: 12 BRPM

## 2022-06-27 DIAGNOSIS — E78.5 DYSLIPIDEMIA: ICD-10-CM

## 2022-06-27 DIAGNOSIS — E03.8 SUBCLINICAL HYPOTHYROIDISM: ICD-10-CM

## 2022-06-27 DIAGNOSIS — I10 ESSENTIAL HYPERTENSION: ICD-10-CM

## 2022-06-27 DIAGNOSIS — R97.20 ELEVATED PSA, LESS THAN 10 NG/ML: ICD-10-CM

## 2022-06-27 PROCEDURE — 99214 OFFICE O/P EST MOD 30 MIN: CPT | Performed by: FAMILY MEDICINE

## 2022-06-27 ASSESSMENT — FIBROSIS 4 INDEX: FIB4 SCORE: 2.5

## 2022-06-27 NOTE — PROGRESS NOTES
CC:   Chief Complaint   Patient presents with   • Follow-Up     6 weeks.    • Lab Results         HPI:   Mj presents today for a follow-up visit and to discuss recent lab results.  Last seen by me on 4/27/22. Last seizure was over 2 years ago prior to the pandemic.     Upcoming appointments with:  neuro- Mildred MARISCAL on 10/31/22.       Elevated PSA measurement  Chronic issue. Recent labs with PSA 4.17  Levels were elevated at 4.09 in December 2020.    Subclinical hypothyroidism  Chronic issue since January 2021. Recent labs with tsh 5.5 and normal free T4.    Essential hypertension  Chronic medical diagnosis.    BP today in office is 108/62  Currently taking amlodipine 5mg daily.  Denies any headaches or chest pain.   Checks bp at home, in range of  160s.    Dyslipidemia  Chronic medical diagnosis.  Continues to take Lipitor 10 mg daily.  Recent lipid panel stable.     Latest Reference Range & Units 06/18/22 07:21   Cholesterol,Tot 100 - 199 mg/dL 129   Triglycerides 0 - 149 mg/dL 82   HDL >=40 mg/dL 39 !   LDL <100 mg/dL 74   !: Data is abnormal        Current Outpatient Medications Ordered in Epic   Medication Sig Dispense Refill   • atorvastatin (LIPITOR) 10 MG Tab Take 1 Tablet by mouth every morning. 100 Tablet 3   • amLODIPine (NORVASC) 5 MG Tab Take 1 Tablet by mouth every day. 100 Tablet 3   • vitamin D2, Ergocalciferol, (DRISDOL) 1.25 MG (41970 UT) Cap capsule Take 1 Capsule by mouth every 7 days for 180 days. 12 Capsule 1   • levetiracetam (KEPPRA) 500 MG TABLET SR 24 HR Take 4 Tablets by mouth every day. At bedtime. 360 Tablet 3   • divalproex ER (DEPAKOTE ER) 500 MG TABLET SR 24 HR Take 2 Tablets by mouth at bedtime. 180 Tablet 3     No current Epic-ordered facility-administered medications on file.       Past Medical History:   Diagnosis Date   • Hypercholesteremia    • Hypertension    • Seizure (HCC)        Social History     Tobacco Use   • Smoking status: Former Smoker     Types:  "Cigarettes     Quit date:      Years since quittin.4   • Smokeless tobacco: Never Used   Vaping Use   • Vaping Use: Never used   Substance Use Topics   • Alcohol use: No   • Drug use: No       Allergies:  Patient has no known allergies.    Health Maintenance:  D/w patient and encouraged to receive covid booster #2. Patient agrees.     Objective:       Exam:  /62   Pulse (!) 57   Temp 36.8 °C (98.2 °F) (Temporal)   Resp 12   Ht 1.626 m (5' 4\")   Wt 62.1 kg (136 lb 14.4 oz)   SpO2 95%   BMI 23.50 kg/m²   Body mass index is 23.5 kg/m².  Wt Readings from Last 4 Encounters:   22 62.1 kg (136 lb 14.4 oz)   22 62.1 kg (137 lb)   22 64 kg (141 lb 1.5 oz)   22 63 kg (139 lb)       Gen: Alert and oriented, No apparent distress. Appropriately groomed.  Neck: Neck is supple without lymphadenopathy.No thyromegaly.   Lungs: Normal effort, CTA bilaterally, no wheezes, rhonchi, or rales  CV: Regular rate and rhythm. No Lower extremity edema  Skin: No rash noted.      Assessment & Plan:     77 y.o. male with the following -     1. Elevated PSA, less than 10 ng/ml  -Chronic medical diagnosis.  His PSA levels were elevated back in  and then they normalized.  We will refer him to   urology.  Referral to Urology    2. Subclinical hypothyroidism  Chronic medical diagnosis.-He has had elevated TSH since .  Has never been on any thyroid medications.  Recent labs with slightly elevated TSH and normal free T4.  We will repeat in 3 months.    TSH WITH REFLEX TO FT4; Future    3.Essential hypertension  Chronic medical diagnosis.  We will have patient come in for an MA visit with his home blood pressure monitor in 2 days.  States that his home blood pressure readings have been in the 160s and reading in the office today is at 108/62.  For now continue with amlodipine 5 mg daily.    4. Dyslipidemia  Chronic medical diagnosis.  Stable.  Continue with Lipitor 10 mg daily.      I spent a total " of 30 minutes with record review, exam, communication with the patient, communication with other providers, and documentation of this encounter.      Return 2 days MA for BP.    Please note that this dictation was created using voice recognition software. I have made every reasonable attempt to correct obvious errors, but I expect that there are errors of grammar and possibly content that I did not discover before finalizing the note.

## 2022-06-28 NOTE — ASSESSMENT & PLAN NOTE
Chronic medical diagnosis.    BP today in office is 108/62  Currently taking amlodipine 5mg daily.  Denies any headaches or chest pain.   Checks bp at home, in range of  160s.

## 2022-06-28 NOTE — ASSESSMENT & PLAN NOTE
Chronic medical diagnosis.  Continues to take Lipitor 10 mg daily.  Recent lipid panel stable.     Latest Reference Range & Units 06/18/22 07:21   Cholesterol,Tot 100 - 199 mg/dL 129   Triglycerides 0 - 149 mg/dL 82   HDL >=40 mg/dL 39 !   LDL <100 mg/dL 74   !: Data is abnormal

## 2022-06-29 ENCOUNTER — NON-PROVIDER VISIT (OUTPATIENT)
Dept: MEDICAL GROUP | Facility: PHYSICIAN GROUP | Age: 77
End: 2022-06-29
Payer: MEDICARE

## 2022-06-29 VITALS — DIASTOLIC BLOOD PRESSURE: 64 MMHG | SYSTOLIC BLOOD PRESSURE: 140 MMHG

## 2022-06-29 NOTE — PROGRESS NOTES
Mj Cornejo is a 77 y.o. male here for a non-provider visit for B/P Check     Encounter Vitals  Blood Pressure : (!) 140/64    If abnormal, was the Registered Nurse (office provider if RN is unavailable) notified today? Yes    Routed to PCP/Requested Provider? Yes      PCP was notified of high b/p.     Pt. Will buy a new monitor, stay on his amlodipine, and comeback in 2 weeks for a recheck on B/p

## 2022-07-13 ENCOUNTER — NON-PROVIDER VISIT (OUTPATIENT)
Dept: MEDICAL GROUP | Facility: PHYSICIAN GROUP | Age: 77
End: 2022-07-13
Payer: MEDICARE

## 2022-07-13 VITALS — SYSTOLIC BLOOD PRESSURE: 140 MMHG | DIASTOLIC BLOOD PRESSURE: 64 MMHG

## 2022-07-13 NOTE — NON-PROVIDER
There had been about a 15-20 point difference between the manual cuff and at home one. The at home cuff was brand new out of the box. Appointment had already been made with Dr. Whitfield for 9/28/2022

## 2022-09-21 ENCOUNTER — HOSPITAL ENCOUNTER (OUTPATIENT)
Dept: LAB | Facility: MEDICAL CENTER | Age: 77
End: 2022-09-21
Attending: FAMILY MEDICINE
Payer: MEDICARE

## 2022-09-21 DIAGNOSIS — E03.8 SUBCLINICAL HYPOTHYROIDISM: ICD-10-CM

## 2022-09-21 LAB
APPEARANCE UR: CLEAR
BASOPHILS # BLD AUTO: 0.8 % (ref 0–1.8)
BASOPHILS # BLD: 0.07 K/UL (ref 0–0.12)
BILIRUB UR QL STRIP.AUTO: NEGATIVE
COLOR UR: YELLOW
EOSINOPHIL # BLD AUTO: 0.07 K/UL (ref 0–0.51)
EOSINOPHIL NFR BLD: 0.8 % (ref 0–6.9)
ERYTHROCYTE [DISTWIDTH] IN BLOOD BY AUTOMATED COUNT: 42.4 FL (ref 35.9–50)
GLUCOSE UR STRIP.AUTO-MCNC: NEGATIVE MG/DL
HCT VFR BLD AUTO: 46.2 % (ref 42–52)
HGB BLD-MCNC: 15.5 G/DL (ref 14–18)
IMM GRANULOCYTES # BLD AUTO: 0.04 K/UL (ref 0–0.11)
IMM GRANULOCYTES NFR BLD AUTO: 0.4 % (ref 0–0.9)
KETONES UR STRIP.AUTO-MCNC: ABNORMAL MG/DL
LEUKOCYTE ESTERASE UR QL STRIP.AUTO: NEGATIVE
LYMPHOCYTES # BLD AUTO: 2.56 K/UL (ref 1–4.8)
LYMPHOCYTES NFR BLD: 28.3 % (ref 22–41)
MCH RBC QN AUTO: 33.8 PG (ref 27–33)
MCHC RBC AUTO-ENTMCNC: 33.5 G/DL (ref 33.7–35.3)
MCV RBC AUTO: 100.7 FL (ref 81.4–97.8)
MICRO URNS: ABNORMAL
MONOCYTES # BLD AUTO: 0.72 K/UL (ref 0–0.85)
MONOCYTES NFR BLD AUTO: 7.9 % (ref 0–13.4)
NEUTROPHILS # BLD AUTO: 5.6 K/UL (ref 1.82–7.42)
NEUTROPHILS NFR BLD: 61.8 % (ref 44–72)
NITRITE UR QL STRIP.AUTO: NEGATIVE
NRBC # BLD AUTO: 0 K/UL
NRBC BLD-RTO: 0 /100 WBC
PH UR STRIP.AUTO: 6 [PH] (ref 5–8)
PLATELET # BLD AUTO: 196 K/UL (ref 164–446)
PMV BLD AUTO: 12.5 FL (ref 9–12.9)
PROT UR QL STRIP: NEGATIVE MG/DL
PSA SERPL-MCNC: 5.9 NG/ML (ref 0–4)
RBC # BLD AUTO: 4.59 M/UL (ref 4.7–6.1)
RBC UR QL AUTO: NEGATIVE
SP GR UR STRIP.AUTO: 1.02
T4 FREE SERPL-MCNC: 1.03 NG/DL (ref 0.93–1.7)
TSH SERPL DL<=0.005 MIU/L-ACNC: 6.7 UIU/ML (ref 0.38–5.33)
UROBILINOGEN UR STRIP.AUTO-MCNC: 0.2 MG/DL
WBC # BLD AUTO: 9.1 K/UL (ref 4.8–10.8)

## 2022-09-21 PROCEDURE — 81003 URINALYSIS AUTO W/O SCOPE: CPT

## 2022-09-21 PROCEDURE — 84439 ASSAY OF FREE THYROXINE: CPT

## 2022-09-21 PROCEDURE — 36415 COLL VENOUS BLD VENIPUNCTURE: CPT

## 2022-09-21 PROCEDURE — 84153 ASSAY OF PSA TOTAL: CPT

## 2022-09-21 PROCEDURE — 85025 COMPLETE CBC W/AUTO DIFF WBC: CPT

## 2022-09-21 PROCEDURE — 84443 ASSAY THYROID STIM HORMONE: CPT

## 2022-09-22 ENCOUNTER — OFFICE VISIT (OUTPATIENT)
Dept: MEDICAL GROUP | Facility: PHYSICIAN GROUP | Age: 77
End: 2022-09-22
Payer: MEDICARE

## 2022-09-22 VITALS
WEIGHT: 135.9 LBS | SYSTOLIC BLOOD PRESSURE: 110 MMHG | BODY MASS INDEX: 23.2 KG/M2 | OXYGEN SATURATION: 96 % | HEART RATE: 58 BPM | HEIGHT: 64 IN | DIASTOLIC BLOOD PRESSURE: 68 MMHG | RESPIRATION RATE: 12 BRPM | TEMPERATURE: 98.4 F

## 2022-09-22 DIAGNOSIS — E03.8 SUBCLINICAL HYPOTHYROIDISM: ICD-10-CM

## 2022-09-22 DIAGNOSIS — I10 ESSENTIAL HYPERTENSION: ICD-10-CM

## 2022-09-22 DIAGNOSIS — Z23 NEED FOR VACCINATION: ICD-10-CM

## 2022-09-22 DIAGNOSIS — E78.5 DYSLIPIDEMIA: ICD-10-CM

## 2022-09-22 DIAGNOSIS — R97.20 ELEVATED PSA MEASUREMENT: ICD-10-CM

## 2022-09-22 DIAGNOSIS — I47.10 SUPRAVENTRICULAR TACHYCARDIA (HCC): ICD-10-CM

## 2022-09-22 PROCEDURE — 90662 IIV NO PRSV INCREASED AG IM: CPT | Performed by: FAMILY MEDICINE

## 2022-09-22 PROCEDURE — 99214 OFFICE O/P EST MOD 30 MIN: CPT | Mod: 25 | Performed by: FAMILY MEDICINE

## 2022-09-22 PROCEDURE — G0008 ADMIN INFLUENZA VIRUS VAC: HCPCS | Performed by: FAMILY MEDICINE

## 2022-09-22 RX ORDER — ZOSTER VACCINE RECOMBINANT, ADJUVANTED 50 MCG/0.5
0.5 KIT INTRAMUSCULAR ONCE
Qty: 0.5 ML | Refills: 0 | Status: SHIPPED | OUTPATIENT
Start: 2022-09-22 | End: 2022-09-22

## 2022-09-22 ASSESSMENT — FIBROSIS 4 INDEX: FIB4 SCORE: 2.54

## 2022-09-22 NOTE — PROGRESS NOTES
CC:   Chief Complaint   Patient presents with    Follow-Up     3 months.     Lab Results         HPI:   Mj presents today for a follow-up visit with his dtr. Last seen by me on ..  At that time thyroid labs and a referral to urology were placed.    Upcoming appointments with:  Neurology on 2023.    Essential hypertension  Chronic. bp today 110/68.  Continues to take amlodipine 5mg hs.     Elevated PSA measurement    Chronic medical diagnosis.  Repeat labs this week have PSA elevated at 5.9.  denies any dysuria or hematuria. Continues to c/o polyuria. He was referred to urology 3 months ago at the  appointment.    Subclinical hypothyroidism    Chronic medical diagnosis.  Recent labs with TSH elevated 6.7 and free T4 1.03.  Has never been on thyroid meds.   Denies any tremors, hair loss, mood changes or bowel movement changes.      Current Outpatient Medications Ordered in Epic   Medication Sig Dispense Refill    Zoster Vac Recomb Adjuvanted (SHINGRIX) 50 MCG/0.5ML Recon Susp Inject 0.5 mL into the shoulder, thigh, or buttocks one time for 1 dose. 0.5 mL 0    atorvastatin (LIPITOR) 10 MG Tab Take 1 Tablet by mouth every morning. 100 Tablet 3    amLODIPine (NORVASC) 5 MG Tab Take 1 Tablet by mouth every day. 100 Tablet 3    vitamin D2, Ergocalciferol, (DRISDOL) 1.25 MG (84956 UT) Cap capsule Take 1 Capsule by mouth every 7 days for 180 days. 12 Capsule 1    levetiracetam (KEPPRA) 500 MG TABLET SR 24 HR Take 4 Tablets by mouth every day. At bedtime. 360 Tablet 3    divalproex ER (DEPAKOTE ER) 500 MG TABLET SR 24 HR Take 2 Tablets by mouth at bedtime. 180 Tablet 3     No current Epic-ordered facility-administered medications on file.       Past Medical History:   Diagnosis Date    Hypercholesteremia     Hypertension     Seizure (HCC)        Social History     Tobacco Use    Smoking status: Former     Types: Cigarettes     Quit date:      Years since quittin.7    Smokeless tobacco:  "Never   Vaping Use    Vaping Use: Never used   Substance Use Topics    Alcohol use: No    Drug use: No       Allergies:  Patient has no known allergies.    Health Maintenance: flu shot today      Objective:       Exam:  /68   Pulse (!) 58   Temp 36.9 °C (98.4 °F) (Temporal)   Resp 12   Ht 1.626 m (5' 4\")   Wt 61.6 kg (135 lb 14.4 oz)   SpO2 96%   BMI 23.33 kg/m²   Body mass index is 23.33 kg/m².  Wt Readings from Last 4 Encounters:   09/22/22 61.6 kg (135 lb 14.4 oz)   06/27/22 62.1 kg (136 lb 14.4 oz)   04/27/22 62.1 kg (137 lb)   04/21/22 64 kg (141 lb 1.5 oz)       Gen: Alert and oriented, No apparent distress. Appropriately groomed.  Neck: Neck is supple without lymphadenopathy.No thyromegaly.   Lungs: Normal effort, CTA bilaterally, no wheezes, rhonchi, or rales  CV: Regular rate and rhythm. No Lower extremity edema  Skin: No rash noted.      Assessment & Plan:     77 y.o. male with the following -     1. Subclinical hypothyroidism  - chronic. Recent labs with elevated TSH and normal free T4. Will continue to monitor.  TSH WITH REFLEX TO FT4; Future    2. Elevated PSA measurement  Chronic. Not well controlled. Recent labs with elevated PSA. Phone # urology provided to dtr. Encouraged to call and schedule f/u with urology.    3. Need for vaccination  - Zoster Vac Recomb Adjuvanted (SHINGRIX) 50 MCG/0.5ML Recon Susp; Inject 0.5 mL into the shoulder, thigh, or buttocks one time for 1 dose.  Dispense: 0.5 mL; Refill: 0  - INFLUENZA VACCINE, HIGH DOSE (65+ ONLY)    4. Essential hypertension  Chronic. Stable. C/w meds    5. Supraventricular tachycardia (HCC)  -  HCC Gap Form    Diagnosis to address: I47.1 - Supraventricular tachycardia (HCC)  Assessment and plan: Chronic, stable. Saw cards in march 2021.  Had echo and ziopatch completed. Hasn't been back.   Dneies any chest pain or palpitations.   Last edited 09/22/22 18:01 PDT by Viviana Basho, M.D.       REFERRAL TO CARDIOLOGY  - CBC WITH DIFFERENTIAL; " Future  - Comp Metabolic Panel; Future    6. Dyslipidemia  - chronic. Stable. C/w lipitor  Lipid Profile; Future      No follow-ups on file.    Please note that this dictation was created using voice recognition software. I have made every reasonable attempt to correct obvious errors, but I expect that there are errors of grammar and possibly content that I did not discover before finalizing the note.

## 2022-09-23 NOTE — ASSESSMENT & PLAN NOTE
Chronic medical diagnosis.  Recent labs with TSH elevated 6.7 and free T4 1.03.  Has never been on thyroid meds.   Denies any tremors, hair loss, mood changes or bowel movement changes.

## 2022-09-23 NOTE — ASSESSMENT & PLAN NOTE
Chronic medical diagnosis.  Repeat labs this week have PSA elevated at 5.9.  denies any dysuria or hematuria. Continues to c/o polyuria. He was referred to urology 3 months ago at the June appointment.

## 2022-10-26 ENCOUNTER — OFFICE VISIT (OUTPATIENT)
Dept: URGENT CARE | Facility: PHYSICIAN GROUP | Age: 77
End: 2022-10-26
Payer: MEDICARE

## 2022-10-26 VITALS
TEMPERATURE: 98.6 F | SYSTOLIC BLOOD PRESSURE: 142 MMHG | HEIGHT: 64 IN | OXYGEN SATURATION: 96 % | RESPIRATION RATE: 14 BRPM | WEIGHT: 135 LBS | BODY MASS INDEX: 23.05 KG/M2 | HEART RATE: 57 BPM | DIASTOLIC BLOOD PRESSURE: 60 MMHG

## 2022-10-26 DIAGNOSIS — R04.0 LEFT-SIDED EPISTAXIS: ICD-10-CM

## 2022-10-26 PROCEDURE — 99213 OFFICE O/P EST LOW 20 MIN: CPT | Performed by: NURSE PRACTITIONER

## 2022-10-26 RX ORDER — TADALAFIL 5 MG/1
TABLET ORAL
COMMUNITY

## 2022-10-26 RX ORDER — GINSENG 100 MG
1 CAPSULE ORAL 3 TIMES DAILY
Qty: 14 G | Refills: 0 | Status: SHIPPED | OUTPATIENT
Start: 2022-10-26 | End: 2022-10-29

## 2022-10-26 RX ORDER — ERGOCALCIFEROL 1.25 MG/1
CAPSULE ORAL
COMMUNITY
End: 2024-01-24

## 2022-10-26 RX ORDER — AMLODIPINE BESYLATE 2.5 MG/1
TABLET ORAL
COMMUNITY
End: 2023-02-03

## 2022-10-26 ASSESSMENT — ENCOUNTER SYMPTOMS
SHORTNESS OF BREATH: 0
FEVER: 0
DIZZINESS: 0

## 2022-10-26 ASSESSMENT — FIBROSIS 4 INDEX: FIB4 SCORE: 2.54

## 2022-10-26 NOTE — PATIENT INSTRUCTIONS
-OTC Oxymetazoline (Afrin).   -Apply antibiotic ointment (bacitracian) nasally with your fingertip or cotton swab three times daily for three days.  -Humidifier  -Ponaris nasal emollient.    For recurrent nose bleed:  -Blow nose to remove blood and clots.  -Spray Oxymetazoline in the nares as directed.  -Pinch nose and hold continuously for 10 minutes.  -Can also apply a cold compress to the bridge of the nose.    Follow up with Primary Care Provider. Follow up urgently for recurrent and persistent nose bleed, as cautery or nasal packing may be necessary. Follow up emergently for severe nose bleed, dizziness, weakness, severe headache.

## 2022-10-26 NOTE — PROGRESS NOTES
Subjective:     Mj Cornejo is a 77 y.o. male who presents for Epistaxis (Nose bleed, left nostril, this morning )      Nose bleed at at 0300 today, left nare. Woke up with symptoms. Intermittent bleeding, stopped 20 minutes ago. No allergies. No trauma. Denies recent URI.           Epistaxis   The bleeding has been from the left nare. This is a new problem. The current episode started today. The problem has been waxing and waning. He has tried pressure for the symptoms. The treatment provided significant relief. There is no history of allergies, a bleeding disorder or frequent nosebleeds.     Past Medical History:   Diagnosis Date    Hypercholesteremia     Hypertension     Seizure (HCC)        History reviewed. No pertinent surgical history.    Social History     Socioeconomic History    Marital status:      Spouse name: Not on file    Number of children: Not on file    Years of education: Not on file    Highest education level: Not on file   Occupational History    Not on file   Tobacco Use    Smoking status: Former     Types: Cigarettes     Quit date:      Years since quittin.8    Smokeless tobacco: Never   Vaping Use    Vaping Use: Never used   Substance and Sexual Activity    Alcohol use: No    Drug use: No    Sexual activity: Not on file     Comment: , lives with wife and son, 2 sons   Other Topics Concern    Not on file   Social History Narrative    Not on file     Social Determinants of Health     Financial Resource Strain: Not on file   Food Insecurity: Not on file   Transportation Needs: Not on file   Physical Activity: Not on file   Stress: Not on file   Social Connections: Not on file   Intimate Partner Violence: Not on file   Housing Stability: Not on file        Family History   Problem Relation Age of Onset    Stroke Mother         No Known Allergies    Review of Systems   Constitutional:  Negative for fever.   HENT:  Positive for nosebleeds.    Respiratory:   "Negative for shortness of breath.    Neurological:  Negative for dizziness.   Endo/Heme/Allergies:  Negative for environmental allergies.   All other systems reviewed and are negative.     Objective:   BP (!) 142/60 (BP Location: Right arm, Patient Position: Sitting, BP Cuff Size: Adult)   Pulse (!) 57   Temp 37 °C (98.6 °F) (Temporal)   Resp 14   Ht 1.626 m (5' 4\")   Wt 61.2 kg (135 lb)   SpO2 96%   BMI 23.17 kg/m²     Physical Exam  Vitals reviewed.   Constitutional:       General: He is not in acute distress.     Appearance: He is not toxic-appearing.   HENT:      Nose:      Right Nostril: No septal hematoma.      Left Nostril: No septal hematoma.      Right Turbinates: Not pale.      Left Turbinates: Not pale.      Comments: Dried blood to left nare. Erythemic nasal mucosa bilaterally. No active bleeding noted. Dried clot to septum.      Mouth/Throat:      Mouth: Mucous membranes are moist.      Pharynx: Oropharynx is clear.   Pulmonary:      Effort: Pulmonary effort is normal. No respiratory distress.   Skin:     General: Skin is warm.      Coloration: Skin is not pale.   Neurological:      Mental Status: He is alert and oriented to person, place, and time.   Psychiatric:         Mood and Affect: Mood normal.       Assessment/Plan:   1. Left-sided epistaxis  - bacitracin 500 UNIT/GM ointment; Apply 1 Each topically 3 times a day for 3 days.  Dispense: 14 g; Refill: 0    -OTC Oxymetazoline (Afrin).   -Apply antibiotic ointment (bacitracian) nasally with your fingertip or cotton swab three times daily for three days.  -Humidifier  -Ponaris nasal emollient.    For recurrent nose bleed:  -Blow nose to remove blood and clots.  -Spray Oxymetazoline in the nares as directed.  -Pinch nose and hold continuously for 10 minutes.  -Can also apply a cold compress to the bridge of the nose.    Follow up with Primary Care Provider. Follow up urgently for recurrent and persistent nose bleed, as cautery or nasal packing " may be necessary. Follow up emergently for severe nose bleed, dizziness, weakness, severe headache.      -Discussed initial conservative measures amd common causes of nose bleeds from mucosal trauma or irritation, dry air, allergies, rhinitis, or excoriations. No active nose bleed. Given nose clamp.     Differential diagnosis, natural history, supportive care, and indications for immediate follow-up discussed.

## 2022-11-19 ENCOUNTER — HOSPITAL ENCOUNTER (OUTPATIENT)
Dept: LAB | Facility: MEDICAL CENTER | Age: 77
End: 2022-11-19
Attending: FAMILY MEDICINE
Payer: MEDICARE

## 2022-11-19 DIAGNOSIS — E03.8 SUBCLINICAL HYPOTHYROIDISM: ICD-10-CM

## 2022-11-19 DIAGNOSIS — E78.5 DYSLIPIDEMIA: ICD-10-CM

## 2022-11-19 DIAGNOSIS — I47.10 SUPRAVENTRICULAR TACHYCARDIA (HCC): ICD-10-CM

## 2022-11-19 LAB
ALBUMIN SERPL BCP-MCNC: 4 G/DL (ref 3.2–4.9)
ALBUMIN/GLOB SERPL: 1.2 G/DL
ALP SERPL-CCNC: 81 U/L (ref 30–99)
ALT SERPL-CCNC: 15 U/L (ref 2–50)
ANION GAP SERPL CALC-SCNC: 9 MMOL/L (ref 7–16)
AST SERPL-CCNC: 23 U/L (ref 12–45)
BASOPHILS # BLD AUTO: 0.8 % (ref 0–1.8)
BASOPHILS # BLD: 0.06 K/UL (ref 0–0.12)
BILIRUB SERPL-MCNC: 0.6 MG/DL (ref 0.1–1.5)
BUN SERPL-MCNC: 14 MG/DL (ref 8–22)
CALCIUM SERPL-MCNC: 10.2 MG/DL (ref 8.5–10.5)
CHLORIDE SERPL-SCNC: 106 MMOL/L (ref 96–112)
CHOLEST SERPL-MCNC: 135 MG/DL (ref 100–199)
CO2 SERPL-SCNC: 25 MMOL/L (ref 20–33)
CREAT SERPL-MCNC: 0.86 MG/DL (ref 0.5–1.4)
EOSINOPHIL # BLD AUTO: 0.13 K/UL (ref 0–0.51)
EOSINOPHIL NFR BLD: 1.8 % (ref 0–6.9)
ERYTHROCYTE [DISTWIDTH] IN BLOOD BY AUTOMATED COUNT: 45.6 FL (ref 35.9–50)
FASTING STATUS PATIENT QL REPORTED: NORMAL
GFR SERPLBLD CREATININE-BSD FMLA CKD-EPI: 89 ML/MIN/1.73 M 2
GLOBULIN SER CALC-MCNC: 3.3 G/DL (ref 1.9–3.5)
GLUCOSE SERPL-MCNC: 87 MG/DL (ref 65–99)
HCT VFR BLD AUTO: 49.9 % (ref 42–52)
HDLC SERPL-MCNC: 41 MG/DL
HGB BLD-MCNC: 15.8 G/DL (ref 14–18)
IMM GRANULOCYTES # BLD AUTO: 0.04 K/UL (ref 0–0.11)
IMM GRANULOCYTES NFR BLD AUTO: 0.6 % (ref 0–0.9)
LDLC SERPL CALC-MCNC: 77 MG/DL
LYMPHOCYTES # BLD AUTO: 2.92 K/UL (ref 1–4.8)
LYMPHOCYTES NFR BLD: 40.3 % (ref 22–41)
MCH RBC QN AUTO: 33.1 PG (ref 27–33)
MCHC RBC AUTO-ENTMCNC: 31.7 G/DL (ref 33.7–35.3)
MCV RBC AUTO: 104.6 FL (ref 81.4–97.8)
MONOCYTES # BLD AUTO: 0.46 K/UL (ref 0–0.85)
MONOCYTES NFR BLD AUTO: 6.3 % (ref 0–13.4)
NEUTROPHILS # BLD AUTO: 3.64 K/UL (ref 1.82–7.42)
NEUTROPHILS NFR BLD: 50.2 % (ref 44–72)
NRBC # BLD AUTO: 0 K/UL
NRBC BLD-RTO: 0 /100 WBC
PLATELET # BLD AUTO: 210 K/UL (ref 164–446)
PMV BLD AUTO: 12.4 FL (ref 9–12.9)
POTASSIUM SERPL-SCNC: 4.7 MMOL/L (ref 3.6–5.5)
PROT SERPL-MCNC: 7.3 G/DL (ref 6–8.2)
RBC # BLD AUTO: 4.77 M/UL (ref 4.7–6.1)
SODIUM SERPL-SCNC: 140 MMOL/L (ref 135–145)
T4 FREE SERPL-MCNC: 0.95 NG/DL (ref 0.93–1.7)
TRIGL SERPL-MCNC: 83 MG/DL (ref 0–149)
TSH SERPL DL<=0.005 MIU/L-ACNC: 6.43 UIU/ML (ref 0.38–5.33)
WBC # BLD AUTO: 7.3 K/UL (ref 4.8–10.8)

## 2022-11-19 PROCEDURE — 80061 LIPID PANEL: CPT

## 2022-11-19 PROCEDURE — 84439 ASSAY OF FREE THYROXINE: CPT

## 2022-11-19 PROCEDURE — 80053 COMPREHEN METABOLIC PANEL: CPT

## 2022-11-19 PROCEDURE — 85025 COMPLETE CBC W/AUTO DIFF WBC: CPT

## 2022-11-19 PROCEDURE — 84443 ASSAY THYROID STIM HORMONE: CPT

## 2022-11-19 PROCEDURE — 36415 COLL VENOUS BLD VENIPUNCTURE: CPT

## 2022-11-22 NOTE — RESULT ENCOUNTER NOTE
Please call patient and let him know that    Your lipid panel, electrolytes, fasting blood sugar, kidney and liver function were all within normal range.  Thyroid labs were slightly improved. We can discuss these results further at next appt with me on 2/1/23.  Thanks,  Viviana Whitfield M.D.

## 2023-01-20 ENCOUNTER — TELEPHONE (OUTPATIENT)
Dept: NEUROLOGY | Facility: MEDICAL CENTER | Age: 78
End: 2023-01-20
Payer: MEDICARE

## 2023-01-27 ENCOUNTER — APPOINTMENT (OUTPATIENT)
Dept: NEUROLOGY | Facility: MEDICAL CENTER | Age: 78
End: 2023-01-27
Attending: STUDENT IN AN ORGANIZED HEALTH CARE EDUCATION/TRAINING PROGRAM
Payer: MEDICARE

## 2023-02-02 DIAGNOSIS — I10 ESSENTIAL HYPERTENSION: ICD-10-CM

## 2023-02-03 RX ORDER — AMLODIPINE BESYLATE 5 MG/1
5 TABLET ORAL DAILY
Qty: 100 TABLET | Refills: 3 | Status: SHIPPED | OUTPATIENT
Start: 2023-02-03

## 2023-02-03 NOTE — TELEPHONE ENCOUNTER
Requested Prescriptions     Pending Prescriptions Disp Refills   • amLODIPine (NORVASC) 5 MG Tab 100 Tablet 3     Sig: Take 1 Tablet by mouth every day.   Viviana Whitfield M.D.

## 2023-03-16 ENCOUNTER — HOSPITAL ENCOUNTER (OUTPATIENT)
Dept: LAB | Facility: MEDICAL CENTER | Age: 78
End: 2023-03-16
Attending: PSYCHIATRY & NEUROLOGY
Payer: MEDICARE

## 2023-03-16 LAB
25(OH)D3 SERPL-MCNC: 17 NG/ML (ref 30–100)
ALBUMIN SERPL BCP-MCNC: 4.4 G/DL (ref 3.2–4.9)
ALBUMIN/GLOB SERPL: 1.3 G/DL
ALP SERPL-CCNC: 116 U/L (ref 30–99)
ALT SERPL-CCNC: 21 U/L (ref 2–50)
ANION GAP SERPL CALC-SCNC: 13 MMOL/L (ref 7–16)
APPEARANCE UR: CLEAR
AST SERPL-CCNC: 27 U/L (ref 12–45)
BASOPHILS # BLD AUTO: 0.8 % (ref 0–1.8)
BASOPHILS # BLD: 0.06 K/UL (ref 0–0.12)
BILIRUB SERPL-MCNC: 0.7 MG/DL (ref 0.1–1.5)
BILIRUB UR QL STRIP.AUTO: NEGATIVE
BUN SERPL-MCNC: 11 MG/DL (ref 8–22)
CALCIUM ALBUM COR SERPL-MCNC: 10.6 MG/DL (ref 8.5–10.5)
CALCIUM SERPL-MCNC: 10.9 MG/DL (ref 8.5–10.5)
CHLORIDE SERPL-SCNC: 105 MMOL/L (ref 96–112)
CO2 SERPL-SCNC: 21 MMOL/L (ref 20–33)
COLOR UR: YELLOW
CREAT SERPL-MCNC: 0.91 MG/DL (ref 0.5–1.4)
EOSINOPHIL # BLD AUTO: 0.14 K/UL (ref 0–0.51)
EOSINOPHIL NFR BLD: 1.9 % (ref 0–6.9)
ERYTHROCYTE [DISTWIDTH] IN BLOOD BY AUTOMATED COUNT: 43.4 FL (ref 35.9–50)
FASTING STATUS PATIENT QL REPORTED: NORMAL
GFR SERPLBLD CREATININE-BSD FMLA CKD-EPI: 86 ML/MIN/1.73 M 2
GLOBULIN SER CALC-MCNC: 3.5 G/DL (ref 1.9–3.5)
GLUCOSE SERPL-MCNC: 87 MG/DL (ref 65–99)
GLUCOSE UR STRIP.AUTO-MCNC: NEGATIVE MG/DL
HCT VFR BLD AUTO: 50.1 % (ref 42–52)
HGB BLD-MCNC: 16.6 G/DL (ref 14–18)
IMM GRANULOCYTES # BLD AUTO: 0.02 K/UL (ref 0–0.11)
IMM GRANULOCYTES NFR BLD AUTO: 0.3 % (ref 0–0.9)
KETONES UR STRIP.AUTO-MCNC: NEGATIVE MG/DL
LEUKOCYTE ESTERASE UR QL STRIP.AUTO: NEGATIVE
LYMPHOCYTES # BLD AUTO: 3.76 K/UL (ref 1–4.8)
LYMPHOCYTES NFR BLD: 50 % (ref 22–41)
MCH RBC QN AUTO: 32.5 PG (ref 27–33)
MCHC RBC AUTO-ENTMCNC: 33.1 G/DL (ref 33.7–35.3)
MCV RBC AUTO: 98.2 FL (ref 81.4–97.8)
MICRO URNS: NORMAL
MONOCYTES # BLD AUTO: 0.41 K/UL (ref 0–0.85)
MONOCYTES NFR BLD AUTO: 5.5 % (ref 0–13.4)
NEUTROPHILS # BLD AUTO: 3.13 K/UL (ref 1.82–7.42)
NEUTROPHILS NFR BLD: 41.5 % (ref 44–72)
NITRITE UR QL STRIP.AUTO: NEGATIVE
NRBC # BLD AUTO: 0 K/UL
NRBC BLD-RTO: 0 /100 WBC
PH UR STRIP.AUTO: 6.5 [PH] (ref 5–8)
PLATELET # BLD AUTO: 254 K/UL (ref 164–446)
PMV BLD AUTO: 11.3 FL (ref 9–12.9)
POTASSIUM SERPL-SCNC: 3.4 MMOL/L (ref 3.6–5.5)
PROT SERPL-MCNC: 7.9 G/DL (ref 6–8.2)
PROT UR QL STRIP: NEGATIVE MG/DL
RBC # BLD AUTO: 5.1 M/UL (ref 4.7–6.1)
RBC UR QL AUTO: NEGATIVE
SODIUM SERPL-SCNC: 139 MMOL/L (ref 135–145)
SP GR UR STRIP.AUTO: 1.01
UROBILINOGEN UR STRIP.AUTO-MCNC: 1 MG/DL
WBC # BLD AUTO: 7.5 K/UL (ref 4.8–10.8)

## 2023-03-16 PROCEDURE — 85025 COMPLETE CBC W/AUTO DIFF WBC: CPT

## 2023-03-16 PROCEDURE — 80053 COMPREHEN METABOLIC PANEL: CPT

## 2023-03-16 PROCEDURE — 81003 URINALYSIS AUTO W/O SCOPE: CPT

## 2023-03-16 PROCEDURE — 87086 URINE CULTURE/COLONY COUNT: CPT

## 2023-03-16 PROCEDURE — 36415 COLL VENOUS BLD VENIPUNCTURE: CPT

## 2023-03-16 PROCEDURE — 82306 VITAMIN D 25 HYDROXY: CPT

## 2023-03-18 LAB
BACTERIA UR CULT: NORMAL
SIGNIFICANT IND 70042: NORMAL
SITE SITE: NORMAL
SOURCE SOURCE: NORMAL

## 2023-04-24 ENCOUNTER — OFFICE VISIT (OUTPATIENT)
Dept: MEDICAL GROUP | Facility: PHYSICIAN GROUP | Age: 78
End: 2023-04-24
Payer: MEDICARE

## 2023-04-24 VITALS
BODY MASS INDEX: 22.88 KG/M2 | HEIGHT: 64 IN | SYSTOLIC BLOOD PRESSURE: 128 MMHG | TEMPERATURE: 98.2 F | OXYGEN SATURATION: 95 % | RESPIRATION RATE: 12 BRPM | DIASTOLIC BLOOD PRESSURE: 68 MMHG | HEART RATE: 63 BPM | WEIGHT: 134 LBS

## 2023-04-24 DIAGNOSIS — Z23 NEED FOR VACCINATION: ICD-10-CM

## 2023-04-24 DIAGNOSIS — E03.8 SUBCLINICAL HYPOTHYROIDISM: ICD-10-CM

## 2023-04-24 DIAGNOSIS — R97.20 RAISED PROSTATE SPECIFIC ANTIGEN: ICD-10-CM

## 2023-04-24 DIAGNOSIS — G40.119 PHARMACORESISTANT INTRACTABLE FOCAL EPILEPSY (HCC): ICD-10-CM

## 2023-04-24 DIAGNOSIS — G89.29 CHRONIC BILATERAL LOW BACK PAIN WITHOUT SCIATICA: ICD-10-CM

## 2023-04-24 DIAGNOSIS — M54.50 CHRONIC BILATERAL LOW BACK PAIN WITHOUT SCIATICA: ICD-10-CM

## 2023-04-24 DIAGNOSIS — E83.52 HYPERCALCEMIA: ICD-10-CM

## 2023-04-24 DIAGNOSIS — I47.10 SUPRAVENTRICULAR TACHYCARDIA (HCC): ICD-10-CM

## 2023-04-24 PROCEDURE — G0009 ADMIN PNEUMOCOCCAL VACCINE: HCPCS | Performed by: FAMILY MEDICINE

## 2023-04-24 PROCEDURE — 90677 PCV20 VACCINE IM: CPT | Performed by: FAMILY MEDICINE

## 2023-04-24 PROCEDURE — 99214 OFFICE O/P EST MOD 30 MIN: CPT | Mod: 25 | Performed by: FAMILY MEDICINE

## 2023-04-24 RX ORDER — LEVETIRACETAM 500 MG/1
500 TABLET ORAL 4 TIMES DAILY
COMMUNITY
End: 2023-06-28

## 2023-04-24 RX ORDER — ZONISAMIDE 100 MG/1
CAPSULE ORAL
COMMUNITY
Start: 2023-03-31

## 2023-04-24 ASSESSMENT — FIBROSIS 4 INDEX: FIB4 SCORE: 1.79

## 2023-04-24 ASSESSMENT — PATIENT HEALTH QUESTIONNAIRE - PHQ9: CLINICAL INTERPRETATION OF PHQ2 SCORE: 0

## 2023-04-24 NOTE — ASSESSMENT & PLAN NOTE
Chronic.  Denies any palpiations  Was last seen by cards in march 2021. Hasn't been seen since then.

## 2023-04-24 NOTE — ASSESSMENT & PLAN NOTE
New medical diagnosis.  Recent set of labs from March had calcium level elevated at 10.9 and alkaline phosphatase at 116.  Patient's family does not think that he has been taking over-the-counter calcium supplements.

## 2023-04-24 NOTE — ASSESSMENT & PLAN NOTE
Chronic. Saw neurology - Dr Florence on 3/30/23.nex t/u in October.   Taking keppra 500 qid and zonisamide 200mg hs.  Last seizure was before the pandemic started- about 2020.

## 2023-04-24 NOTE — PROGRESS NOTES
CC:   Chief Complaint   Patient presents with    Follow-Up     Review medications.          HPI:   Mj presents today for a follow-up visit with his daughter and granddaughter..  Last seen by me on September 22, 2022..    Since our last appointment, has been seen by:  3/30/23- Ludivina  Was seen at urgent care in October for a nosebleed.    Supraventricular tachycardia (HCC)  Chronic.  Denies any palpiations  Was last seen by cards in march 2021. Hasn't been seen since then.     Raised prostate specific antigen  Chronic.  Was evaluated by urology and reassured. Was told that it's age related.     Chronic bilateral low back pain without sciatica  Acute exacebation of chronic issue.  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  Has not been taking any over-the-counter medications for this.  Denies any changes in bowel or bladder habits    Subclinical hypothyroidism  Chronic medical diagnoses.  His last set of labs from March had TSH at 6.43 and free T4 at 0.95.    Hypercalcemia  New medical diagnosis.  Recent set of labs from March had calcium level elevated at 10.9 and alkaline phosphatase at 116.  Patient's family does not think that he has been taking over-the-counter calcium supplements.    Pharmacoresistant intractable focal epilepsy (HCC)  Chronic. Saw neurology - Dr Florence on 3/30/23.nex t/u in October.   Taking keppra 500 qid and zonisamide 200mg hs.  Last seizure was before the pandemic started- about 2020.      Current Outpatient Medications Ordered in Epic   Medication Sig Dispense Refill    zonisamide (ZONEGRAN) 100 MG Cap TAKE 2 CAPSULES BY MOUTH NIGHTLY      levETIRAcetam (KEPPRA) 500 MG Tab Take 500 mg by mouth 4 times a day.      amLODIPine (NORVASC) 5 MG Tab Take 1 Tablet by mouth every day. 100 Tablet 3    tadalafil (CIALIS) 5 MG tablet tadalafil 5 mg tablet   Take 1 tablet(s) EVERY DAY by oral route.       "ergocalciferol (DRISDOL) 47618 UNIT capsule ergocalciferol (vitamin D2) 1,250 mcg (50,000 unit) capsule   TAKE 1 CAPSULE BY MOUTH ONCE A WEEK      atorvastatin (LIPITOR) 10 MG Tab Take 1 Tablet by mouth every morning. 100 Tablet 3     No current Epic-ordered facility-administered medications on file.       Past Medical History:   Diagnosis Date    Hypercholesteremia     Hypertension     Seizure (HCC)        Social History     Tobacco Use    Smoking status: Former     Types: Cigarettes     Quit date:      Years since quittin.3    Smokeless tobacco: Never   Vaping Use    Vaping Use: Never used   Substance Use Topics    Alcohol use: No    Drug use: No       Allergies:  Patient has no known allergies.    Health Maintenance: encouraged to receive his covid booster.  Prevnar 20 today.       Objective:       Exam:  /68   Pulse 63   Temp 36.8 °C (98.2 °F) (Temporal)   Resp 12   Ht 1.626 m (5' 4\")   Wt 60.8 kg (134 lb)   SpO2 95%   BMI 23.00 kg/m²   Body mass index is 23 kg/m².  Wt Readings from Last 4 Encounters:   23 60.8 kg (134 lb)   10/26/22 61.2 kg (135 lb)   22 61.6 kg (135 lb 14.4 oz)   22 62.1 kg (136 lb 14.4 oz)       Gen: Alert and oriented, No apparent distress. Appropriately groomed.  Neck: Neck is supple without lymphadenopathy.No thyromegaly.   Lungs: Normal effort, CTA bilaterally, no wheezes, rhonchi, or rales  CV: Regular rate and rhythm. No Lower extremity edema  Skin: No rash noted.      Assessment & Plan:     77 y.o. male with the following -     1. Subclinical hypothyroidism  Chronic medical diagnosis.  Stable.  We will continue to monitor his labs.  - TSH WITH REFLEX TO FT4; Future    2. Hypercalcemia  -New medical diagnosis.  Plan on rechecking these labs.  Patient will check at home to see if he is taking supplemental calcium tablets.    PTH INTACT; Future  - Comp Metabolic Panel; Future  - ALKALINE PHOSPHATASE ISOENZYMES; Future    3. Supraventricular " tachycardia (HCC)  - REFERRAL TO CARDIOLOGY  HCC Gap Form    Diagnosis to address: I47.1 - Supraventricular tachycardia (HCC)  Assessment and plan: Chronic, stable.  New referral to cardiology placed today.  Last edited 04/24/23 15:55 PDT by Viviana Whitfield M.D.        4. Need for vaccination  - Pneumococcal Conjugate Vaccine 20-Valent (19 yrs+)    5. Raised prostate specific antigen  Chronic medical diagnosis.  Stable.  He had been evaluated by urology and was reassured.  We will continue to monitor.    6. Chronic bilateral low back pain without sciatica  Chronic medical diagnosis.  His lower back pain was exacerbated after he went back to work for 7 days.  Since then he has been resting.  We discussed doing a trial of Tylenol and/or over-the-counter Advil as needed.  Also discussed referral to physical therapy if necessary.  Family will keep me updated.    7. Pharmacoresistant intractable focal epilepsy (HCC)  Chronic medical diagnosis.  Stable.  Recent neurology notes from March received and reviewed.  Last seizure was over 3 years ago.  Continue with Keppra 500 mg 4 times a day and zonisamide 200mg hs.    Other orders  - zonisamide (ZONEGRAN) 100 MG Cap; TAKE 2 CAPSULES BY MOUTH NIGHTLY  - levETIRAcetam (KEPPRA) 500 MG Tab; Take 500 mg by mouth 4 times a day.      Return in about 2 months (around 6/24/2023) for subclinical hypothyroid.    Please note that this dictation was created using voice recognition software. I have made every reasonable attempt to correct obvious errors, but I expect that there are errors of grammar and possibly content that I did not discover before finalizing the note.

## 2023-04-24 NOTE — ASSESSMENT & PLAN NOTE
Acute exacebation of chronic issue.  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  Has not been taking any over-the-counter medications for this.  Denies any changes in bowel or bladder habits

## 2023-04-26 ENCOUNTER — TELEPHONE (OUTPATIENT)
Dept: HEALTH INFORMATION MANAGEMENT | Facility: OTHER | Age: 78
End: 2023-04-26
Payer: MEDICARE

## 2023-05-06 ENCOUNTER — HOSPITAL ENCOUNTER (OUTPATIENT)
Dept: LAB | Facility: MEDICAL CENTER | Age: 78
End: 2023-05-06
Attending: FAMILY MEDICINE
Payer: MEDICARE

## 2023-05-06 DIAGNOSIS — E03.8 SUBCLINICAL HYPOTHYROIDISM: ICD-10-CM

## 2023-05-06 DIAGNOSIS — E83.52 HYPERCALCEMIA: ICD-10-CM

## 2023-05-06 LAB
ALBUMIN SERPL BCP-MCNC: 4.3 G/DL (ref 3.2–4.9)
ALBUMIN/GLOB SERPL: 1.2 G/DL
ALP SERPL-CCNC: 114 U/L (ref 30–99)
ALT SERPL-CCNC: 13 U/L (ref 2–50)
ANION GAP SERPL CALC-SCNC: 10 MMOL/L (ref 7–16)
AST SERPL-CCNC: 17 U/L (ref 12–45)
BILIRUB SERPL-MCNC: 0.8 MG/DL (ref 0.1–1.5)
BUN SERPL-MCNC: 8 MG/DL (ref 8–22)
CALCIUM ALBUM COR SERPL-MCNC: 10.7 MG/DL (ref 8.5–10.5)
CALCIUM SERPL-MCNC: 10.9 MG/DL (ref 8.5–10.5)
CHLORIDE SERPL-SCNC: 111 MMOL/L (ref 96–112)
CO2 SERPL-SCNC: 23 MMOL/L (ref 20–33)
CREAT SERPL-MCNC: 1 MG/DL (ref 0.5–1.4)
GFR SERPLBLD CREATININE-BSD FMLA CKD-EPI: 77 ML/MIN/1.73 M 2
GLOBULIN SER CALC-MCNC: 3.5 G/DL (ref 1.9–3.5)
GLUCOSE SERPL-MCNC: 90 MG/DL (ref 65–99)
POTASSIUM SERPL-SCNC: 4.9 MMOL/L (ref 3.6–5.5)
PROT SERPL-MCNC: 7.8 G/DL (ref 6–8.2)
PTH-INTACT SERPL-MCNC: 86.1 PG/ML (ref 14–72)
SODIUM SERPL-SCNC: 144 MMOL/L (ref 135–145)
TSH SERPL DL<=0.005 MIU/L-ACNC: 4.62 UIU/ML (ref 0.38–5.33)

## 2023-05-06 PROCEDURE — 84080 ASSAY ALKALINE PHOSPHATASES: CPT

## 2023-05-06 PROCEDURE — 83970 ASSAY OF PARATHORMONE: CPT

## 2023-05-06 PROCEDURE — 84443 ASSAY THYROID STIM HORMONE: CPT

## 2023-05-06 PROCEDURE — 36415 COLL VENOUS BLD VENIPUNCTURE: CPT

## 2023-05-06 PROCEDURE — 84075 ASSAY ALKALINE PHOSPHATASE: CPT | Mod: XU

## 2023-05-06 PROCEDURE — 80053 COMPREHEN METABOLIC PANEL: CPT

## 2023-05-10 LAB
ALP BONE SERPL-CCNC: 41 U/L (ref 0–55)
ALP ISOS SERPL HS-CCNC: 0 U/L
ALP LIVER SERPL-CCNC: 74 U/L (ref 0–94)
ALP SERPL-CCNC: 115 U/L (ref 40–120)

## 2023-05-15 ENCOUNTER — TELEPHONE (OUTPATIENT)
Dept: CARDIOLOGY | Facility: MEDICAL CENTER | Age: 78
End: 2023-05-15
Payer: MEDICARE

## 2023-05-15 NOTE — TELEPHONE ENCOUNTER
Chart Prep      LVM for patient notifying him that his appointment with Dr. Bacon on 5/17 has been cancelled. Patient is a prior patient of Dr. Lehman and needs to follow up with Dr. Lehman not Dr. Bacon. Left another VM for patient to be scheduled with Dr. Lehman. Called a third time to try to get patient scheduled with Dr. Lehman, patient has yet to answer or call our clinic back.

## 2023-05-16 ENCOUNTER — TELEPHONE (OUTPATIENT)
Dept: MEDICAL GROUP | Facility: PHYSICIAN GROUP | Age: 78
End: 2023-05-16
Payer: MEDICARE

## 2023-05-16 NOTE — TELEPHONE ENCOUNTER
Phone Number Called: [phone]      Call outcome: Spoke to patient regarding message below.    Message: called and spoke with patient and informed him of his results. Informed him that it will be discussed further at his upcoming appointment.

## 2023-05-17 ENCOUNTER — OFFICE VISIT (OUTPATIENT)
Dept: CARDIOLOGY | Facility: MEDICAL CENTER | Age: 78
End: 2023-05-17
Attending: NURSE PRACTITIONER
Payer: MEDICARE

## 2023-05-17 VITALS
HEART RATE: 66 BPM | HEIGHT: 64 IN | SYSTOLIC BLOOD PRESSURE: 140 MMHG | WEIGHT: 131 LBS | OXYGEN SATURATION: 97 % | BODY MASS INDEX: 22.36 KG/M2 | DIASTOLIC BLOOD PRESSURE: 80 MMHG | RESPIRATION RATE: 14 BRPM

## 2023-05-17 DIAGNOSIS — I45.10 RBBB: ICD-10-CM

## 2023-05-17 DIAGNOSIS — I47.10 SUPRAVENTRICULAR TACHYCARDIA (HCC): ICD-10-CM

## 2023-05-17 PROCEDURE — 3079F DIAST BP 80-89 MM HG: CPT | Performed by: NURSE PRACTITIONER

## 2023-05-17 PROCEDURE — 3077F SYST BP >= 140 MM HG: CPT | Performed by: NURSE PRACTITIONER

## 2023-05-17 PROCEDURE — 99213 OFFICE O/P EST LOW 20 MIN: CPT | Mod: 25 | Performed by: NURSE PRACTITIONER

## 2023-05-17 PROCEDURE — 93010 ELECTROCARDIOGRAM REPORT: CPT | Performed by: NURSE PRACTITIONER

## 2023-05-17 PROCEDURE — 99212 OFFICE O/P EST SF 10 MIN: CPT | Performed by: NURSE PRACTITIONER

## 2023-05-17 PROCEDURE — 93005 ELECTROCARDIOGRAM TRACING: CPT | Performed by: NURSE PRACTITIONER

## 2023-05-17 ASSESSMENT — ENCOUNTER SYMPTOMS
PSYCHIATRIC NEGATIVE: 1
HEMOPTYSIS: 0
DIARRHEA: 0
WHEEZING: 0
HEARTBURN: 0
DIZZINESS: 0
SPUTUM PRODUCTION: 0
TREMORS: 0
FOCAL WEAKNESS: 0
SHORTNESS OF BREATH: 0
LOSS OF CONSCIOUSNESS: 0
COUGH: 0
CHILLS: 0
ORTHOPNEA: 0
VOMITING: 0
SENSORY CHANGE: 0
WEIGHT LOSS: 0
NAUSEA: 0
SPEECH CHANGE: 0
FEVER: 0
PALPITATIONS: 0
MUSCULOSKELETAL NEGATIVE: 1
HEADACHES: 0
TINGLING: 0
PND: 0

## 2023-05-17 ASSESSMENT — FIBROSIS 4 INDEX: FIB4 SCORE: 1.45

## 2023-05-17 NOTE — PROGRESS NOTES
Chief Complaint   Patient presents with    Follow-Up     F/V Dx: Cardiac arrhythmia, unspecified cardiac arrhythmia type       Subjective     Mj Cornejo is a 78 y.o. male who presents today for follow-up.    Was seen in consultation by Dr. Jacques Lehman 3/11/2021 for asymptomatic arrhythmias seen on EEG.  Underwent Zio patch monitoring with his rhythm with asymptomatic SVT and 1 nonsustained VT.  Normal echocardiogram    History obtained via certified [facility]     Today in follow-up he reports that he has been doing well from a cardiovascular perspective.  He denies symptoms of palpitations, tachyarrhythmias.  He does not endorse symptoms of chest pain, dyspnea, significant dizziness presyncope or syncope.  He denies lower extremity edema.  He has no specific concerns to address today.    Past Medical History:   Diagnosis Date    Hypercholesteremia     Hypertension     Seizure (HCC)      No past surgical history on file.  Family History   Problem Relation Age of Onset    Stroke Mother      Social History     Socioeconomic History    Marital status:      Spouse name: Not on file    Number of children: Not on file    Years of education: Not on file    Highest education level: Not on file   Occupational History    Not on file   Tobacco Use    Smoking status: Former     Types: Cigarettes     Quit date:      Years since quittin.3    Smokeless tobacco: Never   Vaping Use    Vaping Use: Never used   Substance and Sexual Activity    Alcohol use: No    Drug use: No    Sexual activity: Not on file     Comment: , lives with wife and son, 2 sons   Other Topics Concern    Not on file   Social History Narrative    Not on file     Social Determinants of Health     Financial Resource Strain: Medium Risk (2021)    Overall Financial Resource Strain (CARDIA)     Difficulty of Paying Living Expenses: Somewhat hard   Food Insecurity: No Food Insecurity (2021)    Hunger Vital  Sign     Worried About Running Out of Food in the Last Year: Never true     Ran Out of Food in the Last Year: Never true   Transportation Needs: No Transportation Needs (1/27/2021)    PRAPARE - Transportation     Lack of Transportation (Medical): No     Lack of Transportation (Non-Medical): No   Physical Activity: Not on file   Stress: Not on file   Social Connections: Not on file   Intimate Partner Violence: Not on file   Housing Stability: Not on file     No Known Allergies  Outpatient Encounter Medications as of 5/17/2023   Medication Sig Dispense Refill    zonisamide (ZONEGRAN) 100 MG Cap TAKE 2 CAPSULES BY MOUTH NIGHTLY      levETIRAcetam (KEPPRA) 500 MG Tab Take 500 mg by mouth 4 times a day.      amLODIPine (NORVASC) 5 MG Tab Take 1 Tablet by mouth every day. 100 Tablet 3    tadalafil (CIALIS) 5 MG tablet tadalafil 5 mg tablet   Take 1 tablet(s) EVERY DAY by oral route.      ergocalciferol (DRISDOL) 95027 UNIT capsule ergocalciferol (vitamin D2) 1,250 mcg (50,000 unit) capsule   TAKE 1 CAPSULE BY MOUTH ONCE A WEEK      atorvastatin (LIPITOR) 10 MG Tab Take 1 Tablet by mouth every morning. 100 Tablet 3     No facility-administered encounter medications on file as of 5/17/2023.     Review of Systems   Constitutional:  Negative for chills, fever, malaise/fatigue and weight loss.   HENT:  Negative for tinnitus.    Respiratory:  Negative for cough, hemoptysis, sputum production, shortness of breath and wheezing.    Cardiovascular:  Negative for chest pain, palpitations, orthopnea, leg swelling and PND.   Gastrointestinal:  Negative for diarrhea, heartburn, nausea and vomiting.   Genitourinary: Negative.    Musculoskeletal: Negative.    Neurological:  Negative for dizziness, tingling, tremors, sensory change, speech change, focal weakness, loss of consciousness and headaches.   Psychiatric/Behavioral: Negative.                Objective     BP (!) 140/80 (BP Location: Left arm, Patient Position: Sitting, BP Cuff  "Size: Adult)   Pulse 66   Resp 14   Ht 1.626 m (5' 4\")   Wt 59.4 kg (131 lb)   SpO2 97%   BMI 22.49 kg/m²     Physical Exam  Vitals reviewed.   Constitutional:       Appearance: Normal appearance. He is well-developed.   HENT:      Head: Normocephalic and atraumatic.      Mouth/Throat:      Mouth: Mucous membranes are moist.      Pharynx: Oropharynx is clear.   Eyes:      Extraocular Movements: Extraocular movements intact.      Conjunctiva/sclera: Conjunctivae normal.      Pupils: Pupils are equal, round, and reactive to light.   Neck:      Vascular: No JVD.   Cardiovascular:      Rate and Rhythm: Normal rate and regular rhythm.      Pulses: Normal pulses.      Heart sounds: Normal heart sounds. No murmur heard.     No friction rub. No gallop.   Pulmonary:      Effort: Pulmonary effort is normal.      Breath sounds: Normal breath sounds. No wheezing or rales.   Chest:      Chest wall: No tenderness.   Musculoskeletal:         General: Normal range of motion.      Cervical back: Normal range of motion and neck supple.      Right lower leg: No edema.      Left lower leg: No edema.   Skin:     General: Skin is warm and dry.      Capillary Refill: Capillary refill takes 2 to 3 seconds.   Neurological:      Mental Status: He is alert and oriented to person, place, and time.   Psychiatric:         Mood and Affect: Mood normal.         Behavior: Behavior normal.         Thought Content: Thought content normal.         Judgment: Judgment normal.     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 Supraventricular tachycardia (HCC)  EKG      2. RBBB            Medical Decision Making: Today's Assessment/Status/Plan:   1.  Paroxysmal SVT  2. RBBB  - Prior work-up completed with Zio patch which showed brief asymptomatic paroxysmal SVT, longest duration of 38 seconds.  Also 1 brief NSVT as well, for which she was asymptomatic.    -Echocardiogram with normal " EF, no valvular abnormalities, mild to moderate tricuspid regurgitation.  -Twelve-lead EKG today shows sinus rhythm with stable RBBB, LAFB.  He does not endorse symptoms of palpitations, dizziness, presyncope or syncope, no known tachyarrhythmias.  -Given absence of symptoms and brief SVT on prior ZIO, no indication for interventional procedure at this time.    Follow-up as needed    PLEASE NOTE: This Note was created using voice recognition Software. I have made every reasonable attempt to correct obvious errors, but I expect that there are errors of grammar and possibly content that I did not discover before finalizing the note

## 2023-05-17 NOTE — RESULT ENCOUNTER NOTE
No one at the phone number spoke English   I could not communicate the message as I do not speak Qatari   EM

## 2023-05-25 LAB — EKG IMPRESSION: NORMAL

## 2023-06-26 ENCOUNTER — APPOINTMENT (OUTPATIENT)
Dept: MEDICAL GROUP | Facility: PHYSICIAN GROUP | Age: 78
End: 2023-06-26
Payer: MEDICARE

## 2023-06-28 ENCOUNTER — OFFICE VISIT (OUTPATIENT)
Dept: MEDICAL GROUP | Facility: PHYSICIAN GROUP | Age: 78
End: 2023-06-28
Payer: MEDICARE

## 2023-06-28 VITALS
WEIGHT: 131 LBS | DIASTOLIC BLOOD PRESSURE: 52 MMHG | HEIGHT: 64 IN | TEMPERATURE: 97.9 F | BODY MASS INDEX: 22.36 KG/M2 | OXYGEN SATURATION: 97 % | RESPIRATION RATE: 14 BRPM | HEART RATE: 63 BPM | SYSTOLIC BLOOD PRESSURE: 112 MMHG

## 2023-06-28 DIAGNOSIS — G89.29 CHRONIC BILATERAL LOW BACK PAIN WITHOUT SCIATICA: ICD-10-CM

## 2023-06-28 DIAGNOSIS — E83.52 HYPERCALCEMIA: ICD-10-CM

## 2023-06-28 DIAGNOSIS — M54.50 CHRONIC BILATERAL LOW BACK PAIN WITHOUT SCIATICA: ICD-10-CM

## 2023-06-28 PROCEDURE — 3074F SYST BP LT 130 MM HG: CPT | Performed by: FAMILY MEDICINE

## 2023-06-28 PROCEDURE — 3078F DIAST BP <80 MM HG: CPT | Performed by: FAMILY MEDICINE

## 2023-06-28 PROCEDURE — 99214 OFFICE O/P EST MOD 30 MIN: CPT | Performed by: FAMILY MEDICINE

## 2023-06-28 RX ORDER — LEVETIRACETAM 500 MG/1
TABLET, FILM COATED, EXTENDED RELEASE ORAL
COMMUNITY
Start: 2023-06-26

## 2023-06-28 ASSESSMENT — ENCOUNTER SYMPTOMS
FEVER: 0
CHILLS: 0

## 2023-06-28 ASSESSMENT — FIBROSIS 4 INDEX: FIB4 SCORE: 1.45

## 2023-06-28 NOTE — PROGRESS NOTES
Subjective:     CC:   Chief Complaint   Patient presents with    Follow-Up    Lab Results    Cold Extremity     Full body          HPI:   Mj presents today for a f/u visit..  Last seen by me on April 20.    Upcoming appointments with:  Oct- neuro      Problem   Hypercalcemia    New problem.  Recent labs with elevation in his PTH and calcium levels.   Not taking OTC calcium tablets.   For the last 6 months, can't sing due to his voice.  For the last 1 yr, has had difficulty swallowing.     . has been feeling cold for the last year.  Feels cold all the time. Doesn't matter if it was winter or summer     Chronic Bilateral Low Back Pain Without Sciatica     Started a year and a half ago. Bilateral low back pain. No radiation.  Doesn't feel strong when he walks, feels like he's goiing to fall.   Has been having difficutly walking for the last year.   After working for his 8 hr shift, he has a hard time standing straight.          Current Outpatient Medications Ordered in Epic   Medication Sig Dispense Refill    levetiracetam (KEPPRA) 500 MG TABLET SR 24 HR TAKE 4 TABLETS BY MOUTH EVERY DAY AT BEDTIME      zonisamide (ZONEGRAN) 100 MG Cap TAKE 2 CAPSULES BY MOUTH NIGHTLY      amLODIPine (NORVASC) 5 MG Tab Take 1 Tablet by mouth every day. 100 Tablet 3    tadalafil (CIALIS) 5 MG tablet tadalafil 5 mg tablet   Take 1 tablet(s) EVERY DAY by oral route.      ergocalciferol (DRISDOL) 57518 UNIT capsule ergocalciferol (vitamin D2) 1,250 mcg (50,000 unit) capsule   TAKE 1 CAPSULE BY MOUTH ONCE A WEEK      atorvastatin (LIPITOR) 10 MG Tab Take 1 Tablet by mouth every morning. 100 Tablet 3     No current Epic-ordered facility-administered medications on file.           ROS:  Review of Systems   Constitutional:  Negative for chills (feels cold all the time) and fever.   Musculoskeletal:         Unsteadiness       Objective:     Exam:  /52 (BP Location: Right arm, Patient Position: Sitting, BP Cuff Size: Adult)   Pulse  "63   Temp 36.6 °C (97.9 °F) (Temporal)   Resp 14   Ht 1.626 m (5' 4\")   Wt 59.4 kg (131 lb)   SpO2 97%   BMI 22.49 kg/m²  Body mass index is 22.49 kg/m².    Physical Exam  Constitutional:       Appearance: Normal appearance.   Eyes:      Extraocular Movements: Extraocular movements intact.   Cardiovascular:      Rate and Rhythm: Normal rate and regular rhythm.   Pulmonary:      Effort: Pulmonary effort is normal.      Breath sounds: Normal breath sounds.   Neurological:      Mental Status: He is alert.   Psychiatric:         Mood and Affect: Mood normal.         Behavior: Behavior normal.             Labs: see above    Assessment & Plan:     78 y.o. male with the following -     1. Chronic bilateral low back pain without sciatica  Chronic. Not improving.  Try tylenol prn. F/u 1 month    2. Hypercalcemia  New issue. Not improving. Not taking any OTC calcium or MVI. Repeat labs today/ tomorrow. Based on results will order further evaluation with ENT as he has noticed that he is unable to sing now or Nuclear scan of parathyroids.  - Comp Metabolic Panel; Future  - PTH INTACT; Future  - TSH WITH REFLEX TO FT4; Future  - VITAMIN D,25 HYDROXY (DEFICIENCY); Future  - CBC WITH DIFFERENTIAL; Future       Return in about 1 month (around 7/28/2023), or wants a 3:20 appt, ok to add on as a 4 pm if necessary on a monday.    Please note that this dictation was created using voice recognition software. I have made every reasonable attempt to correct obvious errors, but I expect that there are errors of grammar and possibly content that I did not discover before finalizing the note.        "

## 2023-06-29 ENCOUNTER — HOSPITAL ENCOUNTER (OUTPATIENT)
Dept: LAB | Facility: MEDICAL CENTER | Age: 78
End: 2023-06-29
Attending: FAMILY MEDICINE
Payer: MEDICARE

## 2023-06-29 DIAGNOSIS — E83.52 HYPERCALCEMIA: ICD-10-CM

## 2023-06-29 LAB
25(OH)D3 SERPL-MCNC: 43 NG/ML (ref 30–100)
ALBUMIN SERPL BCP-MCNC: 4.6 G/DL (ref 3.2–4.9)
ALBUMIN/GLOB SERPL: 1.8 G/DL
ALP SERPL-CCNC: 110 U/L (ref 30–99)
ALT SERPL-CCNC: 11 U/L (ref 2–50)
ANION GAP SERPL CALC-SCNC: 12 MMOL/L (ref 7–16)
AST SERPL-CCNC: 15 U/L (ref 12–45)
BASOPHILS # BLD AUTO: 0.9 % (ref 0–1.8)
BASOPHILS # BLD: 0.08 K/UL (ref 0–0.12)
BILIRUB SERPL-MCNC: 0.7 MG/DL (ref 0.1–1.5)
BUN SERPL-MCNC: 7 MG/DL (ref 8–22)
CALCIUM ALBUM COR SERPL-MCNC: 10.3 MG/DL (ref 8.5–10.5)
CALCIUM SERPL-MCNC: 10.8 MG/DL (ref 8.5–10.5)
CHLORIDE SERPL-SCNC: 106 MMOL/L (ref 96–112)
CO2 SERPL-SCNC: 23 MMOL/L (ref 20–33)
CREAT SERPL-MCNC: 0.82 MG/DL (ref 0.5–1.4)
EOSINOPHIL # BLD AUTO: 0.13 K/UL (ref 0–0.51)
EOSINOPHIL NFR BLD: 1.5 % (ref 0–6.9)
ERYTHROCYTE [DISTWIDTH] IN BLOOD BY AUTOMATED COUNT: 46.2 FL (ref 35.9–50)
GFR SERPLBLD CREATININE-BSD FMLA CKD-EPI: 90 ML/MIN/1.73 M 2
GLOBULIN SER CALC-MCNC: 2.6 G/DL (ref 1.9–3.5)
GLUCOSE SERPL-MCNC: 100 MG/DL (ref 65–99)
HCT VFR BLD AUTO: 44.7 % (ref 42–52)
HGB BLD-MCNC: 14.5 G/DL (ref 14–18)
IMM GRANULOCYTES # BLD AUTO: 0.02 K/UL (ref 0–0.11)
IMM GRANULOCYTES NFR BLD AUTO: 0.2 % (ref 0–0.9)
LYMPHOCYTES # BLD AUTO: 3.33 K/UL (ref 1–4.8)
LYMPHOCYTES NFR BLD: 39 % (ref 22–41)
MCH RBC QN AUTO: 33.7 PG (ref 27–33)
MCHC RBC AUTO-ENTMCNC: 32.4 G/DL (ref 32.3–36.5)
MCV RBC AUTO: 104 FL (ref 81.4–97.8)
MONOCYTES # BLD AUTO: 0.49 K/UL (ref 0–0.85)
MONOCYTES NFR BLD AUTO: 5.7 % (ref 0–13.4)
NEUTROPHILS # BLD AUTO: 4.49 K/UL (ref 1.82–7.42)
NEUTROPHILS NFR BLD: 52.7 % (ref 44–72)
NRBC # BLD AUTO: 0 K/UL
NRBC BLD-RTO: 0 /100 WBC (ref 0–0.2)
PLATELET # BLD AUTO: 263 K/UL (ref 164–446)
PMV BLD AUTO: 12 FL (ref 9–12.9)
POTASSIUM SERPL-SCNC: 4.5 MMOL/L (ref 3.6–5.5)
PROT SERPL-MCNC: 7.2 G/DL (ref 6–8.2)
PTH-INTACT SERPL-MCNC: 86.4 PG/ML (ref 14–72)
RBC # BLD AUTO: 4.3 M/UL (ref 4.7–6.1)
SODIUM SERPL-SCNC: 141 MMOL/L (ref 135–145)
TSH SERPL DL<=0.005 MIU/L-ACNC: 3.36 UIU/ML (ref 0.38–5.33)
WBC # BLD AUTO: 8.5 K/UL (ref 4.8–10.8)

## 2023-06-29 PROCEDURE — 84443 ASSAY THYROID STIM HORMONE: CPT

## 2023-06-29 PROCEDURE — 80053 COMPREHEN METABOLIC PANEL: CPT

## 2023-06-29 PROCEDURE — 83970 ASSAY OF PARATHORMONE: CPT

## 2023-06-29 PROCEDURE — 82306 VITAMIN D 25 HYDROXY: CPT

## 2023-06-29 PROCEDURE — 85025 COMPLETE CBC W/AUTO DIFF WBC: CPT

## 2023-06-29 PROCEDURE — 36415 COLL VENOUS BLD VENIPUNCTURE: CPT

## 2023-06-30 DIAGNOSIS — E21.3 HYPERPARATHYROIDISM (HCC): ICD-10-CM

## 2023-06-30 DIAGNOSIS — E83.52 HYPERCALCEMIA: ICD-10-CM

## 2023-06-30 DIAGNOSIS — R49.0 HOARSENESS: ICD-10-CM

## 2023-07-27 ENCOUNTER — TELEPHONE (OUTPATIENT)
Dept: HEALTH INFORMATION MANAGEMENT | Facility: OTHER | Age: 78
End: 2023-07-27

## 2023-08-07 ENCOUNTER — HOSPITAL ENCOUNTER (OUTPATIENT)
Dept: RADIOLOGY | Facility: MEDICAL CENTER | Age: 78
End: 2023-08-07
Attending: INTERNAL MEDICINE
Payer: MEDICARE

## 2023-08-07 DIAGNOSIS — E21.3 HYPERPARATHYROIDISM (HCC): ICD-10-CM

## 2023-08-07 DIAGNOSIS — E83.52 HYPERCALCEMIA: ICD-10-CM

## 2023-08-07 PROCEDURE — A9500 TC99M SESTAMIBI: HCPCS

## 2023-08-16 ENCOUNTER — OFFICE VISIT (OUTPATIENT)
Dept: MEDICAL GROUP | Facility: PHYSICIAN GROUP | Age: 78
End: 2023-08-16
Payer: MEDICARE

## 2023-08-16 VITALS
TEMPERATURE: 97.8 F | RESPIRATION RATE: 14 BRPM | OXYGEN SATURATION: 97 % | HEIGHT: 62 IN | WEIGHT: 120.6 LBS | BODY MASS INDEX: 22.19 KG/M2 | SYSTOLIC BLOOD PRESSURE: 116 MMHG | DIASTOLIC BLOOD PRESSURE: 64 MMHG | HEART RATE: 60 BPM

## 2023-08-16 DIAGNOSIS — Z12.5 SCREENING FOR MALIGNANT NEOPLASM OF PROSTATE: ICD-10-CM

## 2023-08-16 DIAGNOSIS — R97.20 RAISED PROSTATE SPECIFIC ANTIGEN: ICD-10-CM

## 2023-08-16 DIAGNOSIS — E83.52 HYPERCALCEMIA: ICD-10-CM

## 2023-08-16 DIAGNOSIS — R73.01 ELEVATED FASTING GLUCOSE: ICD-10-CM

## 2023-08-16 DIAGNOSIS — R63.4 WEIGHT LOSS: ICD-10-CM

## 2023-08-16 LAB
HBA1C MFR BLD: 4.6 % (ref ?–5.8)
POCT INT CON NEG: NEGATIVE
POCT INT CON POS: POSITIVE

## 2023-08-16 PROCEDURE — 83036 HEMOGLOBIN GLYCOSYLATED A1C: CPT | Performed by: FAMILY MEDICINE

## 2023-08-16 PROCEDURE — 3074F SYST BP LT 130 MM HG: CPT | Performed by: FAMILY MEDICINE

## 2023-08-16 PROCEDURE — 99215 OFFICE O/P EST HI 40 MIN: CPT | Performed by: FAMILY MEDICINE

## 2023-08-16 PROCEDURE — 3078F DIAST BP <80 MM HG: CPT | Performed by: FAMILY MEDICINE

## 2023-08-16 ASSESSMENT — ENCOUNTER SYMPTOMS
ABDOMINAL PAIN: 0
CHILLS: 0
WEIGHT LOSS: 1
SHORTNESS OF BREATH: 0
VOMITING: 0
SEIZURES: 0
FEVER: 0
NAUSEA: 0
DIZZINESS: 0
COUGH: 0

## 2023-08-16 ASSESSMENT — FIBROSIS 4 INDEX: FIB4 SCORE: 1.34

## 2023-09-19 ENCOUNTER — HOSPITAL ENCOUNTER (OUTPATIENT)
Dept: RADIOLOGY | Facility: MEDICAL CENTER | Age: 78
End: 2023-09-19
Attending: FAMILY MEDICINE
Payer: MEDICARE

## 2023-09-19 DIAGNOSIS — R63.4 WEIGHT LOSS: ICD-10-CM

## 2023-09-19 PROCEDURE — 71046 X-RAY EXAM CHEST 2 VIEWS: CPT

## 2023-09-21 ENCOUNTER — TELEPHONE (OUTPATIENT)
Dept: MEDICAL GROUP | Facility: PHYSICIAN GROUP | Age: 78
End: 2023-09-21
Payer: MEDICARE

## 2023-09-21 NOTE — TELEPHONE ENCOUNTER
Phone Number Called: Zelalem Worthington (Daugther) [phone]    Call outcome: Spoke to patient's daughter.    Message: Calling to let patient know that chest x-ray came back normal. If patient has any questions, they can be discussed at his next appt.

## 2023-10-14 ENCOUNTER — HOSPITAL ENCOUNTER (OUTPATIENT)
Dept: LAB | Facility: MEDICAL CENTER | Age: 78
End: 2023-10-14
Attending: FAMILY MEDICINE
Payer: MEDICARE

## 2023-10-14 DIAGNOSIS — Z12.5 SCREENING FOR MALIGNANT NEOPLASM OF PROSTATE: ICD-10-CM

## 2023-10-14 DIAGNOSIS — E83.52 HYPERCALCEMIA: ICD-10-CM

## 2023-10-14 LAB
25(OH)D3 SERPL-MCNC: 29 NG/ML (ref 30–100)
ALBUMIN SERPL BCP-MCNC: 4.5 G/DL (ref 3.2–4.9)
ALBUMIN/GLOB SERPL: 1.6 G/DL
ALP SERPL-CCNC: 97 U/L (ref 30–99)
ALT SERPL-CCNC: 10 U/L (ref 2–50)
ANION GAP SERPL CALC-SCNC: 9 MMOL/L (ref 7–16)
AST SERPL-CCNC: 17 U/L (ref 12–45)
BILIRUB SERPL-MCNC: 0.8 MG/DL (ref 0.1–1.5)
BUN SERPL-MCNC: 10 MG/DL (ref 8–22)
CALCIUM ALBUM COR SERPL-MCNC: 10.5 MG/DL (ref 8.5–10.5)
CALCIUM SERPL-MCNC: 10.9 MG/DL (ref 8.5–10.5)
CHLORIDE SERPL-SCNC: 109 MMOL/L (ref 96–112)
CO2 SERPL-SCNC: 22 MMOL/L (ref 20–33)
CREAT SERPL-MCNC: 0.82 MG/DL (ref 0.5–1.4)
GFR SERPLBLD CREATININE-BSD FMLA CKD-EPI: 90 ML/MIN/1.73 M 2
GLOBULIN SER CALC-MCNC: 2.9 G/DL (ref 1.9–3.5)
GLUCOSE SERPL-MCNC: 91 MG/DL (ref 65–99)
POTASSIUM SERPL-SCNC: 4.3 MMOL/L (ref 3.6–5.5)
PROT SERPL-MCNC: 7.4 G/DL (ref 6–8.2)
PSA SERPL-MCNC: 3.58 NG/ML (ref 0–4)
PTH-INTACT SERPL-MCNC: 80.9 PG/ML (ref 14–72)
SODIUM SERPL-SCNC: 140 MMOL/L (ref 135–145)

## 2023-10-14 PROCEDURE — 80053 COMPREHEN METABOLIC PANEL: CPT

## 2023-10-14 PROCEDURE — 82306 VITAMIN D 25 HYDROXY: CPT

## 2023-10-14 PROCEDURE — 36415 COLL VENOUS BLD VENIPUNCTURE: CPT

## 2023-10-14 PROCEDURE — 84153 ASSAY OF PSA TOTAL: CPT

## 2023-10-14 PROCEDURE — 83970 ASSAY OF PARATHORMONE: CPT

## 2023-10-16 ENCOUNTER — OFFICE VISIT (OUTPATIENT)
Dept: MEDICAL GROUP | Facility: PHYSICIAN GROUP | Age: 78
End: 2023-10-16
Payer: MEDICARE

## 2023-10-16 VITALS
OXYGEN SATURATION: 95 % | RESPIRATION RATE: 14 BRPM | WEIGHT: 125 LBS | TEMPERATURE: 98.6 F | HEART RATE: 61 BPM | SYSTOLIC BLOOD PRESSURE: 122 MMHG | DIASTOLIC BLOOD PRESSURE: 68 MMHG | BODY MASS INDEX: 23 KG/M2 | HEIGHT: 62 IN

## 2023-10-16 DIAGNOSIS — R63.4 WEIGHT LOSS: ICD-10-CM

## 2023-10-16 DIAGNOSIS — E21.3 HYPERPARATHYROIDISM, UNSPECIFIED (HCC): ICD-10-CM

## 2023-10-16 DIAGNOSIS — E83.52 HYPERCALCEMIA: ICD-10-CM

## 2023-10-16 DIAGNOSIS — Z23 NEED FOR VACCINATION: ICD-10-CM

## 2023-10-16 DIAGNOSIS — E55.9 VITAMIN D DEFICIENCY: ICD-10-CM

## 2023-10-16 PROCEDURE — 99214 OFFICE O/P EST MOD 30 MIN: CPT | Mod: 25 | Performed by: FAMILY MEDICINE

## 2023-10-16 PROCEDURE — 90662 IIV NO PRSV INCREASED AG IM: CPT | Performed by: FAMILY MEDICINE

## 2023-10-16 PROCEDURE — 3078F DIAST BP <80 MM HG: CPT | Performed by: FAMILY MEDICINE

## 2023-10-16 PROCEDURE — G0008 ADMIN INFLUENZA VIRUS VAC: HCPCS | Performed by: FAMILY MEDICINE

## 2023-10-16 PROCEDURE — 3074F SYST BP LT 130 MM HG: CPT | Performed by: FAMILY MEDICINE

## 2023-10-16 ASSESSMENT — FIBROSIS 4 INDEX: FIB4 SCORE: 1.59

## 2023-10-16 NOTE — PROGRESS NOTES
Subjective:     CC:   Chief Complaint   Patient presents with    Follow-Up     2 months     Lab Results         HPI:   Mj presents today with daughter for a follow-up visit..  Last seen by me on 8/16/23.      Upcoming appointments with:  Dr Florence- next week      Problem   Hyperparathyroidism, Unspecified (Hcc)      Chronic issue. Noted since May 2023.   Recent labs with calcium levels at 10.9, vitamin D 29, and PTH at 80.9  Sestamibi scan completed in august 2023 negative for parathyroid adenoma.     Weight Loss    .Has gained 5 pounds over the last 2 months.  Has partial dentures at the bottom. Has noticed difficulty chewing for the last year.       Weight  In August  was 120.6 pounds.  His weight last year in September was 135 pounds.  Records reviewed and weight in June 2023 was 131 pounds  April 2023 134 pounds.  States that he does have a good appetite and has always just mainly ate lunch.  Says that he does have a history of smoking in the past.  Started at the age of 14 and stopped approximately 25 years ago.  Hence patient has smoked for approximately 39 years.  Denies any coughing or shortness of breath.     Vitamin D Deficiency    Chronic.  Taking vitamin D 50,000  Recent labs with vitamin D at 29.          Current Outpatient Medications Ordered in Epic   Medication Sig Dispense Refill    levetiracetam (KEPPRA) 500 MG TABLET SR 24 HR TAKE 4 TABLETS BY MOUTH EVERY DAY AT BEDTIME      zonisamide (ZONEGRAN) 100 MG Cap TAKE 2 CAPSULES BY MOUTH NIGHTLY      amLODIPine (NORVASC) 5 MG Tab Take 1 Tablet by mouth every day. 100 Tablet 3    tadalafil (CIALIS) 5 MG tablet tadalafil 5 mg tablet   Take 1 tablet(s) EVERY DAY by oral route.      ergocalciferol (DRISDOL) 26276 UNIT capsule ergocalciferol (vitamin D2) 1,250 mcg (50,000 unit) capsule   TAKE 1 CAPSULE BY MOUTH ONCE A WEEK      atorvastatin (LIPITOR) 10 MG Tab Take 1 Tablet by mouth every morning. 100 Tablet 3     No current Epic-ordered  "facility-administered medications on file.       Health Maintenance: encouraged to receive the covid vaccine.     ROS:  Review of Systems   Constitutional:  Negative for chills and fever.   Respiratory:  Negative for shortness of breath.    Cardiovascular:  Negative for chest pain.       Objective:     Exam:  /68   Pulse 61   Temp 37 °C (98.6 °F) (Temporal)   Resp 14   Ht 1.575 m (5' 2\")   Wt 56.7 kg (125 lb)   SpO2 95%   BMI 22.86 kg/m²  Body mass index is 22.86 kg/m².    Physical Exam  Constitutional:       Appearance: Normal appearance.   Eyes:      Extraocular Movements: Extraocular movements intact.   Cardiovascular:      Rate and Rhythm: Normal rate and regular rhythm.   Pulmonary:      Effort: Pulmonary effort is normal.      Breath sounds: Normal breath sounds.   Neurological:      Mental Status: He is alert.   Psychiatric:         Mood and Affect: Mood normal.         Behavior: Behavior normal.             Labs: see above    Assessment & Plan:     78 y.o. male with the following -     1. Hyperparathyroidism, unspecified (HCC)  2. Hypercalcemia  - Referral to Endocrinology    [facility] Gap Form    Diagnosis to address: E21.3 - Hyperparathyroidism, unspecified (HCC)  Assessment and plan: Chronic, stable. Repeat labs show elevated Parathyroid and calcium levels. Nuclear scan was negative for an adenoma. New referral to endocrinology for further evaluation placed today.   Last edited 10/18/23 04:45 PDT by Viviana Whitfield M.D.         3. Weight loss  Chronic. Improving.   Has gained 5 pounds over the last 5 months.   Cxr results from September d/w him. No acute findings.will continue to monitor weight.     4. Need for vaccination  - Influenza Vaccine, High Dose (65+ Only)    5. Vitamin D deficiency  Chronic.  Not well controlled. Thinks he's taking vit D 50k weekly howevery recent labs of 29 are lower than Laurence value of 43. Suspect due to hypercalcemia and hyperparathyroid. Endo referral placed.      Return " in about 3 months (around 1/16/2024).    Please note that this dictation was created using voice recognition software. I have made every reasonable attempt to correct obvious errors, but I expect that there are errors of grammar and possibly content that I did not discover before finalizing the note.

## 2023-10-18 PROBLEM — E21.3 HYPERPARATHYROIDISM, UNSPECIFIED (HCC): Status: ACTIVE | Noted: 2023-10-18

## 2023-10-18 ASSESSMENT — ENCOUNTER SYMPTOMS
CHILLS: 0
FEVER: 0
SHORTNESS OF BREATH: 0

## 2023-10-20 DIAGNOSIS — E78.5 DYSLIPIDEMIA: ICD-10-CM

## 2023-10-20 NOTE — TELEPHONE ENCOUNTER
Received request via: Pharmacy    Was the patient seen in the last year in this department? Yes  10/16/2023  Does the patient have an active prescription (recently filled or refills available) for medication(s) requested? No    Does the patient have long term Plus and need 100 day supply (blood pressure, diabetes and cholesterol meds only)? Yes, quantity updated to 100 days

## 2023-10-23 RX ORDER — ATORVASTATIN CALCIUM 10 MG/1
10 TABLET, FILM COATED ORAL EVERY MORNING
Qty: 100 TABLET | Refills: 3 | Status: SHIPPED | OUTPATIENT
Start: 2023-10-23

## 2023-11-07 ENCOUNTER — OFFICE VISIT (OUTPATIENT)
Dept: ENDOCRINOLOGY | Facility: MEDICAL CENTER | Age: 78
End: 2023-11-07
Payer: MEDICARE

## 2023-11-07 VITALS
HEART RATE: 77 BPM | DIASTOLIC BLOOD PRESSURE: 66 MMHG | WEIGHT: 124.7 LBS | BODY MASS INDEX: 23.54 KG/M2 | HEIGHT: 61 IN | RESPIRATION RATE: 20 BRPM | SYSTOLIC BLOOD PRESSURE: 124 MMHG | OXYGEN SATURATION: 97 %

## 2023-11-07 DIAGNOSIS — E55.9 VITAMIN D DEFICIENCY: ICD-10-CM

## 2023-11-07 DIAGNOSIS — E21.3 HYPERPARATHYROIDISM (HCC): ICD-10-CM

## 2023-11-07 PROCEDURE — 99211 OFF/OP EST MAY X REQ PHY/QHP: CPT

## 2023-11-07 PROCEDURE — 99214 OFFICE O/P EST MOD 30 MIN: CPT

## 2023-11-07 PROCEDURE — 3078F DIAST BP <80 MM HG: CPT

## 2023-11-07 PROCEDURE — 3074F SYST BP LT 130 MM HG: CPT

## 2023-11-07 ASSESSMENT — FIBROSIS 4 INDEX: FIB4 SCORE: 1.59

## 2023-11-07 NOTE — PROGRESS NOTES
Chief Complaint: Consult requested by Viviana Whitfield M.D. for evaluation of Hypercalcemia    HPI:     Mj Cornejo is a 78 y.o. male with here for initial evaluation of hypercalcemia.      Hyperparathyroidism  He was first diagnosed with hypercalcemia in March 2023.  He reports elevation of the serum calcium and parathormone.    He denies bone pain, history of nephrolithiasis, depression, polydypsia , polyuria, abdominal pain, constipation, lethargy, weakness, memory difficulties, and osteoporosis.    He is not currently on hydrochlorothiazide.    He is not on lithium therapy.    He  is not taking calcium supplements.    He denies history of malignancy.    He denies family history of hypercalcemia.    He denies family history of endocrine malignancies.   Latest Reference Range & Units 10/14/23 07:36   Calcium 8.5 - 10.5 mg/dL 10.9 (H)   Correct Calcium 8.5 - 10.5 mg/dL 10.5      Latest Reference Range & Units 10/14/23 07:36   Albumin 3.2 - 4.9 g/dL 4.5      Latest Reference Range & Units 10/14/23 07:36   Pth, Intact 14.0 - 72.0 pg/mL 80.9 (H)      Latest Reference Range & Units 10/14/23 07:36   GFR (CKD-EPI) >60 mL/min/1.73 m 2 90      Latest Reference Range & Units 10/14/23 07:36   Alkaline Phosphatase 30 - 99 U/L 97     Sestamibi scan on 8/7/23:  Uptake in the thyroid gland is normal and symmetric.  There is no evidence of focal abnormality on either initial or delayed images to suggest a parathyroid adenoma.    2. Vitamin D deficiency  Currently taking ergocalciferol 00163 IU weekly   Latest Reference Range & Units 10/14/23 07:36   25-Hydroxy   Vitamin D 25 30 - 100 ng/mL 29 (L)     Patient's medications, allergies, and social histories were reviewed and updated as appropriate.      ROS:      CONS:     No fever, no chills, no weight loss, no fatigue   EYES:      No diplopia, no blurry vision, no redness of eyes, no swelling of eyelids   ENT:    No hearing loss, No ear pain, No sore throat, no  dysphagia, no neck swelling   CV:     No chest pain, no palpitations, no claudication, no orthopnea, no PND   PULM:    No SOB, no cough, no hemoptysis, no wheezing    GI:   No nausea, no vomiting, no diarrhea, no constipation, no bloody stools   :  Passing urine well, no dysuria, no hematuria   ENDO:   No polyuria, no polydipsia, no heat intolerance, no cold intolerance   NEURO: No headaches, no dizziness, no convulsions, no tremors   MUSC:  No joint swellings, no arthralgias, no myalgias, no weakness   SKIN:   No rash, no ulcers, no dry skin   PSYCH:   No depression, no anxiety, no difficulty sleeping       Past Medical History:  Patient Active Problem List    Diagnosis Date Noted    Hyperparathyroidism, unspecified (HCC) 10/18/2023    Weight loss 08/16/2023    BMI 22.0-22.9, adult 07/28/2023    Hypercalcemia 04/24/2023    Supraventricular tachycardia 09/22/2022    Raised prostate specific antigen 06/27/2022    Macrocytosis without anemia 04/27/2022    Neck pain 07/19/2021    Elevated fasting glucose 07/19/2021    Vitamin D deficiency 07/19/2021    Tremor of both hands 07/19/2021    Male sexual dysfunction 07/19/2021    Chronic bilateral low back pain without sciatica 04/12/2021    Subclinical hypothyroidism 04/12/2021    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     Allergies:  Patient has no known allergies.     Current Medications:    Current Outpatient Medications:     atorvastatin (LIPITOR) 10 MG Tab, TAKE 1 TABLET BY MOUTH ONCE DAILY IN THE MORNING, Disp: 100 Tablet, Rfl: 3    levetiracetam (KEPPRA) 500 MG TABLET SR 24 HR, TAKE 4 TABLETS BY MOUTH EVERY DAY AT BEDTIME, Disp: , Rfl:     zonisamide (ZONEGRAN) 100 MG Cap, TAKE 2 CAPSULES BY  MOUTH NIGHTLY, Disp: , Rfl:     amLODIPine (NORVASC) 5 MG Tab, Take 1 Tablet by mouth every day., Disp: 100 Tablet, Rfl: 3    ergocalciferol (DRISDOL) 37738 UNIT capsule, ergocalciferol (vitamin D2) 1,250 mcg (50,000 unit) capsule  TAKE 1 CAPSULE BY MOUTH ONCE A WEEK, Disp: , Rfl:     tadalafil (CIALIS) 5 MG tablet, tadalafil 5 mg tablet  Take 1 tablet(s) EVERY DAY by oral route. (Patient not taking: Reported on 2023), Disp: , Rfl:     Social History:  Social History     Socioeconomic History    Marital status:      Spouse name: Not on file    Number of children: Not on file    Years of education: Not on file    Highest education level: Not on file   Occupational History    Not on file   Tobacco Use    Smoking status: Former     Current packs/day: 0.00     Types: Cigarettes     Quit date:      Years since quittin.8    Smokeless tobacco: Never   Vaping Use    Vaping Use: Never used   Substance and Sexual Activity    Alcohol use: No    Drug use: No    Sexual activity: Not on file     Comment: , lives with wife and son, 2 sons   Other Topics Concern    Not on file   Social History Narrative    Not on file     Social Determinants of Health     Financial Resource Strain: Medium Risk (2021)    Overall Financial Resource Strain (CARDIA)     Difficulty of Paying Living Expenses: Somewhat hard   Food Insecurity: No Food Insecurity (2021)    Hunger Vital Sign     Worried About Running Out of Food in the Last Year: Never true     Ran Out of Food in the Last Year: Never true   Transportation Needs: No Transportation Needs (2021)    PRAPARE - Transportation     Lack of Transportation (Medical): No     Lack of Transportation (Non-Medical): No   Physical Activity: Not on file   Stress: Not on file   Social Connections: Not on file   Intimate Partner Violence: Not on file   Housing Stability: Not on file        Family History:   Family History   Problem Relation Age of Onset    Stroke  "Mother          PHYSICAL EXAM:   Vital signs: /66 (BP Location: Right arm, Patient Position: Sitting, BP Cuff Size: Adult)   Pulse 77   Resp 20   Ht 1.549 m (5' 1\")   Wt 56.6 kg (124 lb 11.2 oz)   SpO2 97%   BMI 23.56 kg/m²   GENERAL: Well-developed, well-nourished  in no apparent distress.   EYE: No ocular and eyelid asymmetry, Anicteric sclerae,  PERRL, No exophthalmos or lidlag  HENT: Hearing grossly intact, Normocephalic, atraumatic. Pink, moist mucous membranes, No exudate  NECK: Supple. Trachea midline. thyroid is normal in size without nodules or tenderness  CARDIOVASCULAR: Regular rate and rhythm. No murmurs, rubs, or gallops.   LUNGS: Clear to auscultation bilaterally   ABDOMEN: Soft, nontender with positive bowel sounds.   EXTREMITIES: No clubbing, cyanosis, or edema.   NEUROLOGICAL: Cranial nerves II-XII are grossly intact   Symmetric reflexes at the patella no proximal muscle weakness, No visible tremor with both outstretched hands  LYMPH: No cervical, supraclavicular,  adenopathy palpated.   SKIN: No rashes, lesions. Turgor is normal.      Labs:  Lab Results   Component Value Date/Time    WBC 8.5 06/29/2023 08:33 AM    RBC 4.30 (L) 06/29/2023 08:33 AM    HEMOGLOBIN 14.5 06/29/2023 08:33 AM    .0 (H) 06/29/2023 08:33 AM    MCH 33.7 (H) 06/29/2023 08:33 AM    MCHC 32.4 06/29/2023 08:33 AM    RDW 46.2 06/29/2023 08:33 AM    MPV 12.0 06/29/2023 08:33 AM       Lab Results   Component Value Date/Time    SODIUM 140 10/14/2023 07:36 AM    POTASSIUM 4.3 10/14/2023 07:36 AM    CHLORIDE 109 10/14/2023 07:36 AM    CO2 22 10/14/2023 07:36 AM    ANION 9.0 10/14/2023 07:36 AM    GLUCOSE 91 10/14/2023 07:36 AM    BUN 10 10/14/2023 07:36 AM    CREATININE 0.82 10/14/2023 07:36 AM    CALCIUM 10.9 (H) 10/14/2023 07:36 AM    ASTSGOT 17 10/14/2023 07:36 AM    ALTSGPT 10 10/14/2023 07:36 AM    TBILIRUBIN 0.8 10/14/2023 07:36 AM    ALBUMIN 4.5 10/14/2023 07:36 AM    TOTPROTEIN 7.4 10/14/2023 07:36 AM    " "GLOBULIN 2.9 10/14/2023 07:36 AM    AGRATIO 1.6 10/14/2023 07:36 AM       Lab Results   Component Value Date/Time    TSHULTRASEN 3.360 06/29/2023 0833     No results found for: \"FREEDIR\"  No results found for: \"FREET3\"  No results found for: \"THYSTIMIG\"      Imaging:      ASSESSMENT/PLAN:     1. Hyperparathyroidism (HCC)  Unstable  Discussed the causes of hyperparathyroidism including parathyroid adenoma, vitamin D deficiency, FHH, CKD.   Reviewed the sestamibi scan results with patient which showed, no evidence of parathyroid adenoma.   I will get urine calcium to rule out FHH  - Comp Metabolic Panel; Future  - IONIZED CALCIUM; Future  - PHOSPHORUS; Future  - PTH INTACT (PTH ONLY); Future  - Urine Calcium 24 HR or Random; Future  - VITAMIN 1,25 DIHYDROXY (CALCIUM METABOLISM); Future  - TSH; Future  - FREE THYROXINE; Future    2. Vitamin D deficiency  Unstable  Stop ergocalciferol 88149 IU weekly  Recommend OTC vitamin D 3 5000 IU daily  - VITAMIN D,25 HYDROXY (DEFICIENCY); Future     Return in about 6 weeks (around 12/19/2023). Patient will have blood work done 1 week prior to follow up in 6 weeks.    Thank you kindly for allowing me to participate in the endocrine care plan for this patient.    Chadd Cortes, APRN   11/07/23    CC:   Viviana Whitfield M.D.  "

## 2023-12-02 ENCOUNTER — HOSPITAL ENCOUNTER (OUTPATIENT)
Dept: LAB | Facility: MEDICAL CENTER | Age: 78
End: 2023-12-02
Payer: MEDICARE

## 2023-12-02 DIAGNOSIS — E21.3 HYPERPARATHYROIDISM (HCC): ICD-10-CM

## 2023-12-02 DIAGNOSIS — E55.9 VITAMIN D DEFICIENCY: ICD-10-CM

## 2023-12-02 LAB
25(OH)D3 SERPL-MCNC: 68 NG/ML (ref 30–100)
ALBUMIN SERPL BCP-MCNC: 4.3 G/DL (ref 3.2–4.9)
ALBUMIN/GLOB SERPL: 1.5 G/DL
ALP SERPL-CCNC: 97 U/L (ref 30–99)
ALT SERPL-CCNC: 8 U/L (ref 2–50)
ANION GAP SERPL CALC-SCNC: 9 MMOL/L (ref 7–16)
AST SERPL-CCNC: 17 U/L (ref 12–45)
BILIRUB SERPL-MCNC: 0.7 MG/DL (ref 0.1–1.5)
BUN SERPL-MCNC: 14 MG/DL (ref 8–22)
CA-I SERPL-SCNC: 1.5 MMOL/L (ref 1.1–1.3)
CALCIUM ALBUM COR SERPL-MCNC: 10.5 MG/DL (ref 8.5–10.5)
CALCIUM SERPL-MCNC: 10.7 MG/DL (ref 8.5–10.5)
CHLORIDE SERPL-SCNC: 109 MMOL/L (ref 96–112)
CO2 SERPL-SCNC: 23 MMOL/L (ref 20–33)
CREAT SERPL-MCNC: 0.87 MG/DL (ref 0.5–1.4)
FASTING STATUS PATIENT QL REPORTED: NORMAL
GFR SERPLBLD CREATININE-BSD FMLA CKD-EPI: 88 ML/MIN/1.73 M 2
GLOBULIN SER CALC-MCNC: 2.9 G/DL (ref 1.9–3.5)
GLUCOSE SERPL-MCNC: 90 MG/DL (ref 65–99)
PHOSPHATE SERPL-MCNC: 3.2 MG/DL (ref 2.5–4.5)
POTASSIUM SERPL-SCNC: 4 MMOL/L (ref 3.6–5.5)
PROT SERPL-MCNC: 7.2 G/DL (ref 6–8.2)
PTH-INTACT SERPL-MCNC: 77.3 PG/ML (ref 14–72)
SODIUM SERPL-SCNC: 141 MMOL/L (ref 135–145)
T4 FREE SERPL-MCNC: 1.08 NG/DL (ref 0.93–1.7)
TSH SERPL DL<=0.005 MIU/L-ACNC: 3.79 UIU/ML (ref 0.38–5.33)

## 2023-12-02 PROCEDURE — 84443 ASSAY THYROID STIM HORMONE: CPT

## 2023-12-02 PROCEDURE — 80053 COMPREHEN METABOLIC PANEL: CPT

## 2023-12-02 PROCEDURE — 83970 ASSAY OF PARATHORMONE: CPT

## 2023-12-02 PROCEDURE — 82330 ASSAY OF CALCIUM: CPT

## 2023-12-02 PROCEDURE — 84100 ASSAY OF PHOSPHORUS: CPT

## 2023-12-02 PROCEDURE — 36415 COLL VENOUS BLD VENIPUNCTURE: CPT

## 2023-12-02 PROCEDURE — 82652 VIT D 1 25-DIHYDROXY: CPT

## 2023-12-02 PROCEDURE — 82306 VITAMIN D 25 HYDROXY: CPT

## 2023-12-02 PROCEDURE — 84439 ASSAY OF FREE THYROXINE: CPT

## 2023-12-05 LAB — 1,25(OH)2D3 SERPL-MCNC: 100 PG/ML (ref 19.9–79.3)

## 2023-12-18 ENCOUNTER — HOSPITAL ENCOUNTER (OUTPATIENT)
Facility: MEDICAL CENTER | Age: 78
End: 2023-12-18
Payer: MEDICARE

## 2023-12-18 PROCEDURE — 82340 ASSAY OF CALCIUM IN URINE: CPT

## 2023-12-20 ENCOUNTER — APPOINTMENT (OUTPATIENT)
Dept: ENDOCRINOLOGY | Facility: MEDICAL CENTER | Age: 78
End: 2023-12-20
Payer: MEDICARE

## 2023-12-20 LAB
CALCIUM 24H UR-MCNC: 19.8 MG/DL
CALCIUM 24H UR-MRATE: 148 MG/D (ref 100–250)
CALCIUM/CREAT 24H UR: 239 MG/G (ref 20–240)
COLLECT DURATION TIME SPEC: 24 HRS
CREAT 24H UR-MCNC: 83 MG/DL
CREAT 24H UR-MRATE: 622 MG/D (ref 800–2100)
SPECIMEN VOL ?TM UR: 750 ML

## 2024-01-16 NOTE — PROGRESS NOTES
[facility]  Progress Note: Critical Care  Name: Debbie Moody 82 y.o. female I MRN: [id number]  Unit/Bed#: ICU 04 I Date of Admission: 12/31/2023   Date of Service: 1/17/2024 I Hospital Day: 17    Assessment/Plan   Neuro:   Diagnosis: Acute L cerebellar CVA w/ edema s/p suboccipital craniectomy on 1/3 with IVH  1/5 EVD placed 2/2 extensive IVH  1/15 EVD blocked and replaced  MRI 1/10-Stable exam w/ extensive IVH with surrounding vasogenic edema and/or transependymal flow of CSF. Stable hydrocephalus. Evolving subacute  L PICA territory infarct,stable petechial hemorrhage, vasogenic edema and mass effect.   CTH (1/15) - Evolving left PICA infarct with petechial hemorrhage; extensive diffuse IVHs with stable ventriculomegaly and transependymal CSF resorption. Stable left transfrontal ventriculostomy catheter. Unchanged 0.5 cm right to left midline shift. Suboccipital pseudomeningocele is noted.   Echo EF 70% mild hypertrophy  A1C 8.3  /172/54/141  Plan:   Neuro/Neurosx consulted  Stroke pathway  Stat CTH for any change in GCS >2pts  EVD @ 0   Na 145-155  SBP <160  Hold asa for at least 2 wks post op  Ancef ppx while EVD in place  F/u CTH post EVD change     Diagnosis: Encephalopathy  Plan:   Cont provigil  Neuro checks    CV:   Diagnosis: HTN/HLD  Plan:   Home vasotec held  Cont lisinopril/Norvasc/Coreg/statin  Diagnosis: hypotension  Plan:    If persistent overnight consider holding am anti-HTN rx  Albumin to improve pressures if needed      Pulm:  Diagnosis: Acute hypoxic resp failure  Intubated 1/3 Extubated 1/4  Reintubated 1/5 Extubated 1/12  Patient w/ continued rhonchi but improved from initial exams  Plan:   Wean oxygen for sats >92%  Aggressive pulm hygiene with nebs/vest/PRN NT sx  Unable to do IS     GI:   Diagnosis: Dysphagia  Plan:   NGT cont TF  Speech consulted     :   Diagnosis: CKD 3 baseline cr 1-1.3  Plan:   At baseline     F/E/N:   No active issues    Subjective:     CC:   Chief Complaint   Patient presents with    Follow-Up     1 month          HPI:   Mj presents today with his wife for a follow-up visit.  Last seen by me on June 28.    Since our last appointment, has been seen by:  TRISH on July 20    Upcoming appointments with:  Jeb Florence    Problem   Weight Loss    New medical problem.  Weight today in the office was 120.6 pounds.  His weight last year in September was 135 pounds.  Records reviewed and weight in June 2023 was 131 pounds  April 2023 134 pounds.  States that he does have a good appetite and has always just mainly ate lunch.  Says that he does have a history of smoking in the past.  Started at the age of 14 and stopped approximately 25 years ago.  Hence patient has smoked for approximately 39 years.  Denies any coughing or shortness of breath.  Patient's spouse states that he always has night sweats.  States that he has slight difficulty swallowing.  Continues to have a hoarse voice.  For now, patient would like to postpone any further referrals to ENT or other specialist  Recent blood work did have an elevated fasting glucose of 100.  A1c checked in the office today was normal at 4.6%.     Hypercalcemia    Ongoing medical problem.  June labs with elevated intact PTH at 86.4 and elevated calcium at 10.8.  Underwent radionuclide parathyroid scan last week with no evidence of parathyroid adenoma.  Has been taking vitamin D 50k weekly for the last 2 years. Prescribed by Dr Florence.           Raised Prostate Specific Antigen    Chronic.  Last PSA from sep 2022 was 5.90  Denies any urinary symptoms.     Elevated Fasting Glucose    Chronic medical diagnosis.  Recent labs with fasting glucose at 100.  A1c checked in office today was normal at 4.6%         Current Outpatient Medications Ordered in Epic   Medication Sig Dispense Refill    levetiracetam (KEPPRA) 500 MG TABLET SR 24 HR TAKE 4 TABLETS BY MOUTH EVERY DAY AT BEDTIME      zonisamide (ZONEGRAN)    Heme/Onc:   Diagnosis: Anemia baseline hgb 9-10  Plan:   No s/s bleeding  cont home iron     Endo:   Diagnosis: DM2  A1C > 8 on admission  Plan:   Holding home glipizide/Januvia  Lantus 26U AM t/c increasing and change to 15U BID  SSI     ID:   No active issues     MSK/Skin:   T/p q 2  PT/OT    Disposition: Critical care    ICU Core Measures     Vented Patient  VAP Bundle  VAP bundle ordered     A: Assess, Prevent, and Manage Pain Has pain been assessed? Yes  Need for changes to pain regimen? No   B: Both Spontaneous Awakening Trials (SATs) and Spontaneous Breathing Trials (SBTs) Plan to perform spontaneous awakening trial today? N/A   Plan to perform spontaneous breathing trial today? N/A   Obvious barriers to extubation? NA   C: Choice of Sedation RASS Goal: 0 Alert and Calm  Need for changes to sedation or analgesia regimen? No   D: Delirium CAM-ICU: Unable to perform secondary to Acute cognitive dysfunction   E: Early Mobility  Plan for early mobility? Yes   F: Family Engagement Plan for family engagement today? Yes       Antibiotic Review: Post op requirements     Review of Invasive Devices:      Central access plan: Will obtain peripheral access and discontinue prior to transfer      Prophylaxis:  VTE VTE covered by:  heparin (porcine), Subcutaneous, 5,000 Units at 01/17/24 0557       Stress Ulcer  not ordered        Significant 24hr Events     24hr events: patient's pressures improved. Started BiPAP intermittently - some daytime, should have qhs     Subjective     Review of Systems   Unable to perform ROS: Mental status change        Objective                            Vitals I/O      Most Recent Min/Max in 24hrs   Temp 98.5 °F (36.9 °C) Temp  Min: 97.8 °F (36.6 °C)  Max: 99 °F (37.2 °C)   Pulse 74 Pulse  Min: 70  Max: 92   Resp 14 Resp  Min: 11  Max: 19   BP (!) 147/39 BP  Min: 147/39  Max: 147/39   O2 Sat 100 % SpO2  Min: 99 %  Max: 100 %      Intake/Output Summary (Last 24 hours) at 1/17/2024 0901  Last  "100 MG Cap TAKE 2 CAPSULES BY MOUTH NIGHTLY      amLODIPine (NORVASC) 5 MG Tab Take 1 Tablet by mouth every day. 100 Tablet 3    tadalafil (CIALIS) 5 MG tablet tadalafil 5 mg tablet   Take 1 tablet(s) EVERY DAY by oral route.      ergocalciferol (DRISDOL) 38101 UNIT capsule ergocalciferol (vitamin D2) 1,250 mcg (50,000 unit) capsule   TAKE 1 CAPSULE BY MOUTH ONCE A WEEK      atorvastatin (LIPITOR) 10 MG Tab Take 1 Tablet by mouth every morning. 100 Tablet 3     No current Epic-ordered facility-administered medications on file.       ROS:  Review of Systems   Constitutional:  Positive for weight loss. Negative for chills, fever and malaise/fatigue.   Respiratory:  Negative for cough and shortness of breath.    Cardiovascular:  Negative for chest pain.   Gastrointestinal:  Negative for abdominal pain, nausea and vomiting.   Genitourinary:  Negative for dysuria.   Neurological:  Negative for dizziness and seizures.       Objective:     Exam:  /64   Pulse 60   Temp 36.6 °C (97.8 °F) (Temporal)   Resp 14   Ht 1.575 m (5' 2\")   Wt 54.7 kg (120 lb 9.6 oz)   SpO2 97%   BMI 22.06 kg/m²  Body mass index is 22.06 kg/m².    Physical Exam  Constitutional:       Appearance: Normal appearance.   Eyes:      Extraocular Movements: Extraocular movements intact.   Cardiovascular:      Rate and Rhythm: Normal rate and regular rhythm.   Pulmonary:      Effort: Pulmonary effort is normal. No respiratory distress.      Breath sounds: Normal breath sounds.   Abdominal:      General: Bowel sounds are normal.      Palpations: Abdomen is soft. There is no mass.      Tenderness: There is no abdominal tenderness. There is no guarding or rebound.   Musculoskeletal:      Right lower leg: No edema.      Left lower leg: No edema.   Neurological:      Mental Status: He is alert.   Psychiatric:         Mood and Affect: Mood normal.         Behavior: Behavior normal.             Labs: see above    Assessment & Plan:     78 y.o. male with " data filed at 1/17/2024 0600  Gross per 24 hour   Intake 1663.33 ml   Output 1079 ml   Net 584.33 ml       Diet Enteral/Parenteral; Tube Feeding No Oral Diet; Glucerna 1.2; Continuous; 45; Prosource Protein Liquid - One Packet; 40; Saline; Every 4 hours    Invasive Monitoring           Physical Exam   Physical Exam  Eyes:      Pupils: Pupils are equal, round, and reactive to light.   Skin:     General: Skin is warm and dry.   HENT:      Head: Normocephalic and atraumatic.      Comments: EVD 0       Mouth/Throat:      Mouth: Mucous membranes are moist.   Cardiovascular:      Rate and Rhythm: Normal rate and regular rhythm.      Pulses: Normal pulses.      Heart sounds: Normal heart sounds.   Musculoskeletal:         General: No swelling.      Right lower leg: No edema.      Left lower leg: No edema.   Abdominal: General: Bowel sounds are normal. There is no distension.      Palpations: Abdomen is soft.      Tenderness: There is no abdominal tenderness.   Constitutional:       General: She is not in acute distress.     Appearance: She is ill-appearing.   Pulmonary:      Effort: Pulmonary effort is normal. No respiratory distress.      Breath sounds: Rhonchi present.   Neurological:      Mental Status: She is lethargic.      GCS: GCS eye subscore is 2. GCS verbal subscore is 1. GCS motor subscore is 6.      Comments: 4/5 RUE grasp. Does lift arm off bed. LUE w/d to pain, some spontaneous. B/l LE wiggles toes. Speaks name. Oriented to person, place. Multiple intelligible words          Diagnostic Studies      EKG: tele  Imaging:  I have personally reviewed pertinent reports.   and I have personally reviewed pertinent films in PACS     Medications:  Scheduled PRN   amLODIPine, 10 mg, Daily  atorvastatin, 80 mg, QPM  carvedilol, 12.5 mg, BID With Meals  cefazolin, 1,000 mg, Q8H  chlorhexidine, 15 mL, Q12H JUAN ANTONIO  ferrous sulfate, 325 mg, Daily With Breakfast  formoterol, 20 mcg, Q12H  heparin (porcine), 5,000 Units, Q8H  the following -     1. Elevated fasting glucose  Chronic. Recent labs with fasting glucose at 100. Hs been experiencing unintentional weight loss. A1c checked in office today was normal range at 4.6%  - POCT Hemoglobin A1C    2. Weight loss  New issue. Not improving. Hx tobacco.   Check weight at home. D/w patient ENT referral for further evaluation of his hoarseness and dysphagia. States that these are improving and he would like to postpone for now.   - DX-CHEST-2 VIEWS; Future    3.  Hypercalcemia  Chronic.  Recent labs with improvement in calcium levels to 10.8.  Intact PTH levels remain slightly above normal at 86.4.  States that he has been taking vitamin D 50,000 units weekly for the last 2 years.  We will have him discontinue this and recheck labs in 2 months  - PTH INTACT; Future  - VITAMIN D,25 HYDROXY (DEFICIENCY); Future  - Comp Metabolic Panel; Future    4. Screening for malignant neoplasm of prostate  5. Raised prostate specific antigen  - Prostate Specific Ag Screening; Future            I spent a total of 44 minutes with record review, exam, communication with the patient and spouse in [place], , and documentation of this encounter.      Return in about 2 months (around 10/16/2023) for weight loss.    Please note that this dictation was created using voice recognition software. I have made every reasonable attempt to correct obvious errors, but I expect that there are errors of grammar and possibly content that I did not discover before finalizing the note.         JUAN ANTONIO  insulin glargine, 15 Units, Q12H JUAN ANTONIO  insulin lispro, 3 Units, Q6H  insulin lispro, 4-20 Units, Q6H JUAN ANTONIO  levalbuterol, 1.25 mg, TID  lisinopril, 40 mg, Daily  miconazole, , BID  modafinil, 200 mg, Daily  polyethylene glycol, 17 g, BID  senna-docusate sodium, 2 tablet, BID  sodium chloride, 4 mL, TID      acetaminophen, 650 mg, Q6H PRN  bisacodyl, 10 mg, Daily PRN  hydrALAZINE, 10 mg, Q4H PRN  ondansetron, 4 mg, Q6H PRN       Continuous          Labs:    CBC    Recent Labs     01/16/24  0424 01/16/24  0636 01/16/24  1239 01/17/24  0446   WBC 6.39  --   --  6.71   HGB 6.6*   < > 8.5* 8.7*   HCT 20.5*   < > 26.2* 27.3*     --   --  206    < > = values in this interval not displayed.     BMP    Recent Labs     01/16/24  0424 01/17/24  0446   SODIUM 140 147   K 4.1 4.2   * 114*   CO2 30 31   AGAP 0 2   BUN 38* 35*   CREATININE 0.96 0.91   CALCIUM 8.6 8.7       Coags    No recent results     Additional Electrolytes  Recent Labs     01/17/24  0446   MG 2.2   CAIONIZED 1.15          Blood Gas    Recent Labs     01/16/24  1520   PHART 7.405   GLE5QGF 42.3   PO2ART 122.5   [id number] 25.9   BEART 1.0   SOURCE Line, Arterial     Recent Labs     01/16/24  1520   SOURCE Line, Arterial    LFTs  No recent results    Infectious  No recent results  Glucose  Recent Labs     01/16/24  0424 01/17/24  0446   GLUC 150* 95             Orin Enriquez MD

## 2024-01-24 ENCOUNTER — OFFICE VISIT (OUTPATIENT)
Dept: MEDICAL GROUP | Facility: PHYSICIAN GROUP | Age: 79
End: 2024-01-24
Payer: MEDICARE

## 2024-01-24 VITALS
DIASTOLIC BLOOD PRESSURE: 70 MMHG | WEIGHT: 126 LBS | RESPIRATION RATE: 16 BRPM | OXYGEN SATURATION: 97 % | BODY MASS INDEX: 23.79 KG/M2 | SYSTOLIC BLOOD PRESSURE: 134 MMHG | HEIGHT: 61 IN | TEMPERATURE: 98.7 F | HEART RATE: 65 BPM

## 2024-01-24 DIAGNOSIS — R56.9 SEIZURE (HCC): ICD-10-CM

## 2024-01-24 DIAGNOSIS — E21.3 HYPERPARATHYROIDISM, UNSPECIFIED (HCC): ICD-10-CM

## 2024-01-24 PROCEDURE — 99214 OFFICE O/P EST MOD 30 MIN: CPT | Performed by: FAMILY MEDICINE

## 2024-01-24 PROCEDURE — 3078F DIAST BP <80 MM HG: CPT | Performed by: FAMILY MEDICINE

## 2024-01-24 PROCEDURE — 3075F SYST BP GE 130 - 139MM HG: CPT | Performed by: FAMILY MEDICINE

## 2024-01-24 ASSESSMENT — PATIENT HEALTH QUESTIONNAIRE - PHQ9: CLINICAL INTERPRETATION OF PHQ2 SCORE: 0

## 2024-01-24 ASSESSMENT — ENCOUNTER SYMPTOMS
SEIZURES: 0
BACK PAIN: 1

## 2024-01-24 ASSESSMENT — FIBROSIS 4 INDEX: FIB4 SCORE: 1.78

## 2024-01-24 NOTE — PROGRESS NOTES
"Subjective:     CC:   Chief Complaint   Patient presents with    Follow-Up     3 months         HPI:   Mj presents today with dtr.  Last seen by me on 10/16/23.    Since our last appointment, has been seen by:  11/7- endo- further labs ordered  10/30- neuro- Jules    Upcoming appointments with:  2/14/24- endo    Problem   Hyperparathyroidism, Unspecified (Hcc)    Chronic. Had his fu with endo and recommended to d/c vit D 50k and start 5000 IU daily. Labs were also ordered. Had to reschedule his f/u appt.  Couldn't tolerate the vitamin D 5000. Has been taking the vitamin D 50K weekly instead. Last took it 3 days ago on Sunday.     10/18/23   Chronic issue. Noted since May 2023.   Recent labs with calcium levels at 10.9, vitamin D 29, and PTH at 80.9  Sestamibi scan completed in august 2023 negative for parathyroid adenoma.         Current Outpatient Medications Ordered in Epic   Medication Sig Dispense Refill    atorvastatin (LIPITOR) 10 MG Tab TAKE 1 TABLET BY MOUTH ONCE DAILY IN THE MORNING 100 Tablet 3    levetiracetam (KEPPRA) 500 MG TABLET SR 24 HR TAKE 4 TABLETS BY MOUTH EVERY DAY AT BEDTIME      zonisamide (ZONEGRAN) 100 MG Cap TAKE 2 CAPSULES BY MOUTH NIGHTLY      amLODIPine (NORVASC) 5 MG Tab Take 1 Tablet by mouth every day. 100 Tablet 3    tadalafil (CIALIS) 5 MG tablet        No current Epic-ordered facility-administered medications on file.       Health Maintenance: recommended to receive covid and rsv vaccines    ROS:  Review of Systems   Musculoskeletal:  Positive for back pain.   Neurological:  Negative for seizures.       Objective:     Exam:  /70   Pulse 65   Temp 37.1 °C (98.7 °F) (Temporal)   Resp 16   Ht 1.549 m (5' 1\")   Wt 57.2 kg (126 lb)   SpO2 97%   BMI 23.81 kg/m²  Body mass index is 23.81 kg/m².    Physical Exam  Constitutional:       Appearance: Normal appearance.   Eyes:      Extraocular Movements: Extraocular movements intact.   Cardiovascular:      Rate and Rhythm: " Normal rate and regular rhythm.   Pulmonary:      Effort: Pulmonary effort is normal.      Breath sounds: Normal breath sounds.   Neurological:      Mental Status: He is alert.   Psychiatric:         Mood and Affect: Mood normal.         Behavior: Behavior normal.             Labs: see above    Assessment & Plan:     78 y.o. male with the following -     1. Hyperparathyroidism, unspecified (HCC)  2. Seizure (HCC)    [facility] Form    Diagnosis to address: E21.3 - Hyperparathyroidism, unspecified (HCC)  Assessment and plan: Chronic, stable, had his follow-up with endocrinology in November.  Had to reschedule the appointment and will be following up with them again in February.  Has been taking vitamin D 50,000 units weekly instead of the 5000 units daily.  Mentions that he could not tolerate the 5000.  Will have him discontinue all vitamin D until he sees endocrinology on February 14.  Diagnosis: R56.9 - Seizure (HCC)  Assessment and plan: Chronic, stable, continues to follow-up with neurology.  Last appointment was on October 30.  Has not had a seizure since he started taking medications.  Continue with Keppra 2000 mg daily and zonisamide 200 mg at bedtime.  Last edited 01/25/24 14:04 PST by Viviana Whitfield M.D.           Return in about 3 months (around 4/24/2024).    Please note that this dictation was created using voice recognition software. I have made every reasonable attempt to correct obvious errors, but I expect that there are errors of grammar and possibly content that I did not discover before finalizing the note.

## 2024-04-25 DIAGNOSIS — E55.9 VITAMIN D DEFICIENCY: ICD-10-CM

## 2024-04-25 DIAGNOSIS — E21.3 HYPERPARATHYROIDISM (HCC): ICD-10-CM

## 2024-04-26 ENCOUNTER — HOSPITAL ENCOUNTER (OUTPATIENT)
Dept: LAB | Facility: MEDICAL CENTER | Age: 79
End: 2024-04-26
Payer: MEDICARE

## 2024-04-26 DIAGNOSIS — E21.3 HYPERPARATHYROIDISM (HCC): ICD-10-CM

## 2024-04-26 DIAGNOSIS — E55.9 VITAMIN D DEFICIENCY: ICD-10-CM

## 2024-04-26 LAB
25(OH)D3 SERPL-MCNC: 31 NG/ML (ref 30–100)
ALBUMIN SERPL BCP-MCNC: 4.4 G/DL (ref 3.2–4.9)
ALBUMIN/GLOB SERPL: 1.6 G/DL
ALP SERPL-CCNC: 97 U/L (ref 30–99)
ALT SERPL-CCNC: 19 U/L (ref 2–50)
ANION GAP SERPL CALC-SCNC: 10 MMOL/L (ref 7–16)
AST SERPL-CCNC: 24 U/L (ref 12–45)
BILIRUB SERPL-MCNC: 0.6 MG/DL (ref 0.1–1.5)
BUN SERPL-MCNC: 8 MG/DL (ref 8–22)
CA-I SERPL-SCNC: 1.4 MMOL/L (ref 1.1–1.3)
CALCIUM ALBUM COR SERPL-MCNC: 10.2 MG/DL (ref 8.5–10.5)
CALCIUM SERPL-MCNC: 10.5 MG/DL (ref 8.5–10.5)
CHLORIDE SERPL-SCNC: 110 MMOL/L (ref 96–112)
CO2 SERPL-SCNC: 24 MMOL/L (ref 20–33)
CREAT SERPL-MCNC: 0.8 MG/DL (ref 0.5–1.4)
GFR SERPLBLD CREATININE-BSD FMLA CKD-EPI: 90 ML/MIN/1.73 M 2
GLOBULIN SER CALC-MCNC: 2.8 G/DL (ref 1.9–3.5)
GLUCOSE SERPL-MCNC: 90 MG/DL (ref 65–99)
PHOSPHATE SERPL-MCNC: 2.4 MG/DL (ref 2.5–4.5)
POTASSIUM SERPL-SCNC: 3.3 MMOL/L (ref 3.6–5.5)
PROT SERPL-MCNC: 7.2 G/DL (ref 6–8.2)
PTH-INTACT SERPL-MCNC: 102 PG/ML (ref 14–72)
SODIUM SERPL-SCNC: 144 MMOL/L (ref 135–145)
T4 FREE SERPL-MCNC: 1.04 NG/DL (ref 0.93–1.7)
TSH SERPL DL<=0.005 MIU/L-ACNC: 2.68 UIU/ML (ref 0.38–5.33)

## 2024-04-26 PROCEDURE — 84439 ASSAY OF FREE THYROXINE: CPT

## 2024-04-26 PROCEDURE — 82652 VIT D 1 25-DIHYDROXY: CPT

## 2024-04-26 PROCEDURE — 36415 COLL VENOUS BLD VENIPUNCTURE: CPT

## 2024-04-26 PROCEDURE — 82330 ASSAY OF CALCIUM: CPT

## 2024-04-26 PROCEDURE — 82340 ASSAY OF CALCIUM IN URINE: CPT

## 2024-04-26 PROCEDURE — 84443 ASSAY THYROID STIM HORMONE: CPT

## 2024-04-26 PROCEDURE — 82306 VITAMIN D 25 HYDROXY: CPT

## 2024-04-26 PROCEDURE — 80053 COMPREHEN METABOLIC PANEL: CPT

## 2024-04-26 PROCEDURE — 84100 ASSAY OF PHOSPHORUS: CPT

## 2024-04-26 PROCEDURE — 83970 ASSAY OF PARATHORMONE: CPT

## 2024-04-27 LAB
1,25(OH)2D3 SERPL-MCNC: 63.8 PG/ML (ref 19.9–79.3)
CALCIUM 24H UR-MCNC: 13.4 MG/DL
CALCIUM 24H UR-MRATE: NORMAL MG/D (ref 100–250)
CALCIUM/CREAT 24H UR: 168 MG/G (ref 20–240)
COLLECT DURATION TIME SPEC: NORMAL HRS
CREAT 24H UR-MCNC: 80 MG/DL
SPECIMEN VOL ?TM UR: NORMAL ML

## 2024-04-30 ENCOUNTER — OFFICE VISIT (OUTPATIENT)
Dept: MEDICAL GROUP | Facility: PHYSICIAN GROUP | Age: 79
End: 2024-04-30
Payer: MEDICARE

## 2024-04-30 VITALS
TEMPERATURE: 98.6 F | OXYGEN SATURATION: 96 % | SYSTOLIC BLOOD PRESSURE: 116 MMHG | BODY MASS INDEX: 22.77 KG/M2 | HEART RATE: 71 BPM | DIASTOLIC BLOOD PRESSURE: 70 MMHG | WEIGHT: 120.6 LBS | HEIGHT: 61 IN

## 2024-04-30 DIAGNOSIS — R63.4 WEIGHT LOSS: ICD-10-CM

## 2024-04-30 DIAGNOSIS — R56.9 SEIZURE (HCC): ICD-10-CM

## 2024-04-30 DIAGNOSIS — I10 ESSENTIAL HYPERTENSION: ICD-10-CM

## 2024-04-30 DIAGNOSIS — E78.5 DYSLIPIDEMIA: ICD-10-CM

## 2024-04-30 DIAGNOSIS — R13.19 OTHER DYSPHAGIA: ICD-10-CM

## 2024-04-30 PROCEDURE — 3078F DIAST BP <80 MM HG: CPT | Performed by: FAMILY MEDICINE

## 2024-04-30 PROCEDURE — 99214 OFFICE O/P EST MOD 30 MIN: CPT | Performed by: FAMILY MEDICINE

## 2024-04-30 PROCEDURE — 3074F SYST BP LT 130 MM HG: CPT | Performed by: FAMILY MEDICINE

## 2024-04-30 ASSESSMENT — ENCOUNTER SYMPTOMS
HEARTBURN: 0
ROS GI COMMENTS: DYSPHAGIA

## 2024-04-30 ASSESSMENT — FIBROSIS 4 INDEX: FIB4 SCORE: 1.65

## 2024-05-01 ENCOUNTER — OFFICE VISIT (OUTPATIENT)
Dept: ENDOCRINOLOGY | Facility: MEDICAL CENTER | Age: 79
End: 2024-05-01
Payer: MEDICARE

## 2024-05-01 VITALS
DIASTOLIC BLOOD PRESSURE: 72 MMHG | WEIGHT: 121 LBS | BODY MASS INDEX: 23.75 KG/M2 | SYSTOLIC BLOOD PRESSURE: 140 MMHG | HEART RATE: 78 BPM | OXYGEN SATURATION: 97 % | HEIGHT: 60 IN

## 2024-05-01 DIAGNOSIS — R13.10 DYSPHAGIA, UNSPECIFIED TYPE: ICD-10-CM

## 2024-05-01 DIAGNOSIS — E21.3 HYPERPARATHYROIDISM (HCC): ICD-10-CM

## 2024-05-01 DIAGNOSIS — R63.4 WEIGHT LOSS: ICD-10-CM

## 2024-05-01 DIAGNOSIS — E55.9 VITAMIN D DEFICIENCY: ICD-10-CM

## 2024-05-01 PROCEDURE — 99214 OFFICE O/P EST MOD 30 MIN: CPT

## 2024-05-01 PROCEDURE — 3077F SYST BP >= 140 MM HG: CPT

## 2024-05-01 PROCEDURE — 3078F DIAST BP <80 MM HG: CPT

## 2024-05-01 ASSESSMENT — FIBROSIS 4 INDEX: FIB4 SCORE: 1.65

## 2024-05-01 NOTE — PROGRESS NOTES
Chief Complaint: Consult requested by Viviana Whitfield M.D. for evaluation of Hypercalcemia    HPI:     Mj Cornejo is a 78 y.o. male with here for initial evaluation of hypercalcemia.      Hyperparathyroidism  He was first diagnosed with hypercalcemia in March 2023.    He reports elevation of the serum calcium and parathormone.    He denies bone pain, history of nephrolithiasis, depression, polydypsia , polyuria, abdominal pain, constipation, lethargy, weakness, memory difficulties, and osteoporosis.    He is not currently on hydrochlorothiazide.    He is not on lithium therapy.    He  is not taking calcium supplements.    He denies history of malignancy.    He denies family history of hypercalcemia.    He denies family history of endocrine malignancies.   Latest Reference Range & Units 04/26/24 07:57   Calcium 8.5 - 10.5 mg/dL 10.5   Correct Calcium 8.5 - 10.5 mg/dL 10.2        Latest Reference Range & Units 04/26/24 07:57   Phosphorus 2.5 - 4.5 mg/dL 2.4 (L)      Latest Reference Range & Units 04/26/24 07:57   Albumin 3.2 - 4.9 g/dL 4.4        Latest Reference Range & Units 04/26/24 07:57   Ionized Calcium 1.1 - 1.3 mmol/L 1.4 (H)      Latest Reference Range & Units 04/26/24 07:57   GFR (CKD-EPI) >60 mL/min/1.73 m 2 90      Latest Reference Range & Units 04/26/24 07:57   Alkaline Phosphatase 30 - 99 U/L 97      Latest Reference Range & Units 04/26/24 07:57   25-Hydroxy   Vitamin D 25 30 - 100 ng/mL 31   Vitamin D-1, 25-Dihydroxy 19.9 - 79.3 pg/mL 63.8   TSH 0.380 - 5.330 uIU/mL 2.680   Free T-4 0.93 - 1.70 ng/dL 1.04   Pth, Intact 14.0 - 72.0 pg/mL 102.0 (H)     Sestamibi scan on 8/7/23:  Uptake in the thyroid gland is normal and symmetric.  There is no evidence of focal abnormality on either initial or delayed images to suggest a parathyroid adenoma.    2. Vitamin D deficiency  Currently taking ergocalciferol 24701 IU weekly   Latest Reference Range & Units 04/26/24 07:57   25-Hydroxy   Vitamin D 25 30  - 100 ng/mL 31   Vitamin D-1, 25-Dihydroxy 19.9 - 79.3 pg/mL 63.8     3. Difficulty swallowing  He is unable to eat due to difficultiy   Patient's medications, allergies, and social histories were reviewed and updated as appropriate.      ROS:      CONS:     No fever, no chills, no weight loss, no fatigue   EYES:      No diplopia, no blurry vision, no redness of eyes, no swelling of eyelids   ENT:    No hearing loss, No ear pain, No sore throat, no dysphagia, no neck swelling   CV:     No chest pain, no palpitations, no claudication, no orthopnea, no PND   PULM:    No SOB, no cough, no hemoptysis, no wheezing    GI:   No nausea, no vomiting, no diarrhea, no constipation, no bloody stools   :  Passing urine well, no dysuria, no hematuria   ENDO:   No polyuria, no polydipsia, no heat intolerance, no cold intolerance   NEURO: No headaches, no dizziness, no convulsions, no tremors   MUSC:  No joint swellings, no arthralgias, no myalgias, no weakness   SKIN:   No rash, no ulcers, no dry skin   PSYCH:   No depression, no anxiety, no difficulty sleeping       Past Medical History:  Patient Active Problem List    Diagnosis Date Noted    Hyperparathyroidism, unspecified ([facility]) 10/18/2023    Weight loss 08/16/2023    BMI 22.0-22.9, adult 07/28/2023    Hypercalcemia 04/24/2023    Raised prostate specific antigen 06/27/2022    Macrocytosis without anemia 04/27/2022    Neck pain 07/19/2021    Elevated fasting glucose 07/19/2021    Vitamin D deficiency 07/19/2021    Tremor of both hands 07/19/2021    Male sexual dysfunction 07/19/2021    Chronic bilateral low back pain without sciatica 04/12/2021    Subclinical hypothyroidism 04/12/2021    Essential hypertension 01/27/2021    Dyslipidemia 01/23/2021    Elevated ferritin 01/23/2021    Polycythemia 01/23/2021    Seizure ([facility]) 12/28/2014    Cerebral infarction ([facility]) 12/28/2014       Past Surgical History:  History reviewed. No pertinent surgical history.     Allergies:  Patient has  no known allergies.     Current Medications:    Current Outpatient Medications:     atorvastatin (LIPITOR) 10 MG Tab, TAKE 1 TABLET BY MOUTH ONCE DAILY IN THE MORNING, Disp: 100 Tablet, Rfl: 3    levetiracetam (KEPPRA) 500 MG TABLET SR 24 HR, TAKE 4 TABLETS BY MOUTH EVERY DAY AT BEDTIME, Disp: , Rfl:     zonisamide (ZONEGRAN) 100 MG Cap, TAKE 2 CAPSULES BY MOUTH NIGHTLY, Disp: , Rfl:     amLODIPine (NORVASC) 5 MG Tab, Take 1 Tablet by mouth every day., Disp: 100 Tablet, Rfl: 3    tadalafil (CIALIS) 5 MG tablet, , Disp: , Rfl:     Social History:  Social History     Socioeconomic History    Marital status:      Spouse name: Not on file    Number of children: Not on file    Years of education: Not on file    Highest education level: Not on file   Occupational History    Not on file   Tobacco Use    Smoking status: Former     Current packs/day: 0.00     Types: Cigarettes     Quit date:      Years since quittin.3    Smokeless tobacco: Never   Vaping Use    Vaping Use: Never used   Substance and Sexual Activity    Alcohol use: No    Drug use: No    Sexual activity: Not on file     Comment: , lives with wife and son, 2 sons   Other Topics Concern    Not on file   Social History Narrative    Not on file     Social Determinants of Health     Financial Resource Strain: Medium Risk (2021)    Overall Financial Resource Strain (CARDIA)     Difficulty of Paying Living Expenses: Somewhat hard   Food Insecurity: No Food Insecurity (2021)    Hunger Vital Sign     Worried About Running Out of Food in the Last Year: Never true     Ran Out of Food in the Last Year: Never true   Transportation Needs: No Transportation Needs (2021)    PRAPARE - Transportation     Lack of Transportation (Medical): No     Lack of Transportation (Non-Medical): No   Physical Activity: Not on file   Stress: Not on file   Social Connections: Not on file   Intimate Partner Violence: Not on file   Housing Stability: Not on  file        Family History:   Family History   Problem Relation Age of Onset    Stroke Mother          PHYSICAL EXAM:   Vital signs: BP (!) 140/72 (BP Location: Right arm, Patient Position: Sitting, BP Cuff Size: Adult)   Pulse 78   Ht 1.524 m (5')   Wt 54.9 kg (121 lb)   SpO2 97%   BMI 23.63 kg/m²   GENERAL: Well-developed, well-nourished  in no apparent distress.   EYE: No ocular and eyelid asymmetry, Anicteric sclerae,  PERRL, No exophthalmos or lidlag  HENT: Hearing grossly intact, Normocephalic, atraumatic. Pink, moist mucous membranes, No exudate  NECK: Supple. Trachea midline. thyroid is normal in size without nodules or tenderness  CARDIOVASCULAR: Regular rate and rhythm. No murmurs, rubs, or gallops.   LUNGS: Clear to auscultation bilaterally   ABDOMEN: Soft, nontender with positive bowel sounds.   EXTREMITIES: No clubbing, cyanosis, or edema.   NEUROLOGICAL: Cranial nerves II-XII are grossly intact   Symmetric reflexes at the patella no proximal muscle weakness, No visible tremor with both outstretched hands  LYMPH: No cervical, supraclavicular,  adenopathy palpated.   SKIN: No rashes, lesions. Turgor is normal.      Labs:  Lab Results   Component Value Date/Time    WBC 8.5 06/29/2023 08:33 AM    RBC 4.30 (L) 06/29/2023 08:33 AM    HEMOGLOBIN 14.5 06/29/2023 08:33 AM    .0 (H) 06/29/2023 08:33 AM    MCH 33.7 (H) 06/29/2023 08:33 AM    MCHC 32.4 06/29/2023 08:33 AM    RDW 46.2 06/29/2023 08:33 AM    MPV 12.0 06/29/2023 08:33 AM       Lab Results   Component Value Date/Time    SODIUM 144 04/26/2024 07:57 AM    POTASSIUM 3.3 (L) 04/26/2024 07:57 AM    CHLORIDE 110 04/26/2024 07:57 AM    CO2 24 04/26/2024 07:57 AM    ANION 10.0 04/26/2024 07:57 AM    GLUCOSE 90 04/26/2024 07:57 AM    BUN 8 04/26/2024 07:57 AM    CREATININE 0.80 04/26/2024 07:57 AM    CALCIUM 10.5 04/26/2024 07:57 AM    ASTSGOT 24 04/26/2024 07:57 AM    ALTSGPT 19 04/26/2024 07:57 AM    TBILIRUBIN 0.6 04/26/2024 07:57 AM    ALBUMIN  "4.4 04/26/2024 07:57 AM    TOTPROTEIN 7.2 04/26/2024 07:57 AM    GLOBULIN 2.8 04/26/2024 07:57 AM    AGRATIO 1.6 04/26/2024 07:57 AM       Lab Results   Component Value Date/Time    TSHULTRASEN 3.360 06/29/2023 0833     No results found for: \"FREEDIR\"  No results found for: \"FREET3\"  No results found for: \"THYSTIMIG\"      Imaging:      ASSESSMENT/PLAN:   1. Hyperparathyroidism (HCC)  Unstable  This is most likely secondary hyperparathyroidism caused by low vitamin D levels as evidenced by vitamin D insufficiency and elevated hyperparathyroid hormone levels  I will replete vitamin D and repeat levels for efficacy  - TSH; Future  - FREE THYROXINE; Future  - T3 FREE; Future  - Comp Metabolic Panel; Future  - PTH INTACT (PTH ONLY); Future  I will get DEXA scan to rule out osteoporosis  - DS-BONE DENSITY STUDY (DEXA); Future    2. Vitamin D deficiency  Unstable  Recommend over-the-counter vitamin D3 1000 units daily  - VITAMIN D,25 HYDROXY (DEFICIENCY); Future    3. Weight loss  Unstable  Patient is most likely having weight loss from difficulty swallowing, however, I will get morning cortisol and ACTH to rule out adrenal insufficiency for weight loss  - ACTH; Future  - CORTISOL; Future    4. Dysphagia, unspecified type  Unstable  I will get an ultrasound of the thyroid for further evaluation  - US-THYROID; Future     Return in about 5 weeks (around 6/5/2024). Patient will have blood work done 1 week prior to follow up in 5 weeks.    Thank you kindly for allowing me to participate in the endocrine care plan for this patient.    Chadd Cortes, DAVEY   5/1/24    CC:   Viviana Whitfield M.D.  "

## 2024-05-17 ENCOUNTER — HOSPITAL ENCOUNTER (OUTPATIENT)
Dept: RADIOLOGY | Facility: MEDICAL CENTER | Age: 79
End: 2024-05-17
Payer: MEDICARE

## 2024-05-17 DIAGNOSIS — R13.10 DYSPHAGIA, UNSPECIFIED TYPE: ICD-10-CM

## 2024-05-24 ENCOUNTER — HOSPITAL ENCOUNTER (OUTPATIENT)
Dept: RADIOLOGY | Facility: MEDICAL CENTER | Age: 79
End: 2024-05-24
Payer: MEDICARE

## 2024-05-24 DIAGNOSIS — E21.3 HYPERPARATHYROIDISM (HCC): ICD-10-CM

## 2024-06-19 ENCOUNTER — APPOINTMENT (OUTPATIENT)
Dept: MEDICAL GROUP | Facility: PHYSICIAN GROUP | Age: 79
End: 2024-06-19
Payer: MEDICARE

## 2024-06-19 VITALS
OXYGEN SATURATION: 96 % | WEIGHT: 117.7 LBS | HEIGHT: 60 IN | TEMPERATURE: 97.8 F | HEART RATE: 64 BPM | BODY MASS INDEX: 23.11 KG/M2 | RESPIRATION RATE: 18 BRPM | SYSTOLIC BLOOD PRESSURE: 122 MMHG | DIASTOLIC BLOOD PRESSURE: 60 MMHG

## 2024-06-19 DIAGNOSIS — R63.4 WEIGHT LOSS: ICD-10-CM

## 2024-06-19 DIAGNOSIS — M81.8 OTHER OSTEOPOROSIS WITHOUT CURRENT PATHOLOGICAL FRACTURE: ICD-10-CM

## 2024-06-19 DIAGNOSIS — Z87.891 HISTORY OF NICOTINE DEPENDENCE: ICD-10-CM

## 2024-06-19 DIAGNOSIS — I10 ESSENTIAL HYPERTENSION: ICD-10-CM

## 2024-06-19 DIAGNOSIS — R13.19 OTHER DYSPHAGIA: ICD-10-CM

## 2024-06-19 PROCEDURE — 99214 OFFICE O/P EST MOD 30 MIN: CPT | Performed by: FAMILY MEDICINE

## 2024-06-19 PROCEDURE — 3074F SYST BP LT 130 MM HG: CPT | Performed by: FAMILY MEDICINE

## 2024-06-19 PROCEDURE — 3078F DIAST BP <80 MM HG: CPT | Performed by: FAMILY MEDICINE

## 2024-06-19 ASSESSMENT — ENCOUNTER SYMPTOMS
COUGH: 1
VOMITING: 0
DIARRHEA: 0
ABDOMINAL PAIN: 0
WEIGHT LOSS: 1
HEARTBURN: 0
SHORTNESS OF BREATH: 0
FEVER: 0
NAUSEA: 0
CHILLS: 0

## 2024-06-19 ASSESSMENT — FIBROSIS 4 INDEX: FIB4 SCORE: 1.65

## 2024-06-19 NOTE — PROGRESS NOTES
Verbal consent was acquired by the patient to use [employer] ambient listening note generation during this visit         History of Present Illness  The patient is here for a follow-up visit. He was last seen by me on 04/30/2024. He is accompanied by his son.    The patient continues to experience weight loss, with a notable weight loss of 3 pounds since his last consultation with me in 04/2024, a significant weight loss of 6 pounds from his previous appointment. He reports experiencing early satiety, leading to a lack of appetite and fluid intake, and dysphagia. Despite a follow-up with the endocrinologist and a thyroid ultrasound, both of which yielded normal results, he was unable to secure an appointment with a gastroenterologist. He denies experiencing nausea, vomiting, or diarrhea. His son suspects that his weight loss may be a result of cessation of smoking and alcohol consumption.      Review of Systems   Constitutional:  Positive for weight loss. Negative for chills and fever.   Respiratory:  Positive for cough (with drinking and eating only). Negative for shortness of breath.    Gastrointestinal:  Negative for abdominal pain, diarrhea, heartburn, nausea and vomiting.   Genitourinary:  Negative for dysuria and hematuria.       Outpatient Medications Marked as Taking for the 6/19/24 encounter (Office Visit) with Viviana Whitfield M.D.   Medication Sig Dispense Refill    alendronate (FOSAMAX) 70 MG Tab Take 1 Tablet by mouth every 7 days. 12 Tablet 3    atorvastatin (LIPITOR) 10 MG Tab TAKE 1 TABLET BY MOUTH ONCE DAILY IN THE MORNING 100 Tablet 3    levetiracetam (KEPPRA) 500 MG TABLET SR 24 HR TAKE 4 TABLETS BY MOUTH EVERY DAY AT BEDTIME      zonisamide (ZONEGRAN) 100 MG Cap TAKE 2 CAPSULES BY MOUTH NIGHTLY      amLODIPine (NORVASC) 5 MG Tab Take 1 Tablet by mouth every day. 100 Tablet 3    tadalafil (CIALIS) 5 MG tablet          /60 (BP Location: Left arm, Patient Position: Sitting)   Pulse 64   Temp  36.6 °C (97.8 °F) (Temporal)   Resp 18   Ht 1.524 m (5')   Wt 53.4 kg (117 lb 11.2 oz)   SpO2 96% , Body mass index is 22.99 kg/m².        Physical Exam  Constitutional:       Appearance: Normal appearance.   Eyes:      Extraocular Movements: Extraocular movements intact.   Cardiovascular:      Rate and Rhythm: Normal rate and regular rhythm.   Pulmonary:      Effort: Pulmonary effort is normal.      Breath sounds: Normal breath sounds.   Abdominal:      General: Bowel sounds are normal.      Palpations: Abdomen is soft.      Tenderness: There is no guarding or rebound.   Neurological:      Mental Status: He is alert.   Psychiatric:         Mood and Affect: Mood normal.         Behavior: Behavior normal.         Results  Imaging  Thyroid ultrasound was normal.       Assessment & Plan  1. Unintentional weight loss.  2. Other dysphagia.  3. History of nicotine dependence.    The patient's weight was recorded as 136 pounds last year, which has since decreased to approximately 19 pounds. He reports a lack of appetite, early satiety, dysphagia, and coughing postprandially or during drinking. A referral to [facility] in 04/2024 for further evaluation was initiated, however, his son reports that he was unable to secure an appointment. A new urgent referral to [facility] was initiated today. The patient's son has been instructed to inform us if the appointment is not scheduled. Additionally, a chest x-ray will be conducted, given the patient's history of smoking and cessation approximately 15 years ago.    4. Essential hypertension.  The patient's chronic diagnosis of essential hypertension is stable. His blood pressure reading today is 122/60. He continues to take amlodipine 5 mg daily.    5.  Osteoporosis  New medical diagnosis.  Underwent bone density scan last month with a T-score of -2.3 lumbar spine, -1.9 proximal left femur, and -2.5 distal right forearm.  Patient does have history of  hyperparathyroidism which is being followed by endocrinology.  Message sent to his endocrinology APRN for recommendations to treat.  Her recommendations were to start the patient on Fosamax.    Orders Placed This Encounter    DX-CHEST-2 VIEWS    Referral to Gastroenterology    alendronate (FOSAMAX) 70 MG Tab           This note was created using voice recognition software (Dragon). The accuracy of the dictation is limited by the abilities of the software. I have reviewed the note prior to signing, however some errors in grammar and context are still possible. If you have any questions related to this note please do not hesitate to contact our office.

## 2024-06-19 NOTE — Clinical Note
Please call patient's daughter and let her know that I did hear back from the endocrinology APRN about the osteoporosis treatment.  Her recommendation was to start him on Fosamax 70 mg weekly.  Please let them know that I have sent over a new prescription to his pharmacy. Thank you, Viviana Whitfield M.D.

## 2024-06-20 RX ORDER — ALENDRONATE SODIUM 70 MG/1
70 TABLET ORAL
Qty: 12 TABLET | Refills: 3 | Status: SHIPPED | OUTPATIENT
Start: 2024-06-20

## 2024-06-29 ENCOUNTER — HOSPITAL ENCOUNTER (OUTPATIENT)
Dept: RADIOLOGY | Facility: MEDICAL CENTER | Age: 79
End: 2024-06-29
Attending: FAMILY MEDICINE
Payer: MEDICARE

## 2024-06-29 DIAGNOSIS — Z87.891 HISTORY OF NICOTINE DEPENDENCE: ICD-10-CM

## 2024-06-29 DIAGNOSIS — R63.4 WEIGHT LOSS: ICD-10-CM

## 2024-06-29 PROCEDURE — 71046 X-RAY EXAM CHEST 2 VIEWS: CPT

## 2024-07-09 ENCOUNTER — OFFICE VISIT (OUTPATIENT)
Dept: URGENT CARE | Facility: CLINIC | Age: 79
End: 2024-07-09
Payer: MEDICARE

## 2024-07-09 VITALS
DIASTOLIC BLOOD PRESSURE: 62 MMHG | SYSTOLIC BLOOD PRESSURE: 124 MMHG | BODY MASS INDEX: 22.58 KG/M2 | HEART RATE: 63 BPM | TEMPERATURE: 99.3 F | RESPIRATION RATE: 16 BRPM | HEIGHT: 60 IN | WEIGHT: 115 LBS | OXYGEN SATURATION: 96 %

## 2024-07-09 DIAGNOSIS — R05.8 SPASMODIC COUGH: ICD-10-CM

## 2024-07-09 DIAGNOSIS — R63.4 WEIGHT LOSS: ICD-10-CM

## 2024-07-09 DIAGNOSIS — R68.2 DRY MOUTH: ICD-10-CM

## 2024-07-09 PROCEDURE — 3078F DIAST BP <80 MM HG: CPT

## 2024-07-09 PROCEDURE — 3074F SYST BP LT 130 MM HG: CPT

## 2024-07-09 PROCEDURE — 99214 OFFICE O/P EST MOD 30 MIN: CPT

## 2024-07-09 RX ORDER — ALBUTEROL SULFATE 90 UG/1
2 AEROSOL, METERED RESPIRATORY (INHALATION) EVERY 6 HOURS PRN
Qty: 8.5 G | Refills: 0 | Status: SHIPPED | OUTPATIENT
Start: 2024-07-09

## 2024-07-09 RX ORDER — BENZONATATE 100 MG/1
100 CAPSULE ORAL 3 TIMES DAILY PRN
Qty: 60 CAPSULE | Refills: 0 | Status: SHIPPED | OUTPATIENT
Start: 2024-07-09

## 2024-07-09 ASSESSMENT — FIBROSIS 4 INDEX: FIB4 SCORE: 1.65

## 2024-07-24 ENCOUNTER — HOSPITAL ENCOUNTER (OUTPATIENT)
Dept: LAB | Facility: MEDICAL CENTER | Age: 79
End: 2024-07-24
Attending: PSYCHIATRY & NEUROLOGY
Payer: MEDICARE

## 2024-07-24 PROCEDURE — 36415 COLL VENOUS BLD VENIPUNCTURE: CPT

## 2024-07-24 PROCEDURE — 86042 ACETYLCHOLN RCPTR BLCKG ANTB: CPT

## 2024-07-24 PROCEDURE — 86041 ACETYLCHOLN RCPTR BNDNG ANTB: CPT

## 2024-07-24 PROCEDURE — 86255 FLUORESCENT ANTIBODY SCREEN: CPT

## 2024-07-24 PROCEDURE — 86043 ACETYLCHOLN RCPTR MODLG ANTB: CPT

## 2024-07-24 PROCEDURE — 86366 MUSCLE-SPECIFIC KINASE ANTB: CPT

## 2024-07-27 LAB — MUSK AB SER QL: NORMAL

## 2024-07-28 LAB
ACHR BIND AB SER-SCNC: 0 NMOL/L (ref 0–0.4)
ACHR BLOCK AB/ACHR TOTAL SFR SER: 3 % (ref 0–26)
ACHR MOD AB/ACHR TOTAL SFR SER: 0 %
STRIA MUS IGG SER QL IF: NORMAL

## 2024-08-13 ENCOUNTER — OFFICE VISIT (OUTPATIENT)
Dept: MEDICAL GROUP | Facility: PHYSICIAN GROUP | Age: 79
End: 2024-08-13
Payer: MEDICARE

## 2024-08-13 VITALS
DIASTOLIC BLOOD PRESSURE: 62 MMHG | SYSTOLIC BLOOD PRESSURE: 122 MMHG | HEART RATE: 53 BPM | OXYGEN SATURATION: 97 % | TEMPERATURE: 97.8 F | BODY MASS INDEX: 23.2 KG/M2 | WEIGHT: 118.2 LBS | HEIGHT: 60 IN | RESPIRATION RATE: 14 BRPM

## 2024-08-13 DIAGNOSIS — R30.0 DYSURIA: ICD-10-CM

## 2024-08-13 DIAGNOSIS — R63.4 WEIGHT LOSS: ICD-10-CM

## 2024-08-13 DIAGNOSIS — R56.9 SEIZURE (HCC): ICD-10-CM

## 2024-08-13 PROCEDURE — 3078F DIAST BP <80 MM HG: CPT | Performed by: FAMILY MEDICINE

## 2024-08-13 PROCEDURE — 3074F SYST BP LT 130 MM HG: CPT | Performed by: FAMILY MEDICINE

## 2024-08-13 PROCEDURE — 99214 OFFICE O/P EST MOD 30 MIN: CPT | Performed by: FAMILY MEDICINE

## 2024-08-13 ASSESSMENT — FIBROSIS 4 INDEX: FIB4 SCORE: 1.65

## 2024-08-13 ASSESSMENT — ENCOUNTER SYMPTOMS
VOMITING: 0
FLANK PAIN: 0
DIZZINESS: 0
NAUSEA: 0
ABDOMINAL PAIN: 0
WEIGHT LOSS: 0
ROS GI COMMENTS: DIFFICULTY SWALLOWING
HEMOPTYSIS: 0
SHORTNESS OF BREATH: 0
COUGH: 1

## 2024-08-13 NOTE — PROGRESS NOTES
Verbal consent was acquired by the patient to use [employer] ambient listening note generation during this visit         History of Present Illness  The patient presents today for a follow-up visit. He is accompanied by his son.    He was last seen by me on 06/19/2024. He had a follow-up with neurology, Dr. Campos, on 07/29/2024, and Dr. Campos placed a referral. His blood work has been completed. In early 07/2024, he sought urgent care due to an episode of coughing up blood. He denies experiencing any nausea or vomiting. Eight days ago, he experienced an episode of hematuria and a burning sensation during urination, but this has since subsided. He had a follow-up appointment with a gastroenterologist on 08/02/2024 at [facility], who has ordered a CT scan of the abdomen and pelvis, along with an endoscopy and colonoscopy.      Review of Systems   Constitutional:  Negative for weight loss.   Respiratory:  Positive for cough. Negative for hemoptysis and shortness of breath.    Gastrointestinal:  Negative for abdominal pain, nausea and vomiting.        Difficulty swallowing   Genitourinary:  Negative for dysuria, flank pain and hematuria.   Neurological:  Negative for dizziness.       Outpatient Medications Marked as Taking for the 8/13/24 encounter (Office Visit) with Viviana Whitfield M.D.   Medication Sig Dispense Refill    albuterol 108 (90 Base) MCG/ACT Aero Soln inhalation aerosol Inhale 2 Puffs every 6 hours as needed for Shortness of Breath. 8.5 g 0    alendronate (FOSAMAX) 70 MG Tab Take 1 Tablet by mouth every 7 days. 12 Tablet 3    atorvastatin (LIPITOR) 10 MG Tab TAKE 1 TABLET BY MOUTH ONCE DAILY IN THE MORNING 100 Tablet 3    levetiracetam (KEPPRA) 500 MG TABLET SR 24 HR TAKE 4 TABLETS BY MOUTH EVERY DAY AT BEDTIME      zonisamide (ZONEGRAN) 100 MG Cap TAKE 2 CAPSULES BY MOUTH NIGHTLY      amLODIPine (NORVASC) 5 MG Tab Take 1 Tablet by mouth every day. 100 Tablet 3    tadalafil (CIALIS) 5 MG tablet           /62 (BP Location: Right arm, Patient Position: Sitting)   Pulse (!) 53   Temp 36.6 °C (97.8 °F) (Temporal)   Resp 14   Ht 1.524 m (5')   Wt 53.6 kg (118 lb 3.2 oz)   SpO2 97% , Body mass index is 23.08 kg/m².        Physical Exam  Constitutional:       Appearance: Normal appearance.   Eyes:      Extraocular Movements: Extraocular movements intact.   Cardiovascular:      Rate and Rhythm: Normal rate and regular rhythm.   Pulmonary:      Effort: Pulmonary effort is normal.      Breath sounds: Normal breath sounds.   Neurological:      Mental Status: He is alert.   Psychiatric:         Mood and Affect: Mood normal.         Behavior: Behavior normal.         Results         Assessment & Plan  Follow-up  A follow-up visit is scheduled in 1 month.    1. Dysuria  New issue. Will add u/a to labs he'll be completing for neuro this week.  - URINALYSIS,CULTURE IF INDICATED; Future    2. Weight loss  Chronic. Neuro notes received and reviewed.  ? ALS. Has further labs and testing pending.  Mestinon 60mg tid was added by neuro. Had his f/u with GI.  Will be having EGD/colonoscopy and CT A/P.     3. Seizure (HCC)  Chronic stable. C/w meds.     Orders Placed This Encounter    URINALYSIS,CULTURE IF INDICATED           This note was created using voice recognition software (Dragon). The accuracy of the dictation is limited by the abilities of the software. I have reviewed the note prior to signing, however some errors in grammar and context are still possible. If you have any questions related to this note please do not hesitate to contact our office.

## 2024-08-13 NOTE — LETTER
[facility]  Viviana Whitfield M.D.  [address]  [place] [address]  Fax: [phone]   Authorization for Release/Disclosure of   Protected Health Information   Name: MJ SIEGEL : 1945 SSN: [id number]   Address: [address]   [place] [address] Phone:    [phone] (work)   I authorize the entity listed below to release/disclose the PHI below to:   [facility]/Viviana Whitfield M.D. and Viviana Whitfield M.D.   Provider or Entity Name:     Address   City, State, Zip   Phone:    Fax:   Reason for request: continuity of care   Information to be released:    [  ] LAST COLONOSCOPY,  including any PATH REPORT and follow-up  [  ] LAST FIT/COLOGUARD RESULT [  ] LAST DEXA  [  ] LAST MAMMOGRAM  [  ] LAST PAP  [  ] LAST LABS [  ] RETINA EXAM REPORT  [  ] IMMUNIZATION RECORDS  [  ] Release all info      [  ] Check here and initial the line next to each item to release ALL health information INCLUDING  _____ Care and treatment for drug and / or alcohol abuse  _____ HIV testing, infection status, or AIDS  _____ Genetic Testing    DATES OF SERVICE OR TIME PERIOD TO BE DISCLOSED: _____________  I understand and acknowledge that:  * This Authorization may be revoked at any time by you in writing, except if your health information has already been used or disclosed.  * Your health information that will be used or disclosed as a result of you signing this authorization could be re-disclosed by the recipient. If this occurs, your re-disclosed health information may no longer be protected by State or Federal laws.  * You may refuse to sign this Authorization. Your refusal will not affect your ability to obtain treatment.  * This Authorization becomes effective upon signing and will  on (date) __________.      If no date is indicated, this Authorization will  one (1) year from the signature date.    Name: Mj Siegel  Signature: Date:   2024     PLEASE FAX REQUESTED RECORDS BACK  TO: [phone]

## 2024-08-20 ENCOUNTER — HOSPITAL ENCOUNTER (OUTPATIENT)
Facility: MEDICAL CENTER | Age: 79
End: 2024-08-20
Attending: FAMILY MEDICINE
Payer: MEDICARE

## 2024-08-20 ENCOUNTER — HOSPITAL ENCOUNTER (OUTPATIENT)
Dept: LAB | Facility: MEDICAL CENTER | Age: 79
End: 2024-08-20
Attending: PSYCHIATRY & NEUROLOGY
Payer: MEDICARE

## 2024-08-20 DIAGNOSIS — R30.0 DYSURIA: ICD-10-CM

## 2024-08-20 LAB
APPEARANCE UR: CLEAR
BILIRUB UR QL STRIP.AUTO: NEGATIVE
CK SERPL-CCNC: 105 U/L (ref 0–154)
COLOR UR: YELLOW
ERYTHROCYTE [SEDIMENTATION RATE] IN BLOOD BY WESTERGREN METHOD: 8 MM/HOUR (ref 0–20)
GLUCOSE UR STRIP.AUTO-MCNC: NEGATIVE MG/DL
HCYS SERPL-SCNC: 18.71 UMOL/L
HIV 1+2 AB+HIV1 P24 AG SERPL QL IA: NORMAL
KETONES UR STRIP.AUTO-MCNC: NEGATIVE MG/DL
LEUKOCYTE ESTERASE UR QL STRIP.AUTO: NEGATIVE
MICRO URNS: NORMAL
NITRITE UR QL STRIP.AUTO: NEGATIVE
PH UR STRIP.AUTO: 6 [PH] (ref 5–8)
PROT UR QL STRIP: NEGATIVE MG/DL
RBC UR QL AUTO: NEGATIVE
SP GR UR STRIP.AUTO: 1.02
T PALLIDUM AB SER QL IA: NORMAL
THYROPEROXIDASE AB SERPL-ACNC: 9 IU/ML (ref 0–9)
UROBILINOGEN UR STRIP.AUTO-MCNC: 0.2 MG/DL
VIT B12 SERPL-MCNC: 683 PG/ML (ref 211–911)

## 2024-08-20 PROCEDURE — 86341 ISLET CELL ANTIBODY: CPT

## 2024-08-20 PROCEDURE — 87389 HIV-1 AG W/HIV-1&-2 AB AG IA: CPT

## 2024-08-20 PROCEDURE — 84207 ASSAY OF VITAMIN B-6: CPT

## 2024-08-20 PROCEDURE — 86255 FLUORESCENT ANTIBODY SCREEN: CPT | Mod: 91

## 2024-08-20 PROCEDURE — 83655 ASSAY OF LEAD: CPT

## 2024-08-20 PROCEDURE — 86225 DNA ANTIBODY NATIVE: CPT

## 2024-08-20 PROCEDURE — 84446 ASSAY OF VITAMIN E: CPT

## 2024-08-20 PROCEDURE — 84165 PROTEIN E-PHORESIS SERUM: CPT

## 2024-08-20 PROCEDURE — 86235 NUCLEAR ANTIGEN ANTIBODY: CPT | Mod: 91

## 2024-08-20 PROCEDURE — 82525 ASSAY OF COPPER: CPT

## 2024-08-20 PROCEDURE — 86038 ANTINUCLEAR ANTIBODIES: CPT

## 2024-08-20 PROCEDURE — 82595 ASSAY OF CRYOGLOBULIN: CPT

## 2024-08-20 PROCEDURE — 83825 ASSAY OF MERCURY: CPT

## 2024-08-20 PROCEDURE — 82085 ASSAY OF ALDOLASE: CPT

## 2024-08-20 PROCEDURE — 36415 COLL VENOUS BLD VENIPUNCTURE: CPT

## 2024-08-20 PROCEDURE — 82607 VITAMIN B-12: CPT

## 2024-08-20 PROCEDURE — 82300 ASSAY OF CADMIUM: CPT

## 2024-08-20 PROCEDURE — 82550 ASSAY OF CK (CPK): CPT

## 2024-08-20 PROCEDURE — 86200 CCP ANTIBODY: CPT

## 2024-08-20 PROCEDURE — 81003 URINALYSIS AUTO W/O SCOPE: CPT

## 2024-08-20 PROCEDURE — 86780 TREPONEMA PALLIDUM: CPT

## 2024-08-20 PROCEDURE — 83519 RIA NONANTIBODY: CPT

## 2024-08-20 PROCEDURE — 84630 ASSAY OF ZINC: CPT

## 2024-08-20 PROCEDURE — 86618 LYME DISEASE ANTIBODY: CPT

## 2024-08-20 PROCEDURE — 86376 MICROSOMAL ANTIBODY EACH: CPT

## 2024-08-20 PROCEDURE — 85652 RBC SED RATE AUTOMATED: CPT

## 2024-08-20 PROCEDURE — 84425 ASSAY OF VITAMIN B-1: CPT

## 2024-08-20 PROCEDURE — 83090 ASSAY OF HOMOCYSTEINE: CPT

## 2024-08-20 PROCEDURE — 86480 TB TEST CELL IMMUN MEASURE: CPT

## 2024-08-20 PROCEDURE — 82175 ASSAY OF ARSENIC: CPT

## 2024-08-20 PROCEDURE — 84155 ASSAY OF PROTEIN SERUM: CPT

## 2024-08-21 LAB
GAMMA INTERFERON BACKGROUND BLD IA-ACNC: 0.12 IU/ML
M TB IFN-G BLD-IMP: NEGATIVE
M TB IFN-G CD4+ BCKGRND COR BLD-ACNC: -0.03 IU/ML
MITOGEN IGNF BCKGRD COR BLD-ACNC: >10 IU/ML
QFT TB2 - NIL TBQ2: -0.06 IU/ML

## 2024-08-22 LAB
ALDOLASE SERPL-CCNC: 2.6 U/L (ref 1.2–7.6)
ARSENIC BLD-MCNC: <10 UG/L
B BURGDOR.VLSE1+PEPC10 AB SER IA-ACNC: 0.2 IV
CADMIUM BLD-MCNC: <1 UG/L
CCP IGA+IGG SERPL IA-ACNC: 12 UNITS (ref 0–19)
COPPER SERPL-MCNC: 97.7 UG/DL (ref 70–140)
DSDNA AB TITR SER CLIF: NORMAL {TITER}
ENA SS-A 60KD AB SER-ACNC: 1 AU/ML (ref 0–40)
ENA SS-A IGG SER QL: 2 AU/ML (ref 0–40)
ENA SS-B IGG SER IA-ACNC: 1 AU/ML (ref 0–40)
GAD65 AB SER IA-ACNC: <5 IU/ML (ref 0–5)
LEAD BLDV-MCNC: 2.4 UG/DL
MERCURY BLD-MCNC: <2.5 UG/L
NUCLEAR IGG SER QL IA: NORMAL
ZINC SERPL-MCNC: 76.2 UG/DL (ref 60–120)

## 2024-08-23 LAB
A-TOCOPHEROL VIT E SERPL-MCNC: 8.4 MG/L (ref 5.5–18)
BETA+GAMMA TOCOPHEROL SERPL-MCNC: 1.7 MG/L (ref 0–6)
VIT B6 SERPL-MCNC: 17.6 NMOL/L (ref 20–125)

## 2024-08-24 LAB
ALBUMIN SERPL ELPH-MCNC: 4.31 G/DL (ref 3.75–5.01)
ALPHA1 GLOB SERPL ELPH-MCNC: 0.26 G/DL (ref 0.19–0.46)
ALPHA2 GLOB SERPL ELPH-MCNC: 0.71 G/DL (ref 0.48–1.05)
B-GLOBULIN SERPL ELPH-MCNC: 0.81 G/DL (ref 0.48–1.1)
GAMMA GLOB SERPL ELPH-MCNC: 1.2 G/DL (ref 0.62–1.51)
INTERPRETATION SERPL IFE-IMP: NORMAL
MONOCLON BAND OBS SERPL: NORMAL
MONOCLONAL PROTEIN NL11656: NORMAL G/DL
PATHOLOGY STUDY: NORMAL
PROT SERPL-MCNC: 7.3 G/DL (ref 6.3–8.2)

## 2024-08-25 LAB — VIT B1 BLD-MCNC: 113 NMOL/L (ref 70–180)

## 2024-08-26 LAB
CRYOGLOB SER QL 3D COLD INC: NORMAL
TEST NAME 95000: NORMAL

## 2024-09-02 LAB — TEST NAME 95000: NORMAL

## 2024-09-16 ENCOUNTER — OFFICE VISIT (OUTPATIENT)
Dept: MEDICAL GROUP | Facility: PHYSICIAN GROUP | Age: 79
End: 2024-09-16
Payer: MEDICARE

## 2024-09-16 VITALS
WEIGHT: 121.7 LBS | TEMPERATURE: 98.9 F | HEIGHT: 60 IN | DIASTOLIC BLOOD PRESSURE: 62 MMHG | HEART RATE: 61 BPM | BODY MASS INDEX: 23.89 KG/M2 | OXYGEN SATURATION: 94 % | SYSTOLIC BLOOD PRESSURE: 120 MMHG

## 2024-09-16 DIAGNOSIS — I10 ESSENTIAL HYPERTENSION: ICD-10-CM

## 2024-09-16 DIAGNOSIS — R13.19 OTHER DYSPHAGIA: ICD-10-CM

## 2024-09-16 DIAGNOSIS — N20.0 KIDNEY STONE: ICD-10-CM

## 2024-09-16 DIAGNOSIS — R63.4 WEIGHT LOSS: ICD-10-CM

## 2024-09-16 PROCEDURE — 3074F SYST BP LT 130 MM HG: CPT | Performed by: FAMILY MEDICINE

## 2024-09-16 PROCEDURE — 3078F DIAST BP <80 MM HG: CPT | Performed by: FAMILY MEDICINE

## 2024-09-16 PROCEDURE — 99214 OFFICE O/P EST MOD 30 MIN: CPT | Performed by: FAMILY MEDICINE

## 2024-09-16 ASSESSMENT — ENCOUNTER SYMPTOMS
WEIGHT LOSS: 0
ABDOMINAL PAIN: 0
NAUSEA: 0
FLANK PAIN: 0

## 2024-09-16 ASSESSMENT — FIBROSIS 4 INDEX: FIB4 SCORE: 1.65

## 2024-09-16 NOTE — PROGRESS NOTES
Verbal consent was acquired by the patient to use [employer] ambient listening note generation during this visit         History of Present Illness  The patient is here today for a follow-up visit. His last appointment with me was on 08/13/2024. He is accompanied by his son.    He reports no instances of diarrhea or nausea. However, he has not experienced weight loss since his last visit and noticed blood in his urine a month ago when he passed a kidney stone.  Doesn't have hx kidney stones. Has been fine since then- no new urinary complaints.       Review of Systems   Constitutional:  Negative for weight loss.   Gastrointestinal:  Negative for abdominal pain and nausea.   Genitourinary:  Negative for dysuria and flank pain.        Kidney stone x 1 while urinating two weeks ago       Outpatient Medications Marked as Taking for the 9/16/24 encounter (Office Visit) with Viviana Whitfield M.D.   Medication Sig Dispense Refill    albuterol 108 (90 Base) MCG/ACT Aero Soln inhalation aerosol Inhale 2 Puffs every 6 hours as needed for Shortness of Breath. 8.5 g 0    alendronate (FOSAMAX) 70 MG Tab Take 1 Tablet by mouth every 7 days. 12 Tablet 3    atorvastatin (LIPITOR) 10 MG Tab TAKE 1 TABLET BY MOUTH ONCE DAILY IN THE MORNING 100 Tablet 3    levetiracetam (KEPPRA) 500 MG TABLET SR 24 HR TAKE 4 TABLETS BY MOUTH EVERY DAY AT BEDTIME      zonisamide (ZONEGRAN) 100 MG Cap TAKE 2 CAPSULES BY MOUTH NIGHTLY      amLODIPine (NORVASC) 5 MG Tab Take 1 Tablet by mouth every day. 100 Tablet 3    tadalafil (CIALIS) 5 MG tablet          /62 (BP Location: Left arm, Patient Position: Sitting, BP Cuff Size: Adult)   Pulse 61   Temp 37.2 °C (98.9 °F) (Temporal)   Ht 1.524 m (5')   Wt 55.2 kg (121 lb 11.2 oz)   SpO2 94% , Body mass index is 23.77 kg/m².        Physical Exam  Constitutional:       Appearance: Normal appearance.   Eyes:      Extraocular Movements: Extraocular movements intact.   Cardiovascular:      Rate and Rhythm:  Normal rate and regular rhythm.   Pulmonary:      Effort: Pulmonary effort is normal.      Breath sounds: Normal breath sounds.   Neurological:      Mental Status: He is alert.   Psychiatric:         Mood and Affect: Mood normal.         Behavior: Behavior normal.         Results         Assessment & Plan  1. Weight loss.  2.  Other dysphagia   chronic medical diagnosis.  Improving.  His weight has decreased from 135 pounds two years ago to 125 pounds currently. This significant weight loss needs further investigation. He is advised to monitor his weight regularly and report any further changes.     Had his follow-up with GI on August 2 and underwent endoscopy and colonoscopy on August 27.  His colonoscopy preparation was unfortunately poor and it was recommended that he repeat it in approximately 3 months.  There were some biopsies taken from his esophagus.  Encouraged  Sharyn to follow-up with GI to discuss program.    3.  Kidney stone  New issue.  Encouraged to increase water intake which he does not drink enough with.  Urinalysis checked in August was negative.    4.  Essential hypertension  Chronic medical diagnosis.  Stable.  Blood pressure today 120/62.  I will continue with amlodipine 5 mg daily.        No orders of the defined types were placed in this encounter.            This note was created using voice recognition software (Dragon). The accuracy of the dictation is limited by the abilities of the software. I have reviewed the note prior to signing, however some errors in grammar and context are still possible. If you have any questions related to this note please do not hesitate to contact our office.

## 2024-09-17 PROBLEM — R13.19 OTHER DYSPHAGIA: Status: ACTIVE | Noted: 2024-09-17

## 2024-09-27 ENCOUNTER — HOSPITAL ENCOUNTER (OUTPATIENT)
Dept: RADIOLOGY | Facility: MEDICAL CENTER | Age: 79
End: 2024-09-27
Attending: PSYCHIATRY & NEUROLOGY
Payer: MEDICARE

## 2024-09-27 DIAGNOSIS — R13.10 PROBLEMS WITH SWALLOWING AND MASTICATION: ICD-10-CM

## 2024-09-27 DIAGNOSIS — G12.21 AMYOTROPHIC LATERAL SCLEROSIS (HCC): ICD-10-CM

## 2024-09-27 DIAGNOSIS — M50.20 DISPLACEMENT OF CERVICAL INTERVERTEBRAL DISC WITHOUT MYELOPATHY: ICD-10-CM

## 2024-09-27 PROCEDURE — 70553 MRI BRAIN STEM W/O & W/DYE: CPT

## 2024-09-27 PROCEDURE — 72156 MRI NECK SPINE W/O & W/DYE: CPT

## 2024-09-27 PROCEDURE — 700117 HCHG RX CONTRAST REV CODE 255: Mod: JZ | Performed by: PSYCHIATRY & NEUROLOGY

## 2024-09-27 PROCEDURE — A9579 GAD-BASE MR CONTRAST NOS,1ML: HCPCS | Mod: JZ | Performed by: PSYCHIATRY & NEUROLOGY

## 2024-09-27 RX ADMIN — GADOTERIDOL 12 ML: 279.3 INJECTION, SOLUTION INTRAVENOUS at 10:46

## 2024-10-03 ENCOUNTER — SPEECH THERAPY (OUTPATIENT)
Dept: SPEECH THERAPY | Facility: REHABILITATION | Age: 79
End: 2024-10-03
Payer: MEDICARE

## 2024-10-03 DIAGNOSIS — R47.1 DYSARTHRIA AND ANARTHRIA: ICD-10-CM

## 2024-10-03 DIAGNOSIS — R13.10 DYSPHAGIA, UNSPECIFIED TYPE: ICD-10-CM

## 2024-10-03 PROCEDURE — 92610 EVALUATE SWALLOWING FUNCTION: CPT

## 2024-10-04 ASSESSMENT — ENCOUNTER SYMPTOMS: NO PATIENT REPORTED PAIN: 1

## 2024-10-18 ENCOUNTER — SPEECH THERAPY (OUTPATIENT)
Dept: SPEECH THERAPY | Facility: REHABILITATION | Age: 79
End: 2024-10-18
Payer: MEDICARE

## 2024-10-18 DIAGNOSIS — R13.10 DYSPHAGIA, UNSPECIFIED TYPE: ICD-10-CM

## 2024-10-18 DIAGNOSIS — R47.1 DYSARTHRIA AND ANARTHRIA: ICD-10-CM

## 2024-10-18 PROCEDURE — 92526 ORAL FUNCTION THERAPY: CPT

## 2024-10-25 ENCOUNTER — APPOINTMENT (OUTPATIENT)
Dept: SPEECH THERAPY | Facility: REHABILITATION | Age: 79
End: 2024-10-25
Payer: MEDICARE

## 2024-10-30 ENCOUNTER — OFFICE VISIT (OUTPATIENT)
Dept: MEDICAL GROUP | Facility: PHYSICIAN GROUP | Age: 79
End: 2024-10-30
Payer: MEDICARE

## 2024-10-30 VITALS
HEIGHT: 60 IN | HEART RATE: 65 BPM | DIASTOLIC BLOOD PRESSURE: 62 MMHG | WEIGHT: 127.3 LBS | SYSTOLIC BLOOD PRESSURE: 124 MMHG | TEMPERATURE: 99.1 F | OXYGEN SATURATION: 93 % | BODY MASS INDEX: 24.99 KG/M2

## 2024-10-30 DIAGNOSIS — E21.3 HYPERPARATHYROIDISM, UNSPECIFIED (HCC): ICD-10-CM

## 2024-10-30 DIAGNOSIS — R91.1 PULMONARY NODULE, RIGHT: ICD-10-CM

## 2024-10-30 DIAGNOSIS — E53.1 VITAMIN B6 DEFICIENCY: ICD-10-CM

## 2024-10-30 DIAGNOSIS — R56.9 SEIZURE (HCC): ICD-10-CM

## 2024-10-30 DIAGNOSIS — R63.4 WEIGHT LOSS: ICD-10-CM

## 2024-10-30 DIAGNOSIS — Z23 NEED FOR VACCINATION: ICD-10-CM

## 2024-10-30 RX ORDER — PYRIDOSTIGMINE BROMIDE 60 MG/1
60 TABLET ORAL 3 TIMES DAILY
COMMUNITY
Start: 2024-10-06

## 2024-10-30 RX ORDER — ERGOCALCIFEROL 1.25 MG/1
50000 CAPSULE, LIQUID FILLED ORAL
COMMUNITY
Start: 2024-10-15

## 2024-10-30 RX ORDER — LANOLIN ALCOHOL/MO/W.PET/CERES
CREAM (GRAM) TOPICAL DAILY
COMMUNITY
Start: 2024-10-16

## 2024-10-30 RX ORDER — PREDNISONE 20 MG/1
40 TABLET ORAL DAILY
COMMUNITY
Start: 2024-10-15

## 2024-10-30 ASSESSMENT — ENCOUNTER SYMPTOMS
SEIZURES: 0
ROS GI COMMENTS: DYSPHAGIA

## 2024-10-30 ASSESSMENT — FIBROSIS 4 INDEX: FIB4 SCORE: 1.65

## 2024-11-05 ENCOUNTER — HOSPITAL ENCOUNTER (EMERGENCY)
Facility: MEDICAL CENTER | Age: 79
End: 2024-11-05
Attending: EMERGENCY MEDICINE
Payer: MEDICARE

## 2024-11-05 ENCOUNTER — APPOINTMENT (OUTPATIENT)
Dept: RADIOLOGY | Facility: MEDICAL CENTER | Age: 79
End: 2024-11-05
Attending: EMERGENCY MEDICINE
Payer: MEDICARE

## 2024-11-05 ENCOUNTER — PHARMACY VISIT (OUTPATIENT)
Dept: PHARMACY | Facility: MEDICAL CENTER | Age: 79
End: 2024-11-05
Payer: COMMERCIAL

## 2024-11-05 VITALS
RESPIRATION RATE: 17 BRPM | SYSTOLIC BLOOD PRESSURE: 190 MMHG | TEMPERATURE: 97.4 F | OXYGEN SATURATION: 91 % | HEIGHT: 60 IN | DIASTOLIC BLOOD PRESSURE: 64 MMHG | WEIGHT: 128.31 LBS | HEART RATE: 57 BPM | BODY MASS INDEX: 25.19 KG/M2

## 2024-11-05 DIAGNOSIS — N23 RENAL COLIC: ICD-10-CM

## 2024-11-05 LAB
ALBUMIN SERPL BCP-MCNC: 3.8 G/DL (ref 3.2–4.9)
ALBUMIN/GLOB SERPL: 1.2 G/DL
ALP SERPL-CCNC: 83 U/L (ref 30–99)
ALT SERPL-CCNC: 21 U/L (ref 2–50)
ANION GAP SERPL CALC-SCNC: 13 MMOL/L (ref 7–16)
APPEARANCE UR: CLEAR
AST SERPL-CCNC: 35 U/L (ref 12–45)
BACTERIA #/AREA URNS HPF: ABNORMAL /HPF
BASOPHILS # BLD AUTO: 0.1 % (ref 0–1.8)
BASOPHILS # BLD: 0.02 K/UL (ref 0–0.12)
BILIRUB SERPL-MCNC: 1.1 MG/DL (ref 0.1–1.5)
BILIRUB UR QL STRIP.AUTO: NEGATIVE
BUN SERPL-MCNC: 19 MG/DL (ref 8–22)
CALCIUM ALBUM COR SERPL-MCNC: 11.2 MG/DL (ref 8.5–10.5)
CALCIUM SERPL-MCNC: 11 MG/DL (ref 8.5–10.5)
CASTS URNS QL MICRO: ABNORMAL /LPF (ref 0–2)
CHLORIDE SERPL-SCNC: 106 MMOL/L (ref 96–112)
CO2 SERPL-SCNC: 17 MMOL/L (ref 20–33)
COLOR UR: YELLOW
CREAT SERPL-MCNC: 1.33 MG/DL (ref 0.5–1.4)
EOSINOPHIL # BLD AUTO: 0.01 K/UL (ref 0–0.51)
EOSINOPHIL NFR BLD: 0.1 % (ref 0–6.9)
EPITHELIAL CELLS 1715: ABNORMAL /HPF (ref 0–5)
ERYTHROCYTE [DISTWIDTH] IN BLOOD BY AUTOMATED COUNT: 50.4 FL (ref 35.9–50)
GFR SERPLBLD CREATININE-BSD FMLA CKD-EPI: 54 ML/MIN/1.73 M 2
GLOBULIN SER CALC-MCNC: 3.1 G/DL (ref 1.9–3.5)
GLUCOSE SERPL-MCNC: 112 MG/DL (ref 65–99)
GLUCOSE UR STRIP.AUTO-MCNC: NEGATIVE MG/DL
HCT VFR BLD AUTO: 45.4 % (ref 42–52)
HGB BLD-MCNC: 14.6 G/DL (ref 14–18)
IMM GRANULOCYTES # BLD AUTO: 0.09 K/UL (ref 0–0.11)
IMM GRANULOCYTES NFR BLD AUTO: 0.6 % (ref 0–0.9)
KETONES UR STRIP.AUTO-MCNC: NEGATIVE MG/DL
LEUKOCYTE ESTERASE UR QL STRIP.AUTO: NEGATIVE
LIPASE SERPL-CCNC: 165 U/L (ref 11–82)
LYMPHOCYTES # BLD AUTO: 1.85 K/UL (ref 1–4.8)
LYMPHOCYTES NFR BLD: 13 % (ref 22–41)
MCH RBC QN AUTO: 33 PG (ref 27–33)
MCHC RBC AUTO-ENTMCNC: 32.2 G/DL (ref 32.3–36.5)
MCV RBC AUTO: 102.5 FL (ref 81.4–97.8)
MICRO URNS: ABNORMAL
MONOCYTES # BLD AUTO: 1.07 K/UL (ref 0–0.85)
MONOCYTES NFR BLD AUTO: 7.5 % (ref 0–13.4)
NEUTROPHILS # BLD AUTO: 11.23 K/UL (ref 1.82–7.42)
NEUTROPHILS NFR BLD: 78.7 % (ref 44–72)
NITRITE UR QL STRIP.AUTO: NEGATIVE
NRBC # BLD AUTO: 0 K/UL
NRBC BLD-RTO: 0 /100 WBC (ref 0–0.2)
PH UR STRIP.AUTO: 6.5 [PH] (ref 5–8)
PLATELET # BLD AUTO: 224 K/UL (ref 164–446)
PMV BLD AUTO: 11.7 FL (ref 9–12.9)
POTASSIUM SERPL-SCNC: 3.8 MMOL/L (ref 3.6–5.5)
PROT SERPL-MCNC: 6.9 G/DL (ref 6–8.2)
PROT UR QL STRIP: NEGATIVE MG/DL
RBC # BLD AUTO: 4.43 M/UL (ref 4.7–6.1)
RBC # URNS HPF: ABNORMAL /HPF (ref 0–2)
RBC UR QL AUTO: ABNORMAL
SODIUM SERPL-SCNC: 136 MMOL/L (ref 135–145)
SP GR UR STRIP.AUTO: 1.01
UROBILINOGEN UR STRIP.AUTO-MCNC: 1 EU/DL
WBC # BLD AUTO: 14.3 K/UL (ref 4.8–10.8)
WBC #/AREA URNS HPF: ABNORMAL /HPF

## 2024-11-05 PROCEDURE — 85025 COMPLETE CBC W/AUTO DIFF WBC: CPT

## 2024-11-05 PROCEDURE — 74176 CT ABD & PELVIS W/O CONTRAST: CPT

## 2024-11-05 PROCEDURE — 81001 URINALYSIS AUTO W/SCOPE: CPT

## 2024-11-05 PROCEDURE — 80053 COMPREHEN METABOLIC PANEL: CPT

## 2024-11-05 PROCEDURE — 36415 COLL VENOUS BLD VENIPUNCTURE: CPT

## 2024-11-05 PROCEDURE — 83690 ASSAY OF LIPASE: CPT

## 2024-11-05 PROCEDURE — RXMED WILLOW AMBULATORY MEDICATION CHARGE: Performed by: EMERGENCY MEDICINE

## 2024-11-05 PROCEDURE — 99284 EMERGENCY DEPT VISIT MOD MDM: CPT | Mod: 25

## 2024-11-05 RX ORDER — IBUPROFEN 600 MG/1
600 TABLET, FILM COATED ORAL EVERY 6 HOURS PRN
Qty: 30 TABLET | Refills: 0 | Status: SHIPPED | OUTPATIENT
Start: 2024-11-05

## 2024-11-05 RX ORDER — ONDANSETRON 4 MG/1
4 TABLET, ORALLY DISINTEGRATING ORAL EVERY 6 HOURS PRN
Qty: 10 TABLET | Refills: 0 | Status: SHIPPED | OUTPATIENT
Start: 2024-11-05

## 2024-11-05 ASSESSMENT — FIBROSIS 4 INDEX: FIB4 SCORE: 1.65

## 2024-11-05 NOTE — ED PROVIDER NOTES
ER Provider Note    Scribed for Dr. Jacques Vasquez M.D. by Leif Mike. 11/5/2024  10:24 AM    Primary Care Provider: Viviana Whitfield M.D.    CHIEF COMPLAINT  Chief Complaint   Patient presents with    Low Back Pain     R low back pain starting last night  Pt stated he passed kidney stones three days ago and now has increased pain.   +n/v        EXTERNAL RECORDS REVIEWED  Patient was seen on the 30 th of October for seizure medication evaluation, no changes made at that time.     HPI/ROS  LIMITATION TO HISTORY   None noted     OUTSIDE HISTORIAN(S):  Family present at bedside.     Mj Cornejo is a 79 y.o. male who presents to the ED for right lower back pain onset last night. Patient notes his pain intermittent, currently resolved. Patient notes one episode of hematuria in the past 3 days.  Patient states that he passed kidney stones in his urine 3 days ago he brought them into the ED for evaluation. Patient adds that it is hard to sleep and his pain is worse at night.      PAST MEDICAL HISTORY  Past Medical History:   Diagnosis Date    BMI 22.0-22.9, adult 07/28/2023    Hypercholesteremia     Hypertension     Seizure (HCC)        SURGICAL HISTORY  None noted     FAMILY HISTORY  Family History   Problem Relation Age of Onset    Stroke Mother        SOCIAL HISTORY   reports that he quit smoking about 19 years ago. His smoking use included cigarettes. He has never used smokeless tobacco. He reports that he does not drink alcohol and does not use drugs.    CURRENT MEDICATIONS  Discharge Medication List as of 11/5/2024  1:28 PM        CONTINUE these medications which have NOT CHANGED    Details   pyridostigmine (MESTINON) 60 MG Tab Take 60 mg by mouth 3 times a day., Historical Med      pyridoxine (VITAMIN B-6) 50 MG Tab every day., Historical Med      vitamin D2, Ergocalciferol, (DRISDOL) 1.25 MG (10957 UT) Cap capsule Take 50,000 Units by mouth every 7 days., Historical Med      predniSONE (DELTASONE)  20 MG Tab Take 40 mg by mouth every day., Historical Med      albuterol 108 (90 Base) MCG/ACT Aero Soln inhalation aerosol Inhale 2 Puffs every 6 hours as needed for Shortness of Breath., Disp-8.5 g, R-0, Normal      alendronate (FOSAMAX) 70 MG Tab Take 1 Tablet by mouth every 7 days., Disp-12 Tablet, R-3, Normal      atorvastatin (LIPITOR) 10 MG Tab TAKE 1 TABLET BY MOUTH ONCE DAILY IN THE MORNING, Disp-100 Tablet, R-3, Normal      levetiracetam (KEPPRA) 500 MG TABLET SR 24 HR TAKE 4 TABLETS BY MOUTH EVERY DAY AT BEDTIME, Historical Med      zonisamide (ZONEGRAN) 100 MG Cap TAKE 2 CAPSULES BY MOUTH NIGHTLY, Historical Med      amLODIPine (NORVASC) 5 MG Tab Take 1 Tablet by mouth every day., Disp-100 Tablet, R-3, Normal      tadalafil (CIALIS) 5 MG tablet Historical Med             ALLERGIES  Patient has no known allergies.    PHYSICAL EXAM  BP (!) 191/78   Pulse 62   Temp 36.3 °C (97.4 °F) (Temporal)   Resp 20   Ht 1.524 m (5')   Wt 58.2 kg (128 lb 4.9 oz)   SpO2 95%   BMI 25.06 kg/m²   Constitutional: Alert in no apparent distress.  HENT: No signs of trauma, Bilateral external ears normal, Nose normal.   Eyes: Pupils are equal and reactive, Conjunctiva normal, Non-icteric.   Neck: Normal range of motion, No tenderness, Supple,   Cardiovascular: Regular rate and rhythm, no murmurs.   Thorax & Lungs: Normal breath sounds, No respiratory distress, No wheezing, No chest tenderness.   Abdomen: Bowel sounds normal, Soft, No tenderness,   Skin: Warm, Dry, No erythema, No rash.   Back: No bony tenderness, No CVA tenderness.   Extremities: No edema, No tenderness, No cyanosis, no tenderness  Neurologic: No focal deficits    Psychiatric: Affect normal     DIAGNOSTIC STUDIES & PROCEDURES    Labs:   Labs Reviewed   COMP METABOLIC PANEL - Abnormal; Notable for the following components:       Result Value    Co2 17 (*)     Glucose 112 (*)     Calcium 11.0 (*)     Correct Calcium 11.2 (*)     All other components within  normal limits   LIPASE - Abnormal; Notable for the following components:    Lipase 165 (*)     All other components within normal limits   URINALYSIS,CULTURE IF INDICATED - Abnormal; Notable for the following components:    Occult Blood Small (*)     All other components within normal limits   ESTIMATED GFR - Abnormal; Notable for the following components:    GFR (CKD-EPI) 54 (*)     All other components within normal limits   CBC WITH DIFFERENTIAL - Abnormal; Notable for the following components:    WBC 14.3 (*)     RBC 4.43 (*)     .5 (*)     MCHC 32.2 (*)     RDW 50.4 (*)     Neutrophils-Polys 78.70 (*)     Lymphocytes 13.00 (*)     Neutrophils (Absolute) 11.23 (*)     Monos (Absolute) 1.07 (*)     All other components within normal limits   URINE MICROSCOPIC (W/UA) - Abnormal; Notable for the following components:    WBC 3-5 (*)     RBC 11-20 (*)     All other components within normal limits      All labs reviewed by me.      Radiology:   The attending Emergency Physician has independently interpreted the diagnostic imaging associated with this visit and is awaiting the final reading from the radiologist, which will be displayed below.    Preliminary interpretation is a follows: Calculus noted on the right  Radiologist interpretation:      CT-RENAL COLIC EVALUATION(A/P W/O)   Final Result      1. There is an obstructing 4 mm calculus in the proximal right ureter with moderate upstream collecting system dilatation.      2. Nonobstructive bilateral renal calculi.      3. Cholelithiasis.           COURSE & MEDICAL DECISION MAKING    INITIAL ASSESSMENT AND PLAN  Care Narrative:       10:24 AM - Patient presents to the ED for right sided low back pain. Patient seen and examined at bedside. Discussed plan of care, including plan to further evaluate. Patient agrees to the plan of care. The patient will be medicated as ordered. Ordered for labs and imaging to evaluate their symptoms.     1:36 PM - I reevaluated  the patient at bedside. I discussed the patient's diagnostic study results. I discussed plan for discharge and follow up as outlined below. The patient is stable for discharge at this time and will return for any new or worsening symptoms. Patient verbalizes understanding and support with my plan for discharge.        Patient with small kidney stone.  The patient has passed 2 already.  Ultimately the patient has no significant renal dysfunction with a GFR that is mildly lower.  Lipase is mildly elevated but I do not believe this is pancreatitis.  Not significant enough to be concerned at this point.  CO2 mildly elevated.  Patient not having any pain at this time.  Would recommend over-the-counter therapies.  Urology follow-up.  Strict return precautions were given and follow-up arranged.  Motrin and Zofran as an outpatient.             DISPOSITION AND DISCUSSIONS  I have discussed management of the patient with the following physicians and LUNA's: None noted     Discussion of management with other QHP or appropriate source(s): None     Escalation of care considered, and ultimately not performed: acute inpatient care management, however at this time, the patient is most appropriate for outpatient management.    Barriers to care at this time, including but not limited to: Patient does not have established PCP.     Decision tools and prescription drugs considered including, but not limited to: Antibiotics not felt necessary. .    The patient will return for new or worsening symptoms and is stable at the time of discharge.    DISPOSITION:  Patient will be discharged home in stable condition.    FOLLOW UP:  Akanksha Juarez P.A.-C.  [address] [place] [address]  [place] [address]  [phone]    In 1 week        OUTPATIENT MEDICATIONS:  Discharge Medication List as of 11/5/2024  1:28 PM        START taking these medications    Details   ibuprofen (MOTRIN) 600 MG Tab Take 1 Tablet by mouth every 6 hours as needed for Moderate  Pain., Disp-30 Tablet, R-0, Normal      ondansetron (ZOFRAN ODT) 4 MG TABLET DISPERSIBLE Dissolve 1 Tablet by mouth every 6 hours as needed for Nausea/Vomiting., Disp-10 Tablet, R-0, Normal              FINAL IMPRESSION   1. Renal colic         Leif DICKERSON ([place]), am scribing for, and in the presence of, Jacques Vasquez M.D..    Electronically signed by: Leif Mike ([place]), 11/5/2024    IJacques M.D. personally performed the services described in this documentation, as scribed by Leif Mike in my presence, and it is both accurate and complete.    The note accurately reflects work and decisions made by me.  Jacques Vasquez M.D.  11/5/2024  2:05 PM

## 2024-11-05 NOTE — ED TRIAGE NOTES
Chief Complaint   Patient presents with    Low Back Pain     R low back pain starting last night  Pt stated he passed kidney stones three days ago and now has increased pain.   +n/v      BP (!) 191/78   Pulse 62   Temp 36.3 °C (97.4 °F) (Temporal)   Resp 20   Ht 1.524 m (5')   Wt 58.2 kg (128 lb 4.9 oz)   SpO2 95%   BMI 25.06 kg/m²     Pt is alert/oriented and follows commands. Pt speaking in full sentences and responds appropriately to questions. No acute distress noted in triage and respirations are even and unlabored.      Pt placed in lobby and educated on triage process. Pt encouraged to alert staff for any changes in condition.     used in triage

## 2024-11-05 NOTE — ED NOTES
Patient discharged per orders. Wristband removed per protocol. Pt verbalized understanding of discharge instructions. Pt leaving in stable condition with all belongings.

## 2024-11-12 ENCOUNTER — HOSPITAL ENCOUNTER (OUTPATIENT)
Dept: LAB | Facility: MEDICAL CENTER | Age: 79
End: 2024-11-12
Attending: STUDENT IN AN ORGANIZED HEALTH CARE EDUCATION/TRAINING PROGRAM
Payer: MEDICARE

## 2024-11-12 LAB
ALBUMIN SERPL BCP-MCNC: 3.6 G/DL (ref 3.2–4.9)
ALBUMIN/GLOB SERPL: 1.4 G/DL
ALP SERPL-CCNC: 68 U/L (ref 30–99)
ALT SERPL-CCNC: 14 U/L (ref 2–50)
ANION GAP SERPL CALC-SCNC: 10 MMOL/L (ref 7–16)
AST SERPL-CCNC: 16 U/L (ref 12–45)
B-HCG SERPL-ACNC: <1 MIU/ML (ref 0–5)
BASOPHILS # BLD AUTO: 0.4 % (ref 0–1.8)
BASOPHILS # BLD: 0.04 K/UL (ref 0–0.12)
BILIRUB SERPL-MCNC: 0.6 MG/DL (ref 0.1–1.5)
BUN SERPL-MCNC: 17 MG/DL (ref 8–22)
CALCIUM ALBUM COR SERPL-MCNC: 10.7 MG/DL (ref 8.5–10.5)
CALCIUM SERPL-MCNC: 10.4 MG/DL (ref 8.5–10.5)
CANCER AG125 SERPL-ACNC: 18.3 U/ML (ref 0–35)
CANCER AG19-9 SERPL-ACNC: 35.4 U/ML (ref 0–35)
CEA SERPL-MCNC: 1.7 NG/ML (ref 0–3)
CHLORIDE SERPL-SCNC: 110 MMOL/L (ref 96–112)
CO2 SERPL-SCNC: 21 MMOL/L (ref 20–33)
CREAT SERPL-MCNC: 1.31 MG/DL (ref 0.5–1.4)
EOSINOPHIL # BLD AUTO: 0.11 K/UL (ref 0–0.51)
EOSINOPHIL NFR BLD: 1.1 % (ref 0–6.9)
ERYTHROCYTE [DISTWIDTH] IN BLOOD BY AUTOMATED COUNT: 47.8 FL (ref 35.9–50)
GFR SERPLBLD CREATININE-BSD FMLA CKD-EPI: 55 ML/MIN/1.73 M 2
GLOBULIN SER CALC-MCNC: 2.6 G/DL (ref 1.9–3.5)
GLUCOSE SERPL-MCNC: 76 MG/DL (ref 65–99)
HCT VFR BLD AUTO: 39.8 % (ref 42–52)
HGB BLD-MCNC: 12.7 G/DL (ref 14–18)
IMM GRANULOCYTES # BLD AUTO: 0.08 K/UL (ref 0–0.11)
IMM GRANULOCYTES NFR BLD AUTO: 0.8 % (ref 0–0.9)
LYMPHOCYTES # BLD AUTO: 2.84 K/UL (ref 1–4.8)
LYMPHOCYTES NFR BLD: 27.2 % (ref 22–41)
MCH RBC QN AUTO: 32.9 PG (ref 27–33)
MCHC RBC AUTO-ENTMCNC: 31.9 G/DL (ref 32.3–36.5)
MCV RBC AUTO: 103.1 FL (ref 81.4–97.8)
MONOCYTES # BLD AUTO: 0.58 K/UL (ref 0–0.85)
MONOCYTES NFR BLD AUTO: 5.6 % (ref 0–13.4)
NEUTROPHILS # BLD AUTO: 6.79 K/UL (ref 1.82–7.42)
NEUTROPHILS NFR BLD: 64.9 % (ref 44–72)
NRBC # BLD AUTO: 0 K/UL
NRBC BLD-RTO: 0 /100 WBC (ref 0–0.2)
PLATELET # BLD AUTO: 276 K/UL (ref 164–446)
PMV BLD AUTO: 11.2 FL (ref 9–12.9)
POTASSIUM SERPL-SCNC: 3.3 MMOL/L (ref 3.6–5.5)
PROT SERPL-MCNC: 6.2 G/DL (ref 6–8.2)
PTH-INTACT SERPL-MCNC: 87.7 PG/ML (ref 14–72)
RBC # BLD AUTO: 3.86 M/UL (ref 4.7–6.1)
SODIUM SERPL-SCNC: 141 MMOL/L (ref 135–145)
WBC # BLD AUTO: 10.4 K/UL (ref 4.8–10.8)

## 2024-11-12 PROCEDURE — 84702 CHORIONIC GONADOTROPIN TEST: CPT

## 2024-11-12 PROCEDURE — 86301 IMMUNOASSAY TUMOR CA 19-9: CPT

## 2024-11-12 PROCEDURE — 82378 CARCINOEMBRYONIC ANTIGEN: CPT

## 2024-11-12 PROCEDURE — 86304 IMMUNOASSAY TUMOR CA 125: CPT

## 2024-11-12 PROCEDURE — 82105 ALPHA-FETOPROTEIN SERUM: CPT

## 2024-11-12 PROCEDURE — 83970 ASSAY OF PARATHORMONE: CPT

## 2024-11-12 PROCEDURE — 86300 IMMUNOASSAY TUMOR CA 15-3: CPT

## 2024-11-12 PROCEDURE — 36415 COLL VENOUS BLD VENIPUNCTURE: CPT

## 2024-11-12 PROCEDURE — 85025 COMPLETE CBC W/AUTO DIFF WBC: CPT

## 2024-11-12 PROCEDURE — 80053 COMPREHEN METABOLIC PANEL: CPT

## 2024-11-14 LAB
AFP-TM SERPL-MCNC: 2 NG/ML (ref 0–9)
CANCER AG27-29 SERPL-ACNC: 30.3 U/ML

## 2024-11-22 ENCOUNTER — SPEECH THERAPY (OUTPATIENT)
Dept: SPEECH THERAPY | Facility: REHABILITATION | Age: 79
End: 2024-11-22
Payer: MEDICARE

## 2024-11-22 DIAGNOSIS — R47.1 DYSARTHRIA AND ANARTHRIA: ICD-10-CM

## 2024-11-22 DIAGNOSIS — R13.10 DYSPHAGIA, UNSPECIFIED TYPE: ICD-10-CM

## 2024-11-22 PROCEDURE — 92526 ORAL FUNCTION THERAPY: CPT

## 2024-11-22 NOTE — OP THERAPY DISCHARGE SUMMARY
"  Outpatient Speech Therapy  DISCHARGE SUMMARY NOTE      [facility] Speech Therapy [place] [address]  [address]  [place] [address]  Phone:  [phone]  Fax:  [phone]    Date of Visit: 11/22/2024    Patient: Mj Cornejo  YOB: 1945  MRN: [id number]     Referring Provider: Angie Kamara D.N.P.  [address] [place] [address]   Referring Diagnosis: Dysarthria and anarthria [R47.1];Dysphagia, unspecified [R13.10]     Visit #: 3    Time Calculation    Start time: 0800  Stop time: 0845 Time Calculation (min): 45 minutes           Chief Complaint: No chief complaint on file.    Visit Diagnoses     ICD-10-CM   1. Dysphagia, unspecified type  R13.10   2. Dysarthria and anarthria  R47.1       Subjective:   Additional Subjective Comments:      Pt demonstrated good progress during treatment.  Pt met 2/3 short term goals.    Pt reported \"I can swallow better.  I noticed I've been improving a lot and it's not hard to swallow.\"  Pt's son reported, \"he's coughing less frequently, and is eating foods that he previously had difficulty consuming.\"  Pt is very aware of swallow function stating, \"I eat small pieces, and I consider what I can swallow.\"  Pt avoids dry foods such as fish, chicken, and crackers.  Pt stated that he completes swallow exercises 3x per day.         Objective:   Treatments/Interventions Performed:  Dysphagia treatment  Objective Details:  Pt completed effortful swallow independently.       Pt presented with throat clear x1 of 4 trials of thin by cup. No throat clearing observed following small sips.       Soft bite sized: throat clear x1 of 2 trials.       Sensation of globus reported following regular texture trial.  During evaluation Pt declined regular texture trial.     The Eating Assessment Tool was re-administered this day.  Pt required to rate 10 aspect of swallow on a 0-4 scale (0 = no problem, 4 = severe problem): Pt scored 0 on the EAT-10, " improved from 17.              Speech Therapy Assessment:     Oral Phase Assessment:     Types of food within functional limits: Puree and Thin Liquid    Types of food with increased mastication: Mechanical Soft and Regular    Pharyngeal Phase Assessment:     Delayed Swallow    Food types within functional limits: Puree    Coughing after the swallow present: Mechanical Soft and Thin Liquid    Pharyngeal phase comments: Sensation of globus reported following regular textures.          Speech Therapy Plan :   Prognosis & Recommendations  Impression Summary:  Pt presented with mild oral, and suspect mild pharyngeal dysphagia.  Oral dysphagia was characterized by decreased lingual strength/ ROM, and increased mastication due to ill-fitting dentures.  Suspect pharyngeal dysphagia is characterized by decreased tongue base retraction, and decreased hyolaryngeal excursion, resulting in penetration of thin liquids, and sensation of globus following soft bite sized textures.    Prognosis:  Good  Prognosis Details:  Highly motivated.  Strong family support.  Diet Recommendation:  Mechanical Soft Foods  Liquid Recommendation:  Thin  Medication Administration:  Whole with food (pt preference)  Goals  Short Term Goals:  Pt will participate in an MBS.  NOT MET  Pt will complete lingual, and laryngeal exercises independently.  100% independent  Pt will recall, and utilize, swallow strategies with 80% accuracy given min verbal cues.    100% independent  Short Term Goal Duration (Weeks):  2-4 weeks  Long Term Goals:  Pt will tolerate least restrictive diet without sensation of globus or aspiration.   NOT MET  Long Term Goal Duration (Weeks):  1-2 months  Patient Stated Goal:  Improve swallow function  Therapy Recommendations  Recommendation:  Modified Barium Swallow Study and Dysphagia Treatment,  Planned Therapy Interventions:  Compensatory Strategy Training and Dysphagia treatment,   Frequency:  1x week  Duration (in visits):   6  Duration (in weeks):  6  Plan Comments  Additional comments:  Pt independent with swallow exercises, and reported improvement in swallow function.  Recommend discharge from dysphagia therapy.  Recommend completion of MBS to fully assess swallow function.  Pt may be referred back to this service as deemed appropriate.                 Functional Assessment Used     EAT-10

## 2024-11-22 NOTE — OP THERAPY DAILY TREATMENT
"  Outpatient Speech Therapy  DAILY TREATMENT     [facility] Speech Therapy [address].  [address]  [place] [address]  Phone:  [phone]  Fax:  [phone]    Date: 11/22/2024    Patient: Mj Cornejo  YOB: 1945  MRN: [id number]     Time Calculation    Start time: 0800  Stop time: 0845 Time Calculation (min): 45 minutes         Chief Complaint: No chief complaint on file.    Visit #: 3    Subjective:   Additional Subjective Comments:      Pt pleasant and cooperative during treatment.  Gap in treatment secondary to recent hospitalization.   utilized via IPAD.  Pt reported \"I can swallow better.  I noticed I've been improving a lot and it's not hard to swallow.\"  Pt's son reported, \"he's coughing less frequently, and is eating foods that he previously had difficulty consuming.\"  Pt is very aware of swallow function stating, \"I eat small pieces, and I consider what I can swallow.\"  Pt avoids dry foods such as fish, chicken, and crackers.  Pt stated that he completes swallow exercises 3x per day.        Objective:   Treatments/Interventions Performed:  Dysphagia treatment  Objective Details:  Pt completed effortful swallow independently.      Pt presented with throat clear x1 of 4 trials of thin by cup. No throat clearing observed following small sips.      Soft bite sized: throat clear x1 of 2 trials.      Sensation of globus reported following regular texture trial.  During evaluation Pt declined regular texture trial.    The Eating Assessment Tool was re-administered this day.  Pt required to rate 10 aspect of swallow on a 0-4 scale (0 = no problem, 4 = severe problem): Pt scored 0 on the EAT-10, improved from 17.              Speech Therapy Assessment:     Oral Phase Assessment:     Types of food within functional limits: Puree and Thin Liquid    Types of food with increased mastication: Mechanical Soft and Regular    Pharyngeal Phase Assessment:     Delayed " Swallow    Food types within functional limits: Puree    Coughing after the swallow present: Mechanical Soft and Thin Liquid    Pharyngeal phase comments: Sensation of globus reported following regular textures.        Speech Therapy Plan :   Prognosis & Recommendations  Impression Summary:  Pt presented with mild oral, and suspect mild pharyngeal dysphagia.  Oral dysphagia was characterized by decreased lingual strength/ ROM, and increased mastication due to ill-fitting dentures.  Suspect pharyngeal dysphagia is characterized by decreased tongue base retraction, and decreased hyolaryngeal excursion, resulting in penetration of thin liquids, and sensation of globus following soft bite sized textures.    Prognosis:  Good  Prognosis Details:  Highly motivated.  Strong family support.  Diet Recommendation:  Mechanical Soft Foods  Liquid Recommendation:  Thin  Medication Administration:  Whole with food (pt preference)  Goals  Short Term Goals:  Pt will participate in an MBS.  NOT MET  Pt will complete lingual, and laryngeal exercises independently.  100% independent  Pt will recall, and utilize, swallow strategies with 80% accuracy given min verbal cues.    100% independent  Short Term Goal Duration (Weeks):  2-4 weeks  Long Term Goals:  Pt will tolerate least restrictive diet without sensation of globus or aspiration.   NOT MET  Long Term Goal Duration (Weeks):  1-2 months  Patient Stated Goal:  Improve swallow function  Therapy Recommendations  Recommendation:  Modified Barium Swallow Study and Dysphagia Treatment,  Planned Therapy Interventions:  Compensatory Strategy Training and Dysphagia treatment,   Frequency:  1x week  Duration (in visits):  6  Duration (in weeks):  6  Plan Comments  Additional comments:  Pt independent with swallow exercises, and reported improvement in swallow function.  Recommend discharge from dysphagia therapy.  Recommend completion of MBS to fully assess swallow function.  Pt may be  referred back to this service as deemed appropriate.

## 2024-11-25 ENCOUNTER — HOSPITAL ENCOUNTER (OUTPATIENT)
Dept: RADIOLOGY | Facility: MEDICAL CENTER | Age: 79
End: 2024-11-25

## 2024-12-06 ENCOUNTER — APPOINTMENT (OUTPATIENT)
Dept: SPEECH THERAPY | Facility: REHABILITATION | Age: 79
End: 2024-12-06
Payer: MEDICARE

## 2024-12-13 ENCOUNTER — APPOINTMENT (OUTPATIENT)
Dept: SPEECH THERAPY | Facility: REHABILITATION | Age: 79
End: 2024-12-13
Payer: MEDICARE

## 2024-12-20 ENCOUNTER — APPOINTMENT (OUTPATIENT)
Dept: SPEECH THERAPY | Facility: REHABILITATION | Age: 79
End: 2024-12-20
Payer: MEDICARE

## 2025-02-19 ENCOUNTER — APPOINTMENT (OUTPATIENT)
Dept: RADIOLOGY | Facility: MEDICAL CENTER | Age: 80
End: 2025-02-19
Attending: EMERGENCY MEDICINE
Payer: MEDICARE

## 2025-02-19 ENCOUNTER — HOSPITAL ENCOUNTER (EMERGENCY)
Facility: MEDICAL CENTER | Age: 80
End: 2025-02-19
Attending: EMERGENCY MEDICINE
Payer: MEDICARE

## 2025-02-19 VITALS
SYSTOLIC BLOOD PRESSURE: 164 MMHG | HEART RATE: 73 BPM | HEIGHT: 60 IN | BODY MASS INDEX: 26.27 KG/M2 | WEIGHT: 133.82 LBS | DIASTOLIC BLOOD PRESSURE: 70 MMHG | TEMPERATURE: 98.6 F | RESPIRATION RATE: 27 BRPM | OXYGEN SATURATION: 93 %

## 2025-02-19 DIAGNOSIS — E87.6 HYPOKALEMIA: ICD-10-CM

## 2025-02-19 DIAGNOSIS — I16.0 HYPERTENSIVE URGENCY: ICD-10-CM

## 2025-02-19 DIAGNOSIS — R42 DIZZINESS: ICD-10-CM

## 2025-02-19 DIAGNOSIS — I10 ESSENTIAL HYPERTENSION: ICD-10-CM

## 2025-02-19 LAB
ALBUMIN SERPL BCP-MCNC: 3.9 G/DL (ref 3.2–4.9)
ALBUMIN/GLOB SERPL: 1.7 G/DL
ALP SERPL-CCNC: 65 U/L (ref 30–99)
ALT SERPL-CCNC: 25 U/L (ref 2–50)
ANION GAP SERPL CALC-SCNC: 12 MMOL/L (ref 7–16)
APPEARANCE UR: CLEAR
AST SERPL-CCNC: 29 U/L (ref 12–45)
BACTERIA #/AREA URNS HPF: ABNORMAL /HPF
BASOPHILS # BLD AUTO: 0.3 % (ref 0–1.8)
BASOPHILS # BLD: 0.03 K/UL (ref 0–0.12)
BILIRUB SERPL-MCNC: 0.5 MG/DL (ref 0.1–1.5)
BILIRUB UR QL STRIP.AUTO: NEGATIVE
BUN SERPL-MCNC: 12 MG/DL (ref 8–22)
CALCIUM ALBUM COR SERPL-MCNC: 10.4 MG/DL (ref 8.5–10.5)
CALCIUM SERPL-MCNC: 10.3 MG/DL (ref 8.4–10.2)
CASTS URNS QL MICRO: ABNORMAL /LPF (ref 0–2)
CHLORIDE SERPL-SCNC: 107 MMOL/L (ref 96–112)
CO2 SERPL-SCNC: 24 MMOL/L (ref 20–33)
COLOR UR: YELLOW
CREAT SERPL-MCNC: 0.83 MG/DL (ref 0.5–1.4)
EKG IMPRESSION: NORMAL
EOSINOPHIL # BLD AUTO: 0.07 K/UL (ref 0–0.51)
EOSINOPHIL NFR BLD: 0.7 % (ref 0–6.9)
EPITHELIAL CELLS 1715: ABNORMAL /HPF (ref 0–5)
ERYTHROCYTE [DISTWIDTH] IN BLOOD BY AUTOMATED COUNT: 47.4 FL (ref 35.9–50)
GFR SERPLBLD CREATININE-BSD FMLA CKD-EPI: 89 ML/MIN/1.73 M 2
GLOBULIN SER CALC-MCNC: 2.3 G/DL (ref 1.9–3.5)
GLUCOSE SERPL-MCNC: 83 MG/DL (ref 65–99)
GLUCOSE UR STRIP.AUTO-MCNC: NEGATIVE MG/DL
HCT VFR BLD AUTO: 44.6 % (ref 42–52)
HGB BLD-MCNC: 14.7 G/DL (ref 14–18)
IMM GRANULOCYTES # BLD AUTO: 0.09 K/UL (ref 0–0.11)
IMM GRANULOCYTES NFR BLD AUTO: 0.9 % (ref 0–0.9)
KETONES UR STRIP.AUTO-MCNC: NEGATIVE MG/DL
LEUKOCYTE ESTERASE UR QL STRIP.AUTO: NEGATIVE
LYMPHOCYTES # BLD AUTO: 2.63 K/UL (ref 1–4.8)
LYMPHOCYTES NFR BLD: 27.3 % (ref 22–41)
MCH RBC QN AUTO: 34.3 PG (ref 27–33)
MCHC RBC AUTO-ENTMCNC: 33 G/DL (ref 32.3–36.5)
MCV RBC AUTO: 104 FL (ref 81.4–97.8)
MICRO URNS: ABNORMAL
MONOCYTES # BLD AUTO: 0.54 K/UL (ref 0–0.85)
MONOCYTES NFR BLD AUTO: 5.6 % (ref 0–13.4)
NEUTROPHILS # BLD AUTO: 6.29 K/UL (ref 1.82–7.42)
NEUTROPHILS NFR BLD: 65.2 % (ref 44–72)
NITRITE UR QL STRIP.AUTO: NEGATIVE
NRBC # BLD AUTO: 0 K/UL
NRBC BLD-RTO: 0 /100 WBC (ref 0–0.2)
NT-PROBNP SERPL IA-MCNC: 197 PG/ML (ref 0–125)
PH UR STRIP.AUTO: 7.5 [PH] (ref 5–8)
PLATELET # BLD AUTO: 197 K/UL (ref 164–446)
PMV BLD AUTO: 11.4 FL (ref 9–12.9)
POTASSIUM SERPL-SCNC: 3.1 MMOL/L (ref 3.6–5.5)
PROT SERPL-MCNC: 6.2 G/DL (ref 6–8.2)
PROT UR QL STRIP: NEGATIVE MG/DL
RBC # BLD AUTO: 4.29 M/UL (ref 4.7–6.1)
RBC # URNS HPF: ABNORMAL /HPF
RBC UR QL AUTO: ABNORMAL
SODIUM SERPL-SCNC: 143 MMOL/L (ref 135–145)
SP GR UR STRIP.AUTO: 1.01
TROPONIN T SERPL-MCNC: 32 NG/L (ref 6–19)
TROPONIN T SERPL-MCNC: 39 NG/L (ref 6–19)
UROBILINOGEN UR STRIP.AUTO-MCNC: 0.2 EU/DL
WBC # BLD AUTO: 9.7 K/UL (ref 4.8–10.8)
WBC #/AREA URNS HPF: ABNORMAL /HPF

## 2025-02-19 PROCEDURE — 83880 ASSAY OF NATRIURETIC PEPTIDE: CPT

## 2025-02-19 PROCEDURE — 84484 ASSAY OF TROPONIN QUANT: CPT | Mod: 91

## 2025-02-19 PROCEDURE — 81001 URINALYSIS AUTO W/SCOPE: CPT

## 2025-02-19 PROCEDURE — 700111 HCHG RX REV CODE 636 W/ 250 OVERRIDE (IP): Mod: JZ | Performed by: EMERGENCY MEDICINE

## 2025-02-19 PROCEDURE — 87086 URINE CULTURE/COLONY COUNT: CPT

## 2025-02-19 PROCEDURE — 71045 X-RAY EXAM CHEST 1 VIEW: CPT

## 2025-02-19 PROCEDURE — 85025 COMPLETE CBC W/AUTO DIFF WBC: CPT

## 2025-02-19 PROCEDURE — 80053 COMPREHEN METABOLIC PANEL: CPT

## 2025-02-19 PROCEDURE — 96374 THER/PROPH/DIAG INJ IV PUSH: CPT

## 2025-02-19 PROCEDURE — 36415 COLL VENOUS BLD VENIPUNCTURE: CPT

## 2025-02-19 PROCEDURE — 70450 CT HEAD/BRAIN W/O DYE: CPT

## 2025-02-19 PROCEDURE — 99285 EMERGENCY DEPT VISIT HI MDM: CPT

## 2025-02-19 PROCEDURE — 93005 ELECTROCARDIOGRAM TRACING: CPT | Mod: TC | Performed by: EMERGENCY MEDICINE

## 2025-02-19 RX ORDER — HYDRALAZINE HYDROCHLORIDE 20 MG/ML
20 INJECTION INTRAMUSCULAR; INTRAVENOUS ONCE
Status: COMPLETED | OUTPATIENT
Start: 2025-02-19 | End: 2025-02-19

## 2025-02-19 RX ORDER — POTASSIUM CHLORIDE 1.5 G/1.58G
20 POWDER, FOR SOLUTION ORAL DAILY
Qty: 14 PACKET | Refills: 0 | Status: SHIPPED | OUTPATIENT
Start: 2025-02-19 | End: 2025-03-05

## 2025-02-19 RX ORDER — AMLODIPINE BESYLATE 5 MG/1
10 TABLET ORAL DAILY
Qty: 100 TABLET | Refills: 3 | Status: SHIPPED | OUTPATIENT
Start: 2025-02-19

## 2025-02-19 RX ADMIN — HYDRALAZINE HYDROCHLORIDE 20 MG: 20 INJECTION INTRAMUSCULAR; INTRAVENOUS at 15:40

## 2025-02-19 ASSESSMENT — FIBROSIS 4 INDEX: FIB4 SCORE: 1.22

## 2025-02-19 ASSESSMENT — PAIN DESCRIPTION - PAIN TYPE: TYPE: ACUTE PAIN

## 2025-02-19 NOTE — ED NOTES
ADDITIONAL ORDERS NOTED. MED GIVEN AFTER REPEAT TROPONIN DRAWN. Per lab, urine spec remains un collected

## 2025-02-19 NOTE — ED PROVIDER NOTES
ED Provider Note    CHIEF COMPLAINT  Chief Complaint   Patient presents with    Dizziness     Patient ambulates to triage with reports of worsening dizziness over the past few days. Denies nausea, headache. Son states that new medications were started last Monday, sulfamethoxazole and azathioprine.        EXTERNAL RECORDS REVIEWED  Outpatient Notes patient was recently started on azathioprine by neurology as well as sulfamethoxazole/trimethoprim    HPI/ROS    LIMITATION TO HISTORY   Select: : None    OUTSIDE HISTORIAN(S):    Mj Cornejo is a 79 y.o. male who presents to the emergency department chief complaint of dizziness.  Patient reports 3 days of significant dizziness.  He does not feel as though it has been changing during that time.  He reports minor nauseousness no emesis he denies headache he denies chest pain palpitation shortness of breath abdominal pain problems with urination or bowel movement.  Patient has a history of seizures and with this dizziness he is afraid that he is going to have an additional seizure.  He has not had any seizures.    PAST MEDICAL HISTORY   has a past medical history of BMI 22.0-22.9, adult (2023), Hypercholesteremia, Hypertension, and Seizure (HCC).    SURGICAL HISTORY  patient denies any surgical history    FAMILY HISTORY  Family History   Problem Relation Age of Onset    Stroke Mother        SOCIAL HISTORY  Social History     Tobacco Use    Smoking status: Former     Current packs/day: 0.00     Types: Cigarettes     Quit date:      Years since quittin.    Smokeless tobacco: Never   Vaping Use    Vaping status: Never Used   Substance and Sexual Activity    Alcohol use: No    Drug use: No    Sexual activity: Not on file     Comment: , lives with wife and son, 2 sons       CURRENT MEDICATIONS  Home Medications       Reviewed by Amalia Darnell R.N. (Registered Nurse) on 25 at 1204  Med List Status: Not Addressed     Medication Last Dose  Status   albuterol 108 (90 Base) MCG/ACT Aero Soln inhalation aerosol  Active   alendronate (FOSAMAX) 70 MG Tab  Active   amLODIPine (NORVASC) 5 MG Tab  Active   atorvastatin (LIPITOR) 10 MG Tab  Active   ibuprofen (MOTRIN) 600 MG Tab  Active   levetiracetam (KEPPRA) 500 MG TABLET SR 24 HR  Active   ondansetron (ZOFRAN ODT) 4 MG TABLET DISPERSIBLE  Active   predniSONE (DELTASONE) 20 MG Tab  Active   pyridostigmine (MESTINON) 60 MG Tab  Active   pyridoxine (VITAMIN B-6) 50 MG Tab  Active   tadalafil (CIALIS) 5 MG tablet  Active   vitamin D2, Ergocalciferol, (DRISDOL) 1.25 MG (69218 UT) Cap capsule  Active   zonisamide (ZONEGRAN) 100 MG Cap  Active                    ALLERGIES  No Known Allergies    PHYSICAL EXAM  VITAL SIGNS: BP (!) 192/110   Pulse 60   Temp 37.4 °C (99.3 °F) (Temporal)   Resp 18   Ht 1.524 m (5')   Wt 60.7 kg (133 lb 13.1 oz)   SpO2 93%   BMI 26.13 kg/m²      Pulse ox interpretation: I interpret this pulse ox as normal.  Constitutional: Alert and oriented x 3, minimal distress  HEENT: Atraumatic normocephalic, pupils are equal round reactive to light extraocular movements are intact. The nares is clear, external ears are normal, mouth shows moist mucous membranes normal dentition for age  Neck: Supple, no JVD no tracheal deviation  Cardiovascular: Regular rate and rhythm no murmur rub or gallop 2+ pulses peripherally x4  Thorax & Lungs: No respiratory distress, no wheezes rales or rhonchi, No chest tenderness.   GI: Soft nontender nondistended positive bowel sounds, no peritoneal signs  Skin: Warm dry no acute rash or lesion  Musculoskeletal: Moving all extremities with full range and 5 of 5 strength no acute  deformity  Neurologic: Cranial nerves III through XII are grossly intact no sensory deficit no cerebellar dysfunction   Psychiatric: Appropriate affect for situation at this time      EKG/LABS  Results for orders placed or performed during the hospital encounter of 02/19/25   CBC WITH  DIFFERENTIAL    Collection Time: 02/19/25  1:57 PM   Result Value Ref Range    WBC 9.7 4.8 - 10.8 K/uL    RBC 4.29 (L) 4.70 - 6.10 M/uL    Hemoglobin 14.7 14.0 - 18.0 g/dL    Hematocrit 44.6 42.0 - 52.0 %    .0 (H) 81.4 - 97.8 fL    MCH 34.3 (H) 27.0 - 33.0 pg    MCHC 33.0 32.3 - 36.5 g/dL    RDW 47.4 35.9 - 50.0 fL    Platelet Count 197 164 - 446 K/uL    MPV 11.4 9.0 - 12.9 fL    Neutrophils-Polys 65.20 44.00 - 72.00 %    Lymphocytes 27.30 22.00 - 41.00 %    Monocytes 5.60 0.00 - 13.40 %    Eosinophils 0.70 0.00 - 6.90 %    Basophils 0.30 0.00 - 1.80 %    Immature Granulocytes 0.90 0.00 - 0.90 %    Nucleated RBC 0.00 0.00 - 0.20 /100 WBC    Neutrophils (Absolute) 6.29 1.82 - 7.42 K/uL    Lymphs (Absolute) 2.63 1.00 - 4.80 K/uL    Monos (Absolute) 0.54 0.00 - 0.85 K/uL    Eos (Absolute) 0.07 0.00 - 0.51 K/uL    Baso (Absolute) 0.03 0.00 - 0.12 K/uL    Immature Granulocytes (abs) 0.09 0.00 - 0.11 K/uL    NRBC (Absolute) 0.00 K/uL   COMP METABOLIC PANEL    Collection Time: 02/19/25  1:57 PM   Result Value Ref Range    Sodium 143 135 - 145 mmol/L    Potassium 3.1 (L) 3.6 - 5.5 mmol/L    Chloride 107 96 - 112 mmol/L    Co2 24 20 - 33 mmol/L    Anion Gap 12.0 7.0 - 16.0    Glucose 83 65 - 99 mg/dL    Bun 12 8 - 22 mg/dL    Creatinine 0.83 0.50 - 1.40 mg/dL    Calcium 10.3 (H) 8.4 - 10.2 mg/dL    Correct Calcium 10.4 8.5 - 10.5 mg/dL    AST(SGOT) 29 12 - 45 U/L    ALT(SGPT) 25 2 - 50 U/L    Alkaline Phosphatase 65 30 - 99 U/L    Total Bilirubin 0.5 0.1 - 1.5 mg/dL    Albumin 3.9 3.2 - 4.9 g/dL    Total Protein 6.2 6.0 - 8.2 g/dL    Globulin 2.3 1.9 - 3.5 g/dL    A-G Ratio 1.7 g/dL   TROPONIN    Collection Time: 02/19/25  1:57 PM   Result Value Ref Range    Troponin T 39 (H) 6 - 19 ng/L   proBrain Natriuretic Peptide, NT    Collection Time: 02/19/25  1:57 PM   Result Value Ref Range    NT-proBNP 197 (H) 0 - 125 pg/mL   ESTIMATED GFR    Collection Time: 02/19/25  1:57 PM   Result Value Ref Range    GFR (CKD-EPI) 89  >60 mL/min/1.73 m 2   EKG    Collection Time: 25  3:07 PM   Result Value Ref Range    Report       [facility] Emergency Dept.    Test Date:  2025  Pt Name:    PAYAL SIEGEL     Department: EDSM  MRN:        [id number]                      [facility]  Gender:     Male                         Technician: [id number]  :        1945                   Requested By:NACHO MONTERO  Order #:    [id number]                    Reading MD:    Measurements  Intervals                                Axis  Rate:       67                           P:          47  NE:         132                          QRS:        -58  QRSD:       135                          T:          40  QT:         423  QTc:        447    Interpretive Statements  Sinus rhythm  RBBB and LAFB  Probable left ventricular hypertrophy  Compared to ECG 2023 15:36:57  Atrial premature complex(es) no longer present     TROPONIN    Collection Time: 25  3:44 PM   Result Value Ref Range    Troponin T 32 (H) 6 - 19 ng/L   URINALYSIS    Collection Time: 25  4:25 PM    Specimen: Urine   Result Value Ref Range    Color Yellow     Character Clear     Specific Gravity 1.006 <1.035    Ph 7.5 5.0 - 8.0    Glucose Negative Negative mg/dL    Ketones Negative Negative mg/dL    Protein Negative Negative mg/dL    Bilirubin Negative Negative    Urobilinogen, Urine 0.2 <=1.0 EU/dL    Nitrite Negative Negative    Leukocyte Esterase Negative Negative    Occult Blood Trace (A) Negative    Micro Urine Req Microscopic    URINE MICROSCOPIC (W/UA)    Collection Time: 25  4:25 PM   Result Value Ref Range    WBC 0-2 /hpf    RBC 11-20 (A) /hpf    Bacteria None Seen None /hpf    Epithelial Cells 0-2 0 - 5 /hpf    Urine Casts 0-2 0 - 2 /lpf      I have independently interpreted this EKG    RADIOLOGY/PROCEDURES   I have independently interpreted the diagnostic imaging associated with this visit and am waiting  the final reading from the radiologist.   My preliminary interpretation is as follows: Acute intracranial abnormality    Radiologist interpretation:  CT-HEAD W/O   Final Result      There is no definite acute intracranial abnormality.               DX-CHEST-PORTABLE (1 VIEW)   Final Result      Mild interstitial prominence and bibasilar atelectasis versus infiltrate.          COURSE & MEDICAL DECISION MAKING    ASSESSMENT, COURSE AND PLAN  Care Narrative: 79-year-old male with 3 days of dizziness.  Patient grossly hypertensive at arrival.Slight hypokalemia labs are otherwise reassuring his troponin was slightly elevated on initial draw it is decreasing on repeat draw.  This is likely due to his hypertension.  He is having no chest pain his EKG is nonischemic.  Patient previously taking amlodipine.  Will increase his amlodipine we will place an order for Klor-Con.  Patient will follow-up with his specialist and primary care physician he will return here for worsening headache dizziness nauseousness vomiting any chest pain palpitation shortness of breath any other acute symptom change or concern otherwise discharged stable and improved condition.       ADDITIONAL PROBLEMS MANAGED      DISPOSITION AND DISCUSSIONS  I have discussed management of the patient with the following physicians and LUNA's:      Discussion of management with other QHP or appropriate source(s):      Escalation of care considered, and ultimately not performed:acute inpatient care management, however at this time, the patient is most appropriate for outpatient management    Barriers to care at this time, including but not limited to: .     Decision tools and prescription drugs considered including, but not limited to: Increased antihypertensive and potassium supplementation provided..  BP (!) 164/70   Pulse 73   Temp 37 °C (98.6 °F) (Temporal)   Resp (!) 27   Ht 1.524 m (5')   Wt 60.7 kg (133 lb 13.1 oz)   SpO2 93%   BMI 26.13 kg/m²      Viviana Whitfield M.D.  [address]  [address]  [place] [address]  [phone]          [facility], Emergency Dept  [address]  [place] [address] [phone]    in 12-24 hours if symptoms persist, immediately If symptoms worsen, or if you develop any other symptoms or concerns      FINAL DIAGNOSIS  1. Dizziness Inactive   2. Hypertensive urgency Active   3. Hypokalemia Active   4. Essential hypertension         Electronically signed by: Law Jeffries M.D.

## 2025-02-19 NOTE — ED NOTES
Resumed care of pt , pt rounding, vs as charted, call light in reach, further orders recvd. Son remains at bedside, denies pt needs

## 2025-02-19 NOTE — ED NOTES
From: Melina Mathew  To: Dr. Meyers Fatimah: 2/28/2023 10:11 AM EST  Subject: Covid    This message is being sent by Halie Michelle on behalf of Melina Mathew. 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 He just has congestion. I thought it was allergies. Donovan tested negative minor allergies like symptoms. I have them both home today and the school told me I am to contact my provider for guidance on when to send them back. Any advice?   Pavithra Martinez Orders recvd and reviewed with pt. Saline lock with blood draw at this time.. aware of need for urine spec as well as pcxr and ct scan. Son denies need for interpretor services at this time. Call light in reach, denies needs

## 2025-02-19 NOTE — ED TRIAGE NOTES
Chief Complaint   Patient presents with    Dizziness     Patient ambulates to triage with reports of worsening dizziness over the past few days. Denies nausea, headache. Son states that new medications were started last Monday, sulfamethoxazole and azathioprine.        BP (!) 192/110   Pulse 60   Temp 37.4 °C (99.3 °F) (Temporal)   Resp 18   Ht 1.524 m (5')   Wt 60.7 kg (133 lb 13.1 oz)   SpO2 93%   BMI 26.13 kg/m²      Triage completed with son translating for patient.

## 2025-02-20 NOTE — ED NOTES
Improved bp after iv meds. Pt amb to br for void-urine spec collected and to lab. Erp aware of same

## 2025-02-20 NOTE — ED NOTES
Results noted by erp-ok for d/c. Instructions and prescription reviewed with son. Denies need for interpretor. Awareof need to  same and take 2 tabs 1x/day, f/u with pcp, return for worsening s/s

## 2025-02-21 LAB
BACTERIA UR CULT: NORMAL
SIGNIFICANT IND 70042: NORMAL
SITE SITE: NORMAL
SOURCE SOURCE: NORMAL

## 2025-02-25 ENCOUNTER — HOSPITAL ENCOUNTER (OUTPATIENT)
Dept: RADIOLOGY | Facility: MEDICAL CENTER | Age: 80
End: 2025-02-25
Attending: NURSE PRACTITIONER
Payer: MEDICARE

## 2025-02-25 DIAGNOSIS — R13.10 PROBLEMS WITH SWALLOWING AND MASTICATION: ICD-10-CM

## 2025-02-25 DIAGNOSIS — R13.12 OROPHARYNGEAL DYSPHAGIA: ICD-10-CM

## 2025-02-25 DIAGNOSIS — R13.13 PHARYNGEAL DYSPHAGIA: ICD-10-CM

## 2025-02-25 DIAGNOSIS — R63.4 LOSS OF WEIGHT: ICD-10-CM

## 2025-02-25 PROCEDURE — 74230 X-RAY XM SWLNG FUNCJ C+: CPT

## 2025-02-25 PROCEDURE — 92611 MOTION FLUOROSCOPY/SWALLOW: CPT

## 2025-02-26 NOTE — OP THERAPY EVALUATION
[facility]  Outpatient Modified Barium Swallow Study           Patient Name: Mj Cornejo  Date of Evaluation: 2/25/2025  Referring Provider: Penny Melgar  Fax: [phone]        Patient History/Reason for Referral  The pt was referred for a Modified Barium Swallow Study (MBSS) due to reported difficulty swallowing. Patient son reports he coughs frequently and chokes during meals.        Oral Mechanism Evaluation  Facial Symmetry: Equal  Labial Observations: WFL  Lingual Observations: WFL  Dentition: Upper denture, Lower denture      Voice  Quality: WFL  Resonance: WFL  Intensity: Soft  Cough: WFL      Current Method of Nutrition   Oral diet (regular solids, thin liquids)      Pertinent Information  Affect/Behavior: Appropriate, Calm  Oxygen Requirements: Room Air  Secretion Management: WNL      Subjective: son accompanies patient to session,  phone was used as patient is primarily Persian speaking.        Discussed with the risks, benefits, and alternatives of the MBSS procedure. Patient/family acknowledged and agreed to proceed.    Assessment  Videofluoroscopic Swallow Study was conducted in the lateral projection(s) to evaluate oropharyngeal swallow function. A radiology tech was present to assist with the procedure.       Positioning: seated upright in fluoroscopy chair  Anatomic View:  hypertrophic tissue anterior to cervical spinal cord at level of UES, query prominent cricopharngeaus versus other edema or cause of hypertrophy.    Bolus Administration: SLP, patient      Consistency PAS Score Timing Residue Comments   Thin Liquid 8 Post swallow Vallecular Residue: Moderate (25%-50%)  Pyriform Sinus Residue: Severe (>50%) Material enters airway after and during the swallow and is eventually aspirated during subsequent trials or subsequent swallows, patient requires >5 swallows per bolus to clear single trial, cough was observed inconsistently to aspiration events    Mildly Thick Liquid 8 Post swallow Vallecular Residue: Moderate (25%-50%)  Pyriform Sinus Residue: Severe (>50%) Material enters airway after and during the swallow and is eventually aspirated during subsequent trials or subsequent swallows, patient requires >5 swallows per bolus to clear single trial,cough was observed inconsistently to aspiration events   Liquidized 8 Post swallow Vallecular Residue: Severe (>50%)  Pyriform Sinus Residue: Severe (>50%) Material enters airway after the swallow and is eventually aspirated, patient requires >5 swallows per bolus to clear single trial, cough was observed inconsistently to aspiration events   Pudding 8 Post swallow Vallecular Residue: Severe (>50%)  Pyriform Sinus Residue: Severe (>50%) Material enters airway after the swallow and is eventually aspirated, patient requires >5 swallows per bolus to clear single trial, cough was observed inconsistently to aspiration events   Comment: more advanced solids were not trialed due to risk of airway obstruction    Penetration-Aspiration Scale (PAS)  1     No contrast enters airway  2     Contrast enters the airway, remains above the vocal folds, and is ejected from the airway (not seen in the airway at the end of the swallow).  3     Contrast enters the airway, remains above the vocal folds, and is not ejected from the airway (is seen in the airway after the swallow).  4     Contrast enters the airway, contacts the vocal folds, and is ejected from the airway.  5     Contrast enters the airway, contacts the vocal folds, and is not ejected from the airway  6     Contrast enters the airway, crosses the plane of the vocal folds, and is ejected from the airway.  7     Contrast enters the airway, crosses the plane of the vocal folds, and is not ejected from the airway despite effort.  8     Contrast enters the airway, crosses the plane of the vocal folds, is not ejected from the airway and there is no response to  aspiration.        Oral phase: uncoordinated bolus prep and transit, mild oral stasis following trials, chewable solids not trialed due to airway obstruction risk        Pharyngeal phase:   - Incomplete laryngeal vestibule closure resulted in penetration during the swallow   - Reduced base of tongue retraction resulted in moderate-severe vallecular residue with liquids and solids, spontaneous additional swallows reduced residue but did not fully eliminate it.  - Impaired pharyngeal shortening with reduced laryngeal excursion resulted in severe pyriform sinus residue with all trials  - Patient was not consistently sensate to penetration events to vocal cords nor to aspiration events, occasional cough was observed in response to larger volumes of aspiration      Compensatory Strategies:  - Multiple swallows: effective to reduce pharyngeal residue in vallecula and pyriforms, but did not fully eliminate residue  - Cued cough: effective to clear material in laryngeal vestibule, but did not clear aspirated material  - Chin tuck: did not eliminate penetration or aspiration, did not improve pharyngeal clearance  - Head turn R/L: did not eliminate penetration or aspiration, did not improve pharyngeal clearance  - Effortful swallow: did not eliminate penetration or aspiration, did not improve pharyngeal clearance  - Breath hold+swallow: did not eliminate penetration or aspiration, did not improve pharyngeal clearance    Results/recommendations discussed with patient and son. All questions addressed.       Clinical Impressions  Patient demonstrates severe oropharyngeal dysphagia characterized by impaired laryngeal excursion, impaired pharyngeal shortening, impaired tongue base retraction, and incomplete laryngeal vestibule closure resulting in aspiration of liquids and solids this date. Patient with blunted sensation to aspiration events, without consistent cough response. Cued cough was effective to eliminate penetrated  material, but not effective to clear aspirated material. Swallow safety and efficiency are both significantly impaired. Patient is at high risk for aspiration and high risk for inability to sustain nutrition/hydration orally. Suspect dysphagia is chronic in nature given patient report of years of difficulty swallowing. Given patient with good oral hygiene, good mobility, few other commodities reported this date, and no reports of recent pneumonia or other decline in respiratory status, continued PO nutrition/hydration is reasonable. Patient and son educated on risks of continued PO intake and encouraged them to discuss with their MD (visit scheduled with PCP tomorrow AM per son). Counseling regarding non-oral nutrition such as feeding tube is warranted. Should patient chose to continue oral feeding, recommend puree diet, thin liquids. Avoid chewable solids due to airway obstruction risk. Patient and son report understanding. Recommend follow-up with OP SLP for training of swallow exercises targeting impaired swallow physiology demonstrated during todays assessment.      Recommendations  PUREE DIET (NO CHEWABLE SOLIDS), thin liquids  2.  Swallowing Instructions & Precautions:   Supervision: 1:1 feeding with constant supervision  Positioning: Fully upright and midline during oral intake, Remain upright for 30 minutes after oral intake, Meals sitting upright in a chair, as tolerated  Medication: Crush with applesauce or puree, as appropriate  Strategies: Small bites/sips, Alternate bites and sips, Slow rate of intake, Multiple swallows per bite/sip, cough between bites/sips to clear airway.   Oral Care: Q4h  3.  SLP follow-up for swallow treatment including impairment based swallow exercises  4.  MD follow-up due to high risk for inability to sustain nutrition/hydration and high aspiration risk        Thank you for the referral. For further questions, please call [phone].  Libertad Mendes, SLP

## 2025-03-08 ENCOUNTER — HOSPITAL ENCOUNTER (OUTPATIENT)
Dept: LAB | Facility: MEDICAL CENTER | Age: 80
End: 2025-03-08
Attending: PSYCHIATRY & NEUROLOGY
Payer: MEDICARE

## 2025-03-08 LAB
25(OH)D3 SERPL-MCNC: 53 NG/ML (ref 30–100)
ALBUMIN SERPL BCP-MCNC: 4.2 G/DL (ref 3.2–4.9)
ALBUMIN/GLOB SERPL: 1.6 G/DL
ALP SERPL-CCNC: 74 U/L (ref 30–99)
ALT SERPL-CCNC: 34 U/L (ref 2–50)
ANION GAP SERPL CALC-SCNC: 10 MMOL/L (ref 7–16)
AST SERPL-CCNC: 28 U/L (ref 12–45)
BASOPHILS # BLD AUTO: 0.3 % (ref 0–1.8)
BASOPHILS # BLD: 0.05 K/UL (ref 0–0.12)
BILIRUB SERPL-MCNC: 0.7 MG/DL (ref 0.1–1.5)
BUN SERPL-MCNC: 16 MG/DL (ref 8–22)
CALCIUM ALBUM COR SERPL-MCNC: 11.1 MG/DL (ref 8.5–10.5)
CALCIUM SERPL-MCNC: 11.3 MG/DL (ref 8.5–10.5)
CHLORIDE SERPL-SCNC: 102 MMOL/L (ref 96–112)
CO2 SERPL-SCNC: 26 MMOL/L (ref 20–33)
CREAT SERPL-MCNC: 0.8 MG/DL (ref 0.5–1.4)
EOSINOPHIL # BLD AUTO: 0.01 K/UL (ref 0–0.51)
EOSINOPHIL NFR BLD: 0.1 % (ref 0–6.9)
ERYTHROCYTE [DISTWIDTH] IN BLOOD BY AUTOMATED COUNT: 46.8 FL (ref 35.9–50)
GFR SERPLBLD CREATININE-BSD FMLA CKD-EPI: 90 ML/MIN/1.73 M 2
GLOBULIN SER CALC-MCNC: 2.6 G/DL (ref 1.9–3.5)
GLUCOSE SERPL-MCNC: 84 MG/DL (ref 65–99)
HCT VFR BLD AUTO: 48.4 % (ref 42–52)
HGB BLD-MCNC: 16.2 G/DL (ref 14–18)
IMM GRANULOCYTES # BLD AUTO: 0.2 K/UL (ref 0–0.11)
IMM GRANULOCYTES NFR BLD AUTO: 1.3 % (ref 0–0.9)
LYMPHOCYTES # BLD AUTO: 3.6 K/UL (ref 1–4.8)
LYMPHOCYTES NFR BLD: 23.2 % (ref 22–41)
MCH RBC QN AUTO: 34 PG (ref 27–33)
MCHC RBC AUTO-ENTMCNC: 33.5 G/DL (ref 32.3–36.5)
MCV RBC AUTO: 101.7 FL (ref 81.4–97.8)
MONOCYTES # BLD AUTO: 0.64 K/UL (ref 0–0.85)
MONOCYTES NFR BLD AUTO: 4.1 % (ref 0–13.4)
NEUTROPHILS # BLD AUTO: 11 K/UL (ref 1.82–7.42)
NEUTROPHILS NFR BLD: 71 % (ref 44–72)
NRBC # BLD AUTO: 0 K/UL
NRBC BLD-RTO: 0 /100 WBC (ref 0–0.2)
PLATELET # BLD AUTO: 279 K/UL (ref 164–446)
PMV BLD AUTO: 11.5 FL (ref 9–12.9)
POTASSIUM SERPL-SCNC: 4.3 MMOL/L (ref 3.6–5.5)
PROT SERPL-MCNC: 6.8 G/DL (ref 6–8.2)
RBC # BLD AUTO: 4.76 M/UL (ref 4.7–6.1)
SODIUM SERPL-SCNC: 138 MMOL/L (ref 135–145)
WBC # BLD AUTO: 15.5 K/UL (ref 4.8–10.8)

## 2025-03-08 PROCEDURE — 85025 COMPLETE CBC W/AUTO DIFF WBC: CPT

## 2025-03-08 PROCEDURE — 36415 COLL VENOUS BLD VENIPUNCTURE: CPT

## 2025-03-08 PROCEDURE — 84207 ASSAY OF VITAMIN B-6: CPT

## 2025-03-08 PROCEDURE — 80053 COMPREHEN METABOLIC PANEL: CPT

## 2025-03-08 PROCEDURE — 82306 VITAMIN D 25 HYDROXY: CPT

## 2025-03-12 LAB — VIT B6 SERPL-MCNC: 22.9 NMOL/L (ref 20–125)

## 2025-04-03 NOTE — PROGRESS NOTES
Community Health Worker Intake    • Social determinates of health intake completed.   • Identified barriers to: none currently.  • Contact information provided to Mj Ander Clemente   • Accepted Meds-To-Beds.   • Inpatient assessment completed.    CHW Sudhakar reached out to number on file for pt and his wife Conception answered and indicated that it was their home phone and not pt's personal phone. She accepted [facility] services for pt but denied that CHW scheduled appt with PCP for now. Per pt's wife, he does not have transportation barriers and has his son, daughter or daughter in law take him to appts. She was made aware of [employer] either way. Conception mentioned that they do not currently have financial barriers but fears that they may in the future and would appreciate that CHW mail food bank flyer and any rent/power assistance resources to their residence. Conception reported that pt has a great support system at home consisting of their children and other family members. She requested that CHW reach out again after pt is discharged for additional resources/services pt may need.    Plan: referral to [facility], f/u after d/c to identify any additional needs.  
Photo Preface (Leave Blank If You Do Not Want): Photographs were obtained today
Detail Level: Simple

## 2025-04-16 ENCOUNTER — HOSPITAL ENCOUNTER (INPATIENT)
Facility: MEDICAL CENTER | Age: 80
LOS: 8 days | DRG: 177 | End: 2025-04-24
Attending: EMERGENCY MEDICINE | Admitting: HOSPITALIST
Payer: MEDICARE

## 2025-04-16 ENCOUNTER — APPOINTMENT (OUTPATIENT)
Dept: RADIOLOGY | Facility: MEDICAL CENTER | Age: 80
DRG: 177 | End: 2025-04-16
Attending: EMERGENCY MEDICINE
Payer: MEDICARE

## 2025-04-16 ENCOUNTER — APPOINTMENT (OUTPATIENT)
Dept: RADIOLOGY | Facility: MEDICAL CENTER | Age: 80
DRG: 177 | End: 2025-04-16
Payer: MEDICARE

## 2025-04-16 ENCOUNTER — APPOINTMENT (OUTPATIENT)
Dept: RADIOLOGY | Facility: MEDICAL CENTER | Age: 80
DRG: 177 | End: 2025-04-16
Attending: HOSPITALIST
Payer: MEDICARE

## 2025-04-16 DIAGNOSIS — D69.6 THROMBOCYTOPENIA (HCC): ICD-10-CM

## 2025-04-16 DIAGNOSIS — R63.4 WEIGHT LOSS: ICD-10-CM

## 2025-04-16 DIAGNOSIS — G89.29 CHRONIC BILATERAL LOW BACK PAIN WITHOUT SCIATICA: ICD-10-CM

## 2025-04-16 DIAGNOSIS — I10 ESSENTIAL HYPERTENSION: ICD-10-CM

## 2025-04-16 DIAGNOSIS — E55.9 VITAMIN D DEFICIENCY: ICD-10-CM

## 2025-04-16 DIAGNOSIS — E21.3 HYPERPARATHYROIDISM, UNSPECIFIED (HCC): ICD-10-CM

## 2025-04-16 DIAGNOSIS — R79.89 ELEVATED FERRITIN: ICD-10-CM

## 2025-04-16 DIAGNOSIS — E87.6 HYPOKALEMIA: ICD-10-CM

## 2025-04-16 DIAGNOSIS — D75.1 POLYCYTHEMIA: ICD-10-CM

## 2025-04-16 DIAGNOSIS — J96.01 ACUTE RESPIRATORY FAILURE WITH HYPOXEMIA (HCC): ICD-10-CM

## 2025-04-16 DIAGNOSIS — M81.8 OTHER OSTEOPOROSIS WITHOUT CURRENT PATHOLOGICAL FRACTURE: ICD-10-CM

## 2025-04-16 DIAGNOSIS — M54.50 CHRONIC BILATERAL LOW BACK PAIN WITHOUT SCIATICA: ICD-10-CM

## 2025-04-16 DIAGNOSIS — R09.02 HYPOXIA: ICD-10-CM

## 2025-04-16 DIAGNOSIS — R91.1 PULMONARY NODULE, RIGHT: ICD-10-CM

## 2025-04-16 DIAGNOSIS — E83.52 HYPERCALCEMIA: ICD-10-CM

## 2025-04-16 DIAGNOSIS — E03.8 SUBCLINICAL HYPOTHYROIDISM: ICD-10-CM

## 2025-04-16 DIAGNOSIS — R73.01 ELEVATED FASTING GLUCOSE: ICD-10-CM

## 2025-04-16 DIAGNOSIS — I63.9 CEREBRAL INFARCTION, UNSPECIFIED MECHANISM (HCC): ICD-10-CM

## 2025-04-16 DIAGNOSIS — J18.9 PNEUMONIA OF BOTH LUNGS DUE TO INFECTIOUS ORGANISM, UNSPECIFIED PART OF LUNG: ICD-10-CM

## 2025-04-16 DIAGNOSIS — N13.30 HYDRONEPHROSIS, UNSPECIFIED HYDRONEPHROSIS TYPE: ICD-10-CM

## 2025-04-16 DIAGNOSIS — R25.1 TREMOR OF BOTH HANDS: ICD-10-CM

## 2025-04-16 DIAGNOSIS — N53.9 MALE SEXUAL DYSFUNCTION: ICD-10-CM

## 2025-04-16 DIAGNOSIS — E78.5 DYSLIPIDEMIA: ICD-10-CM

## 2025-04-16 DIAGNOSIS — R13.12 OROPHARYNGEAL DYSPHAGIA: ICD-10-CM

## 2025-04-16 DIAGNOSIS — R97.20 RAISED PROSTATE SPECIFIC ANTIGEN: ICD-10-CM

## 2025-04-16 DIAGNOSIS — E43 SEVERE PROTEIN-CALORIE MALNUTRITION (HCC): ICD-10-CM

## 2025-04-16 DIAGNOSIS — D72.829 LEUKOCYTOSIS, UNSPECIFIED TYPE: ICD-10-CM

## 2025-04-16 DIAGNOSIS — R56.9 SEIZURE (HCC): ICD-10-CM

## 2025-04-16 DIAGNOSIS — J10.1 INFLUENZA A: ICD-10-CM

## 2025-04-16 DIAGNOSIS — R13.10 DYSPHAGIA, UNSPECIFIED TYPE: ICD-10-CM

## 2025-04-16 DIAGNOSIS — N20.1 URETERAL STONE: ICD-10-CM

## 2025-04-16 DIAGNOSIS — M54.2 NECK PAIN: ICD-10-CM

## 2025-04-16 DIAGNOSIS — Z66 DNR (DO NOT RESUSCITATE): ICD-10-CM

## 2025-04-16 DIAGNOSIS — D75.89 MACROCYTOSIS WITHOUT ANEMIA: ICD-10-CM

## 2025-04-16 LAB
ALBUMIN SERPL BCP-MCNC: 3.4 G/DL (ref 3.2–4.9)
ALBUMIN/GLOB SERPL: 1.4 G/DL
ALP SERPL-CCNC: 62 U/L (ref 30–99)
ALT SERPL-CCNC: 35 U/L (ref 2–50)
ANION GAP SERPL CALC-SCNC: 12 MMOL/L (ref 7–16)
APPEARANCE UR: CLEAR
AST SERPL-CCNC: 47 U/L (ref 12–45)
BACTERIA #/AREA URNS HPF: ABNORMAL /HPF
BASOPHILS # BLD AUTO: 0.1 % (ref 0–1.8)
BASOPHILS # BLD: 0.01 K/UL (ref 0–0.12)
BILIRUB SERPL-MCNC: 1 MG/DL (ref 0.1–1.5)
BILIRUB UR QL STRIP.AUTO: NEGATIVE
BUN SERPL-MCNC: 15 MG/DL (ref 8–22)
CA-I SERPL-SCNC: 1.33 MMOL/L (ref 1.1–1.3)
CALCIUM ALBUM COR SERPL-MCNC: 11.6 MG/DL (ref 8.5–10.5)
CALCIUM SERPL-MCNC: 11.1 MG/DL (ref 8.5–10.5)
CASTS URNS QL MICRO: ABNORMAL /LPF (ref 0–2)
CHLORIDE SERPL-SCNC: 99 MMOL/L (ref 96–112)
CO2 SERPL-SCNC: 23 MMOL/L (ref 20–33)
COLOR UR: YELLOW
CREAT SERPL-MCNC: 1.01 MG/DL (ref 0.5–1.4)
CRP SERPL HS-MCNC: 23.1 MG/DL (ref 0–0.75)
EKG IMPRESSION: NORMAL
EOSINOPHIL # BLD AUTO: 0 K/UL (ref 0–0.51)
EOSINOPHIL NFR BLD: 0 % (ref 0–6.9)
EPITHELIAL CELLS 1715: ABNORMAL /HPF (ref 0–5)
ERYTHROCYTE [DISTWIDTH] IN BLOOD BY AUTOMATED COUNT: 45.7 FL (ref 35.9–50)
ERYTHROCYTE [SEDIMENTATION RATE] IN BLOOD BY WESTERGREN METHOD: 42 MM/HOUR (ref 0–20)
FLUAV RNA SPEC QL NAA+PROBE: POSITIVE
FLUBV RNA SPEC QL NAA+PROBE: NEGATIVE
FOLATE SERPL-MCNC: 13.8 NG/ML
GFR SERPLBLD CREATININE-BSD FMLA CKD-EPI: 75 ML/MIN/1.73 M 2
GLOBULIN SER CALC-MCNC: 2.4 G/DL (ref 1.9–3.5)
GLUCOSE SERPL-MCNC: 139 MG/DL (ref 65–99)
GLUCOSE UR STRIP.AUTO-MCNC: NEGATIVE MG/DL
HCT VFR BLD AUTO: 40.2 % (ref 42–52)
HGB BLD-MCNC: 13.7 G/DL (ref 14–18)
IMM GRANULOCYTES # BLD AUTO: 0.04 K/UL (ref 0–0.11)
IMM GRANULOCYTES NFR BLD AUTO: 0.3 % (ref 0–0.9)
KETONES UR STRIP.AUTO-MCNC: NEGATIVE MG/DL
LACTATE SERPL-SCNC: 0.8 MMOL/L (ref 0.5–2)
LACTATE SERPL-SCNC: 0.9 MMOL/L (ref 0.5–2)
LACTATE SERPL-SCNC: 2.7 MMOL/L (ref 0.5–2)
LEUKOCYTE ESTERASE UR QL STRIP.AUTO: NEGATIVE
LYMPHOCYTES # BLD AUTO: 0.89 K/UL (ref 1–4.8)
LYMPHOCYTES NFR BLD: 7.6 % (ref 22–41)
MAGNESIUM SERPL-MCNC: 1.6 MG/DL (ref 1.5–2.5)
MCH RBC QN AUTO: 34.5 PG (ref 27–33)
MCHC RBC AUTO-ENTMCNC: 34.1 G/DL (ref 32.3–36.5)
MCV RBC AUTO: 101.3 FL (ref 81.4–97.8)
MICRO URNS: ABNORMAL
MONOCYTES # BLD AUTO: 0.44 K/UL (ref 0–0.85)
MONOCYTES NFR BLD AUTO: 3.8 % (ref 0–13.4)
NEUTROPHILS # BLD AUTO: 10.26 K/UL (ref 1.82–7.42)
NEUTROPHILS NFR BLD: 88.2 % (ref 44–72)
NITRITE UR QL STRIP.AUTO: NEGATIVE
NRBC # BLD AUTO: 0 K/UL
NRBC BLD-RTO: 0 /100 WBC (ref 0–0.2)
NT-PROBNP SERPL IA-MCNC: 490 PG/ML (ref 0–125)
PH UR STRIP.AUTO: 7 [PH] (ref 5–8)
PLATELET # BLD AUTO: 130 K/UL (ref 164–446)
PMV BLD AUTO: 11.5 FL (ref 9–12.9)
POTASSIUM SERPL-SCNC: 2.9 MMOL/L (ref 3.6–5.5)
PROCALCITONIN SERPL-MCNC: 0.55 NG/ML
PROT SERPL-MCNC: 5.8 G/DL (ref 6–8.2)
PROT UR QL STRIP: NEGATIVE MG/DL
PTH-INTACT SERPL-MCNC: 87.6 PG/ML (ref 14–72)
RBC # BLD AUTO: 3.97 M/UL (ref 4.7–6.1)
RBC # URNS HPF: ABNORMAL /HPF
RBC UR QL AUTO: ABNORMAL
RSV RNA SPEC QL NAA+PROBE: NEGATIVE
SARS-COV-2 RNA RESP QL NAA+PROBE: NOTDETECTED
SODIUM SERPL-SCNC: 134 MMOL/L (ref 135–145)
SP GR UR STRIP.AUTO: 1.03
SPECIMEN SOURCE: ABNORMAL
T3FREE SERPL-MCNC: 2.37 PG/ML (ref 2–4.4)
TROPONIN T SERPL-MCNC: 69 NG/L (ref 6–19)
TSH SERPL DL<=0.005 MIU/L-ACNC: 3.1 UIU/ML (ref 0.38–5.33)
UROBILINOGEN UR STRIP.AUTO-MCNC: 0.2 EU/DL
VIT B12 SERPL-MCNC: 565 PG/ML (ref 211–911)
WBC # BLD AUTO: 11.6 K/UL (ref 4.8–10.8)
WBC #/AREA URNS HPF: ABNORMAL /HPF

## 2025-04-16 PROCEDURE — 87086 URINE CULTURE/COLONY COUNT: CPT

## 2025-04-16 PROCEDURE — 83605 ASSAY OF LACTIC ACID: CPT | Mod: 91

## 2025-04-16 PROCEDURE — 85652 RBC SED RATE AUTOMATED: CPT

## 2025-04-16 PROCEDURE — 80053 COMPREHEN METABOLIC PANEL: CPT

## 2025-04-16 PROCEDURE — 84443 ASSAY THYROID STIM HORMONE: CPT

## 2025-04-16 PROCEDURE — 82607 VITAMIN B-12: CPT

## 2025-04-16 PROCEDURE — 700117 HCHG RX CONTRAST REV CODE 255: Performed by: EMERGENCY MEDICINE

## 2025-04-16 PROCEDURE — 84481 FREE ASSAY (FT-3): CPT

## 2025-04-16 PROCEDURE — 700111 HCHG RX REV CODE 636 W/ 250 OVERRIDE (IP): Performed by: HOSPITALIST

## 2025-04-16 PROCEDURE — 700111 HCHG RX REV CODE 636 W/ 250 OVERRIDE (IP): Mod: JZ | Performed by: EMERGENCY MEDICINE

## 2025-04-16 PROCEDURE — 700102 HCHG RX REV CODE 250 W/ 637 OVERRIDE(OP): Performed by: HOSPITALIST

## 2025-04-16 PROCEDURE — 94760 N-INVAS EAR/PLS OXIMETRY 1: CPT

## 2025-04-16 PROCEDURE — 700102 HCHG RX REV CODE 250 W/ 637 OVERRIDE(OP): Performed by: EMERGENCY MEDICINE

## 2025-04-16 PROCEDURE — 83880 ASSAY OF NATRIURETIC PEPTIDE: CPT

## 2025-04-16 PROCEDURE — 82746 ASSAY OF FOLIC ACID SERUM: CPT

## 2025-04-16 PROCEDURE — 72131 CT LUMBAR SPINE W/O DYE: CPT

## 2025-04-16 PROCEDURE — 96374 THER/PROPH/DIAG INJ IV PUSH: CPT

## 2025-04-16 PROCEDURE — 99285 EMERGENCY DEPT VISIT HI MDM: CPT

## 2025-04-16 PROCEDURE — 87040 BLOOD CULTURE FOR BACTERIA: CPT

## 2025-04-16 PROCEDURE — 74177 CT ABD & PELVIS W/CONTRAST: CPT

## 2025-04-16 PROCEDURE — 84145 PROCALCITONIN (PCT): CPT

## 2025-04-16 PROCEDURE — 83970 ASSAY OF PARATHORMONE: CPT

## 2025-04-16 PROCEDURE — 86140 C-REACTIVE PROTEIN: CPT

## 2025-04-16 PROCEDURE — 36415 COLL VENOUS BLD VENIPUNCTURE: CPT

## 2025-04-16 PROCEDURE — A9270 NON-COVERED ITEM OR SERVICE: HCPCS | Performed by: HOSPITALIST

## 2025-04-16 PROCEDURE — 700105 HCHG RX REV CODE 258: Performed by: EMERGENCY MEDICINE

## 2025-04-16 PROCEDURE — 770020 HCHG ROOM/CARE - TELE (206)

## 2025-04-16 PROCEDURE — 93005 ELECTROCARDIOGRAM TRACING: CPT | Mod: TC | Performed by: EMERGENCY MEDICINE

## 2025-04-16 PROCEDURE — A9270 NON-COVERED ITEM OR SERVICE: HCPCS | Performed by: EMERGENCY MEDICINE

## 2025-04-16 PROCEDURE — 84484 ASSAY OF TROPONIN QUANT: CPT

## 2025-04-16 PROCEDURE — 92610 EVALUATE SWALLOWING FUNCTION: CPT

## 2025-04-16 PROCEDURE — 0241U HCHG SARS-COV-2 COVID-19 NFCT DS RESP RNA 4 TRGT MIC: CPT

## 2025-04-16 PROCEDURE — 700105 HCHG RX REV CODE 258: Performed by: HOSPITALIST

## 2025-04-16 PROCEDURE — 99223 1ST HOSP IP/OBS HIGH 75: CPT | Mod: AI | Performed by: HOSPITALIST

## 2025-04-16 PROCEDURE — 85025 COMPLETE CBC W/AUTO DIFF WBC: CPT

## 2025-04-16 PROCEDURE — 71045 X-RAY EXAM CHEST 1 VIEW: CPT

## 2025-04-16 PROCEDURE — 83735 ASSAY OF MAGNESIUM: CPT

## 2025-04-16 PROCEDURE — 82330 ASSAY OF CALCIUM: CPT

## 2025-04-16 PROCEDURE — 81001 URINALYSIS AUTO W/SCOPE: CPT

## 2025-04-16 PROCEDURE — 94640 AIRWAY INHALATION TREATMENT: CPT

## 2025-04-16 RX ORDER — ACETAMINOPHEN 500 MG
1000 TABLET ORAL EVERY 6 HOURS PRN
COMMUNITY
End: 2025-04-28

## 2025-04-16 RX ORDER — GUAIFENESIN/DEXTROMETHORPHAN 100-10MG/5
10 SYRUP ORAL EVERY 6 HOURS PRN
Status: DISCONTINUED | OUTPATIENT
Start: 2025-04-16 | End: 2025-04-18

## 2025-04-16 RX ORDER — SODIUM CHLORIDE 9 MG/ML
1000 INJECTION, SOLUTION INTRAVENOUS ONCE
Status: COMPLETED | OUTPATIENT
Start: 2025-04-16 | End: 2025-04-16

## 2025-04-16 RX ORDER — ONDANSETRON 4 MG/1
4 TABLET, ORALLY DISINTEGRATING ORAL EVERY 4 HOURS PRN
Status: DISCONTINUED | OUTPATIENT
Start: 2025-04-16 | End: 2025-04-18

## 2025-04-16 RX ORDER — OXYCODONE HYDROCHLORIDE 5 MG/1
5 TABLET ORAL
Refills: 0 | Status: DISCONTINUED | OUTPATIENT
Start: 2025-04-16 | End: 2025-04-18

## 2025-04-16 RX ORDER — AMOXICILLIN 250 MG
2 CAPSULE ORAL EVERY EVENING
Status: DISCONTINUED | OUTPATIENT
Start: 2025-04-16 | End: 2025-04-18

## 2025-04-16 RX ORDER — ONDANSETRON 2 MG/ML
4 INJECTION INTRAMUSCULAR; INTRAVENOUS EVERY 4 HOURS PRN
Status: DISCONTINUED | OUTPATIENT
Start: 2025-04-16 | End: 2025-04-18

## 2025-04-16 RX ORDER — ENOXAPARIN SODIUM 100 MG/ML
40 INJECTION SUBCUTANEOUS DAILY
Status: DISCONTINUED | OUTPATIENT
Start: 2025-04-16 | End: 2025-04-24 | Stop reason: HOSPADM

## 2025-04-16 RX ORDER — POTASSIUM CHLORIDE 1500 MG/1
40 TABLET, EXTENDED RELEASE ORAL ONCE
Status: COMPLETED | OUTPATIENT
Start: 2025-04-16 | End: 2025-04-16

## 2025-04-16 RX ORDER — OSELTAMIVIR PHOSPHATE 30 MG/1
30 CAPSULE ORAL ONCE
Status: COMPLETED | OUTPATIENT
Start: 2025-04-16 | End: 2025-04-16

## 2025-04-16 RX ORDER — HYDRALAZINE HYDROCHLORIDE 20 MG/ML
10 INJECTION INTRAMUSCULAR; INTRAVENOUS EVERY 4 HOURS PRN
Status: DISCONTINUED | OUTPATIENT
Start: 2025-04-16 | End: 2025-04-22

## 2025-04-16 RX ORDER — POLYETHYLENE GLYCOL 3350 17 G/17G
1 POWDER, FOR SOLUTION ORAL
Status: DISCONTINUED | OUTPATIENT
Start: 2025-04-16 | End: 2025-04-18

## 2025-04-16 RX ORDER — MORPHINE SULFATE 4 MG/ML
2 INJECTION INTRAVENOUS
Status: DISCONTINUED | OUTPATIENT
Start: 2025-04-16 | End: 2025-04-18

## 2025-04-16 RX ORDER — AMLODIPINE BESYLATE 5 MG/1
10 TABLET ORAL DAILY
Status: DISCONTINUED | OUTPATIENT
Start: 2025-04-16 | End: 2025-04-18

## 2025-04-16 RX ORDER — OXYCODONE HYDROCHLORIDE 5 MG/1
2.5 TABLET ORAL
Refills: 0 | Status: DISCONTINUED | OUTPATIENT
Start: 2025-04-16 | End: 2025-04-18

## 2025-04-16 RX ORDER — OSELTAMIVIR PHOSPHATE 30 MG/1
30 CAPSULE ORAL 2 TIMES DAILY
Status: DISCONTINUED | OUTPATIENT
Start: 2025-04-16 | End: 2025-04-18

## 2025-04-16 RX ORDER — SODIUM CHLORIDE 9 MG/ML
INJECTION, SOLUTION INTRAVENOUS CONTINUOUS
Status: ACTIVE | OUTPATIENT
Start: 2025-04-16 | End: 2025-04-16

## 2025-04-16 RX ORDER — ACETAMINOPHEN 325 MG/1
650 TABLET ORAL ONCE
Status: COMPLETED | OUTPATIENT
Start: 2025-04-16 | End: 2025-04-16

## 2025-04-16 RX ORDER — ACETAMINOPHEN 325 MG/1
650 TABLET ORAL EVERY 6 HOURS PRN
Status: DISCONTINUED | OUTPATIENT
Start: 2025-04-16 | End: 2025-04-18

## 2025-04-16 RX ORDER — POTASSIUM CHLORIDE 1500 MG/1
20 TABLET, EXTENDED RELEASE ORAL 3 TIMES DAILY
Status: DISCONTINUED | OUTPATIENT
Start: 2025-04-16 | End: 2025-04-18

## 2025-04-16 RX ORDER — CEFTRIAXONE 2 G/1
2000 INJECTION, POWDER, FOR SOLUTION INTRAMUSCULAR; INTRAVENOUS ONCE
Status: COMPLETED | OUTPATIENT
Start: 2025-04-16 | End: 2025-04-16

## 2025-04-16 RX ADMIN — ENOXAPARIN SODIUM 40 MG: 100 INJECTION SUBCUTANEOUS at 18:09

## 2025-04-16 RX ADMIN — SODIUM CHLORIDE: 9 INJECTION, SOLUTION INTRAVENOUS at 13:21

## 2025-04-16 RX ADMIN — POTASSIUM CHLORIDE 20 MEQ: 1500 TABLET, EXTENDED RELEASE ORAL at 18:08

## 2025-04-16 RX ADMIN — PAMIDRONATE DISODIUM 60 MG: 3 INJECTION, SOLUTION INTRAVENOUS at 13:25

## 2025-04-16 RX ADMIN — IOHEXOL 100 ML: 350 INJECTION, SOLUTION INTRAVENOUS at 09:53

## 2025-04-16 RX ADMIN — CEFTRIAXONE SODIUM 2000 MG: 2 INJECTION, POWDER, FOR SOLUTION INTRAMUSCULAR; INTRAVENOUS at 09:04

## 2025-04-16 RX ADMIN — OSELTAMIVIR PHOSPHATE 30 MG: 30 CAPSULE ORAL at 18:09

## 2025-04-16 RX ADMIN — ACETAMINOPHEN 650 MG: 325 TABLET ORAL at 08:57

## 2025-04-16 RX ADMIN — SODIUM CHLORIDE 1000 ML: 9 INJECTION, SOLUTION INTRAVENOUS at 08:53

## 2025-04-16 RX ADMIN — OSELTAMIVIR PHOSPHATE 30 MG: 30 CAPSULE ORAL at 10:20

## 2025-04-16 RX ADMIN — POTASSIUM CHLORIDE 40 MEQ: 1500 TABLET, EXTENDED RELEASE ORAL at 10:22

## 2025-04-16 SDOH — ECONOMIC STABILITY: TRANSPORTATION INSECURITY
IN THE PAST 12 MONTHS, HAS LACK OF RELIABLE TRANSPORTATION KEPT YOU FROM MEDICAL APPOINTMENTS, MEETINGS, WORK OR FROM GETTING THINGS NEEDED FOR DAILY LIVING?: NO

## 2025-04-16 ASSESSMENT — ENCOUNTER SYMPTOMS
SENSORY CHANGE: 0
HEADACHES: 1
INSOMNIA: 0
COUGH: 1
TINGLING: 0
SPUTUM PRODUCTION: 1
DIAPHORESIS: 0
BACK PAIN: 1
DIZZINESS: 0
SEIZURES: 0
PND: 0
PHOTOPHOBIA: 0
PSYCHIATRIC NEGATIVE: 1
STRIDOR: 0
WHEEZING: 0
CARDIOVASCULAR NEGATIVE: 1
WEIGHT LOSS: 1
EYE REDNESS: 0
MYALGIAS: 1
DEPRESSION: 0
SINUS PAIN: 0
SHORTNESS OF BREATH: 1
EYE DISCHARGE: 0
ORTHOPNEA: 0
CHILLS: 1
FOCAL WEAKNESS: 0
FEVER: 1
FLANK PAIN: 0
ABDOMINAL PAIN: 0
NAUSEA: 1
DOUBLE VISION: 0
HEARTBURN: 0
POLYDIPSIA: 0
BLOOD IN STOOL: 0
FALLS: 0
EYES NEGATIVE: 1
BRUISES/BLEEDS EASILY: 0

## 2025-04-16 ASSESSMENT — COGNITIVE AND FUNCTIONAL STATUS - GENERAL
MOVING TO AND FROM BED TO CHAIR: A LOT
STANDING UP FROM CHAIR USING ARMS: A LOT
MOBILITY SCORE: 12
HELP NEEDED FOR BATHING: A LOT
WALKING IN HOSPITAL ROOM: A LOT
PERSONAL GROOMING: A LITTLE
EATING MEALS: A LITTLE
SUGGESTED CMS G CODE MODIFIER MOBILITY: CL
CLIMB 3 TO 5 STEPS WITH RAILING: A LOT
DRESSING REGULAR LOWER BODY CLOTHING: A LOT
TOILETING: A LOT
SUGGESTED CMS G CODE MODIFIER DAILY ACTIVITY: CK
TURNING FROM BACK TO SIDE WHILE IN FLAT BAD: A LOT
DRESSING REGULAR UPPER BODY CLOTHING: A LITTLE
DAILY ACTIVITIY SCORE: 15
MOVING FROM LYING ON BACK TO SITTING ON SIDE OF FLAT BED: A LOT

## 2025-04-16 ASSESSMENT — LIFESTYLE VARIABLES
TOTAL SCORE: 0
DOES PATIENT WANT TO STOP DRINKING: NO
HAVE YOU EVER FELT YOU SHOULD CUT DOWN ON YOUR DRINKING: NO
CONSUMPTION TOTAL: NEGATIVE
AVERAGE NUMBER OF DAYS PER WEEK YOU HAVE A DRINK CONTAINING ALCOHOL: 0
HAVE PEOPLE ANNOYED YOU BY CRITICIZING YOUR DRINKING: NO
HOW MANY TIMES IN THE PAST YEAR HAVE YOU HAD 5 OR MORE DRINKS IN A DAY: 0
TOTAL SCORE: 0
EVER HAD A DRINK FIRST THING IN THE MORNING TO STEADY YOUR NERVES TO GET RID OF A HANGOVER: NO
SUBSTANCE_ABUSE: 0
EVER FELT BAD OR GUILTY ABOUT YOUR DRINKING: NO
ALCOHOL_USE: NO
TOTAL SCORE: 0
ON A TYPICAL DAY WHEN YOU DRINK ALCOHOL HOW MANY DRINKS DO YOU HAVE: 0

## 2025-04-16 ASSESSMENT — PAIN DESCRIPTION - PAIN TYPE
TYPE: ACUTE PAIN
TYPE: ACUTE PAIN

## 2025-04-16 ASSESSMENT — SOCIAL DETERMINANTS OF HEALTH (SDOH)
WITHIN THE PAST 12 MONTHS, YOU WORRIED THAT YOUR FOOD WOULD RUN OUT BEFORE YOU GOT THE MONEY TO BUY MORE: NEVER TRUE
WITHIN THE PAST 12 MONTHS, THE FOOD YOU BOUGHT JUST DIDN'T LAST AND YOU DIDN'T HAVE MONEY TO GET MORE: NEVER TRUE
IN THE PAST 12 MONTHS, HAS THE ELECTRIC, GAS, OIL, OR WATER COMPANY THREATENED TO SHUT OFF SERVICE IN YOUR HOME?: NO

## 2025-04-16 ASSESSMENT — FIBROSIS 4 INDEX
FIB4 SCORE: 1.36
FIB4 SCORE: 4.83

## 2025-04-16 NOTE — ED NOTES
[facility] Comment Done. Floor called to notify them that the patient has been placed on the transport list.

## 2025-04-16 NOTE — ED NOTES
2nd IV established and 2nd set of blood cultures drawn. COVID Swab done. X-ray @ BS.    Pt is on 6L O2 vis NC with sats 88-89%.  No oxy masks in the unit. RT paged.

## 2025-04-16 NOTE — ASSESSMENT & PLAN NOTE
Patient has a history of dyslipidemia  He was on statin but he is not actively not taking these  I will check a fasting lipid panel

## 2025-04-16 NOTE — ED NOTES
"Med rec is complete per son at bedside.  Per son pt only taking his Blood Pressure med and Tylenol in \"forever\".   Reconcile outside Information was available?Y   Allergies reviewed.    Has patient had any outside antibiotics in the last 30 days? N    Any Anticoagulants (rivaroxaban, apixaban, edoxaban, dabigatran, warfarin, enoxaparin) taken in the last 14 days? N       Pharmacy/Pharmacies Pt utilizes : [facility] [phone]         "

## 2025-04-16 NOTE — ASSESSMENT & PLAN NOTE
FEES showed severe oropharyngeal dysphagia at any consistency.  Patient did not want NGT, and wishes to eat

## 2025-04-16 NOTE — ED TRIAGE NOTES
Mj Cornejo  79 y.o.  Chief Complaint   Patient presents with    Fever    Flank Pain    Incontinence     Pt BIB his son.  Son reports that pt has been sick for 2 days.  Pt started c/o bilateral flank pain yesterday.  Pt has had been incontinent at times since yesterday.  Pt developed a fever today.  Sepsis Protocol initiated in triage.

## 2025-04-16 NOTE — PROGRESS NOTES
MD Garza notified of NPO status per RN Dysphasia screening. SLP ordered. Patient and son at bedside educated on NPO status at this time.

## 2025-04-16 NOTE — PROGRESS NOTES
Patient arrived to unit at 1230 on 15L non-rebreather. Respiratory notified to come assess at bedside. They arrived shortly after and placed patient on highflow. Patient is alert and oriented x4, denies pain at this time, son at bedside, vitals taken, telemetry monitor placed. Assessment completed. Bed is locked and in lowest position. Fall and Safety precautions in place. Reviewed POC. Call light within reach. Patient verbalizes No other needs at this time.

## 2025-04-16 NOTE — ASSESSMENT & PLAN NOTE
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  Surgery was not recommended.  Continue Cardura

## 2025-04-16 NOTE — H&P
Hospital Medicine History & Physical Note    Date of Service  4/16/2025    Primary Care Physician  Viviana Whitfield M.D.    Consultants  None    Specialist Names: None    Code Status  DNAR/DNI    Chief Complaint  Chief Complaint   Patient presents with    Fever    Flank Pain    Incontinence       History of Presenting Illness  Mj Cornejo is a 79 y.o. male who presented 4/16/2025 with shortness of breath.  Patient 3 days ago developed cough, fever, chills, headache, flank pain, urinary incontinence, and thus came into the emergency room today.  Patient also says that he has had an 80 pound weight loss in the past 2 years because of dysphagia.  The patient at this point is found to have influenza A with respiratory failure.  Patient will need to be admitted with oxygen support, Tamiflu for influenza.  Electrolytes and CBC is very off.  Patient is hypercalcemic and this may be secondary to hyperparathyroidism.  I am going to give him a dose of pamidronate and check a parathyroid level and thyroid functions.  Patient has hypokalemia may be secondary to his poor oral intake and I will give him potassium supplementation.  Patient at this point will need a swallow evaluation as the dysphagia may be driving his weight loss as well as poor nutritional status and electrolyte abnormalities..    I discussed the plan of care with patient, family, bedside RN, , pharmacy, and emergency room physician Dr. Maria C Calloway .    Review of Systems  Review of Systems   Constitutional:  Positive for chills, fever, malaise/fatigue and weight loss. Negative for diaphoresis.   HENT: Negative.  Negative for hearing loss, nosebleeds and sinus pain.    Eyes: Negative.  Negative for double vision, photophobia, discharge and redness.   Respiratory:  Positive for cough, sputum production and shortness of breath. Negative for wheezing and stridor.    Cardiovascular: Negative.  Negative for chest pain, orthopnea, leg swelling  and PND.   Gastrointestinal:  Positive for nausea. Negative for abdominal pain, blood in stool and heartburn.   Genitourinary: Negative.  Negative for dysuria, flank pain and frequency.   Musculoskeletal:  Positive for back pain and myalgias. Negative for falls.   Skin: Negative.  Negative for itching.   Neurological:  Positive for headaches. Negative for dizziness, tingling, sensory change, focal weakness and seizures.   Endo/Heme/Allergies: Negative.  Negative for polydipsia. Does not bruise/bleed easily.   Psychiatric/Behavioral: Negative.  Negative for depression, substance abuse and suicidal ideas. The patient does not have insomnia.    All other systems reviewed and are negative.      Past Medical History   has a past medical history of BMI 22.0-22.9, adult (07/28/2023), Hypercholesteremia, Hypertension, and Seizure (HCC).    Surgical History   has no past surgical history on file.     Family History  family history includes Stroke in his mother.   Family history reviewed with patient. There is no family history that is pertinent to the chief complaint.     Social History   reports that he quit smoking about 20 years ago. His smoking use included cigarettes. He has never used smokeless tobacco. He reports that he does not drink alcohol and does not use drugs.    Allergies  No Known Allergies    Medications  Prior to Admission Medications   Prescriptions Last Dose Informant Patient Reported? Taking?   acetaminophen (TYLENOL) 500 MG Tab 4/15/2025 Morning Family Member Yes Yes   Sig: Take 1,000 mg by mouth every 6 hours as needed for Mild Pain.   albuterol 108 (90 Base) MCG/ACT Aero Soln inhalation aerosol Not Taking Family Member No No   Sig: Inhale 2 Puffs every 6 hours as needed for Shortness of Breath.   Patient not taking: Reported on 4/16/2025   alendronate (FOSAMAX) 70 MG Tab Not Taking Family Member No No   Sig: Take 1 Tablet by mouth every 7 days.   Patient not taking: Reported on 4/16/2025   amLODIPine  (NORVASC) 5 MG Tab Unknown Family Member No No   Sig: Take 2 Tablets by mouth every day.   atorvastatin (LIPITOR) 10 MG Tab Not Taking Family Member No No   Sig: TAKE 1 TABLET BY MOUTH ONCE DAILY IN THE MORNING   Patient not taking: Reported on 4/16/2025      Facility-Administered Medications: None       Physical Exam  Temp:  [36.9 °C (98.4 °F)-38.2 °C (100.8 °F)] 36.9 °C (98.4 °F)  Pulse:  [] 80  Resp:  [17-32] 26  BP: (115-146)/(55-78) 118/61  SpO2:  [87 %-96 %] 93 %  Blood Pressure : 118/61   Temperature: 36.9 °C (98.4 °F)   Pulse: 80   Respiration: (!) 26   Pulse Oximetry: 93 %       Physical Exam  Vitals and nursing note reviewed. Exam conducted with a chaperone present.   Constitutional:       General: He is awake. He is not in acute distress.     Appearance: Normal appearance. He is well-developed and underweight. He is ill-appearing. He is not toxic-appearing or diaphoretic.      Interventions: Face mask in place.   HENT:      Head: Normocephalic and atraumatic.      Right Ear: External ear normal.      Left Ear: External ear normal.      Nose: Nose normal.      Mouth/Throat:      Mouth: Mucous membranes are moist.      Pharynx: Oropharynx is clear.   Eyes:      General:         Right eye: No discharge.         Left eye: No discharge.      Extraocular Movements: Extraocular movements intact.      Conjunctiva/sclera: Conjunctivae normal.      Pupils: Pupils are equal, round, and reactive to light.   Neck:      Thyroid: No thyromegaly.      Vascular: No carotid bruit or JVD.   Cardiovascular:      Rate and Rhythm: Regular rhythm. Tachycardia present.      Pulses: Normal pulses.      Heart sounds: Normal heart sounds. No murmur heard.  Pulmonary:      Effort: Tachypnea and accessory muscle usage present.      Breath sounds: Decreased air movement present. Examination of the right-upper field reveals decreased breath sounds and rhonchi. Examination of the left-upper field reveals decreased breath sounds  and rhonchi. Examination of the right-middle field reveals decreased breath sounds and rhonchi. Examination of the left-middle field reveals decreased breath sounds and rhonchi. Examination of the right-lower field reveals decreased breath sounds and rhonchi. Examination of the left-lower field reveals decreased breath sounds and rhonchi. Decreased breath sounds and rhonchi present.   Chest:      Chest wall: No tenderness.   Abdominal:      General: Abdomen is flat. Bowel sounds are normal. There is no distension.      Palpations: Abdomen is soft. There is no mass.      Tenderness: There is no abdominal tenderness. There is no guarding or rebound.   Musculoskeletal:         General: Normal range of motion.      Cervical back: Normal range of motion and neck supple.      Right lower leg: No edema.      Left lower leg: No edema.   Lymphadenopathy:      Cervical: No cervical adenopathy.   Skin:     General: Skin is warm and dry.      Capillary Refill: Capillary refill takes more than 3 seconds.      Findings: No rash.   Neurological:      General: No focal deficit present.      Mental Status: He is alert and oriented to person, place, and time. Mental status is at baseline.      GCS: GCS eye subscore is 4. GCS verbal subscore is 5. GCS motor subscore is 6.      Cranial Nerves: No cranial nerve deficit.      Deep Tendon Reflexes: Reflexes are normal and symmetric.   Psychiatric:         Mood and Affect: Mood normal.         Behavior: Behavior normal. Behavior is cooperative.         Thought Content: Thought content normal.         Judgment: Judgment normal.         Laboratory:  Recent Labs     04/16/25  0815   WBC 11.6*   RBC 3.97*   HEMOGLOBIN 13.7*   HEMATOCRIT 40.2*   .3*   MCH 34.5*   MCHC 34.1   RDW 45.7   PLATELETCT 130*   MPV 11.5     Recent Labs     04/16/25  0815   SODIUM 134*   POTASSIUM 2.9*   CHLORIDE 99   CO2 23   GLUCOSE 139*   BUN 15   CREATININE 1.01   CALCIUM 11.1*     Recent Labs      04/16/25  0815   ALTSGPT 35   ASTSGOT 47*   ALKPHOSPHAT 62   TBILIRUBIN 1.0   GLUCOSE 139*         Recent Labs     04/16/25  0815   NTPROBNP 490*         Recent Labs     04/16/25  0815   TROPONINT 69*       Imaging:  CT-LSPINE W/O PLUS RECONS   Final Result      1.  No evidence of fracture of the lumbar spine.      2.  Multilevel degenerative disc disease and facet degeneration.      CT-ABDOMEN-PELVIS WITH   Final Result         1.  Bibasilar pulmonary consolidation, left greater than right.      2.  Multiple right lower ureteral stones located 2 to 3 cm above the ureterovesical junction. The largest measures 6 mm in size. There is minimal right-sided hydronephrosis.      3.  4 mm left lower pole renal calyceal stone.      4.  Sigmoid diverticulosis.      5.  Cholelithiasis.      6.   Fatty liver with multiple hepatic cysts.      7.  Enlarged prostate gland.      8.  Mild urinary bladder wall thickening which may indicate chronic outlet obstruction.      DX-CHEST-PORTABLE (1 VIEW)   Final Result      Left lower lobe pneumonia.      DX-ESOPHAGUS - KNEG-BMNYS-SP    (Results Pending)       X-Ray:  I have personally reviewed the images and compared with prior images.  EKG:  I have personally reviewed the images and compared with prior images.    Assessment/Plan:  Justification for Admission Status  I anticipate this patient will require at least two midnights for appropriate medical management, necessitating inpatient admission because patient is acute hypoxic respiratory failure will require at least 48 hours of inpatient management.    Patient will need a Telemetry bed on MEDICAL service .  The need is secondary to acute hypoxic respiratory failure.    * Acute respiratory failure with hypoxemia (HCC)- (present on admission)  Assessment & Plan  Patient comes in with a 3-day cough.  He is now hypoxic at 86% on room air  Patient was placed on oxygen by nasal cannula which improved his oxygenation however he declined and  he is now on a nonrebreather.  I am going to continue oxygen support to keep his oxygen saturations above 90%.  The patient's cough at this point is secondary to influenza A  I am starting him on Tamiflu  I am starting him on fluid resuscitation  Patient will need to be in isolation    Hypokalemia- (present on admission)  Assessment & Plan  Patient's potassium is down to 2.9  I discussed this with the son he does not use any alcohol  I am checking a magnesium level  I will replace the potassium with 20 mill equivalents of potassium 3 times daily first dose now.  The potential for hyperkalemia is weight loss from dysphagia.  I will obtain a swallow evaluation to see if the patient does have difficulty swallowing and because of lack of intake and the potassium level is low.    Influenza A- (present on admission)  Assessment & Plan  I am starting him with Tamiflu 75 mg twice daily  I am going to continue with fluid resuscitation  Apparently patient was on steroids as an outpatient.  This will not be continued.  The steroids were apparently for weight loss management.    Dysphagia- (present on admission)  Assessment & Plan  Patient in past 2 years has had difficulties with swallowing  Patient has lost over 80 pounds of weight is down to 134 pounds.  Patient apparently as an outpatient had a dysphagia evaluation he does not remember when the needed does the son.  I will order a swallow evaluation here in the hospital.      Hypercalcemia- (present on admission)  Assessment & Plan  Patient's potassium is elevated 11.1.  Patient has underlying hyperparathyroidism his most recent PTH level was 87.76 months ago.  I am rechecking a parathyroid level stat.  Patient says he is lost about 80 pounds of weight and has difficulty with swallowing and his mother had the same thing.  He did have a swallow evaluation many years ago at which point they told him that his conduit was too narrow but they could not do anything about it.   Since then is when he started having the problems so underlying cancer is not excluded especially with the fact that he has thrombocytopenia, macrocytosis, and other electrolyte abnormalities.   For now I am going to give the patient pamidronate 60 mg IV x 1 dose  I will give him fluid resuscitation  I will check a more recent parathyroid level  I am going to check TSH and T4 levels and a T3 level    Hydronephrosis- (present on admission)  Assessment & Plan  Patient has hydronephrosis on the right side  Urology has been consulted, Dr. Fajardo's group will need to see the patient.    Thrombocytopenia (HCC)- (present on admission)  Assessment & Plan  Patient's platelet count is down to 130  Currently does not report any bleeding  I will monitor platelet counts  For now patient does not need a transfusion    Leukocytosis- (present on admission)  Assessment & Plan  Leukocytosis secondary to the current influenza A infection with bilateral consolidation  Patient will need at this point Tamiflu  I will continue to monitor his white blood cell count.  I will obtain a procalcitonin level    Chronic bilateral low back pain without sciatica- (present on admission)  Assessment & Plan  Patient's bilateral pain is secondary to kidney stones both left and right.  The patient on the right has multiple stones which are causing hydronephrosis and pain  The left side the patient has 1 solitary stone that is 4 mm and should be able to pass and it does not cause hydronephrosis.  For now continue with pain management optimization    Essential hypertension- (present on admission)  Assessment & Plan  I will optimize blood pressure management keep systolic blood pressure less than 140 diastolic under 90  Will continue him on amlodipine 10 mg daily.  Will give him IV hydralazine as needed if he should have blood pressure spikes.    Polycythemia- (present on admission)  Assessment & Plan  I will continue monitor H&H for now patient blood  counts are slightly anemic at 13.7 and 40.2.  Patient will need to be hydrated and then we will really see his true numbers.    DNR (do not resuscitate)- (present on admission)  Assessment & Plan  I discussed advance directives with the patient he is DO NOT RESUSCITATE CODE STATUS.    Hyperparathyroidism, unspecified (HCC)- (present on admission)  Assessment & Plan  I will check a parathyroid level most recent is 87.7.  This was from about 6 months ago.  The patient's calcium level is elevated and this may be secondary to uncontrolled hyperparathyroidism.    Macrocytosis without anemia- (present on admission)  Assessment & Plan  I will check a B12 level and a folic acid level    Subclinical hypothyroidism- (present on admission)  Assessment & Plan  I will check thyroid functions    Dyslipidemia- (present on admission)  Assessment & Plan  Patient has a history of dyslipidemia  He was on statin but he is not actively not taking these  I will check a fasting lipid panel    Seizure (HCC)- (present on admission)  Assessment & Plan  Patient has a history of seizures.  Neurology is following him as an outpatient  Currently he is not on any medications for seizure management.        VTE prophylaxis: SCDs/TEDs

## 2025-04-16 NOTE — ED NOTES
Pt son states his father had a cough for a couple of days. Pt  states he has pain in the lower back. Pt has been coughing a lot. Pt son states sometimes he just pees without even knowing. Placed pt on 2L of oxygen (room air sat was 86-87.

## 2025-04-16 NOTE — THERAPY
"Speech Language Pathology   Clinical Swallow Evaluation     Patient Name: Mj Cornejo  AGE:  79 y.o., SEX:  male  Medical Record #: [id number]  Date of Service: 4/16/2025      History of Present Illness  80 y/o M admitted 4/16/25 with respiratory failure, influenza A, poor nutritional status, electrolyte abnormalities, b/l kidney stones.    PMHx: dysphagia with 80 lb weight loss over 2 years, hypercholesteremia, HTN, seizure.    SLP Hx:   OP MBSS 2/25/25:   \"severe oropharyngeal dysphagia characterized by impaired laryngeal excursion, impaired pharyngeal shortening, impaired tongue base retraction, and incomplete laryngeal vestibule closure resulting in aspiration of liquids and solids this date. Patient with blunted sensation to aspiration events, without consistent cough response. Cued cough was effective to eliminate penetrated material, but not effective to clear aspirated material. Swallow safety and efficiency are both significantly impaired. Patient is at high risk for aspiration and high risk for inability to sustain nutrition/hydration orally. Suspect dysphagia is chronic in nature given patient report of years of difficulty swallowing. Given patient with good oral hygiene, good mobility, few other commodities reported this date, and no reports of recent pneumonia or other decline in respiratory status, continued PO nutrition/hydration is reasonable. Patient and son educated on risks of continued PO intake and encouraged them to discuss with their MD (visit scheduled with PCP tomorrow AM per son). Counseling regarding non-oral nutrition such as feeding tube is warranted. Should patient chose to continue oral feeding, recommend puree diet, thin liquids. Avoid chewable solids due to airway obstruction risk. Patient and son report understanding. Recommend follow-up with OP SLP for training of swallow exercises targeting impaired swallow physiology demonstrated during todays " "assessment.\"    Imaging:    CXR 4/16: Left lower lobe pneumonia.    CT Abd/Pelvis 4/16: 1.  Bibasilar pulmonary consolidation, left greater than right.     2.  Multiple right lower ureteral stones located 2 to 3 cm above the ureterovesical junction. The largest measures 6 mm in size. There is minimal right-sided hydronephrosis.     3.  4 mm left lower pole renal calyceal stone.     4.  Sigmoid diverticulosis.     5.  Cholelithiasis.     6.   Fatty liver with multiple hepatic cysts.     7.  Enlarged prostate gland.     8.  Mild urinary bladder wall thickening which may indicate chronic outlet obstruction.      General Information:  Vitals  O2 (LPM): 30  O2 Delivery Device: Heated High Flow Nasal Cannula     Prior Living Situation & Level of Function:  Housing / Facility: [address]  Lives with - Patient's Self Care Capacity: Adult Children, Spouse   Communication: WFL  Swallowing: Impaired - hx of severe oropharyngeal dysphagia documented in Feb 2025 during OP MBSS. Puree/thin despite risk was recommended at the time.     Oral Mechanism Evaluation:  Dentition: Upper partial, Lower partial   Facial Symmetry: Equal  Facial Sensation: Equal     Labial Observations: WFL   Lingual Observations: Midline  Motor Speech: WFL       Laryngeal Function:  Secretion Management: Adequate  Voice Quality: Hoarse   Cough: Perceptually weak (wet/junky sounding)       Subjective  RN reports concern for pt's ability to swallow and family reported a long hx of dysphagia. Azeri video  \"Bethanie\" #[id number] was utilized to translate. Pt/family report pt does not want any kind of a feeding tube. He was eating soft/bite sized solids and thin liquids at home and actually gaining weight until he got the flu.    Assessment  Current Method of Nutrition: NPO until cleared by speech pathology  Positioning: Cerrato's (60-90 degrees)  Bolus Administration: SLP  O2 (LPM): 30 O2 Delivery Device: Heated High Flow Nasal Cannula  Factor(s) " "Affecting Performance: Impaired endurance     Swallowing Trials:  Swallowing Trials  Ice: WFL  Thin Liquid (TN0) via tsp/cup: Impaired  Pureed (PU4): WFL    Comments: Oral phase was notable for intact bolus acceptance and containment, presumed intact bolus formation and AP lingual transit as no oral residue was seen post swallow. Pharyngeal swallow response appeared prompt. Immediate cough response occurred following intake of cup sips of thin liquids, concerning for airway invasion. Though no overt s/sx of airway invasion noted with single sips of thin liquids or pudding, unable to r/o silent aspiration at the bedside, given pt's hx of silent aspiration documented on MBSS 2/25/25. Discussed concerns re: dysphagia, aspiration risk and options with pt and family. Pt and daughter stated he did not want any kind of a feeding tube, even a temporary NGT. Pt stated he would choose to eat and drink despite risk even if a FEES shows he is aspirating. However, he did consent to a FEES to help determine the safest oral diet and any compensatory strategies that may help. Should pt no longer require HFNC and is able to mobilize to a chair tomorrow, an MBSS would be preferable.     Clinical Impressions  Suspect acute on chronic oropharyngeal dysphagia. Given pt's hx of inconsistent/blunted laryngeal sensation to aspiration events, recommend a repeat instrumental swallow study to determine current swallow physiology, \"safest\" oral diet and guide recommendations as pt does not want any kind of a feeding tube.  Will f/u tomorrow.     Recommendations  Diet Consistency: NPO/ice chips pending FEES tomorrow  Instrumentation: FEES  Medication: Crush with applesauce, as appropriate, Crush with pudding/puree, as appropriate (imperative meds)  Positioning: Fully upright and midline during oral intake  Oral Care: Q2h  Referrals: Palliative Care for Advanced Directive    SLP Treatment Plan  Treatment Plan: Dysphagia Treatment, " "Patient/Family/Caregiver Training  SLP Frequency: 4x Per Week  Estimated Duration: Until Therapy Goals Met    Anticipated Discharge Needs  Discharge Recommendations: Recommend post-acute placement for additional speech therapy services prior to discharge home   Therapy Recommendations Upon DC: Dysphagia Training, Patient / Family / Caregiver Education      Patient / Family Goals  Patient / Family Goal #1: \"I would choose to eat with risk.\"  Short Term Goals  Short Term Goal # 1: Patient will participate in an instrumental swallow study to determine swallow physiology, \"safest\" diet, and guide treatment.    Ivory Flores MS,CCC-SLP   "

## 2025-04-16 NOTE — ED PROVIDER NOTES
Emergency Physician Note    Chief Concern:  Chief Complaint   Patient presents with    Fever    Flank Pain    Incontinence         External Records Reviewed:  Outpatient records reviewed: Patient was seen in primary care clinic 10/30/2024.  Family medicine physician note reviewed from that visit.  Noted that he had previously been prescribed prednisone 40 mg due to weight loss.  At that time he was working to establish care with a cardiologist in [place].  He has a history of seizure disorder, currently on Keppra and zonisamide.  Noted to have an episode of coughing up blood in July, 2024, will be following up with hematology oncology for this issue.  Noted of a history of a lung nodule.  Also noted of unintentional weight loss, which was improving, and a history of hyperparathyroidism.    HPI/ROS     Outside Historians:   Family member bedside provides additional history.    Patient has limited English proficiency, [place] language line  utilized for the entirety of this encounter.     HPI:  Mj Cornejo is a 79 y.o. male who presents to the emergency department today for evaluation of fever, as well as low back pain, with some right flank pain.  Symptoms began about 3 days ago.  Family member has been giving him over-the-counter antipyretics, and cough medication.  Symptoms initially seemed flulike, with cough and congestion, however over the last 2 days he has had low back pain, worse on the right.  Family member also states he has been walking, leaning the top of his body to the right to alleviate back pain or flank pain for the past 2 days.  Family members also concerned because he is seeing some urine on his pants at times, patient does not seem to be aware that he is urinated.  Patient states that his pain is along the low back, bilateral, along the lumbar spine and lumbar sacral region.  He does not report any midline tenderness to palpation.  His abnormal gait has been primarily  due to pain, does not report any associated weakness.  He has a past medical history significant for seizure disorder, on review of his chart, it was noted that he had been taking prednisone, family member states he believes he is no longer on daily steroids, though he is not entirely certain.    PAST MEDICAL HISTORY  Past Medical History:   Diagnosis Date    BMI 22.0-22.9, adult 07/28/2023    Hypercholesteremia     Hypertension     Seizure (HCC)        SURGICAL HISTORY  No past surgical history on file.    FAMILY HISTORY  Family History   Problem Relation Age of Onset    Stroke Mother        SOCIAL HISTORY   reports that he quit smoking about 20 years ago. His smoking use included cigarettes. He has never used smokeless tobacco. He reports that he does not drink alcohol and does not use drugs.    CURRENT MEDICATIONS  Previous Medications    ACETAMINOPHEN (TYLENOL) 500 MG TAB    Take 1,000 mg by mouth every 6 hours as needed for Mild Pain.    ALBUTEROL 108 (90 BASE) MCG/ACT AERO SOLN INHALATION AEROSOL    Inhale 2 Puffs every 6 hours as needed for Shortness of Breath.    ALENDRONATE (FOSAMAX) 70 MG TAB    Take 1 Tablet by mouth every 7 days.    AMLODIPINE (NORVASC) 5 MG TAB    Take 2 Tablets by mouth every day.    ATORVASTATIN (LIPITOR) 10 MG TAB    TAKE 1 TABLET BY MOUTH ONCE DAILY IN THE MORNING       ALLERGIES  Patient has no known allergies.    PHYSICAL EXAM  Vital Signs: /65   Pulse 87   Temp 37.9 °C (100.3 °F) (Temporal)   Resp (!) 22   Ht 1.524 m (5')   Wt 61.1 kg (134 lb 11.2 oz)   SpO2 92%   BMI 26.31 kg/m²   Constitutional: Awake, alert, febrile  HENT: Normocephalic, atraumatic.  Cardiovascular: Tachycardic rate, irregular rhythm  Pulmonary: No respiratory distress, normal work of breathing, breath sounds quiet and equal bilaterally  Abdomen: Soft, non tender, no peritoneal signs.  No CVA tenderness to palpation.  Skin: Warm, dry, no rashes or lesions  Musculoskeletal: Normal range of motion  in all extremities, no swelling or deformity noted.  He does have some discomfort on palpation of the paraspinous musculature of the lumbar spine, no point tenderness to palpation of the midline thoracic nor lumbar spine.  Neurologic: Awake, alert, able to move all 4 extremities, difficulty eliciting patellar reflexes bilaterally, normal speech, no facial asymmetry, no gaze deficit.    Diagnostic Studies & Procedures    Labs:  All labs reviewed by me as noted below.    EK Lead EKG interpreted by me as noted below  Results for orders placed or performed during the hospital encounter of 25   EKG   Result Value Ref Range    Report       [facility] Emergency Dept.    Test Date:  2025  Pt Name:    PAYAL SIEGEL     Department: [facility]  MRN:        [id number]                      [facility]  Gender:     Male                         Technician: [id number]  :        1945                   Requested By:FER MATAMOROS  Order #:    [id number]                    Reading MD: FER MATAMOROS MD    Measurements  Intervals                                Axis  Rate:       98                           P:          16  OR:         116                          QRS:        -68  QRSD:       123                          T:          35  QT:         342  QTc:        437    Interpretive Statements  Rate 98, irregular rhythm, sinus complexes are present, unable to rule out  interspersed atrial fibrillation, normal voltage, no ST segment changes  concerning for ischemia.  Electronically Signed On 2025 09:06:28 PDT by FER MATAMOROS MD       Radiology:  The attending Emergency Physician has independently interpreted the following imaging:  I independently reviewed the chest x-ray, left lower lobe opacity present.     CT-LSPINE W/O PLUS RECONS   Final Result      1.  No evidence of fracture of the lumbar spine.      2.  Multilevel degenerative disc disease and facet  degeneration.      CT-ABDOMEN-PELVIS WITH   Final Result         1.  Bibasilar pulmonary consolidation, left greater than right.      2.  Multiple right lower ureteral stones located 2 to 3 cm above the ureterovesical junction. The largest measures 6 mm in size. There is minimal right-sided hydronephrosis.      3.  4 mm left lower pole renal calyceal stone.      4.  Sigmoid diverticulosis.      5.  Cholelithiasis.      6.   Fatty liver with multiple hepatic cysts.      7.  Enlarged prostate gland.      8.  Mild urinary bladder wall thickening which may indicate chronic outlet obstruction.      DX-CHEST-PORTABLE (1 VIEW)   Final Result      Left lower lobe pneumonia.          Course and Medical Decision Making    Initial Assessment and Plan:  Mr. Ander Cornejo presents to the emergency department today for evaluation of fever, cough, back pain, and flank pain.  Sepsis protocol initiated by nursing staff on arrival, on my assessment IV fluids and antibiotics ordered.  Differential diagnosis is broad.  On arrival he is hypoxic to 87%, requiring supplemental oxygen, pneumonia is a consideration.  On cardiac monitor, rhythm appears irregular, new onset atrial fibrillation or heart failure may be a contributing factor, though does not explain fever.  Due to flank pain and back pain, differential diagnosis includes epidural abscess, myositis, psoas abscess, pyelonephritis, or infected obstructing ureteral stone.    On laboratory evaluation, white blood count is 11.6, hemoglobin 13.7.  Platelet count is 130 without evidence of active bleeding.  Lactic acid is elevated at 2.7.  CMP notable for potassium of 2.9.  Creatinine and GFR within normal limits.  High-sensitivity troponin is 69, patient is not having active chest pain, believe this is likely due to increased cardiac demand.  proBNP is just above normal reference range at 490.  CRP is 23.  Viral PCR resulted positive for influenza A.    Chest x-ray demonstrates a  left lower lobe pneumonia.  CT of the abdomen and pelvis notes bibasilar pulmonary consolidation, multiple lower right ureteral stones present with minimal right-sided hydronephrosis.  Urinalysis is positive for occult blood, nitrite negative, no bacteria seen.    Sepsis: Infection was suspected 8:45 AM. Sepsis pathway was initiated. Less than 30cc/kg because of concern for volume overload 1000 mL of crystalloid was ordered. Antibiotics were given per protocol.  After influenza resulted positive, Tamiflu was ordered.  Potassium was replaced orally in the emergency department.    Plan this time is for admission to hospitalist service.  On reassessment after receiving IV fluids, heart rate has significantly improved.  He remains normotensive.  Presentation discussed with hospitalist, who currently consulted urology for evaluation of ureteral stones.    Additional Problems and Disposition    I have discussed management of the patient with the following physicians:   Dr. Garza, Hospitalist    Disposition:  Admit in guarded condition.    FINAL IMPRESSION   1. Pneumonia of both lungs due to infectious organism, unspecified part of lung    2. Influenza A    3. Hypoxia    4. Ureteral stone

## 2025-04-16 NOTE — CONSULTS
UROLOGY Consult Note:    Aminata Daniel P.A.-C.  Date & Time note created:    4/16/2025   3:16 PM     Referring MD:  Dr. Garza    Patient ID:   Name:             Mj Orona     YOB: 1945  Age:                 79 y.o.  male   MRN:               [id number]                                                             Reason for Consult:      Right ureteral stones with hydronephrosis    History of Present Illness:    79yoM with PMHx seizure disorder presented initially with SOB, cough, fever, chills, right flank pain, urinary incontinence and was found to have Influenza A with acute respiratory failure. He had multiple electrolyte abnormalities, including hypercalcemia and hypokalemia. Leukocytosis of 11.6. CT was performed, and he was found to have multiple distal ureteral stones near UVJ measuring 6mm with mild associated hydronephrosis, and one nonobstructing left lower pole kidney stone. Thus, urology was consulted. He is febrile, tachycardic, hypertensive and hypoxic. WBC 11.6, Cr 1.01, UA not suspicious for infection. Also with 2 year history of dysphagia and associated 80 lbs weight loss, which has not been worked up.     Review of Systems:      Constitutional: Endorses fevers, chills.  Eyes: Denies changes in vision   Ears/Nose/Throat/Mouth: Denies nasal congestion   Cardiovascular: no chest pain.  Respiratory: endorses shortness of breath   Gastrointestinal/Hepatic: Denies abdominal pain   Genitourinary: Denies hematuria, dysuria or frequency. Endorses flank pain.   Psychiatric: denies mood disorder   All other systems were reviewed and are negative (AMA/CMS criteria)                Past Medical History:   Past Medical History:   Diagnosis Date    BMI 22.0-22.9, adult 07/28/2023    Hypercholesteremia     Hypertension     Seizure (HCC)      Active Hospital Problems    Diagnosis     Acute respiratory failure with hypoxemia (HCC) [J96.01]     Influenza A [J10.1]     Hypokalemia  [E87.6]     Leukocytosis [D72.829]     Thrombocytopenia (HCC) [D69.6]     Hydronephrosis [N13.30]     DNR (do not resuscitate) [Z66]     Dysphagia [R13.10]     Hyperparathyroidism, unspecified (HCC) [E21.3]     Hypercalcemia [E83.52]     Macrocytosis without anemia [D75.89]     Subclinical hypothyroidism [E03.8]     Chronic bilateral low back pain without sciatica [M54.50, G89.29]     Essential hypertension [I10]     Dyslipidemia [E78.5]     Polycythemia [D75.1]     Seizure (HCC) [R56.9]        Past Surgical History:  No past surgical history on file.    Hospital Medications:    Current Facility-Administered Medications:     amLODIPine (Norvasc) tablet 10 mg, 10 mg, Oral, DAILY, Ginette Garza M.D.    oseltamivir (Tamiflu) capsule 30 mg, 30 mg, Oral, BID, Ginette Garza M.D.    Respiratory Therapy Consult, , Nebulization, Continuous RT, Ginette Garza M.D.    NS infusion, , Intravenous, Continuous, Ginette Garza M.D., Last Rate: 150 mL/hr at 04/16/25 1321, New Bag at 04/16/25 1321    enoxaparin (Lovenox) inj 40 mg, 40 mg, Subcutaneous, DAILY AT 1800, Ginette Garza M.D.    acetaminophen (Tylenol) tablet 650 mg, 650 mg, Oral, Q6HRS PRN, Ginette Garza M.D.    senna-docusate (Pericolace Or Senokot S) 8.6-50 MG per tablet 2 Tablet, 2 Tablet, Oral, Q EVENING **AND** polyethylene glycol/lytes (Miralax) Packet 1 Packet, 1 Packet, Oral, QDAY PRN, Ginette Garza M.D.    Notify provider if pain remains uncontrolled, , , CONTINUOUS **AND** Use the Numeric Rating Scale (NRS), Ortega-Baker Faces (WBF), or FLACC on regular floors and Critical-Care Pain Observation Tool (CPOT) on ICUs/Trauma to assess pain, , , CONTINUOUS **AND** Pulse Ox, , , CONTINUOUS **AND** Pharmacy Consult Request ...Pain Management Review 1 Each, 1 Each, Other, PHARMACY TO DOSE **AND** If patient difficult to arouse and/or has respiratory depression (respiratory rate of 10 or less), stop any opiates that are currently infusing and call a Rapid Response., ,  , CONTINUOUS, Ginette Garza M.D.    oxyCODONE immediate-release (Roxicodone) tablet 2.5 mg, 2.5 mg, Oral, Q3HRS PRN **OR** oxyCODONE immediate-release (Roxicodone) tablet 5 mg, 5 mg, Oral, Q3HRS PRN **OR** morphine 4 MG/ML injection 2 mg, 2 mg, Intravenous, Q3HRS PRN, Ginette Garza M.D.    hydrALAZINE (Apresoline) injection 10 mg, 10 mg, Intravenous, Q4HRS PRN, Ginette Garza M.D.    ondansetron (Zofran) syringe/vial injection 4 mg, 4 mg, Intravenous, Q4HRS PRN **OR** ondansetron (Zofran ODT) dispertab 4 mg, 4 mg, Oral, Q4HRS PRN, Ginette Garza M.D.    guaiFENesin dextromethorphan (Robitussin DM) 100-10 MG/5ML syrup 10 mL, 10 mL, Oral, Q6HRS PRN, Ginette Garza M.D.    potassium chloride SA (Kdur) tablet 20 mEq, 20 mEq, Oral, TID, Ginette Garza M.D.    pamidronate (Aredia) 60 mg in  mL IVPB, 60 mg, Intravenous, Once, Ginette Garza M.D., Last Rate: 83.3 mL/hr at 04/16/25 1325, 60 mg at 04/16/25 1325    Current Outpatient Medications:  Medications Prior to Admission   Medication Sig Dispense Refill Last Dose/Taking    acetaminophen (TYLENOL) 500 MG Tab Take 1,000 mg by mouth every 6 hours as needed for Mild Pain.   4/15/2025 Morning    amLODIPine (NORVASC) 5 MG Tab Take 2 Tablets by mouth every day. 100 Tablet 3 Unknown    albuterol 108 (90 Base) MCG/ACT Aero Soln inhalation aerosol Inhale 2 Puffs every 6 hours as needed for Shortness of Breath. (Patient not taking: Reported on 4/16/2025) 8.5 g 0 Not Taking    alendronate (FOSAMAX) 70 MG Tab Take 1 Tablet by mouth every 7 days. (Patient not taking: Reported on 4/16/2025) 12 Tablet 3 Not Taking    atorvastatin (LIPITOR) 10 MG Tab TAKE 1 TABLET BY MOUTH ONCE DAILY IN THE MORNING (Patient not taking: Reported on 4/16/2025) 100 Tablet 3 Not Taking       Medication Allergy:  No Known Allergies    Family History:  Family History   Problem Relation Age of Onset    Stroke Mother        Social History:  Social History     Socioeconomic History    Marital status:       Spouse name: Not on file    Number of children: Not on file    Years of education: Not on file    Highest education level: Not on file   Occupational History    Not on file   Tobacco Use    Smoking status: Former     Current packs/day: 0.00     Types: Cigarettes     Quit date:      Years since quittin.3    Smokeless tobacco: Never   Vaping Use    Vaping status: Never Used   Substance and Sexual Activity    Alcohol use: No    Drug use: No    Sexual activity: Not on file     Comment: , lives with wife and son, 2 sons   Other Topics Concern    Not on file   Social History Narrative    Not on file     Social Drivers of Health     Financial Resource Strain: Medium Risk (2021)    Overall Financial Resource Strain (CARDIA)     Difficulty of Paying Living Expenses: Somewhat hard   Food Insecurity: No Food Insecurity (2025)    Hunger Vital Sign     Worried About Running Out of Food in the Last Year: Never true     Ran Out of Food in the Last Year: Never true   Transportation Needs: No Transportation Needs (2025)    PRAPARE - Transportation     Lack of Transportation (Medical): No     Lack of Transportation (Non-Medical): No   Physical Activity: Not on file   Stress: Not on file   Social Connections: Not on file   Intimate Partner Violence: Not on file   Housing Stability: Low Risk  (2025)    Housing Stability Vital Sign     Unable to Pay for Housing in the Last Year: No     Number of Times Moved in the Last Year: 0     Homeless in the Last Year: No         Physical Exam:  Vitals/ General Appearance:   Weight/BMI: Body mass index is 28.85 kg/m².  /61   Pulse 85   Temp 37 °C (98.6 °F) (Temporal)   Resp 20   Ht 1.524 m (5')   Wt 67 kg (147 lb 11.3 oz)   SpO2 94%   Vitals:    25 1133 25 1203 25 1231 25 1332   BP: 118/61 119/58 129/61    Pulse: 80 82 79 85   Resp: (!) 26 (!) 29 (!) 24 20   Temp:   37 °C (98.6 °F)    TempSrc:   Temporal    SpO2:  93% 94% 93% 94%   Weight:   67 kg (147 lb 11.3 oz)    Height:   1.524 m (5')      Oxygen Therapy:  Pulse Oximetry: 94 %, O2 (LPM): 30, O2 Delivery Device: Heated High Flow Nasal Cannula    Constitutional:   Well developed, Well nourished, No acute distress  HENMT:  Normocephalic, Atraumatic  Eyes:  EOMI, Conjunctiva normal, No discharge.  Cardiovascular:  Normal heart rate.  Lungs:  increased respiratory effort on O2  Abdomen: Soft, No tenderness, No guarding, No rebound.  : -CVAT  Skin: Warm, Dry, No erythema.  Neurologic: Alert & oriented x 3, No focal deficits noted.  Psychiatric: Affect normal, Judgment normal, Mood normal.      MDM (Data Review):     Records reviewed and summarized in current documentation    Lab Data Review:  Recent Results (from the past 24 hours)   Lactic Acid    Collection Time: 04/16/25  8:15 AM   Result Value Ref Range    Lactic Acid 2.7 (H) 0.5 - 2.0 mmol/L   CBC with Differential    Collection Time: 04/16/25  8:15 AM   Result Value Ref Range    WBC 11.6 (H) 4.8 - 10.8 K/uL    RBC 3.97 (L) 4.70 - 6.10 M/uL    Hemoglobin 13.7 (L) 14.0 - 18.0 g/dL    Hematocrit 40.2 (L) 42.0 - 52.0 %    .3 (H) 81.4 - 97.8 fL    MCH 34.5 (H) 27.0 - 33.0 pg    MCHC 34.1 32.3 - 36.5 g/dL    RDW 45.7 35.9 - 50.0 fL    Platelet Count 130 (L) 164 - 446 K/uL    MPV 11.5 9.0 - 12.9 fL    Neutrophils-Polys 88.20 (H) 44.00 - 72.00 %    Lymphocytes 7.60 (L) 22.00 - 41.00 %    Monocytes 3.80 0.00 - 13.40 %    Eosinophils 0.00 0.00 - 6.90 %    Basophils 0.10 0.00 - 1.80 %    Immature Granulocytes 0.30 0.00 - 0.90 %    Nucleated RBC 0.00 0.00 - 0.20 /100 WBC    Neutrophils (Absolute) 10.26 (H) 1.82 - 7.42 K/uL    Lymphs (Absolute) 0.89 (L) 1.00 - 4.80 K/uL    Monos (Absolute) 0.44 0.00 - 0.85 K/uL    Eos (Absolute) 0.00 0.00 - 0.51 K/uL    Baso (Absolute) 0.01 0.00 - 0.12 K/uL    Immature Granulocytes (abs) 0.04 0.00 - 0.11 K/uL    NRBC (Absolute) 0.00 K/uL   Complete Metabolic Panel    Collection Time:  04/16/25  8:15 AM   Result Value Ref Range    Sodium 134 (L) 135 - 145 mmol/L    Potassium 2.9 (L) 3.6 - 5.5 mmol/L    Chloride 99 96 - 112 mmol/L    Co2 23 20 - 33 mmol/L    Anion Gap 12.0 7.0 - 16.0    Glucose 139 (H) 65 - 99 mg/dL    Bun 15 8 - 22 mg/dL    Creatinine 1.01 0.50 - 1.40 mg/dL    Calcium 11.1 (H) 8.5 - 10.5 mg/dL    Correct Calcium 11.6 (H) 8.5 - 10.5 mg/dL    AST(SGOT) 47 (H) 12 - 45 U/L    ALT(SGPT) 35 2 - 50 U/L    Alkaline Phosphatase 62 30 - 99 U/L    Total Bilirubin 1.0 0.1 - 1.5 mg/dL    Albumin 3.4 3.2 - 4.9 g/dL    Total Protein 5.8 (L) 6.0 - 8.2 g/dL    Globulin 2.4 1.9 - 3.5 g/dL    A-G Ratio 1.4 g/dL   Blood Culture - Draw one from central line and one from peripheral site    Collection Time: 04/16/25  8:15 AM    Specimen: Line; Blood   Result Value Ref Range    Significant Indicator NEG     Source BLD     Site LINE     Culture Result       No Growth  Note: Blood cultures are incubated for 5 days and  are monitored continuously.Positive blood cultures  are called to the RN and reported as soon as  they are identified.  Blood culture testing and Gram stain, if indicated, are  performed at [facility],  [address] [place] [address].  Positive blood  cultures are sent to [facility],  [address] [place] [address], for organism identification  and susceptibility testing.     Sed Rate    Collection Time: 04/16/25  8:15 AM   Result Value Ref Range    Sed Rate Westergren 42 (H) 0 - 20 mm/hour   CRP QUANTITIVE (NON-CARDIAC)    Collection Time: 04/16/25  8:15 AM   Result Value Ref Range    Stat C-Reactive Protein 23.10 (H) 0.00 - 0.75 mg/dL   ESTIMATED GFR    Collection Time: 04/16/25  8:15 AM   Result Value Ref Range    GFR (CKD-EPI) 75 >60 mL/min/1.73 m 2   proBrain Natriuretic Peptide, NT    Collection Time: 04/16/25  8:15 AM   Result Value Ref Range    NT-proBNP 490 (H) 0 - 125 pg/mL   TROPONIN    Collection Time: 04/16/25  8:15 AM   Result  Value Ref Range    Troponin T 69 (H) 6 - 19 ng/L   TSH WITH REFLEX TO FT4    Collection Time: 25  8:15 AM   Result Value Ref Range    TSH 3.100 0.380 - 5.330 uIU/mL   PROCALCITONIN    Collection Time: 25  8:15 AM   Result Value Ref Range    Procalcitonin 0.55 (H) <0.25 ng/mL   MAGNESIUM    Collection Time: 25  8:15 AM   Result Value Ref Range    Magnesium 1.6 1.5 - 2.5 mg/dL   Blood Culture - Draw one from central line and one from peripheral site    Collection Time: 25  8:40 AM    Specimen: Peripheral; Blood   Result Value Ref Range    Significant Indicator NEG     Source BLD     Site PERIPHERAL     Culture Result       No Growth  Note: Blood cultures are incubated for 5 days and  are monitored continuously.Positive blood cultures  are called to the RN and reported as soon as  they are identified.  Blood culture testing and Gram stain, if indicated, are  performed at [facility],  [address] [place] [address].  Positive blood  cultures are sent to [facility],  [address] [place] [address], for organism identification  and susceptibility testing.     CoV-2, FLU A/B, and RSV by PCR (2-4 Hours CEPHEID) : Collect NP swab in VTM    Collection Time: 25  8:45 AM    Specimen: Nasal; Respirate   Result Value Ref Range    Influenza virus A RNA POSITIVE (A) Negative    Influenza virus B, PCR Negative Negative    RSV, PCR Negative Negative    SARS-CoV-2 by PCR NotDetected     SARS-CoV-2 Source NP Swab    EKG    Collection Time: 25  9:06 AM   Result Value Ref Range    Report       [facility] Emergency [facility].    Test Date:  2025  Pt Name:    PAYAL SIEGEL     Department: [facility]  MRN:        [id number]                      Room:       -ROOM 10  Gender:     Male                         Technician: [id number]  :        1945                   Requested By:FER MATAMOROS  Order #:    [id number]                     Reading MD: FER MATAMOROS MD    Measurements  Intervals                                Axis  Rate:       98                           P:          16  PA:         116                          QRS:        -68  QRSD:       123                          T:          35  QT:         342  QTc:        437    Interpretive Statements  Rate 98, irregular rhythm, sinus complexes are present, unable to rule out  interspersed atrial fibrillation, normal voltage, no ST segment changes  concerning for ischemia.  Electronically Signed On 04- 09:06:28 PDT by FER MATAMOROS MD     Lactic Acid    Collection Time: 04/16/25 10:15 AM   Result Value Ref Range    Lactic Acid 0.9 0.5 - 2.0 mmol/L   Urinalysis    Collection Time: 04/16/25 10:42 AM    Specimen: Urine   Result Value Ref Range    Color Yellow     Character Clear     Specific Gravity 1.035 <1.035    Ph 7.0 5.0 - 8.0    Glucose Negative Negative mg/dL    Ketones Negative Negative mg/dL    Protein Negative Negative mg/dL    Bilirubin Negative Negative    Urobilinogen, Urine 0.2 <=1.0 EU/dL    Nitrite Negative Negative    Leukocyte Esterase Negative Negative    Occult Blood Moderate (A) Negative    Micro Urine Req Microscopic    URINE MICROSCOPIC (W/UA)    Collection Time: 04/16/25 10:42 AM   Result Value Ref Range    WBC 0-2 /hpf    RBC 11-20 (A) /hpf    Bacteria None Seen None /hpf    Epithelial Cells 0-2 0 - 5 /hpf    Urine Casts 0-2 0 - 2 /lpf   PTH WITH IONIZED CALCIUM    Collection Time: 04/16/25  2:10 PM   Result Value Ref Range    Ionized Calcium 1.33 (H) 1.10 - 1.30 mmol/L   LACTIC ACID    Collection Time: 04/16/25  2:10 PM   Result Value Ref Range    Lactic Acid 0.8 0.5 - 2.0 mmol/L       Imaging/Procedures Review:    Reviewed    MDM (Assessment and Plan):     79yoM presents with acute respiratory failure and electrolyte imbalances secondary to Influenza A and malnutrition. He was found to have multiple distal ureteral stones near UVJ measuring 6mm with mild  associated hydronephrosis, and one nonobstructing left lower pole kidney stone. He is hypoxic, febrile, tachycardic. WBC 11.6, Cr 1.01, UA not suspicious for infection.     Recommend metabolic expulsive therapy with tamsulosin 0.4mg QHS and urine straining. Daily BMP to monitor renal function. Given his clinical status and compromised respiratory function, would be hesitant to recommend surgical intervention at this time. If flank pain intractable or worsening renal functions, recommend right nephrostomy tube placement. Once clinically stable, recommend laser lithotripsy of stone.     Case discussed and treatment plan coordinated with Dr. Tana Daniel, P.AAngeles-CAngeles   [address] [place] [address]   [phone]

## 2025-04-17 PROBLEM — E43 SEVERE PROTEIN-CALORIE MALNUTRITION (HCC): Status: RESOLVED | Noted: 2025-04-17 | Resolved: 2025-04-17

## 2025-04-17 PROBLEM — E03.8 SUBCLINICAL HYPOTHYROIDISM: Status: RESOLVED | Noted: 2021-04-12 | Resolved: 2025-04-17

## 2025-04-17 PROBLEM — D75.89 MACROCYTOSIS WITHOUT ANEMIA: Status: RESOLVED | Noted: 2022-04-27 | Resolved: 2025-04-17

## 2025-04-17 PROBLEM — E43 SEVERE PROTEIN-CALORIE MALNUTRITION (HCC): Status: ACTIVE | Noted: 2025-04-17

## 2025-04-17 LAB
ANION GAP SERPL CALC-SCNC: 12 MMOL/L (ref 7–16)
BUN SERPL-MCNC: 10 MG/DL (ref 8–22)
CALCIUM SERPL-MCNC: 10.3 MG/DL (ref 8.5–10.5)
CHLORIDE SERPL-SCNC: 107 MMOL/L (ref 96–112)
CHOLEST SERPL-MCNC: 132 MG/DL (ref 100–199)
CO2 SERPL-SCNC: 20 MMOL/L (ref 20–33)
CREAT SERPL-MCNC: 0.58 MG/DL (ref 0.5–1.4)
ERYTHROCYTE [DISTWIDTH] IN BLOOD BY AUTOMATED COUNT: 47.8 FL (ref 35.9–50)
GFR SERPLBLD CREATININE-BSD FMLA CKD-EPI: 99 ML/MIN/1.73 M 2
GLUCOSE SERPL-MCNC: 65 MG/DL (ref 65–99)
HCT VFR BLD AUTO: 38.8 % (ref 42–52)
HDLC SERPL-MCNC: 39 MG/DL
HGB BLD-MCNC: 12.7 G/DL (ref 14–18)
LDLC SERPL CALC-MCNC: 61 MG/DL
MCH RBC QN AUTO: 34.1 PG (ref 27–33)
MCHC RBC AUTO-ENTMCNC: 32.7 G/DL (ref 32.3–36.5)
MCV RBC AUTO: 104.3 FL (ref 81.4–97.8)
PLATELET # BLD AUTO: 145 K/UL (ref 164–446)
PMV BLD AUTO: 11.4 FL (ref 9–12.9)
POTASSIUM SERPL-SCNC: 3.5 MMOL/L (ref 3.6–5.5)
RBC # BLD AUTO: 3.72 M/UL (ref 4.7–6.1)
SODIUM SERPL-SCNC: 139 MMOL/L (ref 135–145)
TRIGL SERPL-MCNC: 161 MG/DL (ref 0–149)
WBC # BLD AUTO: 9.5 K/UL (ref 4.8–10.8)

## 2025-04-17 PROCEDURE — 80061 LIPID PANEL: CPT

## 2025-04-17 PROCEDURE — 94760 N-INVAS EAR/PLS OXIMETRY 1: CPT

## 2025-04-17 PROCEDURE — 97166 OT EVAL MOD COMPLEX 45 MIN: CPT

## 2025-04-17 PROCEDURE — 36415 COLL VENOUS BLD VENIPUNCTURE: CPT

## 2025-04-17 PROCEDURE — 770020 HCHG ROOM/CARE - TELE (206)

## 2025-04-17 PROCEDURE — 85027 COMPLETE CBC AUTOMATED: CPT

## 2025-04-17 PROCEDURE — 94640 AIRWAY INHALATION TREATMENT: CPT

## 2025-04-17 PROCEDURE — A9270 NON-COVERED ITEM OR SERVICE: HCPCS | Performed by: HOSPITALIST

## 2025-04-17 PROCEDURE — 700102 HCHG RX REV CODE 250 W/ 637 OVERRIDE(OP): Performed by: HOSPITALIST

## 2025-04-17 PROCEDURE — 80048 BASIC METABOLIC PNL TOTAL CA: CPT

## 2025-04-17 PROCEDURE — 99233 SBSQ HOSP IP/OBS HIGH 50: CPT | Performed by: INTERNAL MEDICINE

## 2025-04-17 PROCEDURE — 700111 HCHG RX REV CODE 636 W/ 250 OVERRIDE (IP): Mod: JZ | Performed by: HOSPITALIST

## 2025-04-17 PROCEDURE — 92612 ENDOSCOPY SWALLOW (FEES) VID: CPT

## 2025-04-17 PROCEDURE — 97535 SELF CARE MNGMENT TRAINING: CPT

## 2025-04-17 RX ORDER — TAMSULOSIN HYDROCHLORIDE 0.4 MG/1
0.4 CAPSULE ORAL
Status: DISCONTINUED | OUTPATIENT
Start: 2025-04-17 | End: 2025-04-18

## 2025-04-17 RX ADMIN — SENNOSIDES AND DOCUSATE SODIUM 2 TABLET: 50; 8.6 TABLET ORAL at 17:02

## 2025-04-17 RX ADMIN — OSELTAMIVIR PHOSPHATE 30 MG: 30 CAPSULE ORAL at 05:21

## 2025-04-17 RX ADMIN — POTASSIUM CHLORIDE 20 MEQ: 1500 TABLET, EXTENDED RELEASE ORAL at 13:39

## 2025-04-17 RX ADMIN — MORPHINE SULFATE 2 MG: 4 INJECTION, SOLUTION INTRAMUSCULAR; INTRAVENOUS at 09:05

## 2025-04-17 RX ADMIN — POTASSIUM CHLORIDE 20 MEQ: 1500 TABLET, EXTENDED RELEASE ORAL at 05:21

## 2025-04-17 RX ADMIN — ENOXAPARIN SODIUM 40 MG: 100 INJECTION SUBCUTANEOUS at 17:02

## 2025-04-17 RX ADMIN — AMLODIPINE BESYLATE 10 MG: 5 TABLET ORAL at 05:21

## 2025-04-17 RX ADMIN — OSELTAMIVIR PHOSPHATE 30 MG: 30 CAPSULE ORAL at 17:02

## 2025-04-17 RX ADMIN — POTASSIUM CHLORIDE 20 MEQ: 1500 TABLET, EXTENDED RELEASE ORAL at 17:02

## 2025-04-17 ASSESSMENT — FIBROSIS 4 INDEX: FIB4 SCORE: 4.83

## 2025-04-17 ASSESSMENT — ACTIVITIES OF DAILY LIVING (ADL): TOILETING: INDEPENDENT

## 2025-04-17 ASSESSMENT — ENCOUNTER SYMPTOMS
CHILLS: 1
VOMITING: 0
FEVER: 1
HEADACHES: 0
BACK PAIN: 1
NAUSEA: 0
ABDOMINAL PAIN: 0
COUGH: 1
SHORTNESS OF BREATH: 1
SPUTUM PRODUCTION: 1

## 2025-04-17 ASSESSMENT — COGNITIVE AND FUNCTIONAL STATUS - GENERAL
DAILY ACTIVITIY SCORE: 15
PERSONAL GROOMING: A LITTLE
DRESSING REGULAR LOWER BODY CLOTHING: A LOT
EATING MEALS: A LITTLE
HELP NEEDED FOR BATHING: A LOT
TOILETING: A LOT
DRESSING REGULAR UPPER BODY CLOTHING: A LITTLE
SUGGESTED CMS G CODE MODIFIER DAILY ACTIVITY: CK

## 2025-04-17 ASSESSMENT — GAIT ASSESSMENTS: DISTANCE (FEET): 10

## 2025-04-17 ASSESSMENT — PAIN DESCRIPTION - PAIN TYPE
TYPE: ACUTE PAIN
TYPE: ACUTE PAIN

## 2025-04-17 NOTE — THERAPY
"Speech Language Pathology   Flexible Endoscopic Evaluation of Swallowing (FEES)        Patient Name: Mj Cornejo  AGE:  79 y.o., SEX:  male  Medical Record #: [id number]  Date of Service: 4/17/2025      History of Present Illness  78 y/o M admitted 4/16/25 with respiratory failure, influenza A, poor nutritional status, electrolyte abnormalities, b/l kidney stones.     PMHx: dysphagia with 80 lb weight loss over 2 years, hypercholesteremia, HTN, seizure.     SLP Hx:   OP MBSS 2/25/25:   \"severe oropharyngeal dysphagia characterized by impaired laryngeal excursion, impaired pharyngeal shortening, impaired tongue base retraction, and incomplete laryngeal vestibule closure resulting in aspiration of liquids and solids this date. Patient with blunted sensation to aspiration events, without consistent cough response. Cued cough was effective to eliminate penetrated material, but not effective to clear aspirated material. Swallow safety and efficiency are both significantly impaired. Patient is at high risk for aspiration and high risk for inability to sustain nutrition/hydration orally. Suspect dysphagia is chronic in nature given patient report of years of difficulty swallowing. Given patient with good oral hygiene, good mobility, few other commodities reported this date, and no reports of recent pneumonia or other decline in respiratory status, continued PO nutrition/hydration is reasonable. Patient and son educated on risks of continued PO intake and encouraged them to discuss with their MD (visit scheduled with PCP tomorrow AM per son). Counseling regarding non-oral nutrition such as feeding tube is warranted. Should patient chose to continue oral feeding, recommend puree diet, thin liquids. Avoid chewable solids due to airway obstruction risk. Patient and son report understanding. Recommend follow-up with OP SLP for training of swallow exercises targeting impaired swallow physiology demonstrated during " "todays assessment.\"     CXR 4/16: Left lower lobe pneumonia.     CT Abd/Pelvis 4/16:   1.  Bibasilar pulmonary consolidation, left greater than right.  2.  Multiple right lower ureteral stones located 2 to 3 cm above the ureterovesical junction. The largest measures 6 mm in size. There is minimal right-sided hydronephrosis.  3.  4 mm left lower pole renal calyceal stone.  4.  Sigmoid diverticulosis.  5.  Cholelithiasis.  6.   Fatty liver with multiple hepatic cysts.  7.  Enlarged prostate gland.  8.  Mild urinary bladder wall thickening which may indicate chronic outlet obstruction.    Pertinent Information  Current Method of Nutrition: NPO until cleared by speech pathology  Patient Behaviors: Fatigue   Dentition: Upper partial, Lower partial   Feeding Tube: None   Factor(s) Affecting Performance: Impaired endurance     Discussed the risks, benefits, and alternatives of the FEES procedure. Patient/family acknowledged and agreed to proceed. Son present during entire evaluation and verbalized understanding of results and education.     Assessment  Flexible Endoscopic Evaluation of Swallowing (FEES) completed at bedside today. The endoscope was passed transnasally via Left nare to evaluate the anatomy and physiology of swallowing. Pt tolerated the procedure with no apparent distress.    Anatomic Findings: WFL  Vocal Fold Motion: Bilateral movement  Secretion Management: Adequate  PO Trials: Ice Chips, Thin Liquid, Mildly Thick Liquid, Liquidised, Pudding      Consistency PAS Score Timing Residue Comments   Thin Liquid 8 Pre Swallow, Post swallow Vallecular Residue: Moderate (25%-50%)  Pyriform Sinus Residue: Mild (5%-25%) Tsp-PAS 6 before  Cup-PAS 7 before, PAS 8 before, PAS 7 before  Straw-PAS 7 before, PAS 8 after    Mildly Thick 8 Pre Swallow, During Swallow Vallecular Residue: Moderate (25%-50%)  Pyriform Sinus Residue: Mild (5%-25%) Tsp-PAS 6 before  Cup-PAS 8 before and after  Straw-PAS 8 after    Liquidised 3 " Pre Swallow Vallecular Residue: Moderate (25%-50%)  Pyriform Sinus Residue: Moderate (25%-50%)    Pudding 5 Post Swallow Vallecular Residue: Moderate (25%-50%)  Pyriform Sinus Residue: Moderate (25%-50%) PAS 5 after on retrograde flow of bolus from UES      Penetration-Aspiration Scale (PAS)  1     No contrast enters airway  2     Contrast enters the airway, remains above the vocal folds, and is ejected from the airway (not seen in the airway at the end of the swallow).  3     Contrast enters the airway, remains above the vocal folds, and is not ejected from the airway (is seen in the airway after the swallow).  4     Contrast enters the airway, contacts the vocal folds, and is ejected from the airway.  5     Contrast enters the airway, contacts the vocal folds, and is not ejected from the airway  6     Contrast enters the airway, crosses the plane of the vocal folds, and is ejected from the airway.  7     Contrast enters the airway, crosses the plane of the vocal folds, and is not ejected from the airway despite effort.  8     Contrast enters the airway, crosses the plane of the vocal folds, is not ejected from the airway and there is no response to aspiration.    Oral phase:  Appropriate bolus acceptance. One instance of bolus loss on thin liquids via cup. Mastication not assessed d/t high risk for aspiration, so no solids given.     Pharyngeal phase:  Deficits marked by incomplete epiglottic inversion, reduced BoT retraction, impaired LVC, and decreased pharyngeal constriction. These deficits resulted in the followin.) Intermittent silent aspiration before and after the swallow intermittently on thin and MTL liquids via cup and straw sip. When patient did sense aspirates or was told to cough, he was unable to consistently cough to clear aspirates.   2.) Deep penetration after the swallow from retrograde flow of bolus up from the UES with pudding.   3.) Mild to moderate residue in the vallecula and PS after  the swallow that patient had difficulty clearing.     Compensatory Strategies:  Chin tuck: Did not prevent aspiration   Multiple swallows: Difficult for patient to complete and minimally effective     Severity Rating:  Severity Rating: LAVERN  LAVERN: Severe    Clinical Impressions  The pt presents with a severe oropharyngeal dysphagia, likely chronic related to debility, advanced age, hx of dysphagia, and fatigue. Swallow safety is impaired; swallow efficiency is impaired. Pt appears to be at high risk for aspiration PNA, and high for malnutrition/dehydration. Non-oral nutrition is indicated. Swallow prognosis is guarded given chronicity of dysphagia for 8 years per EMR. Patient appears to be a good candidate for behavioral swallow rehabilitation; recommend continued OP SLP dysphagia tx.     Recommendations  Diet Consistency: NPO/NN; Patient/family wish for patient to eat despite risks; Recommend PU4/TN0 diet, as safest diet DESPITE aspiration risks   Medication: Crush with applesauce, as appropriate, Crush with pudding/puree, as appropriate (Despite risks, per pt and family wishes)  Supervision: 1:1 feeding with constant supervision  Positioning: Fully upright and midline during oral intake, Remain upright for 30 minutes after oral intake, Meals sitting upright in a chair, as tolerated  Strategies: Small bites/sips, Alternate bites and sips, Slow rate of intake, Multiple swallows (2) per bite/sips  Oral Care: Q2h  Additional Instrumentation: Repeat diagnostic study when clinically appropriate  Additional recommendations: Recommend palliative care consult to further determine patient/family wishes and POC; MD aware      SLP Treatment Plan  Treatment Plan: Dysphagia Treatment, Patient/Family/Caregiver Training  SLP Frequency: 4x Per Week  Estimated Duration: Until Therapy Goals Met    Anticipated Discharge Needs  Discharge Recommendations: Recommend post-acute placement for additional speech therapy services prior to  "discharge home   Therapy Recommendations Upon DC: Dysphagia Training, Community Re-Integration, Patient / Family / Caregiver Education     Patient / Family Goals  Patient / Family Goal #1: \"I would choose to eat with risk.\"  Goal #1 Outcome: Progressing as expected  Short Term Goal # 1: Patient will participate in an instrumental swallow study to determine swallow physiology, \"safest\" diet, and guide treatment.  Goal Outcome # 1: Goal met, new goal added  Short Term Goal # 1 B : Patient will consume PU4/TN0 diet despite risks with strict use of swallow and reflux precautions and strategies with minimal s/sx of aspiration or decline in respiratory status.    Laya Tran, SLP  "

## 2025-04-17 NOTE — CARE PLAN
Problem: Humidified High Flow Nasal Cannula  Goal: Maintain adequate oxygenation dependent on patient condition  Description: Target End Date:  resolve prior to discharge or when underlying condition is resolved/stabilized1.  Implement humidified high flow oxygen therapy2.  Titrate high flow oxygen to maintain appropriate SpO2  Outcome: Progressing

## 2025-04-17 NOTE — CARE PLAN
The patient is Stable - Low risk of patient condition declining or worsening    Shift Goals  Clinical Goals: monitor oxygen needs  Patient Goals: comfort  Family Goals: Updates, get better, find out if patient can eat    Progress made toward(s) clinical / shift goals:    Problem: Fall Risk  Goal: Patient will remain free from falls  Outcome: Progressing  Note: Patient with generalized weakness and unable to ambulate, bed alarm on, bed locked and in lowest position, call light placed within reach     Problem: Respiratory  Goal: Patient will achieve/maintain optimum respiratory ventilation and gas exchange  Outcome: Progressing  Flowsheets (Taken 4/17/2025 0030)  O2 Delivery Device: High Flow Nasal Cannula  Note: Patient is able to achieve oxygenation on high flow oxygen flow, patient with shallow breathing.     Problem: Skin Integrity  Goal: Skin integrity is maintained or improved  Outcome: Progressing  Note: Patient on assessment skin is intact,patient is able to reposition himself in bed.       Patient is not progressing towards the following goals:

## 2025-04-17 NOTE — PROGRESS NOTES
Received report from Nikki SMITH. Bed is locked and in lowest position. Fall and Safety precautions in place. Reviewed POC, patient is awake in bed with son at bedside. Call light within reach. Patient verbalizes No other needs at this time other than requesting a denture cup.

## 2025-04-17 NOTE — PROGRESS NOTES
Telemetry Shift Summary    Rhythm : SR/BBB  HR Range 79-84  Ectopy : fPAC, rPVC  Measurements 0.14/0.12/0.36    Normal Values  Rhythm SR  HR Range:   Measurements: 0.12-0.20/0.06-0.10/0.30-0.52

## 2025-04-17 NOTE — PROGRESS NOTES
Telemetry Shift Summary    Rhythm SR/ST/AJ/BBB  HR Range   Ectopy rPVC, oPAC  Measurements 0.11/0.12/0.32    Normal Values  Rhythm SR  HR Range:   Measurements: 0.12-0.20/0.06-0.10/0.30-0.52

## 2025-04-17 NOTE — PROGRESS NOTES
Hospital Medicine Daily Progress Note    Date of Service  4/17/2025    Chief Complaint  Mj Cornejo is a 79 y.o. male admitted 4/16/2025 with acute hypoxic respiratory     Hospital Course  No notes on file    Interval Problem Update  Patient seen with son and RN at bedside.  RN used to ask patient questions.    Patient was complaining of back pain likely due to his stones but just received morphine and is feeling better.  He is still running low-grade fever and on high flow oxygen.    We discussed his dysphagia, subsequently evaluated by speech and not felt to be safe for any textures, will request palliative care consult.  Unclear if aspiration is contributing to respiratory failure    I have discussed this patient's plan of care and discharge plan at IDT rounds today with Case Management, Nursing, Nursing leadership, and other members of the IDT team.    Consultants/Specialty  palliative care    Code Status  DNAR/DNI    Disposition  The patient is not medically cleared for discharge to home or a post-acute facility.  Anticipate discharge to: home with close outpatient follow-up    I have placed the appropriate orders for post-discharge needs.    Review of Systems  Review of Systems   Constitutional:  Positive for chills and fever.   Respiratory:  Positive for cough, sputum production (white) and shortness of breath.    Gastrointestinal:  Negative for abdominal pain, nausea and vomiting.   Musculoskeletal:  Positive for back pain.   Neurological:  Negative for headaches.        Physical Exam  Temp:  [36.1 °C (96.9 °F)-37.5 °C (99.5 °F)] 37.4 °C (99.3 °F)  Pulse:  [83-99] 93  Resp:  [18-22] 18  BP: (117-153)/(64-74) 146/69  SpO2:  [93 %-96 %] 95 %    Physical Exam  Vitals and nursing note reviewed.   Constitutional:       Appearance: He is ill-appearing and diaphoretic (waxy). He is not toxic-appearing.      Comments: Appears younger than chronologic age   HENT:      Head: Normocephalic and atraumatic.       Mouth/Throat:      Mouth: Mucous membranes are moist.   Eyes:      Extraocular Movements: Extraocular movements intact.      Pupils: Pupils are equal, round, and reactive to light.   Pulmonary:      Effort: No respiratory distress.      Breath sounds: No wheezing or rhonchi.      Comments: Fine transient crackles  Abdominal:      General: Bowel sounds are normal. There is no distension.      Palpations: Abdomen is soft.      Tenderness: There is no abdominal tenderness.   Musculoskeletal:      Right lower leg: No edema.      Left lower leg: No edema.   Skin:     General: Skin is warm.   Neurological:      General: No focal deficit present.      Mental Status: He is alert and oriented to person, place, and time. Mental status is at baseline.      Cranial Nerves: No cranial nerve deficit.      Motor: Weakness present.   Psychiatric:         Mood and Affect: Mood normal.         Behavior: Behavior normal.         Fluids    Intake/Output Summary (Last 24 hours) at 4/17/2025 1659  Last data filed at 4/17/2025 0100  Gross per 24 hour   Intake --   Output 825 ml   Net -825 ml        Laboratory  Recent Labs     04/16/25  0815 04/17/25  0310   WBC 11.6* 9.5   RBC 3.97* 3.72*   HEMOGLOBIN 13.7* 12.7*   HEMATOCRIT 40.2* 38.8*   .3* 104.3*   MCH 34.5* 34.1*   MCHC 34.1 32.7   RDW 45.7 47.8   PLATELETCT 130* 145*   MPV 11.5 11.4     Recent Labs     04/16/25  0815 04/17/25  0310   SODIUM 134* 139   POTASSIUM 2.9* 3.5*   CHLORIDE 99 107   CO2 23 20   GLUCOSE 139* 65   BUN 15 10   CREATININE 1.01 0.58   CALCIUM 11.1* 10.3             Recent Labs     04/17/25  0310   TRIGLYCERIDE 161*   HDL 39*   LDL 61       Imaging  CT-LSPINE W/O PLUS RECONS   Final Result      1.  No evidence of fracture of the lumbar spine.      2.  Multilevel degenerative disc disease and facet degeneration.      CT-ABDOMEN-PELVIS WITH   Final Result         1.  Bibasilar pulmonary consolidation, left greater than right.      2.  Multiple right  lower ureteral stones located 2 to 3 cm above the ureterovesical junction. The largest measures 6 mm in size. There is minimal right-sided hydronephrosis.      3.  4 mm left lower pole renal calyceal stone.      4.  Sigmoid diverticulosis.      5.  Cholelithiasis.      6.   Fatty liver with multiple hepatic cysts.      7.  Enlarged prostate gland.      8.  Mild urinary bladder wall thickening which may indicate chronic outlet obstruction.      DX-CHEST-PORTABLE (1 VIEW)   Final Result      Left lower lobe pneumonia.           Assessment/Plan  * Acute respiratory failure with hypoxemia (HCC)- (present on admission)  Assessment & Plan  Patient comes in with a 3-day cough.  He is now hypoxic at 86% on room air  Patient with worsening oxygen demands, now on high flow  Influenza A positive  On Tamiflu      DNR (do not resuscitate)- (present on admission)  Assessment & Plan  I discussed advance directives with the patient he is DO NOT RESUSCITATE CODE STATUS.    Hydronephrosis- (present on admission)  Assessment & Plan  Patient has mild hydronephrosis and ureterolithiasis on the right side  Urology has been consulted, not recommending surgery.    Thrombocytopenia (HCC)- (present on admission)  Assessment & Plan  Patient's platelet count is down to 130  Currently does not report any bleeding  I will monitor platelet counts  For now patient does not need a transfusion    Leukocytosis- (present on admission)  Assessment & Plan  Leukocytosis secondary to the current influenza A infection with bilateral consolidation  Procalcitonin slightly elevated 0.55    Hypokalemia- (present on admission)  Assessment & Plan  Improving continue to monitor and supplement as needed    Influenza A- (present on admission)  Assessment & Plan  On Tamiflu  Droplet precautions    Dysphagia- (present on admission)  Assessment & Plan  Patient lost a lot of weight over the last year but apparently is stabilizing  He is not able to tolerate any  textures sometimes coughs with water and is unable to swallow meats  Per speech therapy he is not safe for any textures is high risk for aspiration  Family was told this after a previous speech eval and elected that they would like patient to be able to eat  Will request palliative care consultation    Hyperparathyroidism, unspecified (HCC)- (present on admission)  Assessment & Plan  I will check a parathyroid level most recent is 87.7.  This was from about 6 months ago.  The patient's calcium level is elevated and this may be secondary to uncontrolled hyperparathyroidism.    Hypercalcemia- (present on admission)  Assessment & Plan  Calcium 11.1, improved after hydration  Patient has underlying hyperparathyroidism his most recent PTH level was 87.7  6 months ago.     Chronic bilateral low back pain without sciatica- (present on admission)  Assessment & Plan  Patient's bilateral pain is secondary to kidney stones both left and right.  The patient on the right has multiple stones which are causing hydronephrosis and pain  The left side the patient has 1 solitary stone that is 4 mm and should be able to pass and it does not cause hydronephrosis.  For now continue with pain management optimization    Essential hypertension- (present on admission)  Assessment & Plan  Continue home medications  Continue as needed medications in hospital and monitor    Polycythemia- (present on admission)  Assessment & Plan  Macrocytic anemia  Recent B12 level 516    Dyslipidemia  Assessment & Plan  Patient has a history of dyslipidemia  He was on statin but he is not actively not taking these  I will check a fasting lipid panel    Seizure (HCC)- (present on admission)  Assessment & Plan  Patient has a history of seizures.  Neurology is following him as an outpatient  Currently he is not on any medications for seizure management.         VTE prophylaxis:    enoxaparin ppx      I have performed a physical exam and reviewed and updated ROS  and Plan today (4/17/2025). In review of yesterday's note (4/16/2025), there are no changes except as documented above.

## 2025-04-17 NOTE — DISCHARGE PLANNING
note:  Discussed during IDT , per Mdm pt is losing weight, has dysphasia. OT reommends post acute placement. CM sent SNF referrals per protocol.

## 2025-04-17 NOTE — DISCHARGE PLANNING
Post Acute Navigator Team    Per chart review, patient has been identified as a candidate for a discharge goals of care discussion. Post Acute Navigator Team will collaborate with Palliative Care for goals of care as Palliative Care referral placed.    High End of Life Care Index score 60  High LACE + score 72  6 click Mobility score 12   6 click ADL score 15   Readmission: No     Code Status:DNAR/DNI   Advanced Directive present in EMR: No   POLST present in EMR: No     Per chart review, post acute needs to be determined due to acuity and patient requiring Hi-flow NC 30L @ 80% FiO2.

## 2025-04-17 NOTE — PROGRESS NOTES
Note to reader: this note follows the APSO format rather than the historical SOAP format. Assessment and plan located at the top of the note for ease of use.    Chief Complaint  79 y.o. year old male here with Influenza A, right distal ureteral stones    Assessment/Plan  Interval History   Influenza A  Right ureteral stones    Medical expulsive therapy  Start Flomax  Encouraged pain medication use  Strain all urine and monitor for stone passage        Case discussed with patient, son, RN and Urology-Dr. Fajardo. RN provided interpretation    Patient seen and examined    4/17. Endorses pain in left flank, preventing him from ambulating. Has not been requesting pain medication however. Condom catheter in place due to urinary urgency. Remain on high flow O2. Febrile WBC normal. Creat normal.            Review of Systems  Physical Exam   Review of Systems   Constitutional:  Positive for fever.   Respiratory:  Positive for sputum production and shortness of breath.    Gastrointestinal:  Negative for nausea.   Genitourinary:  Positive for frequency and urgency.   Musculoskeletal:  Positive for back pain.     Vitals:    04/17/25 0402 04/17/25 0521 04/17/25 0730 04/17/25 0733   BP: (!) 153/72 (!) 150/72  (!) 141/67   Pulse: 97  86 88   Resp: 19  18 18   Temp: 36.1 °C (96.9 °F)   36.9 °C (98.4 °F)   TempSrc: Temporal   Temporal   SpO2: 93%  96% 95%   Weight: 68.3 kg (150 lb 9.2 oz)      Height:         Physical Exam  Nursing note reviewed. Exam conducted with a chaperone present.   Constitutional:       Appearance: Normal appearance.   HENT:      Head: Normocephalic and atraumatic.      Nose: Nose normal.      Mouth/Throat:      Mouth: Mucous membranes are moist.   Eyes:      Pupils: Pupils are equal, round, and reactive to light.   Pulmonary:      Effort: Pulmonary effort is normal.   Abdominal:      General: Abdomen is flat. There is no distension.      Palpations: Abdomen is soft.      Tenderness: There is no abdominal  tenderness.   Genitourinary:     Comments: Condom catheter in place draining lacey urine  Skin:     General: Skin is warm and dry.   Neurological:      General: No focal deficit present.      Mental Status: He is alert and oriented to person, place, and time.   Psychiatric:         Mood and Affect: Mood normal.         Behavior: Behavior normal.          Hematology Chemistry   Lab Results   Component Value Date/Time    WBC 9.5 04/17/2025 03:10 AM    HEMOGLOBIN 12.7 (L) 04/17/2025 03:10 AM    HEMATOCRIT 38.8 (L) 04/17/2025 03:10 AM    PLATELETCT 145 (L) 04/17/2025 03:10 AM     Lab Results   Component Value Date/Time    SODIUM 139 04/17/2025 03:10 AM    POTASSIUM 3.5 (L) 04/17/2025 03:10 AM    CHLORIDE 107 04/17/2025 03:10 AM    CO2 20 04/17/2025 03:10 AM    GLUCOSE 65 04/17/2025 03:10 AM    BUN 10 04/17/2025 03:10 AM    CREATININE 0.58 04/17/2025 03:10 AM         Labs not explicitly included in this progress note were reviewed by the author.   Radiology/imaging not explicitly included in this progress note was reviewed by the author.     Medications reviewed and Labs reviewed

## 2025-04-17 NOTE — PROGRESS NOTES
Late Entry 0800: PSVT up to 175 for 3.6 seconds per monitor tech. Patient asymptomatic. MD notified.       Late Entry 0944: PSVT up to 180 for 2.1 seconds per monitor tech. MD notified.

## 2025-04-17 NOTE — ASSESSMENT & PLAN NOTE
Assess swallowing, then consult nutrition to optimize diet  Patient has had multiple labs done in the last 6 months, in order to assess and has been on medication to try to control his weight loss  Continue to follow-up outpatient

## 2025-04-17 NOTE — DIETARY
Nutrition Services: Initial Assessment     Day 1 of admit. Mj Cornejo is 79 y.o., male with admitting DX of Acute respiratory failure with hypoxemia (HCC) [J96.01].    Consult Received for: MST - Malnutrition Screening Tool score 4: unplanned wt loss >/=34 lb x >1 year, no poor PO intake PTA    Current Hospital Problems List:      Acute respiratory failure with hypoxemia (HCC) (POA: Yes)    Seizure (HCC) (POA: Yes)    Polycythemia (POA: Yes)    Essential hypertension (POA: Yes)    Chronic bilateral low back pain without sciatica (POA: Yes)    Hypercalcemia (POA: Yes)    Hyperparathyroidism, unspecified (HCC) (POA: Yes)    Dysphagia (POA: Yes)    Influenza A (POA: Yes)    Hypokalemia (POA: Yes)    Leukocytosis (POA: Yes)    Thrombocytopenia (HCC) (POA: Yes)    Hydronephrosis (POA: Yes)    DNR (do not resuscitate) (POA: Yes)    Severe protein-calorie malnutrition (HCC) (POA: Yes)  Resolved Problems:    Subclinical hypothyroidism (POA: Yes)    Macrocytosis without anemia (POA: Yes)    Nutrition Assessment:      Height: 152.4 cm (5')  Weight: 68.3 kg (150 lb 9.2 oz)  Weight taken via: Bed Scale  BMI Calculated: 28.85  BMI Classification: WNL, For age group     Wt Readings from Last 18 Encounters:   04/17/25 68.3 kg (150 lb 9.2 oz)   02/19/25 60.7 kg (133 lb 13.1 oz)   11/05/24 58.2 kg (128 lb 4.9 oz)   10/30/24 57.7 kg (127 lb 4.8 oz)   09/16/24 55.2 kg (121 lb 11.2 oz)   08/13/24 53.6 kg (118 lb 3.2 oz)   07/09/24 52.2 kg (115 lb)   06/19/24 53.4 kg (117 lb 11.2 oz)   05/01/24 54.9 kg (121 lb)   04/30/24 54.7 kg (120 lb 9.6 oz)   01/24/24 57.2 kg (126 lb)   11/07/23 56.6 kg (124 lb 11.2 oz)   10/16/23 56.7 kg (125 lb)   08/16/23 54.7 kg (120 lb 9.6 oz)   07/28/23 56.7 kg (125 lb)   06/28/23 59.4 kg (131 lb)   05/17/23 59.4 kg (131 lb)   04/24/23 60.8 kg (134 lb)   Standing scale wt on admit 61.1 kg/134 lb 11.2 oz (BMI 26.31)); which is more consistent w/ recent chart wts compared to wt 68.3  kg    Objective:   Pertinent Medical Hx: hypercholesterolemia, HTN, seizure  Presents w/ cough x3 days PTA  Pt eating despite aspiration risks per SLP note; feeding tube not within GOC. Plan for FEES tomorrow.  Pertinent Labs: K+ 3.5  Pertinent Meds: tamiflu, morphine, Kdur  Skin/Wounds:  none documented  Food Allergies: NKFA  Last BM:      (4/16/2025 per patient)     Current Diet Order/Intake:   IDDSI Level 0 - Thin liquids and IDDSI Level 4 - Pureed; 1:1 supervision by nursing, deliver to nursing station. Intake not yet documented.    Subjective:   RD visited pt at bedside; family present as well. Observed pt finished all of lunch 100%. NFPE declined at this time. Family reports wt loss >1 year ago, 175 lb down to 121 lb. Per family, pt w/ wt gain/better PO since started on a blood pressure med last year - they can't recall name of medication. Encouraged to request snacks PRN; no current needs/wishes for larger portions or snacks at this time.    Nutrition Focused Physical Exam (NFPE)  Weight Loss: Per H&P, pt reports 80-lb wt loss in the past 2 years due to dysphagia. Per chart hx review, pt's lowest wt in the past 2 years was 115 lb, 9 months ago; wt now up ~20 lb since then. Prior to 07/2024, pt w/ 4.6% wt loss in ~2 months - does not meet criteria.   NFPE: declined  Fluid Accumulation: none documented  Reduced  Strength: N/A in acute care setting.    Nutrition Diagnosis:      Biting/Chewing Difficulty related to chronic dysphagia as evidenced by pt requiring Level 4 - pureed diet, eating despite known risk per SLP note.      Malnutrition criteria not met - wt gain in past 9 months per chart hx, no documented edema. Notified Dr. Dooley.    Nutrition Interventions:      Continue w/ current PO diet order as per SLP.   Patient aware of active plan of care as appropriate.     Nutrition Monitoring and Evaluation:      Monitor nutrition POC.  Diet upgrades to be per SLP.  Goal for >50-75% intake from  meals.  Monitor vital signs pertinent to nutrition.    RD following and will provide updated recommendations as indicated.      Liya Schwartz R.D.                                         [facility]

## 2025-04-17 NOTE — THERAPY
"Occupational Therapy   Initial Evaluation     Patient Name: Mj Cornejo  Age:  79 y.o., Sex:  male  Medical Record #: [id number]  Today's Date: 4/17/2025     Precautions  Precautions: Fall Risk, Swallow Precautions  Comments: [facility]    Assessment  Patient is 79 y.o. male with a diagnosis of Fever, Flank Pain, Incontinence. Diagnosed with Acute respiratory failure with hypoxemia,  Hypokalemia, Influenza A, Dysphagia, Hypercalcemia.  Pt reports being indep with ADL's and mobility prior to admit, family reports good support avail.  Pt is currently limited by decreased functional mobility, activity tolerance, balance, and adherence to precautions which are affecting his ability to complete ADLs/IADLs at baseline. See grid for details. Pt will benefit from further therapy while in house.  Depending on progress with O2 weaning, may be able to return home with HH and family support.  If not able to wean appropriately, may need further inpt post acute therapy prior to home. Will continue to follow.     Plan    Occupational Therapy Initial Treatment Plan   Treatment Interventions: Self Care / Activities of Daily Living, Adaptive Equipment, Neuro Re-Education / Balance, Therapeutic Exercises, Therapeutic Activity, Family / Caregiver Training  Treatment Frequency: 3 Times per Week  Duration: Until Therapy Goals Met    DC Equipment Recommendations: Unable to determine at this time  Discharge Recommendations: Recommend post-acute placement for additional occupational therapy services prior to discharge home (vs  depending on progress with recovering respiratory status, and if pt able to maintain overall strength while in house)     Subjective    \"I don't know why this is happening.\"     Objective       04/17/25 1313   Prior Living Situation   Prior Services Intermittent Physical Support for ADL Per Family   Housing / Facility 1 Story House   Steps Into Home 0  (threshhold)   Steps In Home 0   Bathroom Set up Walk In " Shower;Grab Bars   Equipment Owned Grab Bar(s) In Tub / Shower   Lives with - Patient's Self Care Capacity Adult Children;Spouse   Comments Pt lives with spouse and adult son who is currently not working.  Nephew also present during eval and reports they have good family support locally.  Pt's spouse is relatively good health, does use a quad cane to mobilize   Prior Level of ADL Function   Self Feeding Independent   Grooming / Hygiene Independent   Bathing Independent   Dressing Independent   Toileting Independent   Prior Level of IADL Function   Medication Management Independent   Laundry Independent   Kitchen Mobility Independent   Finances Independent   Home Management Independent   Shopping Independent   Prior Level Of Mobility Independent Without Device in Community   Driving / Transportation Relatives / Others Provide Transportation   Occupation (Pre-Hospital Vocational) Retired Due To Age  (Retired )   Leisure Interests Exercise  (Reports he exercises multiple)   Precautions   Precautions Fall Risk;Swallow Precautions   Comments [facility]   Vitals   Pulse Oximetry 95 %   O2 (LPM) 30   O2 Delivery Device Heated High Flow Nasal Cannula   Pain 0 - 10 Group   Therapist Pain Assessment 0;During Activity;Nurse Notified   Cognition    Cognition / Consciousness WDL   Level of Consciousness Alert   Comments pleasant, cooperative.  Speaks mostly Trinidadian, was able to communicate with Persian speaking therapist, famil avail at end to clarify any questions pt/therapist had about PLF and plan of care.  Pt tearful at beginning of session talking about current situation   Active ROM Upper Body   Active ROM Upper Body  WDL   Dominant Hand Right   Strength Upper Body   Upper Body Strength  WDL   Coordination Upper Body   Coordination WDL   Balance Assessment   Sitting Balance (Static) Good   Sitting Balance (Dynamic) Fair +   Standing Balance (Static) Fair   Standing Balance (Dynamic) Fair -   Weight Shift Sitting Good    Weight Shift Standing Fair   Comments HHA (CGA)   Bed Mobility    Supine to Sit Modified Independent  (HOB partially elevated, use of rail)   Scooting Standby Assist  (seated scoot)   ADL Assessment   Grooming Supervision  (face seated)   Lower Body Dressing Maximal Assist  (pt asked for help stating he was limited by oxygen cord)   Toileting   (NT- brief and condom cath)   How much help from another person does the patient currently need...   Putting on and taking off regular lower body clothing? 2   Bathing (including washing, rinsing, and drying)? 2   Toileting, which includes using a toilet, bedpan, or urinal? 2   Putting on and taking off regular upper body clothing? 3   Taking care of personal grooming such as brushing teeth? 3   Eating meals? 3   6 Clicks Daily Activity Score 15   Functional Mobility   Sit to Stand Contact Guard Assist   Bed, Chair, Wheelchair Transfer Contact Guard Assist   Transfer Method Stand Step   Mobility OOB, pivot to chair, walk forward and back as far as [facility] tubing would reach, pt got a bit frustrated by not being able to do more and politely declined more walking.   Distance (Feet) 10   # of Times Distance was Traveled 1   Visual Perception   Visual Perception  WDL   Edema / Skin Assessment   Edema / Skin  Not Assessed   Activity Tolerance   Sitting in Chair > 15 min   Sitting Edge of Bed 3-4 min   Standing 1 min x2   Comments Mildly increased WOB with activity, Sats stayed about 92-93%   Patient / Family Goals   Patient / Family Goal #1 get better   Short Term Goals   Short Term Goal # 1 Pt will tolerate standing 8 min at sink for grooming with SBA in 3 visits   Short Term Goal # 2 Pt will toilet in BR with appropriate AE and SBA in 4 visits.   Short Term Goal # 3 Pt will dress FB with SBA from seated with AE as needed in 5 visits.   Short Term Goal # 4 Pt will tolerate OOB to chair for 3 hours for meals and ADL's in 4 visits.   Education Group   Education Provided Role of  Occupational Therapist;Activities of Daily Living;Adaptive Equipment   Role of Occupational Therapist Patient Response Patient;Family;Acceptance;Explanation;Verbal Demonstration   ADL Patient Response Patient;Family;Acceptance;Explanation;Verbal Demonstration   Adaptive Equipment Patient Response Patient;Family;Acceptance;Explanation;Verbal Demonstration   Additional Comments family present, educated that AE for ADl's may need to be adressed closer to discharge depending on pt's current condition and possible need for energy conservation strategies

## 2025-04-17 NOTE — CARE PLAN
The patient is Watcher - Medium risk of patient condition declining or worsening    Shift Goals  Clinical Goals: Monitor oxygen needs  Patient Goals: Get better  Family Goals: Updates, get better, find out if patient can eat    Progress made toward(s) clinical / shift goals:    Problem: Knowledge Deficit - Standard  Goal: Patient and family/care givers will demonstrate understanding of plan of care, disease process/condition, diagnostic tests and medications  Outcome: Progressing  Note: Reviewed POC; discussed and provided education to patient and son about medications, SLP evaluation, NPO ice chips only, isolation precautions, highflow oxygen needs, monitoring vitals (especially RR and Spo2),and admission questions.      Problem: Respiratory  Goal: Patient will achieve/maintain optimum respiratory ventilation and gas exchange  Outcome: Progressing  Note: RT involved, patient on high flow.        Patient is not progressing towards the following goals:

## 2025-04-17 NOTE — CARE PLAN
The patient is Watcher - Medium risk of patient condition declining or worsening    Shift Goals  Clinical Goals: Monitor oxygen needs, pain managment, strain urine  Patient Goals: pain control, eat something  Family Goals: udpates, have patient eat    Progress made toward(s) clinical / shift goals:    Problem: Knowledge Deficit - Standard  Goal: Patient and family/care givers will demonstrate understanding of plan of care, disease process/condition, diagnostic tests and medications  Outcome: Progressing  Note: Reviewed POC; discussed and provided education on medications, SLP involved, diet changes, 1:1 feeds, plan to strain & monitor urine output, pain management, monitoring oxygen needs, OT involved, labs, and isolation precautions.      Problem: Respiratory  Goal: Patient will achieve/maintain optimum respiratory ventilation and gas exchange  Outcome: Progressing  Note: Patient continues to be on highflow, Spo2 maintained >90%, continuous pulse oximeter in place. 1:1 feeds, aspiration precautions placed per SLP.      Problem: Pain - Standard  Goal: Alleviation of pain or a reduction in pain to the patient’s comfort goal  Outcome: Progressing  Note: Patient received Morphine for flank pain per MAR orders. Patient reporting decrease pain after and also reported that he got some rest.        Patient is not progressing towards the following goals:

## 2025-04-17 NOTE — PROGRESS NOTES
Telemetry Shift Summary     Rhythm: SR/AJR  HR Range:75-81  Ectopy: r-fPAC/rPVC/fPAC/fPVC  Measurements: 0.11/0.13/0.39    Normal Values  Measurements: 0.12- 0.20 / 0.08-0.10 / 0.30-0.52

## 2025-04-18 ENCOUNTER — APPOINTMENT (OUTPATIENT)
Dept: RADIOLOGY | Facility: MEDICAL CENTER | Age: 80
DRG: 177 | End: 2025-04-18
Attending: INTERNAL MEDICINE
Payer: MEDICARE

## 2025-04-18 LAB
ANION GAP SERPL CALC-SCNC: 9 MMOL/L (ref 7–16)
BACTERIA UR CULT: NORMAL
BUN SERPL-MCNC: 16 MG/DL (ref 8–22)
CALCIUM SERPL-MCNC: 10.3 MG/DL (ref 8.5–10.5)
CHLORIDE SERPL-SCNC: 105 MMOL/L (ref 96–112)
CO2 SERPL-SCNC: 24 MMOL/L (ref 20–33)
CREAT SERPL-MCNC: 0.62 MG/DL (ref 0.5–1.4)
ERYTHROCYTE [DISTWIDTH] IN BLOOD BY AUTOMATED COUNT: 46.9 FL (ref 35.9–50)
GFR SERPLBLD CREATININE-BSD FMLA CKD-EPI: 97 ML/MIN/1.73 M 2
GLUCOSE SERPL-MCNC: 92 MG/DL (ref 65–99)
HCT VFR BLD AUTO: 35.7 % (ref 42–52)
HGB BLD-MCNC: 11.8 G/DL (ref 14–18)
MAGNESIUM SERPL-MCNC: 1.7 MG/DL (ref 1.5–2.5)
MCH RBC QN AUTO: 34.3 PG (ref 27–33)
MCHC RBC AUTO-ENTMCNC: 33.1 G/DL (ref 32.3–36.5)
MCV RBC AUTO: 103.8 FL (ref 81.4–97.8)
PHOSPHATE SERPL-MCNC: 1.9 MG/DL (ref 2.5–4.5)
PLATELET # BLD AUTO: 175 K/UL (ref 164–446)
PMV BLD AUTO: 11.2 FL (ref 9–12.9)
POTASSIUM SERPL-SCNC: 3.3 MMOL/L (ref 3.6–5.5)
RBC # BLD AUTO: 3.44 M/UL (ref 4.7–6.1)
SIGNIFICANT IND 70042: NORMAL
SITE SITE: NORMAL
SODIUM SERPL-SCNC: 138 MMOL/L (ref 135–145)
SOURCE SOURCE: NORMAL
WBC # BLD AUTO: 9.3 K/UL (ref 4.8–10.8)

## 2025-04-18 PROCEDURE — 97163 PT EVAL HIGH COMPLEX 45 MIN: CPT

## 2025-04-18 PROCEDURE — 94760 N-INVAS EAR/PLS OXIMETRY 1: CPT

## 2025-04-18 PROCEDURE — 700102 HCHG RX REV CODE 250 W/ 637 OVERRIDE(OP): Performed by: PHYSICIAN ASSISTANT

## 2025-04-18 PROCEDURE — A9270 NON-COVERED ITEM OR SERVICE: HCPCS | Performed by: HOSPITALIST

## 2025-04-18 PROCEDURE — 94640 AIRWAY INHALATION TREATMENT: CPT

## 2025-04-18 PROCEDURE — 99497 ADVNCD CARE PLAN 30 MIN: CPT | Performed by: NURSE PRACTITIONER

## 2025-04-18 PROCEDURE — 99233 SBSQ HOSP IP/OBS HIGH 50: CPT | Performed by: INTERNAL MEDICINE

## 2025-04-18 PROCEDURE — 700111 HCHG RX REV CODE 636 W/ 250 OVERRIDE (IP): Mod: JZ | Performed by: HOSPITALIST

## 2025-04-18 PROCEDURE — 700102 HCHG RX REV CODE 250 W/ 637 OVERRIDE(OP): Mod: JZ | Performed by: INTERNAL MEDICINE

## 2025-04-18 PROCEDURE — 36415 COLL VENOUS BLD VENIPUNCTURE: CPT

## 2025-04-18 PROCEDURE — 99498 ADVNCD CARE PLAN ADDL 30 MIN: CPT | Performed by: NURSE PRACTITIONER

## 2025-04-18 PROCEDURE — 84100 ASSAY OF PHOSPHORUS: CPT

## 2025-04-18 PROCEDURE — 97535 SELF CARE MNGMENT TRAINING: CPT

## 2025-04-18 PROCEDURE — 85027 COMPLETE CBC AUTOMATED: CPT

## 2025-04-18 PROCEDURE — A9270 NON-COVERED ITEM OR SERVICE: HCPCS | Mod: JZ | Performed by: INTERNAL MEDICINE

## 2025-04-18 PROCEDURE — 83735 ASSAY OF MAGNESIUM: CPT

## 2025-04-18 PROCEDURE — 700111 HCHG RX REV CODE 636 W/ 250 OVERRIDE (IP): Performed by: INTERNAL MEDICINE

## 2025-04-18 PROCEDURE — A9270 NON-COVERED ITEM OR SERVICE: HCPCS | Performed by: PHYSICIAN ASSISTANT

## 2025-04-18 PROCEDURE — 99223 1ST HOSP IP/OBS HIGH 75: CPT | Mod: 25 | Performed by: NURSE PRACTITIONER

## 2025-04-18 PROCEDURE — 700102 HCHG RX REV CODE 250 W/ 637 OVERRIDE(OP): Performed by: HOSPITALIST

## 2025-04-18 PROCEDURE — 80048 BASIC METABOLIC PNL TOTAL CA: CPT

## 2025-04-18 PROCEDURE — 770020 HCHG ROOM/CARE - TELE (206)

## 2025-04-18 RX ORDER — AMLODIPINE BESYLATE 5 MG/1
10 TABLET ORAL DAILY
Status: DISCONTINUED | OUTPATIENT
Start: 2025-04-19 | End: 2025-04-22

## 2025-04-18 RX ORDER — POTASSIUM CHLORIDE 1500 MG/1
20 TABLET, EXTENDED RELEASE ORAL 4 TIMES DAILY
Status: DISCONTINUED | OUTPATIENT
Start: 2025-04-19 | End: 2025-04-22

## 2025-04-18 RX ORDER — GUAIFENESIN/DEXTROMETHORPHAN 100-10MG/5
10 SYRUP ORAL EVERY 6 HOURS PRN
Status: DISCONTINUED | OUTPATIENT
Start: 2025-04-18 | End: 2025-04-24 | Stop reason: HOSPADM

## 2025-04-18 RX ORDER — DOXAZOSIN 2 MG/1
2 TABLET ORAL
Status: DISCONTINUED | OUTPATIENT
Start: 2025-04-19 | End: 2025-04-20

## 2025-04-18 RX ORDER — ONDANSETRON 2 MG/ML
4 INJECTION INTRAMUSCULAR; INTRAVENOUS EVERY 4 HOURS PRN
Status: DISCONTINUED | OUTPATIENT
Start: 2025-04-18 | End: 2025-04-24 | Stop reason: HOSPADM

## 2025-04-18 RX ORDER — AMOXICILLIN 250 MG
2 CAPSULE ORAL EVERY EVENING
Status: DISCONTINUED | OUTPATIENT
Start: 2025-04-19 | End: 2025-04-24 | Stop reason: HOSPADM

## 2025-04-18 RX ORDER — OSELTAMIVIR PHOSPHATE 75 MG/1
75 CAPSULE ORAL 2 TIMES DAILY
Status: COMPLETED | OUTPATIENT
Start: 2025-04-19 | End: 2025-04-21

## 2025-04-18 RX ORDER — OXYCODONE HYDROCHLORIDE 5 MG/1
5 TABLET ORAL
Refills: 0 | Status: DISCONTINUED | OUTPATIENT
Start: 2025-04-18 | End: 2025-04-22

## 2025-04-18 RX ORDER — ONDANSETRON 4 MG/1
4 TABLET, ORALLY DISINTEGRATING ORAL EVERY 4 HOURS PRN
Status: DISCONTINUED | OUTPATIENT
Start: 2025-04-18 | End: 2025-04-24 | Stop reason: HOSPADM

## 2025-04-18 RX ORDER — ACETAMINOPHEN 325 MG/1
650 TABLET ORAL EVERY 6 HOURS PRN
Status: DISCONTINUED | OUTPATIENT
Start: 2025-04-18 | End: 2025-04-22

## 2025-04-18 RX ORDER — MAGNESIUM SULFATE HEPTAHYDRATE 40 MG/ML
2 INJECTION, SOLUTION INTRAVENOUS ONCE
Status: COMPLETED | OUTPATIENT
Start: 2025-04-18 | End: 2025-04-18

## 2025-04-18 RX ORDER — OXYCODONE HYDROCHLORIDE 5 MG/1
2.5 TABLET ORAL
Refills: 0 | Status: DISCONTINUED | OUTPATIENT
Start: 2025-04-18 | End: 2025-04-22

## 2025-04-18 RX ORDER — DOXAZOSIN 2 MG/1
2 TABLET ORAL
Status: DISCONTINUED | OUTPATIENT
Start: 2025-04-19 | End: 2025-04-18

## 2025-04-18 RX ORDER — POLYETHYLENE GLYCOL 3350 17 G/17G
1 POWDER, FOR SOLUTION ORAL
Status: DISCONTINUED | OUTPATIENT
Start: 2025-04-18 | End: 2025-04-24 | Stop reason: HOSPADM

## 2025-04-18 RX ORDER — DIAZEPAM 10 MG/2ML
5 INJECTION, SOLUTION INTRAMUSCULAR; INTRAVENOUS ONCE
Status: COMPLETED | OUTPATIENT
Start: 2025-04-18 | End: 2025-04-18

## 2025-04-18 RX ORDER — POTASSIUM CHLORIDE 1500 MG/1
20 TABLET, EXTENDED RELEASE ORAL 4 TIMES DAILY
Status: DISCONTINUED | OUTPATIENT
Start: 2025-04-18 | End: 2025-04-18

## 2025-04-18 RX ORDER — MORPHINE SULFATE 4 MG/ML
2 INJECTION INTRAVENOUS
Status: DISCONTINUED | OUTPATIENT
Start: 2025-04-18 | End: 2025-04-22

## 2025-04-18 RX ADMIN — POTASSIUM CHLORIDE 20 MEQ: 1500 TABLET, EXTENDED RELEASE ORAL at 08:12

## 2025-04-18 RX ADMIN — OSELTAMIVIR PHOSPHATE 30 MG: 30 CAPSULE ORAL at 17:49

## 2025-04-18 RX ADMIN — POTASSIUM CHLORIDE 20 MEQ: 1500 TABLET, EXTENDED RELEASE ORAL at 17:48

## 2025-04-18 RX ADMIN — MAGNESIUM SULFATE HEPTAHYDRATE 2 G: 2 INJECTION, SOLUTION INTRAVENOUS at 07:42

## 2025-04-18 RX ADMIN — TAMSULOSIN HYDROCHLORIDE 0.4 MG: 0.4 CAPSULE ORAL at 07:41

## 2025-04-18 RX ADMIN — ENOXAPARIN SODIUM 40 MG: 100 INJECTION SUBCUTANEOUS at 17:49

## 2025-04-18 RX ADMIN — OSELTAMIVIR PHOSPHATE 30 MG: 30 CAPSULE ORAL at 04:47

## 2025-04-18 RX ADMIN — DIAZEPAM 5 MG: 5 INJECTION, SOLUTION INTRAMUSCULAR; INTRAVENOUS at 15:38

## 2025-04-18 RX ADMIN — POTASSIUM CHLORIDE 20 MEQ: 1500 TABLET, EXTENDED RELEASE ORAL at 20:00

## 2025-04-18 RX ADMIN — AMLODIPINE BESYLATE 10 MG: 5 TABLET ORAL at 04:47

## 2025-04-18 RX ADMIN — POTASSIUM CHLORIDE 20 MEQ: 1500 TABLET, EXTENDED RELEASE ORAL at 04:47

## 2025-04-18 RX ADMIN — POTASSIUM CHLORIDE 20 MEQ: 1500 TABLET, EXTENDED RELEASE ORAL at 17:49

## 2025-04-18 ASSESSMENT — PAIN DESCRIPTION - PAIN TYPE: TYPE: ACUTE PAIN

## 2025-04-18 ASSESSMENT — ENCOUNTER SYMPTOMS
FEVER: 0
NAUSEA: 0
COUGH: 1
ROS GI COMMENTS: DYSPHAGIA
ABDOMINAL PAIN: 0
BACK PAIN: 0
SHORTNESS OF BREATH: 1
HEADACHES: 0
WEAKNESS: 1
CHILLS: 0
SPUTUM PRODUCTION: 1
BACK PAIN: 1
FEVER: 1
VOMITING: 0

## 2025-04-18 ASSESSMENT — GAIT ASSESSMENTS
ASSISTIVE DEVICE: HAND HELD ASSIST
GAIT LEVEL OF ASSIST: CONTACT GUARD ASSIST
DISTANCE (FEET): 10

## 2025-04-18 ASSESSMENT — COGNITIVE AND FUNCTIONAL STATUS - GENERAL
SUGGESTED CMS G CODE MODIFIER MOBILITY: CK
CLIMB 3 TO 5 STEPS WITH RAILING: A LOT
WALKING IN HOSPITAL ROOM: A LITTLE
STANDING UP FROM CHAIR USING ARMS: A LITTLE
MOBILITY SCORE: 18
MOVING TO AND FROM BED TO CHAIR: A LITTLE
MOVING FROM LYING ON BACK TO SITTING ON SIDE OF FLAT BED: A LITTLE

## 2025-04-18 NOTE — PROGRESS NOTES
Note to reader: this note follows the APSO format rather than the historical SOAP format. Assessment and plan located at the top of the note for ease of use.    Chief Complaint  79 y.o. year old male here with Influenza A, right distal ureteral stones    Assessment/Plan  Interval History   Influenza A  Right ureteral stones    Medical expulsive therapy  Strain all urine and monitor for stone passage        Case discussed with patient, son and Urology-Dr. Fajardo.     Patient seen and examined    4/18. Pain improved now that is receiving pain medications. Creat normal. Urine clear via condom catheter. On high flow O2 but rate need improved. Dysphagia, not on Flomax    4/17. Endorses pain in left flank, preventing him from ambulating. Has not been requesting pain medication however. Condom catheter in place due to urinary urgency. Remain on high flow O2. Febrile WBC normal. Creat normal.            Review of Systems  Physical Exam   Review of Systems   Constitutional:  Positive for fever.   Respiratory:  Positive for sputum production and shortness of breath.    Gastrointestinal:  Negative for nausea.   Genitourinary:  Positive for frequency and urgency.   Musculoskeletal:  Positive for back pain.     Vitals:    04/17/25 2333 04/18/25 0415 04/18/25 0447 04/18/25 0800   BP: (!) 143/68 (!) 144/69 134/69 (!) 149/75   Pulse: 86 84  88   Resp: 18 18 17   Temp: 37.1 °C (98.7 °F) 36.6 °C (97.9 °F)  36.9 °C (98.4 °F)   TempSrc: Temporal Temporal  Temporal   SpO2: 92% 93%  94%   Weight:       Height:         Physical Exam  Nursing note reviewed. Exam conducted with a chaperone present.   Constitutional:       Appearance: Normal appearance.   HENT:      Head: Normocephalic and atraumatic.      Nose: Nose normal.      Mouth/Throat:      Mouth: Mucous membranes are moist.   Eyes:      Pupils: Pupils are equal, round, and reactive to light.   Pulmonary:      Effort: Pulmonary effort is normal.   Abdominal:      General: Abdomen  is flat. There is no distension.      Palpations: Abdomen is soft.      Tenderness: There is no abdominal tenderness.   Genitourinary:     Comments: Condom catheter in place draining lacey urine  Skin:     General: Skin is warm and dry.   Neurological:      General: No focal deficit present.      Mental Status: He is alert and oriented to person, place, and time.   Psychiatric:         Mood and Affect: Mood normal.         Behavior: Behavior normal.          Hematology Chemistry   Lab Results   Component Value Date/Time    WBC 9.3 04/18/2025 12:32 AM    HEMOGLOBIN 11.8 (L) 04/18/2025 12:32 AM    HEMATOCRIT 35.7 (L) 04/18/2025 12:32 AM    PLATELETCT 175 04/18/2025 12:32 AM     Lab Results   Component Value Date/Time    SODIUM 138 04/18/2025 12:32 AM    POTASSIUM 3.3 (L) 04/18/2025 12:32 AM    CHLORIDE 105 04/18/2025 12:32 AM    CO2 24 04/18/2025 12:32 AM    GLUCOSE 92 04/18/2025 12:32 AM    BUN 16 04/18/2025 12:32 AM    CREATININE 0.62 04/18/2025 12:32 AM         Labs not explicitly included in this progress note were reviewed by the author.   Radiology/imaging not explicitly included in this progress note was reviewed by the author.     Medications reviewed and Labs reviewed

## 2025-04-18 NOTE — DISCHARGE PLANNING
Case Management Discharge Planning    Admission Date: 4/16/2025  GMLOS: 3.5  ALOS: 2    6-Clicks ADL Score: 15  6-Clicks Mobility Score: 18  PT and/or OT Eval ordered: Yes  Post-acute Referrals Ordered: Yes  Post-acute Choice Obtained: No  Has referral(s) been sent to post-acute provider:  Yes      Anticipated Discharge Dispo: Discharge Disposition: Disch to a long term care facility (63)    DME Needed: No    Action(s) Taken:     PASRR: [id number]    Per chart, pt has opted for NG tube placement. PT recommending HH if pt weaned off O2. Pt currently on HFNC. Referral sent to [facility] in the case that pt requires LTACH for weaning off high flow. Anticipate DC home with HH if NG tube discontinued vs post-acute placement.    Escalations Completed: None    Medically Clear: No    Next Steps: Follow-up with medical team to discuss DC needs and barriers.    Barriers to Discharge: Medical clearance

## 2025-04-18 NOTE — PROGRESS NOTES
Monitor tech informed this RN patient had 9 beats of vtach. Patient asymptomatic asleep in bed with unlabored breathing. /68, heart rate 80-90. MD made aware of above and lab ordered.

## 2025-04-18 NOTE — PROGRESS NOTES
Late Entry: 1700 Urine strained per orders, no findings in the strainer of any stone at this time.

## 2025-04-18 NOTE — CONSULTS
"MRN: [id number]  Date of palliative consult: 2025   Reason for consult: ACP/GOC  Referring provider: Dr. Dooley  Location of consult: 0005  Additional consulting services: hospital medicine, urology    HPI:   Mj Cornejo is a 79 y.o. male with medical history significant for seizure disorders and dysphagia admitted 2025 with acute hypoxic respiratory failure and was found to have influenza A. Pt reportedly has had 80lb weight loss in the past 2 years due to dysphagia.  SLP evaluated patient with FEES on 2025 and he was found to have severe oropharyngeal dysphagia and noted \"swallow prognosis guarded given chronicity of dysphagia.\" Patient declined feeding tube and is eating PU4/TNO diet despite risk. PC consulted to assist with GOC discussions.     ROS:    Review of Systems   Constitutional:  Positive for fever.   Respiratory:  Positive for shortness of breath.    Gastrointestinal:         Dysphagia   Musculoskeletal:  Positive for back pain.     PE:   Recent vital signs  BMI: Body mass index is 29.41 kg/m².    Temp (24hrs), Av.1 °C (98.8 °F), Min:36.6 °C (97.9 °F), Max:37.6 °C (99.6 °F)  Temperature: 36.9 °C (98.4 °F)  Pulse  Av  Min: 79  Max: 116   Blood Pressure : 128/69       Physical Exam  Constitutional:       Appearance: He is ill-appearing.   Cardiovascular:      Rate and Rhythm: Tachycardia present.   Pulmonary:      Effort: No tachypnea or respiratory distress.      Comments: On HFNC  Neurological:      Mental Status: He is alert and oriented to person, place, and time.   Psychiatric:         Mood and Affect: Mood normal.         Thought Content: Thought content normal.       ASSESSMENT/PLAN WITH SHARED DECISION MAKING:   PHYSICAL ASPECTS OF CARE  Palliative Performance Scale: 90% prior to admission     # Acute respiratory failure with hypoxemia on HFNC  # Influenza A  # Dysphagia  # Hydronephrosis      SOCIAL ASPECTS OF CARE  Patient lives in [place], [place] with his son, " Mj.  He also has a daughter who lives locally.  At baseline, patient is independent in ADLs and IADLs.  He does 80 push-ups per day and all of the yard work.    SPIRITUAL ASPECTS OF CARE   Patient is listed as [employer].    GOALS OF CARE/SERIOUS ILLNESS CONVERSATION  Consult received and EMR reviewed. Case discussed with MD and RN, updates appreciated. THANH MARISCAL met with pt and son, Mj, at bedside along with PC LMSW (Petty), and postacute navigator (Akanksha) as . Introduced self, role of palliative care, and reason for consultation. Pt and family are agreeable to consultation/ACP discussion at this time. They share that patient's daughter is unable to meet at bedside but all are agreeable to proceeding with GOC discussion at this time.    Patient and family provided update on the social and medical history. They share that patient has been having swallowing difficulties for many years but has been well managed at home by cutting food into small pieces and always serving meat with broth. They share that this has been his only major medical concern and that he is physically in very good shape, doing 80 push-ups per day.  They also share that many other family numbers have had similar difficulties with swallowing.    Assessed patient and family's understanding of current medical status, overall health picture, and options for future care. Demonstrates a good understanding of the current clinical picture.  They understand that patient tested positive for influenza A.  Discussed dysphagia and results of SLP evaluation.  Patient's son, Mj, shared that SLP recommended puréed diet following this assessment.  THANH MARISCAL clarified that per my interpretation of this note, given severity of patient's oropharyngeal dysphagia, initial recommendation was actually for non-oral nutrition. However, given pt/family's expressed desire to eat despite risk, PU4/TN0 was recommended as safest diet despite  "aspiration risk.  Patient and family verbalized understanding of this.    PC APRN explored patient's goals and preferences.  His goal is to return home with his family and be able to eat again as he was previously.  Family shares \"it is important for him to eat and we know the risk, we give him small amounts.\" Pt confirms that he would like to avoid artificial nutrition if possible.  PC APRN explored this further and learned that patient feels that he does not have significant problems with his swallowing, especially when following a modified diet as he has been at home.      PC APRN provided validation regarding the goal of returning to modified diet at home if able. In the interim, discussed trial of NGT given current acute respiratory failure and FEES results. Lengthy discussion regarding the option of proceeding with NGT for short-term artificial nutrition to minimize risk of aspiration and further respiratory compromise, as well as providing adequate nutrition while recovering from influenza. Discussed that this does not preclude the patient from continuing to eat despite risk upon discharge and does not mean that he needs to proceed with long-term artificial nutrition.  Patient and family verbalized understanding and agreement.  They would like to proceed with short-term trial of artificial nutrition via NGT. RN and MD notified. SLP not on Voalte.     Discussed that NGT does not fully eliminate risk of aspiration and that pt can still aspirate on oral secretions/saliva.  Patient and family verbalized understanding and patient shared that he has been having increased oral secretions and difficulty swallowing this.  Recommend frequent oral care and oral suctioning of secretions with Vincent.  Discussed with nursing.    Discussed the issue of code status.  Patient is very adamant that he would not want to be intubated.  Confirmed that he is agreeable to noninvasive positive pressure ventilation (BiPAP) should " his respiratory status worsen.  Confirmed DNAR/DNI status.  Educated on the importance of a POLST.  Patient would like to review this with his daughter prior to completion.  Discussed that this can be completed with MD at bedside.    Active listening, reflection, reminiscing, validation & normalization, and empathic support utilized throughout this encounter.  All questions answered and contact information provided.     Code Status: DNAR/DNI    ACP Documents: Recommend POLST completion prior to discharge    46 minutes spent discussing advance care planning, this time excludes any other billed services.    Interval diagnostic studies and medical documentation entries pertinent to this case were reviewed independently by me. This patient has at least one acute or chronic illness or injury that poses a threat to life or bodily function. This patient suffers from a high risk of morbidity from additional invasive diagnostic testing or intensive treatment. Discussion of recommendations and coordination of care undertaken with primary provider/treatment team.      DAVEY Woodward  Palliative Care Nurse Practitioner   [phone]

## 2025-04-18 NOTE — PROGRESS NOTES
Hospital Medicine Daily Progress Note    Date of Service  4/18/2025    Chief Complaint  Mj Cornejo is a 79 y.o. male admitted 4/16/2025 with acute hypoxic respiratory     Hospital Course  No notes on file    Interval Problem Update  Patient seen with son and RN at bedside.    He is on 30 L HHFO2-  He is up out of the bed today and appears less ill he feels better and has less cough.  Discussed quandary with son, patient's respiratory failure is severe, he is already on high flow oxygen given the degree of aspiration, he is not safe for oral intake and this could tip his respiratory failure over the edge.  Palliative care met with patient and son and they have decided for short-term NG tube feedings while patient has respiratory failure.    I have discussed this patient's plan of care and discharge plan at IDT rounds today with Case Management, Nursing, Nursing leadership, and other members of the IDT team.    Consultants/Specialty  palliative care    Code Status  DNAR/DNI    Disposition  The patient is not medically cleared for discharge to home or a post-acute facility.  Anticipate discharge to: home with close outpatient follow-up    I have placed the appropriate orders for post-discharge needs.    Review of Systems  Review of Systems   Constitutional:  Negative for chills and fever.   Respiratory:  Positive for cough (improved), sputum production (white) and shortness of breath.    Cardiovascular:  Negative for chest pain.   Gastrointestinal:  Negative for abdominal pain, nausea and vomiting.   Musculoskeletal:  Negative for back pain (denied to me but stated present to Urology).   Neurological:  Positive for weakness. Negative for headaches.        Physical Exam  Temp:  [36.6 °C (97.9 °F)-37.6 °C (99.6 °F)] 36.9 °C (98.4 °F)  Pulse:  [84-97] 86  Resp:  [17-18] 17  BP: (128-149)/(67-75) 128/69  SpO2:  [91 %-95 %] 94 %    Physical Exam  Vitals and nursing note reviewed.   Constitutional:        Appearance: He is not ill-appearing, toxic-appearing or diaphoretic.      Comments: Appears younger than chronologic age   HENT:      Head: Normocephalic and atraumatic.      Mouth/Throat:      Mouth: Mucous membranes are moist.   Eyes:      Extraocular Movements: Extraocular movements intact.      Pupils: Pupils are equal, round, and reactive to light.   Pulmonary:      Effort: No respiratory distress.      Breath sounds: No wheezing or rhonchi.      Comments: Fine transient crackles  Abdominal:      General: Bowel sounds are normal. There is no distension.      Palpations: Abdomen is soft.      Tenderness: There is no abdominal tenderness.   Musculoskeletal:      Right lower leg: No edema.      Left lower leg: No edema.   Skin:     General: Skin is warm.   Neurological:      General: No focal deficit present.      Mental Status: He is alert and oriented to person, place, and time. Mental status is at baseline.      Cranial Nerves: No cranial nerve deficit.      Motor: Weakness (Seems improved) present.   Psychiatric:         Mood and Affect: Mood normal.         Behavior: Behavior normal.         Fluids    Intake/Output Summary (Last 24 hours) at 4/18/2025 1504  Last data filed at 4/18/2025 1100  Gross per 24 hour   Intake --   Output 2000 ml   Net -2000 ml        Laboratory  Recent Labs     04/16/25  0815 04/17/25  0310 04/18/25  0032   WBC 11.6* 9.5 9.3   RBC 3.97* 3.72* 3.44*   HEMOGLOBIN 13.7* 12.7* 11.8*   HEMATOCRIT 40.2* 38.8* 35.7*   .3* 104.3* 103.8*   MCH 34.5* 34.1* 34.3*   MCHC 34.1 32.7 33.1   RDW 45.7 47.8 46.9   PLATELETCT 130* 145* 175   MPV 11.5 11.4 11.2     Recent Labs     04/16/25  0815 04/17/25  0310 04/18/25  0032   SODIUM 134* 139 138   POTASSIUM 2.9* 3.5* 3.3*   CHLORIDE 99 107 105   CO2 23 20 24   GLUCOSE 139* 65 92   BUN 15 10 16   CREATININE 1.01 0.58 0.62   CALCIUM 11.1* 10.3 10.3             Recent Labs     04/17/25  0310   TRIGLYCERIDE 161*   HDL 39*   LDL 61        Imaging  CT-LSPINE W/O PLUS RECONS   Final Result      1.  No evidence of fracture of the lumbar spine.      2.  Multilevel degenerative disc disease and facet degeneration.      CT-ABDOMEN-PELVIS WITH   Final Result         1.  Bibasilar pulmonary consolidation, left greater than right.      2.  Multiple right lower ureteral stones located 2 to 3 cm above the ureterovesical junction. The largest measures 6 mm in size. There is minimal right-sided hydronephrosis.      3.  4 mm left lower pole renal calyceal stone.      4.  Sigmoid diverticulosis.      5.  Cholelithiasis.      6.   Fatty liver with multiple hepatic cysts.      7.  Enlarged prostate gland.      8.  Mild urinary bladder wall thickening which may indicate chronic outlet obstruction.      DX-CHEST-PORTABLE (1 VIEW)   Final Result      Left lower lobe pneumonia.           Assessment/Plan  * Acute respiratory failure with hypoxemia (HCC)- (present on admission)  Assessment & Plan  Patient comes in with a 3-day cough.  He is now hypoxic at 86% on room air  Patient with worsening oxygen demands, now on high flow  Influenza A positive  On Tamiflu      DNR (do not resuscitate)- (present on admission)  Assessment & Plan  I discussed advance directives with the patient he is DO NOT RESUSCITATE CODE STATUS.    Hydronephrosis- (present on admission)  Assessment & Plan  Patient has mild hydronephrosis and ureterolithiasis on the right side  Urology has been consulted, not recommending surgery.    Thrombocytopenia (HCC)- (present on admission)  Assessment & Plan  Patient's platelet count is down to 130  Currently does not report any bleeding  I will monitor platelet counts  For now patient does not need a transfusion    Leukocytosis- (present on admission)  Assessment & Plan  Leukocytosis secondary to the current influenza A infection with bilateral consolidation  Procalcitonin slightly elevated 0.55    Hypokalemia- (present on admission)  Assessment &  Plan  Improving continue to monitor and supplement as needed    Influenza A- (present on admission)  Assessment & Plan  On Tamiflu  Droplet precautions    Dysphagia- (present on admission)  Assessment & Plan  Family agreeable with short-term NG feeding while patient is in respiratory failure  Orders placed  Palliative care consultation appreciated    Hyperparathyroidism, unspecified (HCC)- (present on admission)  Assessment & Plan  I will check a parathyroid level most recent is 87.7.  This was from about 6 months ago.  The patient's calcium level is elevated and this may be secondary to uncontrolled hyperparathyroidism.    Hypercalcemia- (present on admission)  Assessment & Plan  Calcium 11.1, improved after hydration  Patient has underlying hyperparathyroidism his most recent PTH level was 87.7  6 months ago.     Chronic bilateral low back pain without sciatica- (present on admission)  Assessment & Plan  Patient's bilateral pain is secondary to kidney stones both left and right.  The patient on the right has multiple stones which are causing hydronephrosis and pain  The left side the patient has 1 solitary stone that is 4 mm and should be able to pass and it does not cause hydronephrosis.  For now continue with pain management optimization    Essential hypertension- (present on admission)  Assessment & Plan  Continue home medications  Continue as needed medications in hospital and monitor    Polycythemia- (present on admission)  Assessment & Plan  Macrocytic anemia  Recent B12 level 516    Dyslipidemia  Assessment & Plan  Patient has a history of dyslipidemia  He was on statin but he is not actively not taking these  I will check a fasting lipid panel    Seizure (HCC)- (present on admission)  Assessment & Plan  Patient has a history of seizures.  Neurology is following him as an outpatient  Currently he is not on any medications for seizure management.         VTE prophylaxis:    enoxaparin ppx      I have  performed a physical exam and reviewed and updated ROS and Plan today (4/18/2025). In review of yesterday's note (4/17/2025), there are no changes except as documented above.

## 2025-04-18 NOTE — CARE PLAN
The patient is Stable - Low risk of patient condition declining or worsening    Shift Goals  Clinical Goals: Monitor O2, strain urine  Patient Goals: rest, comfort  Family Goals: updates    Progress made toward(s) clinical / shift goals:  Patient updated on POC throughout shift. Patient denied pain throughout shift. Patient educated on reason staff with strain urine. Patient demonstrated turning self while in bed. Patient tolerated hiflow throughout shift with spO2>90%.     Problem: Knowledge Deficit - Standard  Goal: Patient and family/care givers will demonstrate understanding of plan of care, disease process/condition, diagnostic tests and medications  Outcome: Progressing     Problem: Fall Risk  Goal: Patient will remain free from falls  Outcome: Progressing     Problem: Skin Integrity  Goal: Skin integrity is maintained or improved  Outcome: Progressing     Problem: Pain - Standard  Goal: Alleviation of pain or a reduction in pain to the patient’s comfort goal  Outcome: Progressing     Patient is not progressing towards the following goals:

## 2025-04-18 NOTE — DISCHARGE PLANNING
Post Acute Navigator Team     Per chart review, patient has been identified as a candidate for a discharge goals of care discussion. Post Acute Navigator Team will collaborate with Palliative Care for goals of care as Palliative Care referral placed.     High End of Life Care Index score 60  High LACE + score 72  6 click Mobility score 12   6 click ADL score 15   Readmission: No      Code Status:DNAR/DNI   Advanced Directive present in EMR: No   POLST present in EMR: No      Received request from Petty DOBSON palliative care requesting a join visit with patient and family for Goals of care discussion.    Meeting set at 1300.     1330 Pimentel meet with patient and soni Barker in a join visit with Ginger MARISCAL and Petty DOBSON from Palliative care team. PAN provided interpretation services for goals of care discussion.     Patient remains on high flow and agreeable to NG tube placement. Lindy refer to Palliative care note.     Post acute needs to be determined.

## 2025-04-18 NOTE — THERAPY
Physical Therapy   Initial Evaluation     Patient Name: Mj Cornejo  Age:  79 y.o., Sex:  male  Medical Record #: [id number]  Today's Date: 4/18/2025     Precautions  Precautions: (P) Fall Risk;Swallow Precautions  Comments: (P) HHFNC    Assessment  Patient is  79 y.o. male admitted 4/16/2025 with acute hypoxic respiratory. (+) influenza A. Desats to 86% on RA. Pt c/o of back pain lively due to stones. Dysphagia.Risk for aspiration.  Palliative care consult. Pt demo's functional strength and balance for transfers and short distance gait. Pt still requiring high 02 demands. If pt progresses and weans from 02 recommend home with HH. Very supportive family. Will follow pt during acute stay. See below for eval findings.     Plan    Physical Therapy Initial Treatment Plan   Treatment Plan : (P) Bed Mobility, Gait Training, Neuro Re-Education / Balance, Self Care / Home Evaluation, Therapeutic Activities, Therapeutic Exercise  Treatment Frequency: (P) 4 Times per Week  Duration: (P) Until Therapy Goals Met    DC Equipment Recommendations: (P) Unable to determine at this time  Discharge Recommendations: (P) Recommend home health for continued physical therapy services        04/18/25 1055   Time In/Time Out   Therapy Start Time 1055   Therapy End Time 1135   Total Therapy Time 40   Charge Group   PT Evaluation PT Evaluation High   PT Self Care / Home Evaluation (Units) 1   Total Time Spent   PT Evaluation Time Spent (Mins) 30   PT Self Care/Home Evaluation Time Spent (Mins) 10    Services   Is patient using  services for this encounter? Yes   Language Interpreted Yi    Mode Other  (family)   Content of Interpretation (select all) Patient Education   Precautions   Precautions Fall Risk;Swallow Precautions   Comments HHFNC   Vitals   Pulse Oximetry 95 %   O2 Delivery Device Heated High Flow Nasal Cannula   Pain 0 - 10 Group   Therapist Pain Assessment 0;Nurse Notified;Post  Activity Pain Same as Prior to Activity   Prior Living Situation   Prior Services Intermittent Physical Support for ADL Per Family   Housing / Facility 1 Story House   Steps Into Home 0   Steps In Home 0   Equipment Owned Quad Cane   Lives with - Patient's Self Care Capacity Adult Children;Spouse   Comments Pt lives with spouse and adult son. 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   Passive ROM Lower Body   Passive ROM Lower Body WDL   Active ROM Lower Body    Active ROM Lower Body  WDL   Strength Lower Body   Lower Body Strength  WDL   Sensation Lower Body   Lower Extremity Sensation   WDL   Lower Body Muscle Tone   Lower Body Muscle Tone  WDL   Neurological Concerns   Neurological Concerns No   Coordination Lower Body    Coordination Lower Body  WDL   Other Treatments   Other Treatments Provided BLE ther ex and ROM   Balance Assessment   Sitting Balance (Static) Good   Sitting Balance (Dynamic) Fair +   Standing Balance (Static) Fair   Standing Balance (Dynamic) Fair   Weight Shift Sitting Good   Weight Shift Standing Fair   Comments HHA   Bed Mobility    Supine to Sit Modified Independent   Sit to Supine   (chair post)   Scooting Supervised   Rolling Standby Assist   Gait Analysis   Gait Level Of Assist Contact Guard Assist   Assistive Device Hand Held Assist   Distance (Feet) 10  (forward and back)   # of Times Distance was Traveled 2   Weight Bearing Status full   Comments 02 sat remain constant at 94% high flow   Functional Mobility   Sit to Stand Standby Assist   Bed, Chair, Wheelchair Transfer Standby Assist   6 Clicks Assessment - How much HELP  from from another person do you currently need... (If the patient hasn't done an activity recently, how much help from another person do you think he/she would need if he/she tried?)   Turning from your back to your side while in a flat bed without using bedrails? 4   Moving from lying on your back to sitting on the side of a flat bed without using bedrails? 3   Moving to and from a bed to a chair (including a wheelchair)? 3   Standing up from a chair using your arms (e.g., wheelchair, or bedside chair)? 3   Walking in hospital room? 3   Climbing 3-5 steps with a railing? 2   6 clicks Mobility Score 18   Activity Tolerance   Sitting in Chair post session   Sitting Edge of Bed 10 mins   Standing 2-3 min x 2   Comments MD visit during session with pt sitting between stands   Short Term Goals    Short Term Goal # 1 in 6 V pt will perform transfers with quad cane and supervison   Short Term Goal # 2 in 6 V pt will amb using quad cane 150 feet with SBA   Education Group   Education Provided Role of Physical Therapist;Exercises - Supine;Exercises - Seated   Role of Physical Therapist Patient Response Patient;Family;Acceptance;Explanation;Verbal Demonstration   Exercises - Supine Patient Response Patient;Family;Acceptance;Explanation;Action Demonstration   Exercises - Seated Patient Response Patient;Family;Acceptance;Explanation;Action Demonstration   Physical Therapy Initial Treatment Plan    Treatment Plan  Bed Mobility;Gait Training;Neuro Re-Education / Balance;Self Care / Home Evaluation;Therapeutic Activities;Therapeutic Exercise   Treatment Frequency 4 Times per Week   Duration Until Therapy Goals Met   Problem List    Problems Impaired Transfers;Impaired Ambulation;Decreased Activity Tolerance  (increased 02 demands)   Anticipated Discharge Equipment and Recommendations   DC Equipment Recommendations Unable to determine at this time   Discharge Recommendations Recommend home health for continued physical therapy  services   Interdisciplinary Plan of Care Collaboration   IDT Collaboration with  Nursing;Family / Caregiver;Physician   Patient Position at End of Therapy Seated;Chair Alarm On;Phone within Reach;Tray Table within Reach;Call Light within Reach;Family / Friend in Room   Collaboration Comments re; eval   Session Information   Date / Session Number  4/18-1 ( 1/4, 4/24)

## 2025-04-18 NOTE — PROGRESS NOTES
Telemetry summary    Rhythm SR/AJR/BBB  Rate 79-86    Ectopy fPVC, o-fPAC, rJEB, fPJC  Measurements 0.11/0.13/0.36    Normal Values  Rhythm SR  HR Range   Measurements 0.12-0.20/0.06-0.10/0.30-0.52

## 2025-04-19 ENCOUNTER — APPOINTMENT (OUTPATIENT)
Dept: RADIOLOGY | Facility: MEDICAL CENTER | Age: 80
DRG: 177 | End: 2025-04-19
Attending: INTERNAL MEDICINE
Payer: MEDICARE

## 2025-04-19 PROBLEM — D53.9 MACROCYTIC ANEMIA: Status: ACTIVE | Noted: 2021-01-23

## 2025-04-19 LAB
ANION GAP SERPL CALC-SCNC: 11 MMOL/L (ref 7–16)
BASOPHILS # BLD AUTO: 0.2 % (ref 0–1.8)
BASOPHILS # BLD: 0.02 K/UL (ref 0–0.12)
BUN SERPL-MCNC: 12 MG/DL (ref 8–22)
CALCIUM SERPL-MCNC: 9.6 MG/DL (ref 8.5–10.5)
CHLORIDE SERPL-SCNC: 105 MMOL/L (ref 96–112)
CO2 SERPL-SCNC: 24 MMOL/L (ref 20–33)
CREAT SERPL-MCNC: 0.51 MG/DL (ref 0.5–1.4)
EOSINOPHIL # BLD AUTO: 0.02 K/UL (ref 0–0.51)
EOSINOPHIL NFR BLD: 0.2 % (ref 0–6.9)
ERYTHROCYTE [DISTWIDTH] IN BLOOD BY AUTOMATED COUNT: 45.4 FL (ref 35.9–50)
GFR SERPLBLD CREATININE-BSD FMLA CKD-EPI: 103 ML/MIN/1.73 M 2
GLUCOSE SERPL-MCNC: 88 MG/DL (ref 65–99)
HCT VFR BLD AUTO: 37 % (ref 42–52)
HGB BLD-MCNC: 12.3 G/DL (ref 14–18)
IMM GRANULOCYTES # BLD AUTO: 0.07 K/UL (ref 0–0.11)
IMM GRANULOCYTES NFR BLD AUTO: 0.6 % (ref 0–0.9)
LYMPHOCYTES # BLD AUTO: 1.29 K/UL (ref 1–4.8)
LYMPHOCYTES NFR BLD: 10.8 % (ref 22–41)
MAGNESIUM SERPL-MCNC: 2 MG/DL (ref 1.5–2.5)
MCH RBC QN AUTO: 34.3 PG (ref 27–33)
MCHC RBC AUTO-ENTMCNC: 33.2 G/DL (ref 32.3–36.5)
MCV RBC AUTO: 103.1 FL (ref 81.4–97.8)
MONOCYTES # BLD AUTO: 0.53 K/UL (ref 0–0.85)
MONOCYTES NFR BLD AUTO: 4.4 % (ref 0–13.4)
NEUTROPHILS # BLD AUTO: 10.02 K/UL (ref 1.82–7.42)
NEUTROPHILS NFR BLD: 83.8 % (ref 44–72)
NRBC # BLD AUTO: 0 K/UL
NRBC BLD-RTO: 0 /100 WBC (ref 0–0.2)
PLATELET # BLD AUTO: 276 K/UL (ref 164–446)
PMV BLD AUTO: 10 FL (ref 9–12.9)
POTASSIUM SERPL-SCNC: 3.6 MMOL/L (ref 3.6–5.5)
PROCALCITONIN SERPL-MCNC: 0.15 NG/ML
RBC # BLD AUTO: 3.59 M/UL (ref 4.7–6.1)
SODIUM SERPL-SCNC: 140 MMOL/L (ref 135–145)
WBC # BLD AUTO: 12 K/UL (ref 4.8–10.8)

## 2025-04-19 PROCEDURE — A9270 NON-COVERED ITEM OR SERVICE: HCPCS | Performed by: HOSPITALIST

## 2025-04-19 PROCEDURE — 36415 COLL VENOUS BLD VENIPUNCTURE: CPT

## 2025-04-19 PROCEDURE — 71045 X-RAY EXAM CHEST 1 VIEW: CPT

## 2025-04-19 PROCEDURE — 87040 BLOOD CULTURE FOR BACTERIA: CPT

## 2025-04-19 PROCEDURE — 94760 N-INVAS EAR/PLS OXIMETRY 1: CPT

## 2025-04-19 PROCEDURE — 700102 HCHG RX REV CODE 250 W/ 637 OVERRIDE(OP): Performed by: HOSPITALIST

## 2025-04-19 PROCEDURE — A9270 NON-COVERED ITEM OR SERVICE: HCPCS | Performed by: INTERNAL MEDICINE

## 2025-04-19 PROCEDURE — 83735 ASSAY OF MAGNESIUM: CPT

## 2025-04-19 PROCEDURE — 94640 AIRWAY INHALATION TREATMENT: CPT

## 2025-04-19 PROCEDURE — 99233 SBSQ HOSP IP/OBS HIGH 50: CPT | Performed by: INTERNAL MEDICINE

## 2025-04-19 PROCEDURE — 85025 COMPLETE CBC W/AUTO DIFF WBC: CPT

## 2025-04-19 PROCEDURE — 84145 PROCALCITONIN (PCT): CPT

## 2025-04-19 PROCEDURE — 700102 HCHG RX REV CODE 250 W/ 637 OVERRIDE(OP): Performed by: INTERNAL MEDICINE

## 2025-04-19 PROCEDURE — 770020 HCHG ROOM/CARE - TELE (206)

## 2025-04-19 PROCEDURE — 80048 BASIC METABOLIC PNL TOTAL CA: CPT

## 2025-04-19 PROCEDURE — 700111 HCHG RX REV CODE 636 W/ 250 OVERRIDE (IP): Mod: JZ | Performed by: HOSPITALIST

## 2025-04-19 RX ORDER — LORAZEPAM 1 MG/1
1 TABLET ORAL ONCE
Status: COMPLETED | OUTPATIENT
Start: 2025-04-19 | End: 2025-04-19

## 2025-04-19 RX ADMIN — LORAZEPAM 1 MG: 1 TABLET ORAL at 14:30

## 2025-04-19 RX ADMIN — OSELTAMIVIR PHOSPHATE 75 MG: 75 CAPSULE ORAL at 05:09

## 2025-04-19 RX ADMIN — POTASSIUM CHLORIDE 20 MEQ: 1500 TABLET, EXTENDED RELEASE ORAL at 17:35

## 2025-04-19 RX ADMIN — DOXAZOSIN 2 MG: 2 TABLET ORAL at 05:08

## 2025-04-19 RX ADMIN — POTASSIUM CHLORIDE 20 MEQ: 1500 TABLET, EXTENDED RELEASE ORAL at 09:10

## 2025-04-19 RX ADMIN — OSELTAMIVIR PHOSPHATE 75 MG: 75 CAPSULE ORAL at 17:35

## 2025-04-19 RX ADMIN — ENOXAPARIN SODIUM 40 MG: 100 INJECTION SUBCUTANEOUS at 17:35

## 2025-04-19 RX ADMIN — POTASSIUM CHLORIDE 20 MEQ: 1500 TABLET, EXTENDED RELEASE ORAL at 21:17

## 2025-04-19 RX ADMIN — ACETAMINOPHEN 650 MG: 325 TABLET, FILM COATED ORAL at 17:35

## 2025-04-19 RX ADMIN — AMLODIPINE BESYLATE 10 MG: 5 TABLET ORAL at 05:09

## 2025-04-19 RX ADMIN — POTASSIUM CHLORIDE 20 MEQ: 1500 TABLET, EXTENDED RELEASE ORAL at 14:08

## 2025-04-19 RX ADMIN — MORPHINE SULFATE 2 MG: 4 INJECTION, SOLUTION INTRAMUSCULAR; INTRAVENOUS at 09:44

## 2025-04-19 ASSESSMENT — ENCOUNTER SYMPTOMS
VOMITING: 0
CHILLS: 0
SHORTNESS OF BREATH: 0
FEVER: 0
BACK PAIN: 1
SORE THROAT: 1
HEADACHES: 0
ABDOMINAL PAIN: 0
WEAKNESS: 1
SINUS PAIN: 1
NAUSEA: 0
COUGH: 1

## 2025-04-19 ASSESSMENT — PAIN DESCRIPTION - PAIN TYPE
TYPE: ACUTE PAIN
TYPE: ACUTE PAIN

## 2025-04-19 NOTE — PROGRESS NOTES
Note to reader: this note follows the APSO format rather than the historical SOAP format. Assessment and plan located at the top of the note for ease of use.    Chief Complaint  79 y.o. year old male here with Influenza A, right distal ureteral stones    Assessment/Plan  Interval History   Influenza A  Right ureteral stones  Dysphagia      Medical expulsive therapy  Strain all urine and monitor for stone passage  Surgical treatment for stones once recovers from Influenza, high anesthesia risk at present        4/19+. NGT placed yesterday for feeding purposes due to dysphagia. O2 remains similar rate, unable to wean lower.     4/18. Pain improved now that is receiving pain medications. Creat normal. Urine clear via condom catheter. On high flow O2 but rate need improved. Dysphagia, not on Flomax    4/17. Endorses pain in left flank, preventing him from ambulating. Has not been requesting pain medication however. Condom catheter in place due to urinary urgency. Remain on high flow O2. Febrile WBC normal. Creat normal.            Review of Systems  Physical Exam   ROS  Vitals:    04/19/25 0704 04/19/25 0820 04/19/25 1114 04/19/25 1532   BP: (!) 140/68  (!) 147/73 (!) 147/69   Pulse: 87 85 99 (!) 105   Resp: 18 18 18 20   Temp: 37.3 °C (99.1 °F)  37.7 °C (99.9 °F) (!) 38.4 °C (101.2 °F)   TempSrc: Temporal  Temporal Temporal   SpO2: 93% 94% 95% 96%   Weight:       Height:         Physical Exam  Nursing note reviewed. Exam conducted with a chaperone present.   Constitutional:       Appearance: Normal appearance.   HENT:      Head: Normocephalic and atraumatic.      Nose: Nose normal.      Mouth/Throat:      Mouth: Mucous membranes are moist.   Eyes:      Pupils: Pupils are equal, round, and reactive to light.   Pulmonary:      Effort: Pulmonary effort is normal.   Abdominal:      General: Abdomen is flat. There is no distension.      Palpations: Abdomen is soft.      Tenderness: There is no abdominal tenderness.    Genitourinary:     Comments: Condom catheter in place draining lacey urine  Skin:     General: Skin is warm and dry.   Neurological:      General: No focal deficit present.      Mental Status: He is alert and oriented to person, place, and time.   Psychiatric:         Mood and Affect: Mood normal.         Behavior: Behavior normal.          Hematology Chemistry   Lab Results   Component Value Date/Time    WBC 9.3 04/18/2025 12:32 AM    HEMOGLOBIN 11.8 (L) 04/18/2025 12:32 AM    HEMATOCRIT 35.7 (L) 04/18/2025 12:32 AM    PLATELETCT 175 04/18/2025 12:32 AM     Lab Results   Component Value Date/Time    SODIUM 140 04/19/2025 12:38 AM    POTASSIUM 3.6 04/19/2025 12:38 AM    CHLORIDE 105 04/19/2025 12:38 AM    CO2 24 04/19/2025 12:38 AM    GLUCOSE 88 04/19/2025 12:38 AM    BUN 12 04/19/2025 12:38 AM    CREATININE 0.51 04/19/2025 12:38 AM         Labs not explicitly included in this progress note were reviewed by the author.   Radiology/imaging not explicitly included in this progress note was reviewed by the author.     Medications reviewed and Labs reviewed

## 2025-04-19 NOTE — CARE PLAN
The patient is Stable - Low risk of patient condition declining or worsening    Shift Goals  Clinical Goals: Monitor O2, Strain urine, pain management  Patient Goals: Rest, comfort  Family Goals: Updates, feel better    Progress made toward(s) clinical / shift goals:  Patient updated on POC during shift. Patient remained free from fall throughout with safety precautions in place. Patient tolerated hiflow throughout shift.     Problem: Knowledge Deficit - Standard  Goal: Patient and family/care givers will demonstrate understanding of plan of care, disease process/condition, diagnostic tests and medications  Outcome: Progressing     Problem: Fall Risk  Goal: Patient will remain free from falls  Outcome: Progressing     Problem: Respiratory  Goal: Patient will achieve/maintain optimum respiratory ventilation and gas exchange  Outcome: Progressing     Problem: Pain - Standard  Goal: Alleviation of pain or a reduction in pain to the patient’s comfort goal  Outcome: Progressing     Patient is not progressing towards the following goals:

## 2025-04-19 NOTE — PROGRESS NOTES
Telemetry Shift Summary     Rhythm: SR-ST  HR:   Ectopy: o-f PAC, rPVC    Measurements: .11/.12/.33    Normal Values  Rhythm: SR  HR:   Measurements: 0.12-0.20/0.08-0.10/0.30-0.52

## 2025-04-19 NOTE — PROGRESS NOTES
Bedside report received from evan Bernal. Patient awake and alert in bed A&Ox4. Bed is locked and in low position with bed alarm on. Reviewed plan of care with patient. Family at bedside. Call light within reach. No other needs at this time.

## 2025-04-19 NOTE — PROGRESS NOTES
[facility] Medicine Daily Progress Note    Date of Service  4/19/2025    Chief Complaint  Mj Cornejo is a 79 y.o. male admitted 4/16/2025 with acute hypoxic respiratory     Hospital Course  Patient was admitted to the hospital and started on Tamiflu, after further discussion he was noted to have significant dysphagia and was evaluated by speech therapy and felt to be unsafe for all textures.  After admission patient required escalation of oxygen to high flow- initially family declined feeding tube stating that they wanted him to be able to eat what ever he wanted however after discussion with palliative care and the risk of aspiration while he already has severe respiratory failure they agreed to temporary NG feedings.    Interval Problem Update  RN trying to turn down to 30 L but patient desaturated and remains at 50 L.  He denies shortness of breath but is still having some cough.    He has a regular NG tube and it is quite uncomfortable for him advised RN he should be changed to soft feeding tube.  ICU nurse will come down later today to switch.    NG insertion was somewhat traumatic yesterday and patient did require sedation with oral Ativan.    He is again having low back pain due to his kidney stones    Son is at bedside    I have discussed this patient's plan of care and discharge plan at IDT rounds today with Case Management, Nursing, Nursing leadership, and other members of the IDT team.    Consultants/Specialty  palliative care    Code Status  DNAR/DNI    Disposition  The patient is not medically cleared for discharge to home or a post-acute facility.      I have placed the appropriate orders for post-discharge needs.    Review of Systems  Review of Systems   Constitutional:  Negative for chills and fever.   HENT:  Positive for sinus pain (Due to NG tube) and sore throat (Due to NG tube).    Respiratory:  Positive for cough. Negative for shortness of breath.    Cardiovascular:  Negative for  chest pain.   Gastrointestinal:  Negative for abdominal pain, nausea and vomiting.   Musculoskeletal:  Positive for back pain.   Neurological:  Positive for weakness. Negative for headaches.        Physical Exam  Temp:  [36.9 °C (98.4 °F)-37.6 °C (99.6 °F)] 37.3 °C (99.1 °F)  Pulse:  [] 87  Resp:  [17-18] 18  BP: (128-225)/(67-99) 140/68  SpO2:  [93 %-95 %] 93 %    Physical Exam  Vitals and nursing note reviewed.   Constitutional:       Appearance: He is not ill-appearing, toxic-appearing or diaphoretic.      Comments: Appears younger than chronologic age   HENT:      Head: Normocephalic and atraumatic.      Nose:      Comments: Nasogastric tube in place, not currently on tube feeding, tube has bile in it.     Mouth/Throat:      Mouth: Mucous membranes are moist.   Eyes:      Extraocular Movements: Extraocular movements intact.      Pupils: Pupils are equal, round, and reactive to light.   Pulmonary:      Effort: No respiratory distress.      Breath sounds: No wheezing or rhonchi.      Comments: Diminished breath sounds  Abdominal:      General: Bowel sounds are normal. There is no distension.      Palpations: Abdomen is soft.      Tenderness: There is no abdominal tenderness.   Musculoskeletal:      Right lower leg: No edema.      Left lower leg: No edema.   Skin:     General: Skin is warm.   Neurological:      General: No focal deficit present.      Mental Status: He is alert and oriented to person, place, and time. Mental status is at baseline.      Cranial Nerves: No cranial nerve deficit.      Motor: Weakness (Seems improved-has been getting up to the chair) present.   Psychiatric:         Mood and Affect: Mood normal.         Behavior: Behavior normal.         Fluids    Intake/Output Summary (Last 24 hours) at 4/19/2025 0750  Last data filed at 4/19/2025 0600  Gross per 24 hour   Intake --   Output 1900 ml   Net -1900 ml        Laboratory  Recent Labs     04/16/25  0815 04/17/25  0310 04/18/25  0032    WBC 11.6* 9.5 9.3   RBC 3.97* 3.72* 3.44*   HEMOGLOBIN 13.7* 12.7* 11.8*   HEMATOCRIT 40.2* 38.8* 35.7*   .3* 104.3* 103.8*   MCH 34.5* 34.1* 34.3*   MCHC 34.1 32.7 33.1   RDW 45.7 47.8 46.9   PLATELETCT 130* 145* 175   MPV 11.5 11.4 11.2     Recent Labs     04/17/25  0310 04/18/25  0032 04/19/25  0038   SODIUM 139 138 140   POTASSIUM 3.5* 3.3* 3.6   CHLORIDE 107 105 105   CO2 20 24 24   GLUCOSE 65 92 88   BUN 10 16 12   CREATININE 0.58 0.62 0.51   CALCIUM 10.3 10.3 9.6             Recent Labs     04/17/25  0310   TRIGLYCERIDE 161*   HDL 39*   LDL 61       Imaging  DX-ABDOMEN FOR TUBE PLACEMENT   Final Result         Gastric drainage tube with tip projecting over the expected area of the duodenum.      CT-LSPINE W/O PLUS RECONS   Final Result      1.  No evidence of fracture of the lumbar spine.      2.  Multilevel degenerative disc disease and facet degeneration.      CT-ABDOMEN-PELVIS WITH   Final Result         1.  Bibasilar pulmonary consolidation, left greater than right.      2.  Multiple right lower ureteral stones located 2 to 3 cm above the ureterovesical junction. The largest measures 6 mm in size. There is minimal right-sided hydronephrosis.      3.  4 mm left lower pole renal calyceal stone.      4.  Sigmoid diverticulosis.      5.  Cholelithiasis.      6.   Fatty liver with multiple hepatic cysts.      7.  Enlarged prostate gland.      8.  Mild urinary bladder wall thickening which may indicate chronic outlet obstruction.      DX-CHEST-PORTABLE (1 VIEW)   Final Result      Left lower lobe pneumonia.           Assessment/Plan  * Acute respiratory failure with hypoxemia (HCC)- (present on admission)  Assessment & Plan  Patient comes in with a 3-day cough.  He is now hypoxic at 86% on room air  Patient with worsening oxygen demands, now on high flow  Influenza A positive  On Tamiflu      DNR (do not resuscitate)- (present on admission)  Assessment & Plan  I discussed advance directives with the  patient he is DO NOT RESUSCITATE CODE STATUS.    Hydronephrosis- (present on admission)  Assessment & Plan  Patient has mild hydronephrosis and ureterolithiasis on the right side  Urology has been consulted, not recommending surgery- on cardura, pain is improved    Thrombocytopenia (HCC)- (present on admission)  Assessment & Plan  Resolved- likely 2/2 viral syndrome on presentation    Leukocytosis- (present on admission)  Assessment & Plan  resolved    Hypokalemia- (present on admission)  Assessment & Plan  resolved    Influenza A- (present on admission)  Assessment & Plan  On Tamiflu  Droplet precautions    Dysphagia- (present on admission)  Assessment & Plan  Family agreeable with short-term NG feeding while patient is in respiratory failure  Orders placed  Palliative care consultation appreciated    Hypercalcemia- (present on admission)  Assessment & Plan  Resolved w/ hydration    Chronic bilateral low back pain without sciatica- (present on admission)  Assessment & Plan  Patient's bilateral pain is secondary to kidney stones both left and right.  The patient on the right has multiple stones which are causing hydronephrosis and pain  The left side the patient has 1 solitary stone that is 4 mm and should be able to pass and it does not cause hydronephrosis.  For now continue with pain management optimization    Essential hypertension- (present on admission)  Assessment & Plan  Continue home medications  Continue as needed medications in hospital and monitor    Macrocytic anemia- (present on admission)  Assessment & Plan  Recent B12 level 516    Dyslipidemia  Assessment & Plan  Patient has a history of dyslipidemia  He was on statin but he is not actively not taking these  I will check a fasting lipid panel    Seizure (HCC)- (present on admission)  Assessment & Plan  Patient has a history of seizures.  Neurology is following him as an outpatient  Currently he is not on any medications for seizure management.          VTE prophylaxis:    enoxaparin ppx      I have performed a physical exam and reviewed and updated ROS and Plan today (4/19/2025). In review of yesterday's note (4/18/2025), there are no changes except as documented above.

## 2025-04-19 NOTE — PROGRESS NOTES
Telemetry summary    Rhythm SR  Rate 75-87  Ectopy r-fPVC/PAC  Measurements 0.15/0.10/0.43    Normal Values  Rhythm SR  HR Range   Measurements 0.12-0.20/0.06-0.10/0.30-0.52

## 2025-04-19 NOTE — PROGRESS NOTES
@ 1524 RN Attempted NGT placement. Pt became distressed while insertion, HR reached 207 /99, Pt oral suctioned. NG tube removed with a scant amount of blood on tube.  Repeat /71. Dr. Dooley notified.     @1600 NGT successfully reinserted. X-ray obtained for placement. Per dr. Dooley NGT ok to use for meds.

## 2025-04-19 NOTE — DIETARY
"Nutrition Services: Tube Feeding Assessment     Day 3 of admit. Mj Cornejo is 79 y.o., male with admitting DX of Acute respiratory failure with hypoxemia (HCC) [J96.01].    Consult Received for: EN    Current Hospital Problems List:      Acute respiratory failure with hypoxemia (HCC) (POA: Yes)    Seizure (HCC) (POA: Yes)    Polycythemia (POA: Yes)    Essential hypertension (POA: Yes)    Chronic bilateral low back pain without sciatica (POA: Yes)    Hypercalcemia (POA: Yes)    Hyperparathyroidism, unspecified (HCC) (POA: Yes)    Dysphagia (POA: Yes)    Influenza A (POA: Yes)    Hypokalemia (POA: Yes)    Leukocytosis (POA: Yes)    Thrombocytopenia (HCC) (POA: Yes)    Hydronephrosis (POA: Yes)    DNR (do not resuscitate) (POA: Yes)    Severe protein-calorie malnutrition (HCC) (POA: Yes)  Resolved Problems:    Subclinical hypothyroidism (POA: Yes)    Macrocytosis without anemia (POA: Yes)       Nutrition Assessment:      Height: 152.4 cm (5')  Weight: 68.3 kg (150 lb 9.2 oz)  Weight taken via: Bed Scale  BMI Calculated: 28.85  BMI Classification: Overweight       Weight Readings from Last 5 encounters:   Wt Readings from Last 5 Encounters:   25 68.3 kg (150 lb 9.2 oz)   25 60.7 kg (133 lb 13.1 oz)   24 58.2 kg (128 lb 4.9 oz)   10/30/24 57.7 kg (127 lb 4.8 oz)   24 55.2 kg (121 lb 11.2 oz)       Calculation Equation:   REE via MSJ x 1.3-1.4 = 1626 - 1751 kcal   Total Calories / day: 1700  - 1800 Calories / k  - 26   Total Grams Protein / day:  82  - 102 Grams Protein / k.2 - 1.5      Objective:   Pertinent Medical Hx: hypercholesterolemia, HTN, seizure   Indication for Nutrition Support: Unable to meet nutrition needs orally. \"Palliative care met with patient and son and they have decided for short-term NG tube feedings while patient has respiratory failure\" (per MD note 25)   Enteral Access:   Enteral Tube 25 Nasogastric 14 Fr. Left nare (Active)    "   Pertinent Labs: .8, phos 1.9, mag WNL, potassium increased from 3.3 to 3.6, vit B-12 565  Pertinent Meds: amlodipine, Cardura, potassium chloride, Zofran PRN   Skin/Wounds: none per flowsheets   Food Allergies: NKFA  Last BM:  Type 5: Soft blob with clear cut edges (passed easily)   (4/18/2025)   Formula based on RD Eval: Jevity 1.2 [place]      Current Diet Order/Intake:   NPO      Nutrition Diagnosis:      Swallowing Difficulty related to severe oropharyngeal dysphagia  as evidenced by pt's inability to sustain adequate oral nutrition .      Nutrition Interventions:      Initiate Jevity 1.2 at 25 ml/hr continuous and advance per protocol to goal rate of 60 ml/hr.  Goal tube feed volume provides 1728 kcals, 80 g protein, and 1162 ml free water daily.   Recommend close f/u with SLP and tapering TF appropriately if PO diet initiated  Patient aware of active plan of care as appropriate.       Nutrition Monitoring and Evaluation:      Monitor nutrition POC, goal for 100% of nutrition needs to be met through Tube feeding at this time   Additional fluids per MD/DO  Monitor vital signs pertinent to nutrition.    RD following and will provide updated recommendations as indicated.      Rohith Braun R.D.                                         [facility]ITER[facility] FOR MALNUTRITION

## 2025-04-19 NOTE — CARE PLAN
Problem: Humidified High Flow Nasal Cannula  Goal: Maintain adequate oxygenation dependent on patient condition  Description: Target End Date:  resolve prior to discharge or when underlying condition is resolved/stabilized1.  Implement humidified high flow oxygen therapy2.  Titrate high flow oxygen to maintain appropriate SpO2  Outcome: Not Progressing

## 2025-04-19 NOTE — CARE PLAN
The patient is Watcher - Medium risk of patient condition declining or worsening    Shift Goals  Clinical Goals: Monitor O2, strain Urine  Patient Goals: rest, comfort  Family Goals: updates    Progress made toward(s) clinical / shift goals:  Pt/family educated on aspiration risk. NGT placed. Family demonstrates understanding. POC ongoing.     Patient is not progressing towards the following goals:

## 2025-04-20 ENCOUNTER — APPOINTMENT (OUTPATIENT)
Dept: RADIOLOGY | Facility: MEDICAL CENTER | Age: 80
DRG: 177 | End: 2025-04-20
Attending: HOSPITALIST
Payer: MEDICARE

## 2025-04-20 PROBLEM — R50.9 FEVER: Status: ACTIVE | Noted: 2025-04-20

## 2025-04-20 LAB
ANION GAP SERPL CALC-SCNC: 8 MMOL/L (ref 7–16)
BUN SERPL-MCNC: 14 MG/DL (ref 8–22)
CALCIUM SERPL-MCNC: 9.6 MG/DL (ref 8.5–10.5)
CHLORIDE SERPL-SCNC: 103 MMOL/L (ref 96–112)
CO2 SERPL-SCNC: 24 MMOL/L (ref 20–33)
CREAT SERPL-MCNC: 0.61 MG/DL (ref 0.5–1.4)
CRP SERPL HS-MCNC: 11.9 MG/DL (ref 0–0.75)
ERYTHROCYTE [DISTWIDTH] IN BLOOD BY AUTOMATED COUNT: 46 FL (ref 35.9–50)
GFR SERPLBLD CREATININE-BSD FMLA CKD-EPI: 97 ML/MIN/1.73 M 2
GLUCOSE SERPL-MCNC: 140 MG/DL (ref 65–99)
HCT VFR BLD AUTO: 35.3 % (ref 42–52)
HGB BLD-MCNC: 11.6 G/DL (ref 14–18)
MAGNESIUM SERPL-MCNC: 2 MG/DL (ref 1.5–2.5)
MCH RBC QN AUTO: 34.2 PG (ref 27–33)
MCHC RBC AUTO-ENTMCNC: 32.9 G/DL (ref 32.3–36.5)
MCV RBC AUTO: 104.1 FL (ref 81.4–97.8)
PHOSPHATE SERPL-MCNC: 1.7 MG/DL (ref 2.5–4.5)
PLATELET # BLD AUTO: 278 K/UL (ref 164–446)
PMV BLD AUTO: 10.4 FL (ref 9–12.9)
POTASSIUM SERPL-SCNC: 3.6 MMOL/L (ref 3.6–5.5)
RBC # BLD AUTO: 3.39 M/UL (ref 4.7–6.1)
SODIUM SERPL-SCNC: 135 MMOL/L (ref 135–145)
WBC # BLD AUTO: 10.5 K/UL (ref 4.8–10.8)

## 2025-04-20 PROCEDURE — 85027 COMPLETE CBC AUTOMATED: CPT

## 2025-04-20 PROCEDURE — 51798 US URINE CAPACITY MEASURE: CPT

## 2025-04-20 PROCEDURE — 700111 HCHG RX REV CODE 636 W/ 250 OVERRIDE (IP): Mod: JZ | Performed by: HOSPITALIST

## 2025-04-20 PROCEDURE — 770020 HCHG ROOM/CARE - TELE (206)

## 2025-04-20 PROCEDURE — 84100 ASSAY OF PHOSPHORUS: CPT

## 2025-04-20 PROCEDURE — A9270 NON-COVERED ITEM OR SERVICE: HCPCS | Performed by: HOSPITALIST

## 2025-04-20 PROCEDURE — 99232 SBSQ HOSP IP/OBS MODERATE 35: CPT | Performed by: INTERNAL MEDICINE

## 2025-04-20 PROCEDURE — 36415 COLL VENOUS BLD VENIPUNCTURE: CPT

## 2025-04-20 PROCEDURE — 83735 ASSAY OF MAGNESIUM: CPT

## 2025-04-20 PROCEDURE — 700102 HCHG RX REV CODE 250 W/ 637 OVERRIDE(OP): Performed by: HOSPITALIST

## 2025-04-20 PROCEDURE — 700102 HCHG RX REV CODE 250 W/ 637 OVERRIDE(OP): Performed by: INTERNAL MEDICINE

## 2025-04-20 PROCEDURE — 86140 C-REACTIVE PROTEIN: CPT

## 2025-04-20 PROCEDURE — 94760 N-INVAS EAR/PLS OXIMETRY 1: CPT

## 2025-04-20 PROCEDURE — 80048 BASIC METABOLIC PNL TOTAL CA: CPT

## 2025-04-20 PROCEDURE — 84134 ASSAY OF PREALBUMIN: CPT

## 2025-04-20 PROCEDURE — 94640 AIRWAY INHALATION TREATMENT: CPT

## 2025-04-20 PROCEDURE — A9270 NON-COVERED ITEM OR SERVICE: HCPCS | Performed by: INTERNAL MEDICINE

## 2025-04-20 RX ORDER — DOXAZOSIN 2 MG/1
2 TABLET ORAL 2 TIMES DAILY
Status: DISCONTINUED | OUTPATIENT
Start: 2025-04-20 | End: 2025-04-22

## 2025-04-20 RX ADMIN — POTASSIUM CHLORIDE 20 MEQ: 1500 TABLET, EXTENDED RELEASE ORAL at 18:03

## 2025-04-20 RX ADMIN — AMLODIPINE BESYLATE 10 MG: 5 TABLET ORAL at 06:13

## 2025-04-20 RX ADMIN — OSELTAMIVIR PHOSPHATE 75 MG: 75 CAPSULE ORAL at 06:13

## 2025-04-20 RX ADMIN — ACETAMINOPHEN 650 MG: 325 TABLET, FILM COATED ORAL at 14:35

## 2025-04-20 RX ADMIN — OSELTAMIVIR PHOSPHATE 75 MG: 75 CAPSULE ORAL at 18:03

## 2025-04-20 RX ADMIN — POTASSIUM CHLORIDE 20 MEQ: 1500 TABLET, EXTENDED RELEASE ORAL at 09:04

## 2025-04-20 RX ADMIN — OXYCODONE HYDROCHLORIDE 5 MG: 5 TABLET ORAL at 13:48

## 2025-04-20 RX ADMIN — POTASSIUM CHLORIDE 20 MEQ: 1500 TABLET, EXTENDED RELEASE ORAL at 13:48

## 2025-04-20 RX ADMIN — DOXAZOSIN 2 MG: 2 TABLET ORAL at 06:12

## 2025-04-20 RX ADMIN — ENOXAPARIN SODIUM 40 MG: 100 INJECTION SUBCUTANEOUS at 18:04

## 2025-04-20 RX ADMIN — DOXAZOSIN 2 MG: 2 TABLET ORAL at 18:03

## 2025-04-20 RX ADMIN — POTASSIUM CHLORIDE 20 MEQ: 1500 TABLET, EXTENDED RELEASE ORAL at 21:42

## 2025-04-20 ASSESSMENT — ENCOUNTER SYMPTOMS
SHORTNESS OF BREATH: 0
COUGH: 1
ABDOMINAL PAIN: 0
NAUSEA: 0
SPUTUM PRODUCTION: 1
SINUS PAIN: 0
HEADACHES: 0
SORE THROAT: 0
BACK PAIN: 1
WEAKNESS: 1
FEVER: 1
CHILLS: 0
FLANK PAIN: 1
VOMITING: 0

## 2025-04-20 ASSESSMENT — PAIN DESCRIPTION - PAIN TYPE
TYPE: ACUTE PAIN

## 2025-04-20 NOTE — CARE PLAN
The patient is Watcher - Medium risk of patient condition declining or worsening    Shift Goals  Clinical Goals: monitor O2, monitor heart rhythms  Patient Goals: rest  Family Goals: Updates, feel better    Progress made toward(s) clinical / shift goals:      Pt remains on High Flow nasal cannula. Condom catheter in place and straining fluids for kidney stones. Tube feed increased to 50mL/hr. Pt tolerating rate change.    Problem: Respiratory  Goal: Patient will achieve/maintain optimum respiratory ventilation and gas exchange  Outcome: Progressing     Problem: Urinary Elimination  Goal: Establish and maintain regular urinary output  Outcome: Progressing     Problem: Gastrointestinal Irritability  Goal: Nausea and vomiting will be absent or improve  Outcome: Progressing

## 2025-04-20 NOTE — PROGRESS NOTES
4 Eyes Skin Assessment Completed by LUIS Morel and LUIS Schneider.    Head WDL  Ears WDL  Nose Skin surrounding the nose is red and blanching from High flow nasal cannula  Mouth WDL  Neck WDL  Breast/Chest WDL  Shoulder Blades WDL  Spine WDL  (R) Arm/Elbow/Hand WDL  (L) Arm/Elbow/Hand WDL  Abdomen WDL  Groin WDL  Scrotum/Coccyx/Buttocks Redness and Blanching  (R) Leg Scar  (L) Leg Scar  (R) Heel/Foot/Toe Blanching, dry and flaky  (L) Heel/Foot/Toe Blanching, dry and flaky          Devices In Places Tele Box, Blood Pressure Cuff, Pulse Ox, OG/NG, and HFNC      Interventions In Place Pillows and Low Air Loss Mattress    Possible Skin Injury No    Pictures Uploaded Into Epic N/A  Wound Consult Placed N/A  RN Wound Prevention Protocol Ordered Yes

## 2025-04-20 NOTE — PROGRESS NOTES
Telemetry Shift Summary     Rhythm SR/ST/PSVT with a BBB  HR Range   Ectopy r-oPACs, rPVCs  Measurements  0.11/0.12/0.35  Per strip printed 1600     Normal Values  Rhythm SR  HR Range    Measurements 0.12-0.20 / 0.06-0.10  / 0.30-0.52

## 2025-04-20 NOTE — PROGRESS NOTES
Received report from LUIS Bernal. Pt is Korean speaking only with family at bedside, A+Ox4, has an IRIS with continuous TPN, and is on High flow nasal canula at 30L and 50% oxygen.

## 2025-04-20 NOTE — PROGRESS NOTES
Note to reader: this note follows the APSO format rather than the historical SOAP format. Assessment and plan located at the top of the note for ease of use.    Chief Complaint  79 y.o. year old male here with Influenza A, right distal ureteral stones    Assessment/Plan  Interval History   Influenza A  Right ureteral stones  Dysphagia      Medical expulsive therapy  Strain all urine and monitor for stone passage  Surgical treatment for stones once recovers from Influenza, high anesthesia risk at present      Discussed with family (daughter) and Urology-Dr. Tana RG  Patient seen and examined    4/20. Fevers continue. O2 demand unchanged. NGT remains. Creatinine has been normal.     4/19. NGT placed yesterday for feeding purposes due to dysphagia. O2 remains similar rate, unable to wean lower.     4/18. Pain improved now that is receiving pain medications. Creat normal. Urine clear via condom catheter. On high flow O2 but rate need improved. Dysphagia, not on Flomax    4/17. Endorses pain in left flank, preventing him from ambulating. Has not been requesting pain medication however. Condom catheter in place due to urinary urgency. Remain on high flow O2. Febrile WBC normal. Creat normal.            Review of Systems  Physical Exam   ROS  Vitals:    04/20/25 1101 04/20/25 1426 04/20/25 1445 04/20/25 1527   BP: 139/67   120/56   Pulse: (!) 103  (!) 101 93   Resp: (!) 22  18 18   Temp: 37.1 °C (98.7 °F) (!) 39.1 °C (102.3 °F)  36.9 °C (98.5 °F)   TempSrc: Temporal Oral  Temporal   SpO2: 93%  93% 94%   Weight:       Height:         Physical Exam  Nursing note reviewed. Exam conducted with a chaperone present.   Constitutional:       Appearance: Normal appearance.      Comments: Sleeping   HENT:      Head: Normocephalic and atraumatic.      Nose: Nose normal.      Mouth/Throat:      Mouth: Mucous membranes are moist.   Eyes:      Pupils: Pupils are equal, round, and reactive to light.   Pulmonary:      Effort: Pulmonary  effort is normal.   Abdominal:      General: Abdomen is flat. There is no distension.      Palpations: Abdomen is soft.      Tenderness: There is no abdominal tenderness.   Genitourinary:     Comments: Condom catheter in place draining lacey urine  Skin:     General: Skin is warm and dry.   Neurological:      General: No focal deficit present.      Mental Status: He is alert and oriented to person, place, and time.   Psychiatric:         Mood and Affect: Mood normal.         Behavior: Behavior normal.          Hematology Chemistry   Lab Results   Component Value Date/Time    WBC 10.5 04/20/2025 01:17 AM    HEMOGLOBIN 11.6 (L) 04/20/2025 01:17 AM    HEMATOCRIT 35.3 (L) 04/20/2025 01:17 AM    PLATELETCT 278 04/20/2025 01:17 AM     Lab Results   Component Value Date/Time    SODIUM 135 04/20/2025 01:17 AM    POTASSIUM 3.6 04/20/2025 01:17 AM    CHLORIDE 103 04/20/2025 01:17 AM    CO2 24 04/20/2025 01:17 AM    GLUCOSE 140 (H) 04/20/2025 01:17 AM    BUN 14 04/20/2025 01:17 AM    CREATININE 0.61 04/20/2025 01:17 AM         Labs not explicitly included in this progress note were reviewed by the author.   Radiology/imaging not explicitly included in this progress note was reviewed by the author.     Medications reviewed and Labs reviewed

## 2025-04-20 NOTE — PROGRESS NOTES
Telemetry Shift Summary     Rhythm: SR-ST  HR: 80-100s  Ectopy: rPAC, rPVC  6 beats run VTACH. MD notified.     Measurements: .14/.10/.33    Normal Values  Rhythm: SR  HR:   Measurements: 0.12-0.20/0.08-0.10/0.30-0.52

## 2025-04-21 ENCOUNTER — APPOINTMENT (OUTPATIENT)
Dept: RADIOLOGY | Facility: MEDICAL CENTER | Age: 80
DRG: 177 | End: 2025-04-21
Attending: INTERNAL MEDICINE
Payer: MEDICARE

## 2025-04-21 LAB
APPEARANCE UR: ABNORMAL
BACTERIA #/AREA URNS HPF: ABNORMAL /HPF
BACTERIA BLD CULT: NORMAL
BACTERIA BLD CULT: NORMAL
BILIRUB UR QL STRIP.AUTO: NEGATIVE
CASTS URNS QL MICRO: ABNORMAL /LPF (ref 0–2)
COLOR UR: ABNORMAL
CRP SERPL HS-MCNC: 13.1 MG/DL (ref 0–0.75)
EPITHELIAL CELLS 1715: ABNORMAL /HPF (ref 0–5)
GLUCOSE BLD STRIP.AUTO-MCNC: 146 MG/DL (ref 65–99)
GLUCOSE UR STRIP.AUTO-MCNC: NEGATIVE MG/DL
KETONES UR STRIP.AUTO-MCNC: NEGATIVE MG/DL
LEUKOCYTE ESTERASE UR QL STRIP.AUTO: ABNORMAL
MICRO URNS: ABNORMAL
NITRITE UR QL STRIP.AUTO: POSITIVE
PH UR STRIP.AUTO: 7 [PH] (ref 5–8)
PREALB SERPL-MCNC: 7.1 MG/DL (ref 18–38)
PREALB SERPL-MCNC: 8 MG/DL (ref 18–38)
PROCALCITONIN SERPL-MCNC: 0.14 NG/ML
PROT UR QL STRIP: 30 MG/DL
RBC # URNS HPF: ABNORMAL /HPF
RBC UR QL AUTO: NEGATIVE
SIGNIFICANT IND 70042: NORMAL
SIGNIFICANT IND 70042: NORMAL
SITE SITE: NORMAL
SITE SITE: NORMAL
SOURCE SOURCE: NORMAL
SOURCE SOURCE: NORMAL
SP GR UR STRIP.AUTO: 1.01
UROBILINOGEN UR STRIP.AUTO-MCNC: >=8 EU/DL
WBC #/AREA URNS HPF: ABNORMAL /HPF
YEAST BUDDING URNS QL: PRESENT /HPF

## 2025-04-21 PROCEDURE — 700111 HCHG RX REV CODE 636 W/ 250 OVERRIDE (IP): Mod: JZ | Performed by: INTERNAL MEDICINE

## 2025-04-21 PROCEDURE — 87086 URINE CULTURE/COLONY COUNT: CPT

## 2025-04-21 PROCEDURE — 700102 HCHG RX REV CODE 250 W/ 637 OVERRIDE(OP): Performed by: INTERNAL MEDICINE

## 2025-04-21 PROCEDURE — 700101 HCHG RX REV CODE 250: Performed by: INTERNAL MEDICINE

## 2025-04-21 PROCEDURE — 81001 URINALYSIS AUTO W/SCOPE: CPT

## 2025-04-21 PROCEDURE — 700105 HCHG RX REV CODE 258: Performed by: INTERNAL MEDICINE

## 2025-04-21 PROCEDURE — A9270 NON-COVERED ITEM OR SERVICE: HCPCS | Performed by: HOSPITALIST

## 2025-04-21 PROCEDURE — 700111 HCHG RX REV CODE 636 W/ 250 OVERRIDE (IP): Mod: JZ | Performed by: HOSPITALIST

## 2025-04-21 PROCEDURE — 94640 AIRWAY INHALATION TREATMENT: CPT

## 2025-04-21 PROCEDURE — 302043 TAPE, HYPAFIX: Performed by: INTERNAL MEDICINE

## 2025-04-21 PROCEDURE — 86140 C-REACTIVE PROTEIN: CPT

## 2025-04-21 PROCEDURE — 94760 N-INVAS EAR/PLS OXIMETRY 1: CPT

## 2025-04-21 PROCEDURE — 770020 HCHG ROOM/CARE - TELE (206)

## 2025-04-21 PROCEDURE — 87186 SC STD MICRODIL/AGAR DIL: CPT

## 2025-04-21 PROCEDURE — 84134 ASSAY OF PREALBUMIN: CPT

## 2025-04-21 PROCEDURE — 87077 CULTURE AEROBIC IDENTIFY: CPT

## 2025-04-21 PROCEDURE — 82962 GLUCOSE BLOOD TEST: CPT

## 2025-04-21 PROCEDURE — 99233 SBSQ HOSP IP/OBS HIGH 50: CPT | Performed by: INTERNAL MEDICINE

## 2025-04-21 PROCEDURE — 84145 PROCALCITONIN (PCT): CPT

## 2025-04-21 PROCEDURE — 87040 BLOOD CULTURE FOR BACTERIA: CPT

## 2025-04-21 PROCEDURE — 700102 HCHG RX REV CODE 250 W/ 637 OVERRIDE(OP): Performed by: HOSPITALIST

## 2025-04-21 PROCEDURE — A9270 NON-COVERED ITEM OR SERVICE: HCPCS | Performed by: INTERNAL MEDICINE

## 2025-04-21 RX ADMIN — OSELTAMIVIR PHOSPHATE 75 MG: 75 CAPSULE ORAL at 05:51

## 2025-04-21 RX ADMIN — DOXAZOSIN 2 MG: 2 TABLET ORAL at 17:00

## 2025-04-21 RX ADMIN — SENNOSIDES AND DOCUSATE SODIUM 2 TABLET: 50; 8.6 TABLET ORAL at 18:00

## 2025-04-21 RX ADMIN — AMPICILLIN AND SULBACTAM 3 G: 1; 2 INJECTION, POWDER, FOR SOLUTION INTRAMUSCULAR; INTRAVENOUS at 17:11

## 2025-04-21 RX ADMIN — POTASSIUM CHLORIDE 20 MEQ: 1500 TABLET, EXTENDED RELEASE ORAL at 08:03

## 2025-04-21 RX ADMIN — DOXAZOSIN 2 MG: 2 TABLET ORAL at 05:51

## 2025-04-21 RX ADMIN — POTASSIUM CHLORIDE 20 MEQ: 1500 TABLET, EXTENDED RELEASE ORAL at 20:50

## 2025-04-21 RX ADMIN — ACETAMINOPHEN 650 MG: 325 TABLET, FILM COATED ORAL at 10:16

## 2025-04-21 RX ADMIN — POTASSIUM CHLORIDE 20 MEQ: 1500 TABLET, EXTENDED RELEASE ORAL at 17:06

## 2025-04-21 RX ADMIN — AMLODIPINE BESYLATE 10 MG: 5 TABLET ORAL at 05:51

## 2025-04-21 RX ADMIN — ACETAMINOPHEN 650 MG: 325 TABLET, FILM COATED ORAL at 17:00

## 2025-04-21 RX ADMIN — AMPICILLIN AND SULBACTAM 3 G: 1; 2 INJECTION, POWDER, FOR SOLUTION INTRAMUSCULAR; INTRAVENOUS at 12:51

## 2025-04-21 RX ADMIN — ENOXAPARIN SODIUM 40 MG: 100 INJECTION SUBCUTANEOUS at 17:00

## 2025-04-21 RX ADMIN — POTASSIUM PHOSPHATE, MONOBASIC AND POTASSIUM PHOSPHATE, DIBASIC 30 MMOL: 224; 236 INJECTION, SOLUTION, CONCENTRATE INTRAVENOUS at 09:58

## 2025-04-21 RX ADMIN — POTASSIUM CHLORIDE 20 MEQ: 1500 TABLET, EXTENDED RELEASE ORAL at 12:40

## 2025-04-21 ASSESSMENT — ENCOUNTER SYMPTOMS
ABDOMINAL PAIN: 0
SORE THROAT: 0
FLANK PAIN: 0
SPUTUM PRODUCTION: 1
NAUSEA: 0
SHORTNESS OF BREATH: 0
CHILLS: 0
SINUS PAIN: 0
WEAKNESS: 1
HEADACHES: 0
COUGH: 1
FLANK PAIN: 1
FEVER: 1
BACK PAIN: 1
VOMITING: 0

## 2025-04-21 ASSESSMENT — PAIN DESCRIPTION - PAIN TYPE: TYPE: ACUTE PAIN

## 2025-04-21 NOTE — CARE PLAN
Problem: Knowledge Deficit - Standard  Goal: Patient and family/care givers will demonstrate understanding of plan of care, disease process/condition, diagnostic tests and medications  Outcome: Progressing     Problem: Respiratory  Goal: Patient will achieve/maintain optimum respiratory ventilation and gas exchange  Outcome: Progressing     Problem: Urinary Elimination  Goal: Establish and maintain regular urinary output  Outcome: Progressing   The patient is Stable - Low risk of patient condition declining or worsening    Shift Goals  Clinical Goals: Monitor VS & O2  Patient Goals: rest  Family Goals: up to the commode for a bowel movement    Progress made toward(s) clinical / shift goals:      Patient is not progressing towards the following goals:

## 2025-04-21 NOTE — DIETARY
Nutrition Services: Follow-up for tube feed status   Day 5 of admit. Mj Cornejo is a 79 y.o. male with admitting DX of Acute respiratory failure with hypoxemia (HCC) [J96.01]    Pt's tube feed of Jevity 1.2 is at goal rate of 60 mL/hour.    Current diet order:   NPO    Labs: 4/20/25: sodium=135, glucose=140, Bun=14, phos=1.7    Meds: KCl, potassium phosphate, senna-docusate    GI: LBM on 4/18/25      Nutrition Dx: Swallowing Difficulty related to severe oropharyngeal dysphagia  as evidenced by pt's inability to sustain adequate oral nutrition .      Nutrition Dx Status: Ongoing     Problem: Nutritional:  Goal: Achieve adequate nutritional intake  Description: Tube feed will provide > 80% of pt's nutrition needs  Outcome: met      Plan/ Recommendations:      Continue with Jevity 1.2 at goal rate of 60 mL/hour as ordered.  Monitor weight.  Initiation of diet per SLP     RD following

## 2025-04-21 NOTE — PROGRESS NOTES
[facility] Medicine Daily Progress Note    Date of Service  4/20/2025    Chief Complaint  Mj Cornejo is a 79 y.o. male admitted 4/16/2025 with acute hypoxic respiratory     Hospital Course  Patient was admitted to the hospital and started on Tamiflu, after further discussion he was noted to have significant dysphagia and was evaluated by speech therapy and felt to be unsafe for all textures.  After admission patient required escalation of oxygen to high flow- initially family declined feeding tube stating that they wanted him to be able to eat what ever he wanted however after discussion with palliative care and the risk of aspiration while he already has severe respiratory failure they agreed to temporary NG feedings.    Interval Problem Update  Remains on high flow nasal cannula, now has feeding tube in place of regular NG tube and is tolerating tube feeds.  Labs unremarkable cultures negative from yesterday's fever, spiked a fever again this afternoon over 102 has no new symptoms.    Son at bedside    I have discussed this patient's plan of care and discharge plan at IDT rounds today with Case Management, Nursing, Nursing leadership, and other members of the IDT team.    Consultants/Specialty  palliative care    Code Status  DNAR/DNI    Disposition  The patient is not medically cleared for discharge to home or a post-acute facility.  Anticipate discharge to: home with close outpatient follow-up    I have placed the appropriate orders for post-discharge needs.    Review of Systems  Review of Systems   Constitutional:  Positive for fever. Negative for chills.   HENT:  Negative for sinus pain (Better) and sore throat (Better).    Respiratory:  Positive for cough and sputum production. Negative for shortness of breath.    Cardiovascular:  Negative for chest pain.   Gastrointestinal:  Negative for abdominal pain, nausea and vomiting.   Genitourinary:  Positive for flank pain.   Musculoskeletal:  Positive  for back pain.   Neurological:  Positive for weakness. Negative for headaches.        Physical Exam  Temp:  [36.8 °C (98.2 °F)-39.1 °C (102.3 °F)] 36.9 °C (98.5 °F)  Pulse:  [] 93  Resp:  [18-22] 18  BP: (120-142)/(56-72) 120/56  SpO2:  [90 %-96 %] 94 %    Physical Exam  Vitals and nursing note reviewed.   Constitutional:       Appearance: He is not ill-appearing, toxic-appearing or diaphoretic.      Comments: Appears younger than chronologic age   HENT:      Head: Normocephalic and atraumatic.      Nose:      Comments: Small caliber feeding tube in place with tube feed     Mouth/Throat:      Mouth: Mucous membranes are moist.   Eyes:      Extraocular Movements: Extraocular movements intact.      Pupils: Pupils are equal, round, and reactive to light.   Pulmonary:      Effort: No respiratory distress.      Breath sounds: No wheezing or rhonchi.      Comments: Diminished breath sounds  Abdominal:      General: Bowel sounds are normal. There is no distension.      Palpations: Abdomen is soft.      Tenderness: There is no abdominal tenderness.   Musculoskeletal:      Right lower leg: No edema.      Left lower leg: No edema.   Skin:     General: Skin is warm.   Neurological:      General: No focal deficit present.      Mental Status: He is alert and oriented to person, place, and time. Mental status is at baseline.      Cranial Nerves: No cranial nerve deficit.      Motor: Weakness (About the same) present.   Psychiatric:         Mood and Affect: Mood normal.         Behavior: Behavior normal.         Fluids    Intake/Output Summary (Last 24 hours) at 4/20/2025 1709  Last data filed at 4/20/2025 1300  Gross per 24 hour   Intake 600 ml   Output 850 ml   Net -250 ml        Laboratory  Recent Labs     04/18/25  0032 04/19/25  1800 04/20/25  0117   WBC 9.3 12.0* 10.5   RBC 3.44* 3.59* 3.39*   HEMOGLOBIN 11.8* 12.3* 11.6*   HEMATOCRIT 35.7* 37.0* 35.3*   .8* 103.1* 104.1*   MCH 34.3* 34.3* 34.2*   MCHC 33.1 33.2  32.9   RDW 46.9 45.4 46.0   PLATELETCT 175 276 278   MPV 11.2 10.0 10.4     Recent Labs     04/18/25  0032 04/19/25  0038 04/20/25  0117   SODIUM 138 140 135   POTASSIUM 3.3* 3.6 3.6   CHLORIDE 105 105 103   CO2 24 24 24   GLUCOSE 92 88 140*   BUN 16 12 14   CREATININE 0.62 0.51 0.61   CALCIUM 10.3 9.6 9.6                     Imaging  DX-ABDOMEN FOR TUBE PLACEMENT   Final Result         1.  Nonspecific bowel gas pattern in the upper abdomen.   2.  Nasogastric tube tip terminates overlying the expected location of the second or third duodenal segment.   3.  Bibasilar atelectasis or infiltrates.      DX-CHEST-PORTABLE (1 VIEW)   Final Result      Bibasilar patchy consolidation and volume loss.      DX-ABDOMEN FOR TUBE PLACEMENT   Final Result         Gastric drainage tube with tip projecting over the expected area of the duodenum.      CT-LSPINE W/O PLUS RECONS   Final Result      1.  No evidence of fracture of the lumbar spine.      2.  Multilevel degenerative disc disease and facet degeneration.      CT-ABDOMEN-PELVIS WITH   Final Result         1.  Bibasilar pulmonary consolidation, left greater than right.      2.  Multiple right lower ureteral stones located 2 to 3 cm above the ureterovesical junction. The largest measures 6 mm in size. There is minimal right-sided hydronephrosis.      3.  4 mm left lower pole renal calyceal stone.      4.  Sigmoid diverticulosis.      5.  Cholelithiasis.      6.   Fatty liver with multiple hepatic cysts.      7.  Enlarged prostate gland.      8.  Mild urinary bladder wall thickening which may indicate chronic outlet obstruction.      DX-CHEST-PORTABLE (1 VIEW)   Final Result      Left lower lobe pneumonia.           Assessment/Plan  * Acute respiratory failure with hypoxemia (HCC)- (present on admission)  Assessment & Plan  Due to influenza A, completing Tamiflu, remains dependent on high flow oxygen still has cough    Fever  Assessment & Plan  Patient spiked over 101 yesterday  afternoon, today over 102  Chest x-ray with no new findings, WBCs better than on admission.  Continues to have flank pain but no other symptoms    DNR (do not resuscitate)- (present on admission)  Assessment & Plan  I discussed advance directives with the patient he is DO NOT RESUSCITATE CODE STATUS.    Hydronephrosis- (present on admission)  Assessment & Plan  Patient has mild hydronephrosis and ureterolithiasis on the right side  Urology has been consulted, not recommending surgery- on cardura, pain is improved    Thrombocytopenia (HCC)- (present on admission)  Assessment & Plan  Resolved- likely 2/2 viral syndrome on presentation    Leukocytosis- (present on admission)  Assessment & Plan  resolved    Hypokalemia- (present on admission)  Assessment & Plan  resolved    Influenza A- (present on admission)  Assessment & Plan  On Tamiflu  Droplet precautions    Dysphagia- (present on admission)  Assessment & Plan  Family agreeable with short-term NG feeding while patient is in respiratory failure  Has feeding tube and tolerating tube feeds    Hypercalcemia- (present on admission)  Assessment & Plan  Resolved w/ hydration    Chronic bilateral low back pain without sciatica- (present on admission)  Assessment & Plan  Increase Cardura.  Still no plans for surgery from urology due to respiratory status    Essential hypertension- (present on admission)  Assessment & Plan  Continue home medications  Continue as needed medications in hospital and monitor    Macrocytic anemia- (present on admission)  Assessment & Plan  Recent B12 level 516    Dyslipidemia  Assessment & Plan  Patient has a history of dyslipidemia  He was on statin but he is not actively not taking these  I will check a fasting lipid panel    Seizure (HCC)- (present on admission)  Assessment & Plan  Patient has a history of seizures.  Neurology is following him as an outpatient  Currently he is not on any medications for seizure management.         VTE  prophylaxis:    enoxaparin ppx      I have performed a physical exam and reviewed and updated ROS and Plan today (4/20/2025). In review of yesterday's note (4/19/2025), there are no changes except as documented above.

## 2025-04-21 NOTE — PROGRESS NOTES
Received report from RNVeness[facility]. Pt is A+Ox4, on High Flow NC on 30L with 55% oxygen, and tube feed running at goal of 60mL/hr. New tubing set up completed. Bed is locked and in the lowest position, call light within reach.

## 2025-04-21 NOTE — PROGRESS NOTES
Telemetry Shift Summary     Rhythm SR/ST with BBB  HR Range   Ectopy foPAC/rPVC  Measurements  0.16/0.13/0.33     Normal Values  Rhythm SR  HR Range    Measurements 0.12-0.20 / 0.06-0.10  / 0.30-0.52

## 2025-04-21 NOTE — PROGRESS NOTES
Telemetry Shift Summary     Rhythm SR  HR Range 69-96  Ectopy o-fPACs and PVCs  Measurements  0.14/0.09/0.38  Per strip printed 1600     Normal Values  Rhythm SR  HR Range    Measurements 0.12-0.20 / 0.06-0.10  / 0.30-0.52

## 2025-04-21 NOTE — CARE PLAN
The patient is Watcher - Medium risk of patient condition declining or worsening    Shift Goals  Clinical Goals: Monitor VS, monitor oxygen needs  Patient Goals: rest  Family Goals: up to the commode for a bowel movement    Progress made toward(s) clinical / shift goals:      Pt remains on High Flow Nasal Cannula now at 30L of O2 at 45%. Condom cath in place. No urinary stones thus far. Pt is tolerating tube feed and flushes. No bowel movements. Pt and family educated on stool softeners and getting up to the commode with assistance.     Problem: Respiratory  Goal: Patient will achieve/maintain optimum respiratory ventilation and gas exchange  Outcome: Progressing     Problem: Urinary Elimination  Goal: Establish and maintain regular urinary output  Outcome: Progressing     Problem: Gastrointestinal Irritability  Goal: Nausea and vomiting will be absent or improve  Outcome: Progressing  Goal: Diarrhea will be absent or improved  Outcome: Progressing

## 2025-04-21 NOTE — PROGRESS NOTES
Hospital Medicine Daily Progress Note    Date of Service  4/21/2025    Chief Complaint  Mj Cornejo is a 79 y.o. male admitted 4/16/2025 with acute hypoxic respiratory     Hospital Course  Patient was admitted to the hospital and started on Tamiflu, after further discussion he was noted to have significant dysphagia and was evaluated by speech therapy and felt to be unsafe for all textures.  After admission patient required escalation of oxygen to high flow- initially family declined feeding tube stating that they wanted him to be able to eat what ever he wanted however after discussion with palliative care and the risk of aspiration while he already has severe respiratory failure they agreed to temporary NG feedings.    Interval Problem Update  Patient spiked fever again, today his cough is much wetter sounding and he is much more weak and puny, urine in the catheter tubing (condom cath) still of clears clear however urinalysis now shows nitrates and leukocyte esterase that were not present on admission.  Blood cultures were repeated, chest x-ray is still pending, procalcitonin is negative.  Patient will be started empirically on Unasyn, can be de-escalated to Rocephin or Ancef once urine cultures result    Son at bedside had questions regarding patient's outpatient infusions with Dr. Florence-explained that patient could not resume immunologic therapy until he has recovered fully from any infections.     pad used for discussion- #[id number]  Tana Pratt    I have discussed this patient's plan of care and discharge plan at IDT rounds today with Case Management, Nursing, Nursing leadership, and other members of the IDT team.    Consultants/Specialty  palliative care    Code Status  DNAR/DNI    Disposition  The patient is not medically cleared for discharge to home or a post-acute facility.  Anticipate discharge to: TBD, ? LTAC    I have placed the appropriate orders for post-discharge  needs.    Review of Systems  Review of Systems   Constitutional:  Positive for fever and malaise/fatigue (Worse). Negative for chills.   HENT:  Negative for sinus pain and sore throat.    Respiratory:  Positive for cough (Worse) and sputum production. Negative for shortness of breath.    Cardiovascular:  Negative for chest pain.   Gastrointestinal:  Negative for abdominal pain, nausea and vomiting.   Genitourinary:  Positive for flank pain (Ongoing).   Musculoskeletal:  Positive for back pain (Ongoing).   Neurological:  Positive for weakness (Worse). Negative for headaches.        Physical Exam  Temp:  [36.9 °C (98.4 °F)-38.4 °C (101.2 °F)] 37.2 °C (98.9 °F)  Pulse:  [85-98] 89  Resp:  [16-20] 20  BP: (120-151)/(56-67) 130/64  SpO2:  [92 %-95 %] 94 %    Physical Exam  Vitals and nursing note reviewed.   Constitutional:       Appearance: He is diaphoretic (waxy/shiny). He is not ill-appearing or toxic-appearing.      Comments: Appears younger than chronologic age  Sleepy, more puny than prior today   HENT:      Head: Normocephalic and atraumatic.      Nose:      Comments: Small caliber feeding tube in place with tube feed     Mouth/Throat:      Mouth: Mucous membranes are moist.   Eyes:      Extraocular Movements: Extraocular movements intact.      Pupils: Pupils are equal, round, and reactive to light.   Pulmonary:      Effort: No respiratory distress.      Breath sounds: No wheezing or rhonchi.      Comments: Diminished breath sounds  Abdominal:      General: Bowel sounds are normal. There is no distension.      Palpations: Abdomen is soft.      Tenderness: There is no abdominal tenderness.   Genitourinary:     Comments: Urine in condom cath tubing remains clear  Musculoskeletal:      Right lower leg: No edema.      Left lower leg: No edema.   Skin:     General: Skin is warm.   Neurological:      Cranial Nerves: No cranial nerve deficit.      Motor: Weakness (About the same) present.      Comments: Very drowsy, not  talking much today         Fluids    Intake/Output Summary (Last 24 hours) at 4/21/2025 1449  Last data filed at 4/21/2025 1246  Gross per 24 hour   Intake 800 ml   Output 1200 ml   Net -400 ml        Laboratory  Recent Labs     04/19/25  1800 04/20/25  0117   WBC 12.0* 10.5   RBC 3.59* 3.39*   HEMOGLOBIN 12.3* 11.6*   HEMATOCRIT 37.0* 35.3*   .1* 104.1*   MCH 34.3* 34.2*   MCHC 33.2 32.9   RDW 45.4 46.0   PLATELETCT 276 278   MPV 10.0 10.4     Recent Labs     04/19/25  0038 04/20/25  0117   SODIUM 140 135   POTASSIUM 3.6 3.6   CHLORIDE 105 103   CO2 24 24   GLUCOSE 88 140*   BUN 12 14   CREATININE 0.51 0.61   CALCIUM 9.6 9.6                     Imaging  DX-ABDOMEN FOR TUBE PLACEMENT   Final Result      Feeding tube tip overlies the gastric fundus. Feeding tube tip overlies the gastric fundus.      DX-ABDOMEN FOR TUBE PLACEMENT   Final Result         1.  Nonspecific bowel gas pattern in the upper abdomen.   2.  Nasogastric tube tip terminates overlying the expected location of the second or third duodenal segment.   3.  Bibasilar atelectasis or infiltrates.      DX-CHEST-PORTABLE (1 VIEW)   Final Result      Bibasilar patchy consolidation and volume loss.      DX-ABDOMEN FOR TUBE PLACEMENT   Final Result         Gastric drainage tube with tip projecting over the expected area of the duodenum.      CT-LSPINE W/O PLUS RECONS   Final Result      1.  No evidence of fracture of the lumbar spine.      2.  Multilevel degenerative disc disease and facet degeneration.      CT-ABDOMEN-PELVIS WITH   Final Result         1.  Bibasilar pulmonary consolidation, left greater than right.      2.  Multiple right lower ureteral stones located 2 to 3 cm above the ureterovesical junction. The largest measures 6 mm in size. There is minimal right-sided hydronephrosis.      3.  4 mm left lower pole renal calyceal stone.      4.  Sigmoid diverticulosis.      5.  Cholelithiasis.      6.   Fatty liver with multiple hepatic cysts.       7.  Enlarged prostate gland.      8.  Mild urinary bladder wall thickening which may indicate chronic outlet obstruction.      DX-CHEST-PORTABLE (1 VIEW)   Final Result      Left lower lobe pneumonia.           Assessment/Plan  * Acute respiratory failure with hypoxemia (HCC)- (present on admission)  Assessment & Plan  Due to influenza A, completing Tamiflu, remains dependent on high flow oxygen still has cough    Fever  Assessment & Plan  Patient has been febrile the last 3 days, he is noted to have a wet junky cough today which was not present previously  Blood cultures from the other day are still negative.  Chest x-ray is pending but urine was positive for nitrites and leukocyte esterase when it was not the other day  He has nephrolithiasis now complicated by probable UTI, urine culture has been ordered, he will be started on Unasyn.    DNR (do not resuscitate)- (present on admission)  Assessment & Plan  I discussed advance directives with the patient he is DO NOT RESUSCITATE CODE STATUS.    Hydronephrosis- (present on admission)  Assessment & Plan  Patient has mild hydronephrosis and ureterolithiasis on the right side  Urology has been consulted, not recommending surgery- on cardura, pain is improved    Thrombocytopenia (HCC)- (present on admission)  Assessment & Plan  Resolved- likely 2/2 viral syndrome on presentation    Leukocytosis- (present on admission)  Assessment & Plan  resolved    Hypokalemia- (present on admission)  Assessment & Plan  resolved    Influenza A- (present on admission)  Assessment & Plan  Completed Tamiflu  Droplet precautions    Dysphagia- (present on admission)  Assessment & Plan  Family agreeable with short-term NG feeding while patient is in respiratory failure  Has feeding tube and tolerating tube feeds    Hypercalcemia- (present on admission)  Assessment & Plan  Resolved w/ hydration    Chronic bilateral low back pain without sciatica- (present on admission)  Assessment &  Plan  Increase Cardura.  Still no plans for surgery from urology due to respiratory status    Essential hypertension- (present on admission)  Assessment & Plan  Continue home medications  Continue as needed medications in hospital and monitor    Macrocytic anemia- (present on admission)  Assessment & Plan  Recent B12 level 516    Dyslipidemia  Assessment & Plan  Patient has a history of dyslipidemia  He was on statin but he is not actively not taking these  I will check a fasting lipid panel    Seizure (HCC)- (present on admission)  Assessment & Plan  Patient has a history of seizures.  Neurology is following him as an outpatient-he was on immunotherapy  Currently he is not on any medications for seizure management.         VTE prophylaxis:    enoxaparin ppx      I have performed a physical exam and reviewed and updated ROS and Plan today (4/21/2025). In review of yesterday's note (4/20/2025), there are no changes except as documented above.

## 2025-04-21 NOTE — PROGRESS NOTES
Note to reader: this note follows the APSO format rather than the historical SOAP format. Assessment and plan located at the top of the note for ease of use.    Chief Complaint  79 y.o. year old male here with Influenza A, right distal ureteral stones    Assessment/Plan  Interval History   Influenza A  Right ureteral stones  Dysphagia    Plan:  -Medical expulsive therapy-- continue doxazosin  -Strain all urine and monitor for stone passage  -If develops worsening renal function or flank pain, would consider neph tube placement (would need to be done at [facility] with IR) as pt is very poor candidate for general anesthesia given current respiratory issues  - Urology will continue to follow      Case discussed with Dr Devi, who has directed this patient's plan of care.      Patient seen and examined    4/21. Fevers improving, ID managing antimicrobials. Family at bedside provided translation. Pt denies pain, no hematuria noted in catheter tubing. Tolerating tube feeds. Cr WNL.    4/20. Fevers continue. O2 demand unchanged. NGT remains. Creatinine has been normal.     4/19. NGT placed yesterday for feeding purposes due to dysphagia. O2 remains similar rate, unable to wean lower.     4/18. Pain improved now that is receiving pain medications. Creat normal. Urine clear via condom catheter. On high flow O2 but rate need improved. Dysphagia, not on Flomax    4/17. Endorses pain in left flank, preventing him from ambulating. Has not been requesting pain medication however. Condom catheter in place due to urinary urgency. Remain on high flow O2. Febrile WBC normal. Creat normal.            Review of Systems  Physical Exam   Review of Systems   Constitutional:  Positive for fever.   Gastrointestinal:  Negative for abdominal pain.   Genitourinary:  Negative for dysuria, flank pain and hematuria.   All other systems reviewed and are negative.    Vitals:    04/21/25 0739 04/21/25 1012 04/21/25 1113 04/21/25 1204    BP: (!) 151/67   130/64   Pulse: 98  91 85   Resp: 18  16 18   Temp: 37.5 °C (99.5 °F) (!) 38.4 °C (101.2 °F)  37.2 °C (98.9 °F)   TempSrc: Temporal   Temporal   SpO2: 92%  94% 94%   Weight:       Height:         Physical Exam  Vitals and nursing note reviewed.   Constitutional:       Appearance: Normal appearance.      Comments: Sleeping   HENT:      Head: Normocephalic and atraumatic.      Nose: Nose normal.      Mouth/Throat:      Mouth: Mucous membranes are moist.   Eyes:      Extraocular Movements: Extraocular movements intact.      Conjunctiva/sclera: Conjunctivae normal.   Pulmonary:      Effort: Pulmonary effort is normal.   Abdominal:      General: Abdomen is flat. There is no distension.      Palpations: Abdomen is soft.      Tenderness: There is no abdominal tenderness. There is no right CVA tenderness or left CVA tenderness.   Genitourinary:     Comments: Condom catheter in place draining lacey urine  Musculoskeletal:      Cervical back: Normal range of motion.   Skin:     General: Skin is warm and dry.   Neurological:      General: No focal deficit present.      Mental Status: He is alert and oriented to person, place, and time.   Psychiatric:         Mood and Affect: Mood normal.         Behavior: Behavior normal.          Hematology Chemistry   Lab Results   Component Value Date/Time    WBC 10.5 04/20/2025 01:17 AM    HEMOGLOBIN 11.6 (L) 04/20/2025 01:17 AM    HEMATOCRIT 35.3 (L) 04/20/2025 01:17 AM    PLATELETCT 278 04/20/2025 01:17 AM     Lab Results   Component Value Date/Time    SODIUM 135 04/20/2025 01:17 AM    POTASSIUM 3.6 04/20/2025 01:17 AM    CHLORIDE 103 04/20/2025 01:17 AM    CO2 24 04/20/2025 01:17 AM    GLUCOSE 140 (H) 04/20/2025 01:17 AM    BUN 14 04/20/2025 01:17 AM    CREATININE 0.61 04/20/2025 01:17 AM         Labs not explicitly included in this progress note were reviewed by the author.   Radiology/imaging not explicitly included in this progress note was reviewed by the author.      Medications reviewed, Labs reviewed and Radiology images reviewed

## 2025-04-21 NOTE — CARE PLAN
The patient is Stable - Low risk of patient condition declining or worsening    Shift Goals  Clinical Goals: Monitor VS, feeding tube  Patient Goals: Rest  Family Goals: Updates, feel better    Progress made toward(s) clinical / shift goals:      Problem: Knowledge Deficit - Standard  Goal: Patient and family/care givers will demonstrate understanding of plan of care, disease process/condition, diagnostic tests and medications  Outcome: Progressing     Problem: Fall Risk  Goal: Patient will remain free from falls  Outcome: Progressing     Problem: Respiratory  Goal: Patient will achieve/maintain optimum respiratory ventilation and gas exchange  Outcome: Progressing     Problem: Pain - Standard  Goal: Alleviation of pain or a reduction in pain to the patient’s comfort goal  Outcome: Progressing       Patient is not progressing towards the following goals:

## 2025-04-22 ENCOUNTER — APPOINTMENT (OUTPATIENT)
Dept: RADIOLOGY | Facility: MEDICAL CENTER | Age: 80
DRG: 177 | End: 2025-04-22
Attending: HOSPITALIST
Payer: MEDICARE

## 2025-04-22 ENCOUNTER — APPOINTMENT (OUTPATIENT)
Dept: RADIOLOGY | Facility: MEDICAL CENTER | Age: 80
DRG: 177 | End: 2025-04-22
Attending: STUDENT IN AN ORGANIZED HEALTH CARE EDUCATION/TRAINING PROGRAM
Payer: MEDICARE

## 2025-04-22 PROBLEM — R13.12 OROPHARYNGEAL DYSPHAGIA: Status: ACTIVE | Noted: 2024-09-17

## 2025-04-22 PROBLEM — J69.0 ASPIRATION PNEUMONIA OF BOTH LOWER LOBES (HCC): Status: ACTIVE | Noted: 2025-04-22

## 2025-04-22 LAB
ANION GAP SERPL CALC-SCNC: 7 MMOL/L (ref 7–16)
BUN SERPL-MCNC: 10 MG/DL (ref 8–22)
CALCIUM SERPL-MCNC: 10.2 MG/DL (ref 8.5–10.5)
CHLORIDE SERPL-SCNC: 103 MMOL/L (ref 96–112)
CO2 SERPL-SCNC: 26 MMOL/L (ref 20–33)
CREAT SERPL-MCNC: 0.52 MG/DL (ref 0.5–1.4)
GFR SERPLBLD CREATININE-BSD FMLA CKD-EPI: 102 ML/MIN/1.73 M 2
GLUCOSE SERPL-MCNC: 102 MG/DL (ref 65–99)
POTASSIUM SERPL-SCNC: 4.4 MMOL/L (ref 3.6–5.5)
SODIUM SERPL-SCNC: 136 MMOL/L (ref 135–145)

## 2025-04-22 PROCEDURE — 71045 X-RAY EXAM CHEST 1 VIEW: CPT

## 2025-04-22 PROCEDURE — 700105 HCHG RX REV CODE 258: Performed by: INTERNAL MEDICINE

## 2025-04-22 PROCEDURE — 94640 AIRWAY INHALATION TREATMENT: CPT

## 2025-04-22 PROCEDURE — 700102 HCHG RX REV CODE 250 W/ 637 OVERRIDE(OP): Performed by: STUDENT IN AN ORGANIZED HEALTH CARE EDUCATION/TRAINING PROGRAM

## 2025-04-22 PROCEDURE — 700102 HCHG RX REV CODE 250 W/ 637 OVERRIDE(OP): Performed by: HOSPITALIST

## 2025-04-22 PROCEDURE — 97530 THERAPEUTIC ACTIVITIES: CPT

## 2025-04-22 PROCEDURE — A9270 NON-COVERED ITEM OR SERVICE: HCPCS | Performed by: INTERNAL MEDICINE

## 2025-04-22 PROCEDURE — A9270 NON-COVERED ITEM OR SERVICE: HCPCS | Performed by: STUDENT IN AN ORGANIZED HEALTH CARE EDUCATION/TRAINING PROGRAM

## 2025-04-22 PROCEDURE — 92611 MOTION FLUOROSCOPY/SWALLOW: CPT

## 2025-04-22 PROCEDURE — 700111 HCHG RX REV CODE 636 W/ 250 OVERRIDE (IP): Mod: JZ | Performed by: INTERNAL MEDICINE

## 2025-04-22 PROCEDURE — 80048 BASIC METABOLIC PNL TOTAL CA: CPT

## 2025-04-22 PROCEDURE — 99291 CRITICAL CARE FIRST HOUR: CPT | Performed by: STUDENT IN AN ORGANIZED HEALTH CARE EDUCATION/TRAINING PROGRAM

## 2025-04-22 PROCEDURE — 700111 HCHG RX REV CODE 636 W/ 250 OVERRIDE (IP): Mod: JZ | Performed by: HOSPITALIST

## 2025-04-22 PROCEDURE — 92526 ORAL FUNCTION THERAPY: CPT

## 2025-04-22 PROCEDURE — 94760 N-INVAS EAR/PLS OXIMETRY 1: CPT

## 2025-04-22 PROCEDURE — 74230 X-RAY XM SWLNG FUNCJ C+: CPT

## 2025-04-22 PROCEDURE — A9270 NON-COVERED ITEM OR SERVICE: HCPCS | Performed by: HOSPITALIST

## 2025-04-22 PROCEDURE — 700102 HCHG RX REV CODE 250 W/ 637 OVERRIDE(OP): Performed by: INTERNAL MEDICINE

## 2025-04-22 PROCEDURE — 770020 HCHG ROOM/CARE - TELE (206)

## 2025-04-22 RX ORDER — IBUPROFEN 400 MG/1
400 TABLET, FILM COATED ORAL EVERY 6 HOURS PRN
Status: DISCONTINUED | OUTPATIENT
Start: 2025-04-22 | End: 2025-04-24 | Stop reason: HOSPADM

## 2025-04-22 RX ORDER — DOXAZOSIN 2 MG/1
2 TABLET ORAL 2 TIMES DAILY
Status: DISCONTINUED | OUTPATIENT
Start: 2025-04-22 | End: 2025-04-24 | Stop reason: HOSPADM

## 2025-04-22 RX ORDER — MORPHINE SULFATE 4 MG/ML
2 INJECTION INTRAVENOUS
Status: DISCONTINUED | OUTPATIENT
Start: 2025-04-22 | End: 2025-04-24 | Stop reason: HOSPADM

## 2025-04-22 RX ORDER — OXYCODONE HYDROCHLORIDE 5 MG/1
2.5 TABLET ORAL
Refills: 0 | Status: DISCONTINUED | OUTPATIENT
Start: 2025-04-22 | End: 2025-04-24 | Stop reason: HOSPADM

## 2025-04-22 RX ORDER — OXYCODONE HYDROCHLORIDE 5 MG/1
5 TABLET ORAL
Refills: 0 | Status: DISCONTINUED | OUTPATIENT
Start: 2025-04-22 | End: 2025-04-24 | Stop reason: HOSPADM

## 2025-04-22 RX ORDER — ACETAMINOPHEN 500 MG
1000 TABLET ORAL EVERY 6 HOURS PRN
Status: DISCONTINUED | OUTPATIENT
Start: 2025-04-22 | End: 2025-04-24 | Stop reason: HOSPADM

## 2025-04-22 RX ORDER — AMLODIPINE BESYLATE 5 MG/1
10 TABLET ORAL DAILY
Status: DISCONTINUED | OUTPATIENT
Start: 2025-04-23 | End: 2025-04-24 | Stop reason: HOSPADM

## 2025-04-22 RX ADMIN — AMLODIPINE BESYLATE 10 MG: 5 TABLET ORAL at 06:07

## 2025-04-22 RX ADMIN — AMPICILLIN AND SULBACTAM 3 G: 1; 2 INJECTION, POWDER, FOR SOLUTION INTRAMUSCULAR; INTRAVENOUS at 14:45

## 2025-04-22 RX ADMIN — DOXAZOSIN 2 MG: 2 TABLET ORAL at 06:07

## 2025-04-22 RX ADMIN — ENOXAPARIN SODIUM 40 MG: 100 INJECTION SUBCUTANEOUS at 17:19

## 2025-04-22 RX ADMIN — AMPICILLIN AND SULBACTAM 3 G: 1; 2 INJECTION, POWDER, FOR SOLUTION INTRAMUSCULAR; INTRAVENOUS at 00:02

## 2025-04-22 RX ADMIN — AMPICILLIN AND SULBACTAM 3 G: 1; 2 INJECTION, POWDER, FOR SOLUTION INTRAMUSCULAR; INTRAVENOUS at 17:18

## 2025-04-22 RX ADMIN — AMPICILLIN AND SULBACTAM 3 G: 1; 2 INJECTION, POWDER, FOR SOLUTION INTRAMUSCULAR; INTRAVENOUS at 23:38

## 2025-04-22 RX ADMIN — AMPICILLIN AND SULBACTAM 3 G: 1; 2 INJECTION, POWDER, FOR SOLUTION INTRAMUSCULAR; INTRAVENOUS at 06:07

## 2025-04-22 RX ADMIN — DOXAZOSIN 2 MG: 2 TABLET ORAL at 17:19

## 2025-04-22 ASSESSMENT — COGNITIVE AND FUNCTIONAL STATUS - GENERAL
MOVING FROM LYING ON BACK TO SITTING ON SIDE OF FLAT BED: A LITTLE
MOVING TO AND FROM BED TO CHAIR: A LITTLE
STANDING UP FROM CHAIR USING ARMS: A LITTLE
MOBILITY SCORE: 18
SUGGESTED CMS G CODE MODIFIER MOBILITY: CK
CLIMB 3 TO 5 STEPS WITH RAILING: A LOT
WALKING IN HOSPITAL ROOM: A LITTLE

## 2025-04-22 ASSESSMENT — GAIT ASSESSMENTS
DEVIATION: DECREASED BASE OF SUPPORT;BRADYKINETIC;SHUFFLED GAIT
GAIT LEVEL OF ASSIST: MINIMAL ASSIST
DISTANCE (FEET): 50
ASSISTIVE DEVICE: FRONT WHEEL WALKER

## 2025-04-22 ASSESSMENT — ENCOUNTER SYMPTOMS
VOMITING: 0
CONSTIPATION: 0
SPUTUM PRODUCTION: 1
NERVOUS/ANXIOUS: 0
FLANK PAIN: 0
NAUSEA: 0
WEAKNESS: 1
MYALGIAS: 0
BACK PAIN: 0
DEPRESSION: 0
CHILLS: 0
NECK PAIN: 0
SORE THROAT: 1
FEVER: 0
SINUS PAIN: 0
ABDOMINAL PAIN: 0
SHORTNESS OF BREATH: 0
HEADACHES: 0
DIARRHEA: 0
COUGH: 1

## 2025-04-22 ASSESSMENT — PAIN DESCRIPTION - PAIN TYPE
TYPE: ACUTE PAIN
TYPE: ACUTE PAIN

## 2025-04-22 NOTE — DISCHARGE PLANNING
Case Management Discharge Planning    Admission Date: 4/16/2025  GMLOS: 3.5  ALOS: 6    6-Clicks ADL Score: 15  6-Clicks Mobility Score: 18  PT and/or OT Eval ordered: Yes  Post-acute Referrals Ordered: Yes  Post-acute Choice Obtained: No  Has referral(s) been sent to post-acute provider:  Yes      Anticipated Discharge Dispo: Discharge Disposition: D/T to SNF with Medicare cert in anticipation of skilled care (03)    DME Needed: No    Action(s) Taken:     Spoke to pt and son Mj at bedside. Pt lives with Mj at address listed on facesheet. PCP is Jacques Marrero at [facility]. Pt's wife is available at home to assist with pt's care. Daughter Genoveva lives locally and also assists in patient's care and healthcare decisions along with pt and Mj. RNCM presented SNF option for DC. Pt's son Mj states he would like to discuss with Genoveva later today. RNCM provided SNF choice list for pt, Mj, and Genoveva to review.    Escalations Completed: None    Medically Clear: No    Next Steps: Follow-up with medical team to discuss DC needs and barriers. Follow-up with pt and family regarding SNF choice/DC plan.    Barriers to Discharge: Medical clearance            Care Transition Team Assessment    Information Source  Orientation Level: Oriented X4  Information Given By: Patient, Relative (son)  Informant's Name: Mj Barker  Who is responsible for making decisions for patient? : Patient    Readmission Evaluation  Is this a readmission?: No    Elopement Risk  Legal Hold: No  Ambulatory or Self Mobile in Wheelchair: Yes  Disoriented: No  Psychiatric Symptoms: None  History of Wandering: No  Elopement this Admit: No  Vocalizing Wanting to Leave: No  Displays Behaviors, Body Language Wanting to Leave: No-Not at Risk for Elopement  Elopement Risk: Not at Risk for Elopement    Interdisciplinary Discharge Planning  Lives with - Patient's Self Care Capacity: Adult Children, Spouse  Patient or legal guardian  wants to designate a caregiver: No  Support Systems: Children, Family Member(s)  Housing / Facility: [address]  Prior Services: Intermittent Physical Support for ADL Per Family    Discharge Preparedness  What is your plan after discharge?: Skilled nursing facility  What are your discharge supports?: Child, Spouse    Finances  Financial Barriers to Discharge: No  Prescription Coverage: Yes    Vision / Hearing Impairment  Vision Impairment : Yes  Right Eye Vision: Impaired, Wears Glasses, Other (Comments)  Left Eye Vision: Impaired, Wears Glasses, Other (Comments)  Hearing Impairment : No    Advance Directive  Advance Directive?: None    Psychological Assessment  History of Substance Abuse: None  History of Psychiatric Problems: No  Non-compliant with Treatment: No    Discharge Risks or Barriers  Discharge risks or barriers?: No  Patient risk factors: Vulnerable adult    Anticipated Discharge Information  Discharge Disposition: D/T to SNF with Medicare cert in anticipation of skilled care (03)

## 2025-04-22 NOTE — CARE PLAN
The patient is Stable - Low risk of patient condition declining or worsening    Shift Goals  Clinical Goals: Monitor labs and vitals  Patient Goals: rest and comfort  Family Goals: Patient feels better    Progress made toward(s) clinical / shift goals: Patient assessed and consulted with MD at bedside. Patient treated by RT at bedside. Patient assessed and consulted with SLP and had an MBS completed. Patient had NG tube removed. Patient worked with OT/PT. Patient's family at bedside.    Problem: Knowledge Deficit - Standard  Goal: Patient and family/care givers will demonstrate understanding of plan of care, disease process/condition, diagnostic tests and medications  Outcome: Progressing  Note: Patient verbalizes understanding of plan of care and barriers to discharge. Patient asks appropriate questions about plan of care.       Problem: Fall Risk  Goal: Patient will remain free from falls  Outcome: Progressing  Note: Fall precautions are in place. Patient uses call light appropriately.       Problem: Respiratory  Goal: Patient will achieve/maintain optimum respiratory ventilation and gas exchange  Outcome: Progressing  Note: Patient utilizing assistive O2 with pulse ox in place. Patient's oxygen level is titrated, as needed, to maintain oxygen saturation levels per orders.        Patient is not progressing towards the following goals:       normal...

## 2025-04-22 NOTE — PROGRESS NOTES
Note to reader: this note follows the APSO format rather than the historical SOAP format. Assessment and plan located at the top of the note for ease of use.    Chief Complaint  79 y.o. year old male here with Influenza A, right distal ureteral stones    Assessment/Plan  Interval History   Influenza A  Right ureteral stones  Dysphagia    Plan:  -Medical expulsive therapy-- continue doxazosin  -Strain all urine and monitor for stone passage  -If develops worsening renal function or flank pain, would consider neph tube placement (would need to be done at [facility] with IR) as pt is very poor candidate for general anesthesia given current respiratory issues  - Urology will continue to follow      Case discussed with Dr Devi, who has directed this patient's plan of care.      Patient seen and examined    4/22- no acute urologic events overnight. Biggest complaint is his NGT, no active flank pain or issues with catheter. Urine light orange pyridium tinged. No updated cr on rounds.  Estonian translation provided by ipad translation line.      4/21. Fevers improving, ID managing antimicrobials. Family at bedside provided translation. Pt denies pain, no hematuria noted in catheter tubing. Tolerating tube feeds. Cr WNL.    4/20. Fevers continue. O2 demand unchanged. NGT remains. Creatinine has been normal.     4/19. NGT placed yesterday for feeding purposes due to dysphagia. O2 remains similar rate, unable to wean lower.     4/18. Pain improved now that is receiving pain medications. Creat normal. Urine clear via condom catheter. On high flow O2 but rate need improved. Dysphagia, not on Flomax    4/17. Endorses pain in left flank, preventing him from ambulating. Has not been requesting pain medication however. Condom catheter in place due to urinary urgency. Remain on high flow O2. Febrile WBC normal. Creat normal.            Review of Systems  Physical Exam   Review of Systems   Constitutional:  Negative for  fever.   Gastrointestinal:  Negative for abdominal pain.   Genitourinary:  Negative for dysuria, flank pain and hematuria.   All other systems reviewed and are negative.    Vitals:    04/22/25 0007 04/22/25 0206 04/22/25 0500 04/22/25 0728   BP: 119/59  (!) 146/67 124/58   Pulse: 89 81 92 87   Resp: 18 18 16 16   Temp: 36.8 °C (98.2 °F)  36.7 °C (98.1 °F) 36.9 °C (98.5 °F)   TempSrc: Temporal  Temporal Temporal   SpO2: 92% 93% 91% 94%   Weight:       Height:         Physical Exam  Vitals and nursing note reviewed.   Constitutional:       Appearance: Normal appearance.      Comments: Sleeping   HENT:      Head: Normocephalic and atraumatic.      Nose: Nose normal.      Mouth/Throat:      Mouth: Mucous membranes are moist.   Eyes:      Extraocular Movements: Extraocular movements intact.      Conjunctiva/sclera: Conjunctivae normal.   Pulmonary:      Effort: Pulmonary effort is normal.   Abdominal:      General: Abdomen is flat. There is no distension.      Palpations: Abdomen is soft.      Tenderness: There is no abdominal tenderness. There is no right CVA tenderness or left CVA tenderness.   Genitourinary:     Comments: Condom catheter in place draining lacey urine  Musculoskeletal:      Cervical back: Normal range of motion.   Skin:     General: Skin is warm and dry.   Neurological:      General: No focal deficit present.      Mental Status: He is alert and oriented to person, place, and time.   Psychiatric:         Mood and Affect: Mood normal.         Behavior: Behavior normal.          Hematology Chemistry   Lab Results   Component Value Date/Time    WBC 10.5 04/20/2025 01:17 AM    HEMOGLOBIN 11.6 (L) 04/20/2025 01:17 AM    HEMATOCRIT 35.3 (L) 04/20/2025 01:17 AM    PLATELETCT 278 04/20/2025 01:17 AM     Lab Results   Component Value Date/Time    SODIUM 135 04/20/2025 01:17 AM    POTASSIUM 3.6 04/20/2025 01:17 AM    CHLORIDE 103 04/20/2025 01:17 AM    CO2 24 04/20/2025 01:17 AM    GLUCOSE 140 (H) 04/20/2025  01:17 AM    BUN 14 04/20/2025 01:17 AM    CREATININE 0.61 04/20/2025 01:17 AM         Labs not explicitly included in this progress note were reviewed by the author.   Radiology/imaging not explicitly included in this progress note was reviewed by the author.     Core Measures

## 2025-04-22 NOTE — PROGRESS NOTES
Telemetry shift summary:  Rhythm: SR/ AJR/ ST/ BBB/PSVT 183/ PJC  HR Range: 69-96  Ectopies PAC R-FOF/     UT: 0.12  QRS: 0.12  QT: 0.33

## 2025-04-22 NOTE — THERAPY
Physical Therapy   Daily Treatment     Patient Name: Mj Cornejo  Age:  79 y.o., Sex:  male  Medical Record #: [id number]  Today's Date: 4/22/2025     Precautions  Precautions: (P) Fall Risk;Swallow Precautions  Comments: (P) 5 L O2    Assessment    Pt is progressing slower than expected demonstrating increased assistance with STS, Txs, and ambulation. Pt demonstrated with worsening gait mechanics and upright balance, requiring Min A from therapist to maintain standing balance. Pt was able to participate in seated LAQ, marching in place, sit<>stands, and ambulation within room. However, pt activity tolerance was poor as his peak HR reached 154 and SpO2 dec to 85%. Pt required significant rest breaks during session and further activity was deferred due to vital sign concerns. RN made aware of concerns. Given poor progression with mobility and poor functional activity tolerance, recommend post-acute placement for continued physical therapy services prior to discharge home.       Plan    Treatment Plan Status: (P) Continue Current Treatment Plan  Type of Treatment: Bed Mobility, Gait Training, Neuro Re-Education / Balance, Self Care / Home Evaluation, Therapeutic Activities, Therapeutic Exercise  Treatment Frequency: 4 Times per Week  Treatment Duration: Until Therapy Goals Met    DC Equipment Recommendations: (P) Unable to determine at this time  Discharge Recommendations: (P) Recommend post-acute placement for additional physical therapy services prior to discharge home    Objective       04/22/25 1629   Precautions   Precautions Fall Risk;Swallow Precautions   Comments 5 L O2   Vitals   Pulse (!) 154   Pulse Oximetry (!) 85 %   O2 (LPM) 5   O2 Delivery Device Silicone Nasal Cannula   Vitals Comments after standing exercises and ambulation, requires frequent seated rest breaks   Pain 0 - 10 Group   Therapist Pain Assessment Nurse Notified;0   Cognition    Cognition / Consciousness WDL   Level of  Consciousness Alert   Comments pleasant/cooperative   Balance   Sitting Balance (Static) Fair +   Sitting Balance (Dynamic) Fair   Standing Balance (Static) Fair -   Standing Balance (Dynamic) Poor +   Weight Shift Sitting Fair   Weight Shift Standing Fair   Skilled Intervention Verbal Cuing;Postural Facilitation   Comments presents with retropulsion and frequent veering with ambulation and FWW, had slight LOB during ambulation with Min A for correction from therapist   Bed Mobility    Supine to Sit Supervised   Sit to Supine Supervised   Scooting Supervised   Skilled Intervention Verbal Cuing   Gait Analysis   Gait Level Of Assist Minimal Assist   Assistive Device Front Wheel Walker   Distance (Feet) 50   # of Times Distance was Traveled 1   Deviation Decreased Base Of Support;Bradykinetic;Shuffled Gait   Weight Bearing Status fwb   Skilled Intervention Verbal Cuing;Compensatory Strategies;Postural Facilitation   Functional Mobility   Sit to Stand Contact Guard Assist   Bed, Chair, Wheelchair Transfer Minimal Assist   Transfer Method Stand Step   Mobility w/fww use   Skilled Intervention Sequencing;Verbal Cuing   6 Clicks Assessment - How much HELP from from another person do you currently need... (If the patient hasn't done an activity recently, how much help from another person do you think he/she would need if he/she tried?)   Turning from your back to your side while in a flat bed without using bedrails? 4   Moving from lying on your back to sitting on the side of a flat bed without using bedrails? 3   Moving to and from a bed to a chair (including a wheelchair)? 3   Standing up from a chair using your arms (e.g., wheelchair, or bedside chair)? 3   Walking in hospital room? 3   Climbing 3-5 steps with a railing? 2   6 clicks Mobility Score 18   Activity Tolerance   Sitting in Chair NT   Sitting Edge of Bed 8 mins   Standing 5 mins   Comments limited by fatigue, SOB, and high HR concerns along with dec SpO2    Short Term Goals    Short Term Goal # 1 in 6 V pt will perform transfers with quad cane and supervison   Goal Outcome # 1 Progressing slower than expected   Short Term Goal # 2 in 6 V pt will amb using quad cane 150 feet with SBA   Goal Outcome # 2 Progressing slower than expected   Education Group   Role of Physical Therapist Patient Response Patient;Family;Acceptance;Explanation;Verbal Demonstration   Exercises - Supine Patient Response Patient;Family;Acceptance;Explanation;Action Demonstration   Exercises - Seated Patient Response Patient;Family;Acceptance;Explanation;Action Demonstration   Physical Therapy Treatment Plan   Physical Therapy Treatment Plan Continue Current Treatment Plan   Anticipated Discharge Equipment and Recommendations   DC Equipment Recommendations Unable to determine at this time   Discharge Recommendations Recommend post-acute placement for additional physical therapy services prior to discharge home   Interdisciplinary Plan of Care Collaboration   IDT Collaboration with  Nursing   Patient Position at End of Therapy In Bed;Bed Alarm On;Tray Table within Reach;Call Light within Reach;Phone within Reach;Family / Friend in Room   Collaboration Comments aware of visit and recs   Session Information   Date / Session Number  4/22-2 (2/4, 4/24)

## 2025-04-22 NOTE — ASSESSMENT & PLAN NOTE
Secondary to severe oropharyngeal dysphagia.  Palliative care consulted, patient wishes to eat despite risk.  On Unasyn

## 2025-04-22 NOTE — PROGRESS NOTES
This RN was doing hourly rounds on patient when they found patients' NG tube partially pulled out, continuous feed was paused and physician informed and orders were placed for a stat CXR, patient having a moist productive cough which appeared worse than during initial nursing  assessment. Patient with stable saturation on high flow NC.

## 2025-04-22 NOTE — THERAPY
"Speech Language Pathology   Daily Treatment     Patient Name: Mj Cornejo  AGE:  79 y.o., SEX:  male  Medical Record #: [id number]  Date of Service: 4/22/2025    Precautions:  Precautions: Fall Risk, Swallow Precautions     Subjective  RN cleared patient for dysphagia tx session. Patient awake, alert, and with son present at bedside. Entire session translated in Argentine via iPad with [employer] , [place] ([id number]). Per RN, patient pulled dislodged NGT this AM and does not want it replaced. Patient currently NPO/NN and on 25L O2 via HHFNC, but per RT, can likely wean off HFNC today.     Assessment  Patient noted to be expectorating secretions and with frequent use of oral suction via [place]. Patient noted to have intermittent wet/gurgly vocal quality prior to any PO trials. Patient consumed PO trials of ice, thins via tsp, and MTL via tsp. Patient presents with appropriate bolus acceptance and sufficient containment. PO trials of all textures resulted in intermittent coughing, which is concerning for airway invasion. Patient appeared to fatigue quickly. Patient and son educated on Effortful swallow and CTAR exercises, correct technique, and frequency. Patient completed ~5 reps of each with \"fair\" accuracy.    Extensive education provided to patient and son regarding hx of dysphagia, SLP recs, POC, and options for adequate nutrition source via PO despite risks vs NPO/PEG vs PEG with some PO despite risks. Patient and son had several questions and were unable to decide which route they would like to purse as far as nutrition. Patient and son reported that patient's brother and father have both had severe dysphagia, but were able to eat pureed foods w/ thin liquids with much difficulty. Both deny neurological event causing dysphagia, but report it has been progressively getting worse for ~8 years and that patient follows with OP neurologist d/t same. Patient reports \"see my eyelids and my " "throat; they're both the same. They're weak and my family (brother and father) have had the same.\"; son endorsed this.     Clinical Impressions  Patient continues to present with frequent s/sx of aspiration in the setting of influenza A, hx chronic dysphagia, and ARF. Patient is currently without a source of nutrition and patient and son have changed minds two times during this admission as far as eating despite risks vs NPO/NGT for nutrition. Repeat MBSS is warranted to determine SLP recs and POC if patient can wean off HFNC today. Patient and son agreeable and RT and RN to update SLP if patient can wean off HFNC to transport to radiology for MBSS.     Recommendations  Treatment Completed: Dysphagia Treatment     Dysphagia Treatment  Diet Consistency: NPO/NN pending repeat MBSS today  Instrumentation: VFSS (MBSS)  Medication: Non Oral  Oral Care: Q2h    SLP Treatment Plan  Treatment Plan: Dysphagia Treatment, Patient/Family/Caregiver Training  SLP Frequency: 4x Per Week  Estimated Duration: Until Therapy Goals Met    Anticipated Discharge Needs  Discharge Recommendations: Recommend post-acute placement for additional speech therapy services prior to discharge home  Therapy Recommendations Upon DC: Dysphagia Training, Community Re-Integration, Patient / Family / Caregiver Education    Patient / Family Goals  Patient / Family Goal #1: \"I would choose to eat with risk.\"  Goal #1 Outcome: Progressing slower than expected  Short Term Goals  Short Term Goal # 1: Patient will participate in an instrumental swallow study to determine swallow physiology, \"safest\" diet, and guide treatment.  Goal Outcome # 1: Goal met, new goal added  Short Term Goal # 1 B : Patient will consume PU4/TN0 diet despite risks with strict use of swallow and reflux precautions and strategies with minimal s/sx of aspiration or decline in respiratory status.  Goal Outcome  # 1 B: Goal not met    Laya Tran, SLP  "

## 2025-04-22 NOTE — PROGRESS NOTES
Hospital Medicine Daily Progress Note    Date of Service  4/22/2025    Chief Complaint  Mj Cornejo is a 79 y.o. male admitted 4/16/2025 with acute hypoxic respiratory     Hospital Course  Patient was admitted to the hospital and started on Tamiflu, after further discussion he was noted to have significant dysphagia and was evaluated by speech therapy and felt to be unsafe for all textures.  After admission patient required escalation of oxygen to high flow- initially family declined feeding tube stating that they wanted him to be able to eat what ever he wanted however after discussion with palliative care and the risk of aspiration while he already has severe respiratory failure they agreed to temporary NG feedings.    Interval Problem Update    Overnight had partial dislodging of NGT.  The TF were ongoing and had worsened cough indicative of aspiration.  Discussed this morning and he is having more throat pain.  We discussed aspiration risk is not diminished by NGT and may be higher.  It will definitely increase his delirium risk and increase risk of hospital complications.  It is intended for reliable enteral access and nutrition, for which he clearly would prefer to eat despite risk.  We revisited that he had discussed with Palliative Care that his preference was against artificial nutrition and to eat for QOL.  He opted for a pureed diet per chronic dysphagia plan and will work with SLP.  He is weak and per family has not gotten on his feet in the past week.  He denies chest pain, N/V, bowel/bladder dysfunction, dyspnea, other pain.    No new labs this morning.  CXR reviewed, hazy R>L lower lobe infiltrates consistent with aspiration.  KUB resolved. Dobhoff ends in body of stomach.    I have discussed this patient's plan of care and discharge plan at IDT rounds today with Case Management, Nursing, Nursing leadership, and other members of the IDT team.    Consultants/Specialty  palliative care and  urology    Code Status  DNAR/DNI    Disposition  The patient is not medically cleared for discharge to home or a post-acute facility.  Anticipate discharge to: home with organized home healthcare and close outpatient follow-up    I have placed the appropriate orders for post-discharge needs.    Review of Systems  Review of Systems   Constitutional:  Positive for malaise/fatigue. Negative for chills and fever.   HENT:  Positive for sore throat. Negative for sinus pain.    Respiratory:  Positive for cough and sputum production. Negative for shortness of breath.    Cardiovascular:  Negative for chest pain.   Gastrointestinal:  Negative for abdominal pain, constipation, diarrhea, nausea and vomiting.   Genitourinary:  Negative for dysuria, frequency and urgency.   Musculoskeletal:  Negative for back pain, joint pain, myalgias and neck pain.   Neurological:  Positive for weakness. Negative for headaches.   Psychiatric/Behavioral:  Negative for depression. The patient is not nervous/anxious.         Physical Exam  Temp:  [36.7 °C (98 °F)-38.4 °C (101.2 °F)] 36.9 °C (98.5 °F)  Pulse:  [] 87  Resp:  [16-20] 16  BP: (119-152)/(58-67) 124/58  SpO2:  [91 %-95 %] 94 %    Physical Exam  Vitals and nursing note reviewed.   Constitutional:       General: He is not in acute distress.     Appearance: He is ill-appearing ([place]). He is not toxic-appearing or diaphoretic.      Interventions: Nasal cannula in place.      Comments: Appears younger than chronologic age  Sleepy, more puny than prior today   HENT:      Head: Normocephalic.      Nose:      Comments: +NGT     Mouth/Throat:      Mouth: Mucous membranes are dry.   Eyes:      General: No scleral icterus.     Conjunctiva/sclera: Conjunctivae normal.   Cardiovascular:      Rate and Rhythm: Normal rate and regular rhythm.      Pulses: Normal pulses.      Heart sounds: Normal heart sounds. No murmur heard.     No friction rub. No gallop.   Pulmonary:      Effort: Pulmonary  effort is normal. No respiratory distress.      Breath sounds: Decreased breath sounds (All lung fields) present. No wheezing, rhonchi or rales.   Abdominal:      General: Bowel sounds are normal. There is no distension.      Palpations: Abdomen is soft.      Tenderness: There is no abdominal tenderness. There is no guarding or rebound.   Genitourinary:     Comments: +Condom catheter. Urine lacey / clear  Musculoskeletal:      Cervical back: Neck supple.      Right lower leg: No edema.      Left lower leg: No edema.   Skin:     General: Skin is warm and dry.   Neurological:      Mental Status: He is alert.      Comments: Appropriately conversant, moves all extremities   Psychiatric:         Mood and Affect: Mood normal.         Behavior: Behavior normal.         Thought Content: Thought content normal.         Judgment: Judgment normal.         Fluids    Intake/Output Summary (Last 24 hours) at 4/22/2025 0951  Last data filed at 4/22/2025 0503  Gross per 24 hour   Intake 290 ml   Output 1175 ml   Net -885 ml        Laboratory  Recent Labs     04/19/25  1800 04/20/25  0117   WBC 12.0* 10.5   RBC 3.59* 3.39*   HEMOGLOBIN 12.3* 11.6*   HEMATOCRIT 37.0* 35.3*   .1* 104.1*   MCH 34.3* 34.2*   MCHC 33.2 32.9   RDW 45.4 46.0   PLATELETCT 276 278   MPV 10.0 10.4     Recent Labs     04/20/25  0117   SODIUM 135   POTASSIUM 3.6   CHLORIDE 103   CO2 24   GLUCOSE 140*   BUN 14   CREATININE 0.61   CALCIUM 9.6                     Imaging  DX-ABDOMEN FOR TUBE PLACEMENT   Final Result         1.  Nonspecific bowel gas pattern in the upper abdomen.   2.  Dobbhoff tube is seen terminating just distal to the gastroesophageal junction, recommend advancement.   3.  Hazy bilateral lower lung infiltrate      DX-CHEST-PORTABLE (1 VIEW)   Final Result         1.  Hazy bilateral lung opacities compatible with atelectasis and/or infiltrate   2.  Atherosclerosis      DX-ABDOMEN FOR TUBE PLACEMENT   Final Result      Feeding tube tip  overlies the gastric fundus. Feeding tube tip overlies the gastric fundus.      DX-ABDOMEN FOR TUBE PLACEMENT   Final Result         1.  Nonspecific bowel gas pattern in the upper abdomen.   2.  Nasogastric tube tip terminates overlying the expected location of the second or third duodenal segment.   3.  Bibasilar atelectasis or infiltrates.      DX-CHEST-PORTABLE (1 VIEW)   Final Result      Bibasilar patchy consolidation and volume loss.      DX-ABDOMEN FOR TUBE PLACEMENT   Final Result         Gastric drainage tube with tip projecting over the expected area of the duodenum.      CT-LSPINE W/O PLUS RECONS   Final Result      1.  No evidence of fracture of the lumbar spine.      2.  Multilevel degenerative disc disease and facet degeneration.      CT-ABDOMEN-PELVIS WITH   Final Result         1.  Bibasilar pulmonary consolidation, left greater than right.      2.  Multiple right lower ureteral stones located 2 to 3 cm above the ureterovesical junction. The largest measures 6 mm in size. There is minimal right-sided hydronephrosis.      3.  4 mm left lower pole renal calyceal stone.      4.  Sigmoid diverticulosis.      5.  Cholelithiasis.      6.   Fatty liver with multiple hepatic cysts.      7.  Enlarged prostate gland.      8.  Mild urinary bladder wall thickening which may indicate chronic outlet obstruction.      DX-CHEST-PORTABLE (1 VIEW)   Final Result      Left lower lobe pneumonia.           Assessment/Plan  * Acute respiratory failure with hypoxemia (HCC)- (present on admission)  Assessment & Plan  Due to influenza A and then aspiration pneumonia  Currently requiring HHFNC  RT consult    Aspiration pneumonia of both lower lobes (HCC)  Assessment & Plan  Due to oropharyngeal dysphagia confirmed by FEES  Evidenced by R>L lower lobe infiltrates  NGT discontinued - does not reduce and may increase aspiration risk  Palliative Care consulted, he has opted to eat despite risk  Continue empiric unasyn for  aspiration pneumonia due to severe acute hypoxic respiratory failure    Fever- (present on admission)  Assessment & Plan  Patient has been febrile the last 3 days, he is noted to have a wet junky cough today which was not present previously  Likely aspiration pneumonia / pneumonitis AEB R>L lower lobe infiltrates  Blood cultures from the other day are still negative  Initiated on empiric unasyn    DNR (do not resuscitate)- (present on admission)  Assessment & Plan  I discussed advance directives with the patient he is DO NOT RESUSCITATE CODE STATUS.    Hydronephrosis- (present on admission)  Assessment & Plan  Patient has mild hydronephrosis and ureterolithiasis on the right side  Urology has been consulted, not recommending surgery- on cardura, pain is improved    Thrombocytopenia (HCC)- (present on admission)  Assessment & Plan  Resolved- likely 2/2 viral syndrome on presentation    Leukocytosis- (present on admission)  Assessment & Plan  resolved    Hypokalemia- (present on admission)  Assessment & Plan  resolved    Influenza A- (present on admission)  Assessment & Plan  Completed Tamiflu  Droplet precautions    Oropharyngeal dysphagia- (present on admission)  Assessment & Plan  SLP consulted, FEES confirms severe oropharyngeal dysphagia at an consistency  Has evidence of aspiration, trial of NGT has been discontinued  He has opted to eat despite risk - initiated on pureed diet per patient preference    Hypercalcemia- (present on admission)  Assessment & Plan  Resolved w/ hydration    Chronic bilateral low back pain without sciatica- (present on admission)  Assessment & Plan  PT/OT  APAP, low-dose NSAIDs PRN, low-dose oxycodone PRN    Essential hypertension- (present on admission)  Assessment & Plan  Continue home medications  PRNs discontinued - no indication for strict BP control and no proven efficacy in acute care patients    Macrocytic anemia- (present on admission)  Assessment & Plan  Recent B12 level  516    Dyslipidemia  Assessment & Plan  Patient has a history of dyslipidemia  He was on statin but he is not actively not taking these  I will check a fasting lipid panel    History of seizure disorder- (present on admission)  Assessment & Plan  Patient has a history of seizures.  Neurology is following him as an outpatient-he was on immunotherapy  Currently he is not on any medications for seizure management.         VTE prophylaxis:    enoxaparin ppx    I have performed a physical exam and reviewed and updated ROS and Plan today (4/22/2025). In review of yesterday's note (4/21/2025), there are no changes except as documented above.    Patient is critically ill.   The patient continues to have: acute hypoxic respiratory failure  The vital organ system that is affected is the: pulmonary  If untreated there is a high chance of deterioration into: pulmonary arrest  And eventually death.   The critical care that I am providing today is: HHFNC  The critical that has been undertaken is medically complex.   There has been no overlap in critical care time.   Critical Care Time not including procedures: 38 minutes

## 2025-04-22 NOTE — THERAPY
"   Speech Language Pathology   Videofluoroscopic Swallow Study Evaluation     Patient Name: Mj Cornejo  AGE:  79 y.o., SEX:  male  Medical Record #: [id number]  Date of Service: 4/22/2025      History of Present Illness  78 y/o M admitted 4/16/25 with respiratory failure, influenza A, poor nutritional status, electrolyte abnormalities, b/l kidney stones.     PMHx: dysphagia for ~8 years with 80 lb weight loss, hypercholesteremia, HTN, seizure.     SLP Hx:   OP MBSS 2/25/25:   \"severe oropharyngeal dysphagia characterized by impaired laryngeal excursion, impaired pharyngeal shortening, impaired tongue base retraction, and incomplete laryngeal vestibule closure resulting in aspiration of liquids and solids this date. Patient with blunted sensation to aspiration events, without consistent cough response. Cued cough was effective to eliminate penetrated material, but not effective to clear aspirated material. Swallow safety and efficiency are both significantly impaired. Patient is at high risk for aspiration and high risk for inability to sustain nutrition/hydration orally. Suspect dysphagia is chronic in nature given patient report of years of difficulty swallowing. Given patient with good oral hygiene, good mobility, few other commodities reported this date, and no reports of recent pneumonia or other decline in respiratory status, continued PO nutrition/hydration is reasonable. Patient and son educated on risks of continued PO intake and encouraged them to discuss with their MD (visit scheduled with PCP tomorrow AM per son). Counseling regarding non-oral nutrition such as feeding tube is warranted. Should patient chose to continue oral feeding, recommend puree diet, thin liquids. Avoid chewable solids due to airway obstruction risk. Patient and son report understanding. Recommend follow-up with OP SLP for training of swallow exercises targeting impaired swallow physiology demonstrated during todays " "assessment.\"     CXR 4/16: Left lower lobe pneumonia.     CT Abd/Pelvis 4/16:   1.  Bibasilar pulmonary consolidation, left greater than right.  2.  Multiple right lower ureteral stones located 2 to 3 cm above the ureterovesical junction. The largest measures 6 mm in size. There is minimal right-sided hydronephrosis.  3.  4 mm left lower pole renal calyceal stone.  4.  Sigmoid diverticulosis.  5.  Cholelithiasis.  6.   Fatty liver with multiple hepatic cysts.  7.  Enlarged prostate gland.  8.  Mild urinary bladder wall thickening which may indicate chronic outlet obstruction.    Pertinent Information  Current Method of Nutrition: NPO until cleared by speech pathology  Patient Behaviors: Fatigue  Dentition: Scattered dentition, Upper partial, Lower partial  Secretion Management: Excess secretions, suctioning required  Feeding Tube: None  Tracheostomy: No   Factor(s) Affecting Performance: Impaired endurance    Discussed the risks, benefits, and alternatives of the VFSS procedure. Patient/family acknowledged and agreed to proceed.    Assessment  Videofluoroscopic Swallow Study was conducted in the Lateral projection(s) to evaluate oropharyngeal swallow function. A radiology tech was present to assist with the procedure.    Positioning: Seated upright in fluoroscopy chair  Anatomic View: Other (Some bulking/thickening of PPW; see photos below)  Bolus Administration: SLP, Patient  PO Barium Contrast Trials: Varibar thin, Varibar nectar (mildly thick), Varibar pudding      Consistency PAS Score Timing Residue Comments   Thin Liquid 8 Post swallow Vallecular Residue: Moderate (25%-50%)  Pyriform Sinus Residue: Moderate (25%-50%) Tsp and cup=PAS 5, PAS 7, PAS 8, PAS 8    Mildly Thick 8 Post Swallow Vallecular Residue: Moderate (25%-50%)  Pyriform Sinus Residue: Moderate (25%-50%) Tsp and cup=PAS 4, PAS 5, PAS 7, PAS 8   Pudding 4 Post Swallow Vallecular Residue: Severe (>50%)  Pyriform Sinus Residue: Severe (>50%) Severe " vallecular and PS residue that patient could not clear      Penetration-Aspiration Scale (PAS)  1     No contrast enters airway  2     Contrast enters the airway, remains above the vocal folds, and is ejected from the airway (not seen in the airway at the end of the swallow).  3     Contrast enters the airway, remains above the vocal folds, and is not ejected from the airway (is seen in the airway after the swallow).  4     Contrast enters the airway, contacts the vocal folds, and is ejected from the airway.  5     Contrast enters the airway, contacts the vocal folds, and is not ejected from the airway  6     Contrast enters the airway, crosses the plane of the vocal folds, and is ejected from the airway.  7     Contrast enters the airway, crosses the plane of the vocal folds, and is not ejected from the airway despite effort.  8     Contrast enters the airway, crosses the plane of the vocal folds, is not ejected from the airway and there is no response to aspiration.     Oral phase:  Appropriate bolus acceptance and sufficient containment. Slow A-P bolus transit. Moderate lingual residue noted post-swallow.     Pharyngeal phase:  Severe deficits marked by: reduced BoT retraction, impaired LVC, little to no epiglottic inversion, reduced pharyngeal constriction, reduced sensation, and reduced hyolaryngeal excursion. These deficits resulted in the followin.) Consistent deep penetration and frequent aspiration after the swallow on thin and MTL via tsp and cup sip.   2.) Moderate to severe vallecular and PS residue after the swallow on all textures that patient could not clear despite coughs, throat clearing, liquid wash, and multiple swallows.           Deep laryngeal penetration and severe residue after the swallow with pudding  Silent aspiration after the swallow with thin liquids via tsp     Compensatory Strategies:  Multiple swallows: Pt often unable to complete; weak and ineffective   Effortful swallow: Pt  often unable to complete; weak and ineffective   Chin tuck: Did not decrease aspiration; increased aspiration on MTL via tsp   Left/right head turn: Difficult for patient to complete    Severity Rating:   Severity Rating: LAVERN  LAVERN: Severe    Clinical Impressions  The pt presents with a severe-profound oropharyngeal dysphagia characterized by impaired laryngeal excursion, impaired pharyngeal shortening, impaired tongue base retraction, and incomplete laryngeal vestibule closure resulting in aspiration of thin and mildly thick liquids this date and severe amount of residue with deep laryngeal penetration of pudding. Patient with impaired sensation to aspiration events, without consistent cough response. Cued cough was effective to eliminate penetrated material, but not effective to consistently clear aspirated material. Swallow safety and efficiency are both significantly impaired. Patient is at high risk for aspiration and high risk for inability to sustain nutrition/hydration orally. Dysphagia is chronic in nature given patient report of >8 years of difficulty swallowing.     Extensive education provided to patient and son with use of  and both were educated on risks of continued PO intake. Non-oral nutrition via PEG tube is warranted. However, MD reports patient and son have declined long-term tube feedings at this time. As patient wants to continue oral feeding despite known risk of aspiration per MD report, recommend puree diet, thin liquids. Avoid chewable solids due to airway obstruction risk.     Recommend follow-up with HH or OP SLP for training of swallow exercises targeting impaired swallow physiology demonstrated during todays assessment.    Recommendations  Diet Consistency: NPO/NN; per MD, patient wants to eat despite risks (recommend purees with thin liquids d/t aspiration of both thin and MTL during study)   Medication: Non Oral  Oral Care: Q2h  Additional Instrumentation: Repeat  "diagnostic study when clinically appropriate    SLP Treatment Plan  Treatment Plan: Dysphagia Treatment, Patient/Family/Caregiver Training  SLP Frequency: 4x Per Week  Estimated Duration: Until Therapy Goals Met    Anticipated Discharge Needs  Discharge Recommendations: Recommend post-acute placement for additional speech therapy services prior to discharge home   Therapy Recommendations Upon DC: Dysphagia Training, Community Re-Integration, Patient / Family / Caregiver Education      Patient / Family Goals  Patient / Family Goal #1: \"I would choose to eat with risk.\"  Goal #1 Outcome: Progressing as expected  Short Term Goal # 1: Patient will participate in an instrumental swallow study to determine swallow physiology, \"safest\" diet, and guide treatment.  Goal Outcome # 1: Goal met, new goal added  Short Term Goal # 1 B : Patient will consume PU4/TN0 diet despite risks with strict use of swallow and reflux precautions and strategies with minimal s/sx of aspiration or decline in respiratory status.  Goal Outcome  # 1 B: Progressing slower than expected    Laya Tran, SLP   "

## 2025-04-22 NOTE — PROGRESS NOTES
Telemetry Shift Summary     Rhythm: AJR/SR/ST  HR Range:  Ectopy: r,oPVC/r-fPAC  Measurements: 0.11/0.12/0.39    Normal Values  Measurements: 0.12- 0.20 / 0.08-0.10 / 0.30-0.52

## 2025-04-22 NOTE — PROGRESS NOTES
Patient had NG tube removed per orders, SLP at bedside with  service interpreting for RN and SLP. Patient tolerated removal of NG tube well. Patient's family at bedside.

## 2025-04-22 NOTE — PROGRESS NOTES
Received report from LUIS Caicedo, at pt bedside. Pt with no complaint t time of assessment, POC discussed. Call light and belongings within reach. Bed locked and in low position. Alarm on and fall precautions in place.

## 2025-04-22 NOTE — CARE PLAN
The patient is Stable - Low risk of patient condition declining or worsening    Shift Goals  Clinical Goals: monitor oxygenation, monitor Temp  Patient Goals: Rest  Family Goals: Patient feels better    Progress made toward(s) clinical / shift goals:    Problem: Respiratory  Goal: Patient will achieve/maintain optimum respiratory ventilation and gas exchange  Outcome: Progressing  Note: Patient still on high flow nasal cannula, unable to wean patient down, patients SPO2 stable with high flow nasal cannula.Patient with shallow breathing continue on droplet precaution for flu A.     Problem: Skin Integrity  Goal: Skin integrity is maintained or improved  Outcome: Progressing  Note: Patient with maintained skin integrity, patient on a low air loss pump, encouraged on repositioning in bed, NG tube site assessed for kin breakdown     Problem: Pain - Standard  Goal: Alleviation of pain or a reduction in pain to the patient’s comfort goal  Outcome: Progressing  Note: Patient reports no pain on assessment, education done on available pain medication       Patient is not progressing towards the following goals:

## 2025-04-23 PROBLEM — D69.6 THROMBOCYTOPENIA (HCC): Status: RESOLVED | Noted: 2025-04-16 | Resolved: 2025-04-23

## 2025-04-23 PROBLEM — D72.829 LEUKOCYTOSIS: Status: RESOLVED | Noted: 2025-04-16 | Resolved: 2025-04-23

## 2025-04-23 PROBLEM — E87.6 HYPOKALEMIA: Status: RESOLVED | Noted: 2025-04-16 | Resolved: 2025-04-23

## 2025-04-23 PROBLEM — E78.5 DYSLIPIDEMIA: Status: RESOLVED | Noted: 2021-01-23 | Resolved: 2025-04-23

## 2025-04-23 PROCEDURE — 94760 N-INVAS EAR/PLS OXIMETRY 1: CPT

## 2025-04-23 PROCEDURE — 700111 HCHG RX REV CODE 636 W/ 250 OVERRIDE (IP): Mod: JZ | Performed by: INTERNAL MEDICINE

## 2025-04-23 PROCEDURE — A9270 NON-COVERED ITEM OR SERVICE: HCPCS | Performed by: STUDENT IN AN ORGANIZED HEALTH CARE EDUCATION/TRAINING PROGRAM

## 2025-04-23 PROCEDURE — 700111 HCHG RX REV CODE 636 W/ 250 OVERRIDE (IP): Mod: JZ | Performed by: HOSPITALIST

## 2025-04-23 PROCEDURE — 99233 SBSQ HOSP IP/OBS HIGH 50: CPT | Performed by: INTERNAL MEDICINE

## 2025-04-23 PROCEDURE — 700102 HCHG RX REV CODE 250 W/ 637 OVERRIDE(OP): Performed by: STUDENT IN AN ORGANIZED HEALTH CARE EDUCATION/TRAINING PROGRAM

## 2025-04-23 PROCEDURE — 97535 SELF CARE MNGMENT TRAINING: CPT

## 2025-04-23 PROCEDURE — 770020 HCHG ROOM/CARE - TELE (206)

## 2025-04-23 PROCEDURE — 700105 HCHG RX REV CODE 258: Performed by: INTERNAL MEDICINE

## 2025-04-23 RX ADMIN — AMPICILLIN AND SULBACTAM 3 G: 1; 2 INJECTION, POWDER, FOR SOLUTION INTRAMUSCULAR; INTRAVENOUS at 23:49

## 2025-04-23 RX ADMIN — ENOXAPARIN SODIUM 40 MG: 100 INJECTION SUBCUTANEOUS at 18:11

## 2025-04-23 RX ADMIN — AMPICILLIN AND SULBACTAM 3 G: 1; 2 INJECTION, POWDER, FOR SOLUTION INTRAMUSCULAR; INTRAVENOUS at 18:15

## 2025-04-23 RX ADMIN — AMPICILLIN AND SULBACTAM 3 G: 1; 2 INJECTION, POWDER, FOR SOLUTION INTRAMUSCULAR; INTRAVENOUS at 05:40

## 2025-04-23 RX ADMIN — AMLODIPINE BESYLATE 10 MG: 5 TABLET ORAL at 05:42

## 2025-04-23 RX ADMIN — AMPICILLIN AND SULBACTAM 3 G: 1; 2 INJECTION, POWDER, FOR SOLUTION INTRAMUSCULAR; INTRAVENOUS at 14:14

## 2025-04-23 RX ADMIN — DOXAZOSIN 2 MG: 2 TABLET ORAL at 05:42

## 2025-04-23 RX ADMIN — DOXAZOSIN 2 MG: 2 TABLET ORAL at 18:11

## 2025-04-23 ASSESSMENT — PAIN DESCRIPTION - PAIN TYPE
TYPE: ACUTE PAIN
TYPE: ACUTE PAIN

## 2025-04-23 ASSESSMENT — COGNITIVE AND FUNCTIONAL STATUS - GENERAL
PERSONAL GROOMING: A LITTLE
DRESSING REGULAR LOWER BODY CLOTHING: A LOT
TOILETING: A LOT
DRESSING REGULAR UPPER BODY CLOTHING: A LITTLE
DAILY ACTIVITIY SCORE: 15
EATING MEALS: A LITTLE
SUGGESTED CMS G CODE MODIFIER DAILY ACTIVITY: CK
HELP NEEDED FOR BATHING: A LOT

## 2025-04-23 ASSESSMENT — ENCOUNTER SYMPTOMS
ABDOMINAL PAIN: 0
FEVER: 0
PSYCHIATRIC NEGATIVE: 1
CARDIOVASCULAR NEGATIVE: 1
CONSTITUTIONAL NEGATIVE: 1
NAUSEA: 0
NEUROLOGICAL NEGATIVE: 1
GASTROINTESTINAL NEGATIVE: 1
VOMITING: 0
MUSCULOSKELETAL NEGATIVE: 1
EYES NEGATIVE: 1
RESPIRATORY NEGATIVE: 1
FLANK PAIN: 0

## 2025-04-23 ASSESSMENT — GAIT ASSESSMENTS: DISTANCE (FEET): 40

## 2025-04-23 NOTE — PROGRESS NOTES
Hospital Medicine Daily Progress Note    Date of Service  4/23/2025    Chief Complaint  Mj Cornejo is a 79 y.o. male admitted 4/16/2025 with shortness of breath, fever, chills, flank pain, urine incontinence.  He has had dysphagia since the past 2 years resulting in 80 pound weight loss.  He has a history of seizure disorder, hyperparathyroidism, hypertension, dyslipidemia    Hospital Course  On admission, patient was positive for influenza A, Tamiflu was started.  He was evaluated by speech therapy and determined to be unsafe for all textures.  NG tube was placed.    Chest x-ray showed left lower lobe pneumonia.  CT abdomen and pelvis showed bibasilar pulmonary consolidation (left greater than right), 4 mm left lower pole renal stone, mild bladder wall thickening consistent with chronic outlet obstruction.    NG tube was removed. Patient wishes to continue eating as much as he can tolerate regardless of the risk of aspiration.     Interval Problem Update  Saturating 95% on 6 L/min nasal cannula.Case management have been requested to facilitate discharge.     I have discussed this patient's plan of care and discharge plan at IDT rounds today with Case Management, Nursing, Nursing leadership, and other members of the IDT team.    Consultants/Specialty  urology    Code Status  DNAR/DNI    Disposition  The patient is not medically cleared for discharge to home or a post-acute facility.    I have placed the appropriate orders for post-discharge needs.    Review of Systems  Review of Systems   Constitutional: Negative.    HENT: Negative.     Eyes: Negative.    Respiratory: Negative.     Cardiovascular: Negative.    Gastrointestinal: Negative.    Genitourinary: Negative.    Musculoskeletal: Negative.    Skin: Negative.    Neurological: Negative.    Endo/Heme/Allergies: Negative.    Psychiatric/Behavioral: Negative.          Physical Exam  Temp:  [36.7 °C (98 °F)-38 °C (100.4 °F)] 37.2 °C (98.9 °F)  Pulse:   [] 116  Resp:  [16-18] 18  BP: (129-147)/(62-76) 147/76  SpO2:  [85 %-96 %] 94 %    Physical Exam  Constitutional:       Appearance: He is ill-appearing.   HENT:      Head: Normocephalic.      Nose: Nose normal.      Mouth/Throat:      Mouth: Mucous membranes are moist.   Eyes:      Pupils: Pupils are equal, round, and reactive to light.   Cardiovascular:      Rate and Rhythm: Tachycardia present.   Pulmonary:      Comments: Coarse breath sounds  Abdominal:      Palpations: Abdomen is soft.   Musculoskeletal:      Cervical back: Normal range of motion.      Right lower leg: No edema.      Left lower leg: No edema.   Skin:     General: Skin is warm.   Neurological:      Mental Status: He is alert. Mental status is at baseline.   Psychiatric:         Mood and Affect: Mood normal.       Fluids    Intake/Output Summary (Last 24 hours) at 4/23/2025 1442  Last data filed at 4/23/2025 0453  Gross per 24 hour   Intake --   Output 375 ml   Net -375 ml        Laboratory      Recent Labs     04/22/25  1019   SODIUM 136   POTASSIUM 4.4   CHLORIDE 103   CO2 26   GLUCOSE 102*   BUN 10   CREATININE 0.52   CALCIUM 10.2                   Imaging  DX-ESOPHAGUS - [id number]   Final Result      DX-ABDOMEN FOR TUBE PLACEMENT   Final Result         1.  Nonspecific bowel gas pattern in the upper abdomen.   2.  Dobbhoff tube is seen terminating just distal to the gastroesophageal junction, recommend advancement.   3.  Hazy bilateral lower lung infiltrate      DX-CHEST-PORTABLE (1 VIEW)   Final Result         1.  Hazy bilateral lung opacities compatible with atelectasis and/or infiltrate   2.  Atherosclerosis      DX-ABDOMEN FOR TUBE PLACEMENT   Final Result      Feeding tube tip overlies the gastric fundus. Feeding tube tip overlies the gastric fundus.      DX-ABDOMEN FOR TUBE PLACEMENT   Final Result         1.  Nonspecific bowel gas pattern in the upper abdomen.   2.  Nasogastric tube tip terminates overlying the expected  location of the second or third duodenal segment.   3.  Bibasilar atelectasis or infiltrates.      DX-CHEST-PORTABLE (1 VIEW)   Final Result      Bibasilar patchy consolidation and volume loss.      DX-ABDOMEN FOR TUBE PLACEMENT   Final Result         Gastric drainage tube with tip projecting over the expected area of the duodenum.      CT-LSPINE W/O PLUS RECONS   Final Result      1.  No evidence of fracture of the lumbar spine.      2.  Multilevel degenerative disc disease and facet degeneration.      CT-ABDOMEN-PELVIS WITH   Final Result         1.  Bibasilar pulmonary consolidation, left greater than right.      2.  Multiple right lower ureteral stones located 2 to 3 cm above the ureterovesical junction. The largest measures 6 mm in size. There is minimal right-sided hydronephrosis.      3.  4 mm left lower pole renal calyceal stone.      4.  Sigmoid diverticulosis.      5.  Cholelithiasis.      6.   Fatty liver with multiple hepatic cysts.      7.  Enlarged prostate gland.      8.  Mild urinary bladder wall thickening which may indicate chronic outlet obstruction.      DX-CHEST-PORTABLE (1 VIEW)   Final Result      Left lower lobe pneumonia.           Assessment/Plan  * Acute respiratory failure with hypoxemia (HCC)- (present on admission)  Assessment & Plan  Secondary to influenza A and aspiration pneumonia.  Saturating 95% on 6 L/min nasal cannula    Aspiration pneumonia of both lower lobes (HCC)  Assessment & Plan  Secondary to severe oropharyngeal dysphagia.  Palliative care consulted, patient wishes to eat despite risk.  On Unasyn    Fever- (present on admission)  Assessment & Plan  Secondary to aspiration pneumonia and influenza A.  Blood cultures have been negative.  On Unasyn    Hydronephrosis- (present on admission)  Assessment & Plan  Evaluated by urology for mild heart nephrosis and right-sided ureterolithiasis.  Surgery was not recommended.  Continue Cardura    Influenza A- (present on  admission)  Assessment & Plan  Completed course of Tamiflu    Oropharyngeal dysphagia- (present on admission)  Assessment & Plan  FEES showed severe oropharyngeal dysphagia at any consistency.  Patient did not want NGT, and wishes to eat    Hypercalcemia- (present on admission)  Assessment & Plan  History of hyperparathyroidism.  Calcium normalized with hydration    Chronic bilateral low back pain without sciatica- (present on admission)  Assessment & Plan  Continue multimodal pain control.    Essential hypertension- (present on admission)  Assessment & Plan  Stable.  Continue Norvasc    Macrocytic anemia- (present on admission)  Assessment & Plan  Recent B12 level 516    History of seizure disorder- (present on admission)  Assessment & Plan  Not on any medications.  Has been following outpatient neurology    DNR (do not resuscitate)- (present on admission)  Assessment & Plan  DNR/DNI         VTE prophylaxis: Lovenox

## 2025-04-23 NOTE — PROGRESS NOTES
Telemetry Shift Summary     Rhythm: ST  Rate: 104-107  Measurements: .12/.10/.31  Ectopy (reported by Monitor Tech): fPAC/rPVC/oPAC/rPAC    PSVT: 195/208/262     Normal Values  Rhythm: Sinus  HR:   Measurements: 0.12-0.20/0.06-0.10/0.30-0.52

## 2025-04-23 NOTE — PROGRESS NOTES
Note to reader: this note follows the APSO format rather than the historical SOAP format. Assessment and plan located at the top of the note for ease of use.    Chief Complaint  79 y.o. year old male here with Influenza A, right distal ureteral stones    Assessment/Plan  Interval History   Influenza A  Right ureteral stones  Dysphagia    Plan:  -Medical expulsive therapy-- continue doxazosin  -Strain all urine and monitor for stone passage  - at this point we feel he is urologically stable. Plan for outpatient management of right ureteral stones with no further recommendations during this hospital stay.  - Now of course this could change if he develops worsening renal function or flank pain. At that point would consider neph tube placement (would need to be done at [facility] with IR) as pt is very poor candidate for general anesthesia given current respiratory issues      Case discussed with Dr Devi, who has directed this patient's plan of care.      Patient seen and examined    4/23- Cr wnl. No acute urologic changes overnight. Urine yellow in sage bag. NGT out. Afebrile overnight.   [place] translation used by family at bedside.     4/22- no acute urologic events overnight. Biggest complaint is his NGT, no active flank pain or issues with catheter. Urine light orange pyridium tinged. No updated cr on rounds.  [place] translation provided by ipad translation line.      4/21. Fevers improving, ID managing antimicrobials. Family at bedside provided translation. Pt denies pain, no hematuria noted in catheter tubing. Tolerating tube feeds. Cr WNL.    4/20. Fevers continue. O2 demand unchanged. NGT remains. Creatinine has been normal.     4/19. NGT placed yesterday for feeding purposes due to dysphagia. O2 remains similar rate, unable to wean lower.     4/18. Pain improved now that is receiving pain medications. Creat normal. Urine clear via condom catheter. On high flow O2 but rate need improved.  Dysphagia, not on Flomax    4/17. Endorses pain in left flank, preventing him from ambulating. Has not been requesting pain medication however. Condom catheter in place due to urinary urgency. Remain on high flow O2. Febrile WBC normal. Creat normal.            Review of Systems  Physical Exam   Review of Systems   Constitutional:  Negative for fever.   Gastrointestinal:  Negative for abdominal pain, nausea and vomiting.   Genitourinary:  Negative for dysuria, flank pain and hematuria.   All other systems reviewed and are negative.    Vitals:    04/23/25 0049 04/23/25 0213 04/23/25 0448 04/23/25 0721   BP: 129/62  138/63 (!) 141/67   Pulse: 93 95 94 94   Resp:  18 18 18   Temp: 36.8 °C (98.2 °F)  37 °C (98.6 °F) 36.7 °C (98 °F)   TempSrc: Temporal  Temporal Temporal   SpO2: 94% 94% 95% 93%   Weight:       Height:         Physical Exam  Vitals and nursing note reviewed.   Constitutional:       Appearance: Normal appearance.      Comments: Sleeping   HENT:      Head: Normocephalic and atraumatic.      Nose: Nose normal.      Mouth/Throat:      Mouth: Mucous membranes are moist.   Eyes:      Extraocular Movements: Extraocular movements intact.      Conjunctiva/sclera: Conjunctivae normal.   Pulmonary:      Effort: Pulmonary effort is normal.   Abdominal:      General: Abdomen is flat. There is no distension.      Palpations: Abdomen is soft.      Tenderness: There is no abdominal tenderness. There is no right CVA tenderness or left CVA tenderness.   Genitourinary:     Comments: Condom catheter in place draining lacey urine  Musculoskeletal:      Cervical back: Normal range of motion.   Skin:     General: Skin is warm and dry.   Neurological:      General: No focal deficit present.      Mental Status: He is alert and oriented to person, place, and time.   Psychiatric:         Mood and Affect: Mood normal.         Behavior: Behavior normal.          Hematology Chemistry   Lab Results   Component Value Date/Time    WBC  10.5 04/20/2025 01:17 AM    HEMOGLOBIN 11.6 (L) 04/20/2025 01:17 AM    HEMATOCRIT 35.3 (L) 04/20/2025 01:17 AM    PLATELETCT 278 04/20/2025 01:17 AM     Lab Results   Component Value Date/Time    SODIUM 136 04/22/2025 10:19 AM    POTASSIUM 4.4 04/22/2025 10:19 AM    CHLORIDE 103 04/22/2025 10:19 AM    CO2 26 04/22/2025 10:19 AM    GLUCOSE 102 (H) 04/22/2025 10:19 AM    BUN 10 04/22/2025 10:19 AM    CREATININE 0.52 04/22/2025 10:19 AM         Labs not explicitly included in this progress note were reviewed by the author.   Radiology/imaging not explicitly included in this progress note was reviewed by the author.     Core Measures

## 2025-04-23 NOTE — DIETARY
Nutrition Services: Follow-up for nutrition plan   Day 7 of admit. Mj Cornejo is a 79 y.o. male with admitting DX of Acute respiratory failure with hypoxemia (HCC) [J96.01]    NG tube dislodged and was removed. SLP saw pt w/ dx of severe-profound oropharyngeal dysphagia. Pt declined long-term tube feedings. He would like to eat despite the risk. Diet ordered.      Current diet order:   IDDSI level 0 - thin liquids, IDDSI level 4 - pureed, and no straws     Recorded PO 50-75% x 1 and % x 1 today     Plan/ Recommendations:      Continue with diet as ordered. Pt aware of risk.   Document intake of all meals as % taken in ADLs to provide interdisciplinary communication across all shifts.   Monitor weight.  Nutrition rep available to see patient for ongoing meal and snack preferences.  Obtain supplement order per RD as needed.      RD following weekly or PRN

## 2025-04-23 NOTE — THERAPY
"Occupational Therapy  Daily Treatment     Patient Name: Mj Cornejo  Age:  79 y.o., Sex:  male  Medical Record #: [id number]  Today's Date: 4/23/2025     Precautions  Precautions: Fall Risk, Swallow Precautions  Comments: 5 L O2    Assessment    Pt is making progress with activity tolerance, mobility and basic self care tasks.  Still limited by fatigue, high heart rate and increased O2 needs, but improved from being on HHFNC.  Anticipate he will need more time in post acute therapy to build up endurance and strength prior to returning home.  OT will follow while in house.    Plan    Treatment Plan Status: Continue Current Treatment Plan  Type of Treatment: Self Care / Activities of Daily Living, Adaptive Equipment, Neuro Re-Education / Balance, Therapeutic Exercises, Therapeutic Activity, Family / Caregiver Training  Treatment Frequency: 3 Times per Week  Treatment Duration: Until Therapy Goals Met    DC Equipment Recommendations: Unable to determine at this time  Discharge Recommendations: Recommend post-acute placement for additional occupational therapy services prior to discharge home    Subjective    \"I can't wash my face with this on.\"     Objective       04/23/25 1135   Precautions   Precautions Fall Risk;Swallow Precautions   Comments 5 L O2   Vitals   Pulse Oximetry 92 %   O2 (LPM) 5   O2 Delivery Device Silicone Nasal Cannula   Vitals Comments after walking and standing activity in room   Pain 0 - 10 Group   Therapist Pain Assessment 0;During Activity;Nurse Notified   Cognition    Cognition / Consciousness WDL   Level of Consciousness Alert   Comments Pleasant, cooperative.  Appears mildly confused at times.  Refusing some activities but then shortly after is agreeable to same activity.  May be in part due to language barrier   Strength Upper Body   Upper Body Strength  WDL   Other Treatments   Other Treatments Provided Standing activity at sink for grooming, functional mobility in room   Balance "   Sitting Balance (Static) Good   Sitting Balance (Dynamic) Fair   Standing Balance (Static) Fair -   Standing Balance (Dynamic) Fair -   Weight Shift Sitting Fair   Weight Shift Standing Fair   Skilled Intervention Verbal Cuing;Tactile Cuing;Sequencing   Comments with FWW in standing   Bed Mobility    Comments [facility] pre and post   Activities of Daily Living   Grooming Contact Guard Assist;Standing  (oral care standing at sink, completed face and hand washing due to elevated heart rate)   Toileting   (uinsg condom cath)   Skilled Intervention Verbal Cuing   Comments needed cues for importance of oral hygiene for the teeth he does have ( dentures not in currently) and that it was ok to wash his face even with O2 on   How much help from another person does the patient currently need...   Putting on and taking off regular lower body clothing? 2   Bathing (including washing, rinsing, and drying)? 2   Toileting, which includes using a toilet, bedpan, or urinal? 2   Putting on and taking off regular upper body clothing? 3   Taking care of personal grooming such as brushing teeth? 3   Eating meals? 3   6 Clicks Daily Activity Score 15   Functional Mobility   Sit to Stand Contact Guard Assist   Bed, Chair, Wheelchair Transfer Contact Guard Assist   Transfer Method Stand Step   Mobility with FWW into toilet and back to chair, seated rest, walk to sink then back to chair   Distance (Feet) 40   # of Times Distance was Traveled 1   Skilled Intervention Verbal Cuing;Sequencing;Postural Facilitation   Activity Tolerance   Sitting in Chair ad celina   Standing 5-6 min at sink, 2 min with walking   Comments limited by fatigue and high HR   Patient / Family Goals   Patient / Family Goal #1 get better   Goal #1 Outcome Progressing as expected   Short Term Goals   Short Term Goal # 1 Pt will tolerate standing 8 min at sink for grooming with SBA in 3 visits   Goal Outcome # 1 Progressing as expected   Short Term Goal # 2 Pt will toilet in  BR with appropriate AE and SBA in 4 visits.   Short Term Goal # 3 Pt will dress FB with SBA from seated with AE as needed in 5 visits.   Short Term Goal # 4 Pt will tolerate OOB to chair for 3 hours for meals and ADL's in 4 visits.   Goal Outcome # 4 Progressing as expected   Education Group   Education Provided Activities of Daily Living   ADL Patient Response Patient;Acceptance;Explanation;Verbal Demonstration

## 2025-04-23 NOTE — PROGRESS NOTES
Assumed care and received report from Nikki. Patient awake and answering questions during bedside, change of shift report. Patient's whiteboard updated, patient's call light within reach, patient's tray table accessible.

## 2025-04-23 NOTE — PROGRESS NOTES
"PALLIATIVE CARE SOCIAL WORK NOTE    Patient: Mj Orona  Age: 79  Gender: Male  MRN: [id number]  Insurance: [facility]  Date Admitted: 4/16/25  Date of Service: 4/23/25     HPI  Per palliative care provider note dated 4/18, \"Mj Cornejo is a 79 y.o. male with medical history significant for seizure disorders and dysphagia admitted 4/16/2025 with acute hypoxic respiratory failure and was found to have influenza A. Pt reportedly has had 80lb weight loss in the past 2 years due to dysphagia.  SLP evaluated patient with FEES on 4/17/2025 and he was found to have severe oropharyngeal dysphagia and noted \"swallow prognosis guarded given chronicity of dysphagia.\" Patient declined feeding tube and is eating PU4/TNO diet despite risk. PC consulted to assist with GOC discussions. \".    4/23/25: Patient was sitting in the chair, eating lunch. LMSW used  service, ID# [id number]. He is on 6 liters of oxygen.     ACP  LMSW met with patient. He appeared A+Ox4. There were no family at bedside. LMSW introduced self and explained purpose of palliative care follow up visit. Patient was agreeable to discuss his healthcare wishes/advance care planning. Patient reports he feels much better and is anxious to go home. He understands there is not much to be done with his swallowing and wants to eat despite the risk. He reports his family provides good care and he is thankful for their support.     CODE STATUS: DNAR/DNI    POLST: LMSW explained purpose of document. Patient did not remember discussing POLST at last visit. He requested son or daughter be present for discussion/completion. Recommend POLST completion prior to discharge.    ADVANCE DIRECTIVE: Not on file      GOALS OF CARE  Patient expressed his wish to go home. He does not want to go to SNF, even temporarily. \"I don't see the point\". He is agreeable to home health if appropriate.     LMSW updated care team.     FOLLOW UP: Palliative care to sign off at " this time. Goals established. Please reconsult if needs arise.     20 minutes spent discussing advance care planning, this time excludes any other billed services.      Petty Irizarry SW  Palliative Care

## 2025-04-23 NOTE — CARE PLAN
The patient is Stable - Low risk of patient condition declining or worsening    Shift Goals  Clinical Goals: monitor oxygen levels and needs, IV abx  Patient Goals: sleep  Family Goals: Patient feels better    Progress made toward(s) clinical / shift goals:    Problem: Respiratory  Goal: Patient will achieve/maintain optimum respiratory ventilation and gas exchange  Outcome: Progressing  Note: Patient' oxygen demands achieved on 5litres via nasal cannula, work of breathing is within normal limits     Problem: Skin Integrity  Goal: Skin integrity is maintained or improved  Outcome: Progressing     Problem: Pain - Standard  Goal: Alleviation of pain or a reduction in pain to the patient’s comfort goal  Outcome: Progressing       Patient is not progressing towards the following goals:

## 2025-04-23 NOTE — DISCHARGE PLANNING
Case Management Discharge Planning    Admission Date: 4/16/2025  GMLOS: 3.5  ALOS: 7    6-Clicks ADL Score: 15  6-Clicks Mobility Score: 18  PT and/or OT Eval ordered: Yes  Post-acute Referrals Ordered: Yes  Post-acute Choice Obtained: Yes  Has referral(s) been sent to post-acute provider:  Yes      Anticipated Discharge Dispo: Discharge Disposition: D/T to home under HHA care in anticipation of covered skilled care (06)    DME Needed: Yes    DME Ordered: No    Action(s) Taken:     Spoke to pt and soni Barker at bedside. Pt and family have opted to DC home with HH and home O2. Choice form completed for HH with 1)[facility] 2)[facility] and for O2 with 1)[facility] 2)[facility]. Pending HH and O2 orders. MD notified via [employer].    Escalations Completed: Long Length of Stay Committee     Medically Clear: No    Next Steps: Follow-up with medical team to discuss DC needs and barriers.    Barriers to Discharge: Medical clearance and Oxygen Delivery

## 2025-04-23 NOTE — CARE PLAN
The patient is Stable - Low risk of patient condition declining or worsening    Shift Goals  Clinical Goals: Monitor labs and vitals  Patient Goals: rest and comfort  Family Goals: Patient feels better    Progress made toward(s) clinical / shift goals: Patient assessed and consulted with MD at bedside. Patient worked with OT. Patient consulted with case management. Patient consulted with palliative care. Patient continued on IV antibiotic regime. Patient treated by RT at bedside. Patient had walking O2 assessment completed.    Problem: Knowledge Deficit - Standard  Goal: Patient and family/care givers will demonstrate understanding of plan of care, disease process/condition, diagnostic tests and medications  Outcome: Progressing  Note: Patient verbalizes understanding of plan of care and barriers to discharge. Patient asks appropriate questions about plan of care.       Problem: Fall Risk  Goal: Patient will remain free from falls  Outcome: Progressing  Note: Fall precautions are in place. Patient uses call light appropriately.       Problem: Respiratory  Goal: Patient will achieve/maintain optimum respiratory ventilation and gas exchange  Outcome: Progressing  Note: Patient utilizing assistive O2 with pulse ox in place. Patient's oxygen level is titrated, as needed, to maintain oxygen saturation levels per orders.        Patient is not progressing towards the following goals:       Admitting Office

## 2025-04-23 NOTE — PROGRESS NOTES
Telemetry Shift Summary     Rhythm: SR/ST/BBB  Rate:   Measurements: .10/.11/.28  Ectopy (reported by Monitor Tech): rPAC/rPVC/oPAC/oPVC/rCoup/fPAC    SVT: 186/PSVT: 268     Normal Values  Rhythm: Sinus  HR:   Measurements: 0.12-0.20/0.06-0.10/0.30-0.52

## 2025-04-23 NOTE — PROGRESS NOTES
Telemetry Shift Summary     Rhythm: SR/ST  HR Range:  Ectopy: r,o,f PAC  Measurements: 0.12/0.12/0.41    Normal Values  Measurements: 0.12- 0.20 / 0.08-0.10 / 0.30-0.52

## 2025-04-24 ENCOUNTER — PHARMACY VISIT (OUTPATIENT)
Dept: PHARMACY | Facility: MEDICAL CENTER | Age: 80
End: 2025-04-24
Payer: COMMERCIAL

## 2025-04-24 ENCOUNTER — HOME HEALTH ADMISSION (OUTPATIENT)
Dept: HOME HEALTH SERVICES | Facility: HOME HEALTHCARE | Age: 80
End: 2025-04-24
Payer: MEDICARE

## 2025-04-24 VITALS
DIASTOLIC BLOOD PRESSURE: 67 MMHG | TEMPERATURE: 98.8 F | WEIGHT: 150.57 LBS | RESPIRATION RATE: 18 BRPM | HEIGHT: 60 IN | BODY MASS INDEX: 29.56 KG/M2 | OXYGEN SATURATION: 92 % | SYSTOLIC BLOOD PRESSURE: 129 MMHG | HEART RATE: 96 BPM

## 2025-04-24 PROBLEM — J10.1 INFLUENZA A: Status: RESOLVED | Noted: 2025-04-16 | Resolved: 2025-04-24

## 2025-04-24 PROBLEM — N13.30 HYDRONEPHROSIS: Status: RESOLVED | Noted: 2025-04-16 | Resolved: 2025-04-24

## 2025-04-24 PROBLEM — R50.9 FEVER: Status: RESOLVED | Noted: 2025-04-20 | Resolved: 2025-04-24

## 2025-04-24 LAB
ANION GAP SERPL CALC-SCNC: 13 MMOL/L (ref 7–16)
BACTERIA BLD CULT: NORMAL
BACTERIA BLD CULT: NORMAL
BACTERIA UR CULT: ABNORMAL
BUN SERPL-MCNC: 13 MG/DL (ref 8–22)
CALCIUM SERPL-MCNC: 9.9 MG/DL (ref 8.5–10.5)
CHLORIDE SERPL-SCNC: 108 MMOL/L (ref 96–112)
CO2 SERPL-SCNC: 20 MMOL/L (ref 20–33)
CREAT SERPL-MCNC: 0.67 MG/DL (ref 0.5–1.4)
ERYTHROCYTE [DISTWIDTH] IN BLOOD BY AUTOMATED COUNT: 47 FL (ref 35.9–50)
GFR SERPLBLD CREATININE-BSD FMLA CKD-EPI: 94 ML/MIN/1.73 M 2
GLUCOSE SERPL-MCNC: 88 MG/DL (ref 65–99)
HCT VFR BLD AUTO: 28.7 % (ref 42–52)
HGB BLD-MCNC: 9.3 G/DL (ref 14–18)
MCH RBC QN AUTO: 34.4 PG (ref 27–33)
MCHC RBC AUTO-ENTMCNC: 32.4 G/DL (ref 32.3–36.5)
MCV RBC AUTO: 106.3 FL (ref 81.4–97.8)
PLATELET # BLD AUTO: 481 K/UL (ref 164–446)
PMV BLD AUTO: 10.2 FL (ref 9–12.9)
POTASSIUM SERPL-SCNC: 3.6 MMOL/L (ref 3.6–5.5)
RBC # BLD AUTO: 2.7 M/UL (ref 4.7–6.1)
SIGNIFICANT IND 70042: ABNORMAL
SIGNIFICANT IND 70042: NORMAL
SIGNIFICANT IND 70042: NORMAL
SITE SITE: ABNORMAL
SITE SITE: NORMAL
SITE SITE: NORMAL
SODIUM SERPL-SCNC: 141 MMOL/L (ref 135–145)
SOURCE SOURCE: ABNORMAL
SOURCE SOURCE: NORMAL
SOURCE SOURCE: NORMAL
WBC # BLD AUTO: 7.9 K/UL (ref 4.8–10.8)

## 2025-04-24 PROCEDURE — RXMED WILLOW AMBULATORY MEDICATION CHARGE: Performed by: INTERNAL MEDICINE

## 2025-04-24 PROCEDURE — 99239 HOSP IP/OBS DSCHRG MGMT >30: CPT | Performed by: INTERNAL MEDICINE

## 2025-04-24 PROCEDURE — A9270 NON-COVERED ITEM OR SERVICE: HCPCS | Performed by: STUDENT IN AN ORGANIZED HEALTH CARE EDUCATION/TRAINING PROGRAM

## 2025-04-24 PROCEDURE — 92526 ORAL FUNCTION THERAPY: CPT

## 2025-04-24 PROCEDURE — 97535 SELF CARE MNGMENT TRAINING: CPT

## 2025-04-24 PROCEDURE — 700105 HCHG RX REV CODE 258: Performed by: INTERNAL MEDICINE

## 2025-04-24 PROCEDURE — 97110 THERAPEUTIC EXERCISES: CPT

## 2025-04-24 PROCEDURE — 36415 COLL VENOUS BLD VENIPUNCTURE: CPT

## 2025-04-24 PROCEDURE — 80048 BASIC METABOLIC PNL TOTAL CA: CPT

## 2025-04-24 PROCEDURE — 94760 N-INVAS EAR/PLS OXIMETRY 1: CPT

## 2025-04-24 PROCEDURE — 700102 HCHG RX REV CODE 250 W/ 637 OVERRIDE(OP): Performed by: STUDENT IN AN ORGANIZED HEALTH CARE EDUCATION/TRAINING PROGRAM

## 2025-04-24 PROCEDURE — 700111 HCHG RX REV CODE 636 W/ 250 OVERRIDE (IP): Mod: JZ | Performed by: INTERNAL MEDICINE

## 2025-04-24 PROCEDURE — 85027 COMPLETE CBC AUTOMATED: CPT

## 2025-04-24 RX ORDER — DOXAZOSIN 2 MG/1
2 TABLET ORAL 2 TIMES DAILY
Qty: 30 TABLET | Refills: 0 | Status: SHIPPED | OUTPATIENT
Start: 2025-04-24

## 2025-04-24 RX ADMIN — AMPICILLIN AND SULBACTAM 3 G: 1; 2 INJECTION, POWDER, FOR SOLUTION INTRAMUSCULAR; INTRAVENOUS at 12:42

## 2025-04-24 RX ADMIN — AMLODIPINE BESYLATE 10 MG: 5 TABLET ORAL at 05:26

## 2025-04-24 RX ADMIN — DOXAZOSIN 2 MG: 2 TABLET ORAL at 05:26

## 2025-04-24 RX ADMIN — AMPICILLIN AND SULBACTAM 3 G: 1; 2 INJECTION, POWDER, FOR SOLUTION INTRAMUSCULAR; INTRAVENOUS at 05:30

## 2025-04-24 ASSESSMENT — COGNITIVE AND FUNCTIONAL STATUS - GENERAL
MOBILITY SCORE: 20
SUGGESTED CMS G CODE MODIFIER MOBILITY: CJ
WALKING IN HOSPITAL ROOM: A LITTLE
STANDING UP FROM CHAIR USING ARMS: A LITTLE
MOVING TO AND FROM BED TO CHAIR: A LITTLE
CLIMB 3 TO 5 STEPS WITH RAILING: A LITTLE

## 2025-04-24 ASSESSMENT — GAIT ASSESSMENTS
ASSISTIVE DEVICE: FRONT WHEEL WALKER
GAIT LEVEL OF ASSIST: CONTACT GUARD ASSIST
DISTANCE (FEET): 40
DEVIATION: DECREASED BASE OF SUPPORT;BRADYKINETIC

## 2025-04-24 ASSESSMENT — PAIN DESCRIPTION - PAIN TYPE: TYPE: ACUTE PAIN

## 2025-04-24 NOTE — DISCHARGE INSTRUCTIONS
Please follow-up with your primary care physician within 1 to 2 days    Please follow-up with urology within 3 to 5 days.

## 2025-04-24 NOTE — DISCHARGE SUMMARY
Discharge Summary    CHIEF COMPLAINT ON ADMISSION  Chief Complaint   Patient presents with    Fever    Flank Pain    Incontinence       Reason for Admission  lower abdomen pain; urination issue     Admission Date  4/16/2025    CODE STATUS  DNAR/DNI    HPI & HOSPITAL COURSE  Mj Cornejo is a 79 y.o. male admitted 4/16/2025 with shortness of breath, fever, chills, flank pain, urine incontinence.  He has had dysphagia since the past 2 years resulting in 80 pound weight loss.  He has a history of seizure disorder, hyperparathyroidism, hypertension, dyslipidemia     On admission, patient was positive for influenza A, Tamiflu was started.  He was evaluated by speech therapy and determined to be unsafe for all textures.  NG tube was placed.     Chest x-ray showed left lower lobe pneumonia.  CT abdomen and pelvis showed bibasilar pulmonary consolidation (left greater than right), 4 mm left lower pole renal stone, mild bladder wall thickening consistent with chronic outlet obstruction.     NG tube was removed. Patient wishes to continue eating as much as he can tolerate regardless of the risk of aspiration.      Saturating 94% on 5 L/min nasal cannula. Home health an oxygen have been ordered.     Therefore, he is discharged in guarded and stable condition to home with organized home healthcare and close outpatient follow-up.    Discharge Date  4/24/2025    FOLLOW UP ITEMS POST DISCHARGE  PCP in 2-3 days, Urology as needed    DISCHARGE DIAGNOSES  Principal Problem:    Acute respiratory failure with hypoxemia (HCC) (POA: Yes)  Active Problems:    History of seizure disorder (POA: Yes)    Macrocytic anemia (POA: Yes)    Essential hypertension (POA: Yes)    Chronic bilateral low back pain without sciatica (POA: Yes)    Hypercalcemia (POA: Yes)      Overview: Ongoing medical problem.      June labs with elevated intact PTH at 86.4 and elevated calcium at 10.8.        Underwent radionuclide parathyroid scan last week  with no evidence of       parathyroid adenoma.      Has been taking vitamin D 50k weekly for the last 2 years. Prescribed by       Dr Florence.                      Oropharyngeal dysphagia (POA: Yes)    Influenza A (POA: Yes)    Hydronephrosis (POA: Yes)    Fever (POA: Yes)    Aspiration pneumonia of both lower lobes (HCC) (POA: Clinically Undetermined)    DNR (do not resuscitate) (POA: Yes)  Resolved Problems:    Subclinical hypothyroidism (POA: Yes)    Macrocytosis without anemia (POA: Yes)    Hypokalemia (POA: Yes)    Leukocytosis (POA: Yes)    Thrombocytopenia (HCC) (POA: Yes)    Severe protein-calorie malnutrition (HCC) (POA: Yes)      FOLLOW UP  No future appointments.  No follow-up provider specified.    MEDICATIONS ON DISCHARGE     Medication List        ASK your doctor about these medications        Instructions   acetaminophen 500 MG Tabs  Commonly known as: Tylenol   Take 1,000 mg by mouth every 6 hours as needed for Mild Pain.  Dose: 1,000 mg     albuterol 108 (90 Base) MCG/ACT Aers inhalation aerosol   Inhale 2 Puffs every 6 hours as needed for Shortness of Breath.  Dose: 2 Puff     alendronate 70 MG Tabs  Commonly known as: Fosamax   Take 1 Tablet by mouth every 7 days.  Dose: 70 mg     amLODIPine 5 MG Tabs  Commonly known as: Norvasc   Take 2 Tablets by mouth every day.  Dose: 10 mg     atorvastatin 10 MG Tabs  Commonly known as: Lipitor   TAKE 1 TABLET BY MOUTH ONCE DAILY IN THE MORNING  Dose: 10 mg              Allergies  No Known Allergies    DIET  Orders Placed This Encounter   Procedures    Diet Order Diet: Level 4 - Pureed (Pt eating despite aspiration risk; crush meds in puree); Liquid level: Level 0 - Thin; Tray Modifications (optional): No Straws     Standing Status:   Standing     Number of Occurrences:   1     Diet::   Level 4 - Pureed [25]              Pt eating despite aspiration risk; crush meds in puree     Liquid level:   Level 0 - Thin     Tray Modifications (optional):   No Straws        ACTIVITY  As tolerated.      CONSULTATIONS  Urology    PROCEDURES  None    LABORATORY  Lab Results   Component Value Date    SODIUM 141 04/24/2025    POTASSIUM 3.6 04/24/2025    CHLORIDE 108 04/24/2025    CO2 29 04/24/2025    GLUCOSE 88 04/24/2025    BUN 13 04/24/2025    CREATININE 0.67 04/24/2025        Lab Results   Component Value Date    WBC 7.9 04/24/2025    HEMOGLOBIN 9.3 (L) 04/24/2025    HEMATOCRIT 28.7 (L) 04/24/2025    PLATELETCT 481 (H) 04/24/2025        Total time of the discharge process exceeds 38 minutes.

## 2025-04-24 NOTE — DISCHARGE PLANNING
Post Acute Navigator Team    Patient screened based off of following information:    End of Life Care Index risk score 60  High LACE + score 77  6 click Mobility score 15   6 click ADL score 18   Readmission: NO       Per chart review, Per chart review patient is discharging  home under the care of [facility].     Please reach out to me directly should care team feel patient will benefit from a discharge goals of care conversation regarding outpatient palliative care or hospice.

## 2025-04-24 NOTE — PROGRESS NOTES
Report received from LUIS Levy. Pt is A+Ox4, on 5L NC, and resting in bed with family present. POC discussed with pt and family. Pt does not complain of any pain. Bed locked and in the lowest position and call light within reach.

## 2025-04-24 NOTE — DISCHARGE PLANNING
Case Management Discharge Planning    Admission Date: 4/16/2025  GMLOS: 3.5  ALOS: 8    6-Clicks ADL Score: 15  6-Clicks Mobility Score: 18  PT and/or OT Eval ordered: Yes  Post-acute Referrals Ordered: Yes  Post-acute Choice Obtained: Yes  Has referral(s) been sent to post-acute provider:  Yes      Anticipated Discharge Dispo: Discharge Disposition: D/T to home under [facility] care in anticipation of covered skilled care (06)    DME Needed: Yes    DME Ordered: Yes    Action(s) Taken:     HH and O2 orders received. Choice forms faxed to [facility]. Referrals sent to [facility]  and [facility] for O2 per choice form.    Per chart, [facility] has accepted.    Per bedside RN, O2 has been delivered to pt's bedside by [facility].    Escalations Completed: None    Medically Clear: Yes    Next Steps: Follow-up regarding HH acceptance and O2 delivery.    Barriers to Discharge: Oxygen Delivery

## 2025-04-24 NOTE — PROGRESS NOTES
Telemetry Shift Summary     Rhythm JT/AJR  HR Range   Ectopy rPVCs, PACs  Measurements  0.13/0.1/0.34  Per strip printed 1600     Normal Values  Rhythm SR  HR Range    Measurements 0.12-0.20 / 0.06-0.10  / 0.30-0.52

## 2025-04-24 NOTE — CARE PLAN
The patient is Stable - Low risk of patient condition declining or worsening    Shift Goals  Clinical Goals: Monitor labs and vitals  Patient Goals: rest and comfort  Family Goals: Patient feels better    Progress made toward(s) clinical / shift goals: Patient assessed and consulted with MD at bedside. Patient worked with OT/PT. Patient continued on IV antibiotics. Patient's family at bedside.    Problem: Knowledge Deficit - Standard  Goal: Patient and family/care givers will demonstrate understanding of plan of care, disease process/condition, diagnostic tests and medications  Outcome: Progressing  Note: Patient verbalizes understanding of plan of care and barriers to discharge. Patient asks appropriate questions about plan of care.       Problem: Fall Risk  Goal: Patient will remain free from falls  Outcome: Progressing  Note: Fall precautions are in place. Patient uses call light appropriately.       Problem: Respiratory  Goal: Patient will achieve/maintain optimum respiratory ventilation and gas exchange  Outcome: Progressing  Note: Patient utilizing assistive O2 with pulse ox in place. Patient's oxygen level is titrated, as needed, to maintain oxygen saturation levels per orders.        Patient is not progressing towards the following goals:

## 2025-04-24 NOTE — THERAPY
Speech Language Pathology   Daily Treatment     Patient Name: Mj Cornejo  AGE:  79 y.o., SEX:  male  Medical Record #: [id number]  Date of Service: 4/24/2025      Precautions:  Precautions: Fall Risk, Swallow Precautions    Subjective  RN cleared patient for dysphagia tx session. Patient awake, alert, and upright eating lunch of PU4/TN0 diet despite risks. Family member present at bedside. Per RN and EMR, patient may d/c home today.     Assessment  Patient consumed PO trials of purees w/ thin liquids via cup. Patient consumed small bites/sips and slow feeding rate. No straws utilized, as patient reported he recalled this from previous education regarding universal swallow precautions. Patient and family member reporting patient is not actively coughing, choking, or having s/sx of aspiration. Patient again educated on results of FEES, MBSS, and outpatient MBSS in February all resulting in silent aspiration and what this entails. Patient noted to be desatting to 83% during meals; RN in and called RT. This SLP again educated patient on severe-profound dysphagia, aspiration, risk of respiratory failure, complication, and even death with continued PO intake w/ known aspiration. Patient still reporting he wishes to continue to eat despite same. Also educated patient and family member on f/u with HH or OP SLP to target dysphagia goals and f/u with OP neurologist to determine etiology of dysphagia, as it is severe-profound. Both verbalized understanding.     Clinical Impressions  Patient presents with known severe-profound dysphagia, as evidenced on 3 instrumental swallow studies since end of February. Patient has silent aspiration on all textures trialed and is NOT safe for PO intake; Recommend strict NPO with PEG tube. However, per MD/palliative care notes, patient/family wish for patient to continue to eat despite risks. PU4/TN0 diet is safest diet to MINIMIZE, but NOT PREVENT aspiration risks. SLP will no  "longer actively follow.     Recommendations  Treatment Completed: Dysphagia Treatment     Dysphagia Treatment  Diet Consistency: NPO/PEG; however, per MD, patient/family are declining PEG tube and patient wants to eat despite risks, so PU4/TN0 diet despite risks has been initiated  Instrumentation: None indicated at this time  Medication: Non Oral  Supervision: Close supervision - patient may be left alone for less than 5 minutes at a time  Positioning: Fully upright and midline during oral intake, Meals sitting upright in a chair, as tolerated, Remain upright for 30 minutes after oral intake  Risk Management : Small bites/sips, Alternate bites and sips, Slow rate of intake, No straws, Reduce environmental distractions, Physical mobility, as tolerated  Oral Care: Q2h    SLP Treatment Plan  Treatment Plan: Dysphagia Treatment, Patient/Family/Caregiver Training  SLP Frequency: 3x Per Week  Estimated Duration: Until Therapy Goals Met    Anticipated Discharge Needs  Discharge Recommendations: Recommend home health for continued speech therapy services  Therapy Recommendations Upon DC: Dysphagia Training, Community Re-Integration, Patient / Family / Caregiver Education    Patient / Family Goals  Patient / Family Goal #1: \"I would choose to eat with risk.\"  Goal #1 Outcome: Progressing as expected  Short Term Goals  Short Term Goal # 1: Patient will participate in an instrumental swallow study to determine swallow physiology, \"safest\" diet, and guide treatment.  Goal Outcome # 1: Goal met, new goal added  Short Term Goal # 1 B : Patient will consume PU4/TN0 diet despite risks with strict use of swallow and reflux precautions and strategies with minimal s/sx of aspiration or decline in respiratory status.  Goal Outcome  # 1 B: Progressing slower than expected    Laya Tran, SLP  "

## 2025-04-24 NOTE — FACE TO FACE
"Face to Face Note  -  Durable Medical Equipment    Jayla Castle M.D. - NPI: [id number]  I certify that this patient is under my care and that they have had a durable medical equipment(DME)face to face encounter by myself that meets the physician DME face-to-face encounter requirements with this patient on:    Date of encounter:   Patient:                    MRN:                       YOB: 2025  Mj Cornejo  [id number]  1945     The encounter with the patient was in whole, or in part, for the following medical condition, which is the primary reason for durable medical equipment:  Other - Influenza A, chronic aspiration    I certify that, based on my findings, the following durable medical equipment is medically necessary:  Oxygen   HOME O2 Saturation Measurements:(Values must be present for Home Oxygen orders)  Room air sat at rest: 85  Room air sat with amb: 80  With liters of O2: 5, O2 sat at rest with O2: 96  With Liters of O2: 6, O2 sat with amb with O2 : 90  Is the patient mobile?: Yes  If patient feels more short of breath, they can go up to 6 liters per minute and contact healthcare provider.    Supporting Symptoms: The patient requires supplemental oxygen, as the following interventions have been tried with limited or no improvement: \"Bronchodilators and/or steroid inhalers, \"Positive expiratory pressure therapies, \"Oral and/or IV steroids, \"Ambulation with oximetry, and \"Incentive spirometry.        ------------------------------------------------------------------------------------------------------------------    Face to Face Supporting Documentation - Home Health    The encounter with this patient was in whole or in part the primary reason for home health admission.    Date of encounter:   Patient:                    MRN:                       YOB: 2025  Mj Cornejo  [id number]  1945     Home health to see patient for:  Skilled " Nursing care for assessment, interventions & education, Physical Therapy evaluation and treatment, and Occupational therapy evaluation and treatment    Skilled need for:  Exacerbation of Chronic Disease State Dysphagia, seizure disorder    Skilled nursing interventions to include:  Comment: medication management    Homebound evidenced status by:  Need the aid of supportive devices such as crutches, canes, wheelchairs or walkers, Require the use of special transportation, or Needs the assistance of another person in order to leave the home. Leaving home must require a considerable and taxing effort. There must exist a normal inability to leave the home.    Community Physician to provide follow up care: JOHANA Raygoza     Optional Interventions    Wound information & treatment:    Home Infusion Therapy orders:    Line/Drain/Airway:    I certify the face to face encounter for this home care referral meets the CMS requirements and the encounter/clinical assessment with the patient was, in whole, or in part, for the medical condition(s) listed above, which is the primary reason for home health care. Based on my clinical findings: the service(s) are medically necessary, support the need for home health care, and the homebound criteria are met.  I certify that this patient has had a face to face encounter by myself.  Jayla Castle M.D. - NPI: [id number]    *Debility, frailty and advanced age in the absence of an acute deterioration or exacerbation of a condition do not qualify a patient for home health.

## 2025-04-24 NOTE — PROGRESS NOTES
Patient received discharge paperwork and education, patient expressed understanding of discharge instructions and signed paperwork. Patient's PIVs and telebox were removed from patient. Patient had all belongings with them upon discharge. Patient had O2 delivered to bedside and left with all supplies for home O2. Patient's meds to bed were delivered to bedside and medications were explained to patient by RN. Patient transferred off unit via a wheelchair and RN to family's vehicle.     Patient's room had all IV fluids and linens removed. Room ready for EVS.

## 2025-04-24 NOTE — DISCHARGE PLANNING
ATTN: Case Management  RE: Referral for Home Health    As of 4/24/25, we have accepted the Home Health referral for the patient listed above.    [facility]  will contact the patient within 48 hours. If you have any questions or concerns regarding the patient’s transition to Home Health, please do not hesitate to contact us at [phone].      We look forward to collaborating with you,  [facility] Team

## 2025-04-24 NOTE — THERAPY
Occupational Therapy Contact Note    Patient Name: Mj Cornejo  Age:  79 y.o., Sex:  male  Medical Record #: [id number]  Today's Date: 4/24/2025 04/24/25 0920   Interdisciplinary Plan of Care Collaboration   IDT Collaboration with  Nursing   Collaboration Comments Planned to see pt, RN reports pt is preparing for discharge with HH and family assist.  Reviewed previous OT notes.  Asked Kazakh speaking nursing student to please ask family to obtain shower chair for pt and encourage him to use it for energy conservation.  She stated famlily verbalized understanding and will obtain shower chair for pt.  OT will return later today for treatment as able/needed if pt not discharging

## 2025-04-24 NOTE — PROGRESS NOTES
Patient was desating while eating, RT was requested to bedside. RT evaluated patient and provided education for discharge needs. Patient's oxygen requirements improved and patient was able to finish his meal.

## 2025-04-24 NOTE — CARE PLAN
The patient is Stable - Low risk of patient condition declining or worsening    Shift Goals  Clinical Goals: Monitor oxygen demand and work of breathing, ABX, monitor labs and vitals  Patient Goals: rest, discharge home  Family Goals: Patient feels better    Progress made toward(s) clinical / shift goals:      Pt remains on 5LNC. Pt reports no SOB or increased work of breathing. Continued on ABX therapy. No complaints of pain reported, pt has slept through the night.       Problem: Respiratory  Goal: Patient will achieve/maintain optimum respiratory ventilation and gas exchange  Outcome: Progressing     Problem: Pain - Standard  Goal: Alleviation of pain or a reduction in pain to the patient’s comfort goal  Outcome: Progressing

## 2025-04-24 NOTE — THERAPY
Physical Therapy   Daily Treatment     Patient Name: Mj Cornejo  Age:  79 y.o., Sex:  male  Medical Record #: [id number]  Today's Date: 4/24/2025     Precautions  Precautions: (P) Fall Risk;Swallow Precautions  Comments: (P) 5L O2    Assessment    Pt is progressing well with therapy and demonstrating dec assist and guarding required during upright activity. However, pt continues to be limited in activity tolerance. As fatigue and SOB/HR increased balance appears to worsen. Pt was provided with education and compensatory strategies for energy conservation techniques upon d/c to home. Pt educated on importance of self monitoring, HR rate, HF signs/symptoms, deep breathing, pacing, and rest breaks. Pt was able to demo slightly improved tolerance to increased activity while performing supine exercises, however, required supine rest break at times due to high HR with bridges. Recommend home health transitional care for continued physical therapy services along with assist from family due to poor activity tolerance.     Plan    Treatment Plan Status: (P) Continue Current Treatment Plan  Type of Treatment: Bed Mobility, Gait Training, Neuro Re-Education / Balance, Self Care / Home Evaluation, Therapeutic Activities, Therapeutic Exercise  Treatment Frequency: 4 Times per Week  Treatment Duration: Until Therapy Goals Met    DC Equipment Recommendations: (P) Front-Wheel Walker  Discharge Recommendations: (P) Recommend home health for continued physical therapy services (if family is able to provide 24/7 supvervision, otherwise post acute therapy)    Objective       04/24/25 1036   Precautions   Precautions Fall Risk;Swallow Precautions   Comments 5L O2   Vitals   O2 (LPM) 5   O2 Delivery Device Silicone Nasal Cannula   Vitals Comments 160 HR peak during exercises and ambulation, requires frequent rest breaks   Pain 0 - 10 Group   Therapist Pain Assessment Nurse Notified;0   Cognition    Level of Consciousness Alert    Supine Lower Body Exercise   Supine Lower Body Exercises Yes   Bridges Two Legged;1 set of 10   Straight Leg Raises 1 set of 10;Bilateral   Heel Slide 1 set of 10;Bilateral   Ankle Pumps Reciprocal;1 set of 10   Gluteal Isometrics 1 set of 10;Bilateral   Comments required supine rest break after bridges due to high HR and SOB   Sitting Lower Body Exercises   Sitting Lower Body Exercises Yes   Long Arc Quad 1 set of 10;Bilateral   Home Exercise Program   Home Exercise Program Patient Requires Assistance / Guarding to Complete Home Program   Balance   Sitting Balance (Static) Good   Sitting Balance (Dynamic) Fair +   Standing Balance (Static) Fair   Standing Balance (Dynamic) Fair   Weight Shift Sitting Good   Weight Shift Standing Fair   Skilled Intervention Verbal Cuing;Sequencing;Compensatory Strategies   Comments w/fww use   Bed Mobility    Supine to Sit Supervised   Sit to Supine Supervised   Scooting Supervised   Skilled Intervention Verbal Cuing   Gait Analysis   Gait Level Of Assist Contact Guard Assist   Assistive Device Front Wheel Walker   Distance (Feet) 40   # of Times Distance was Traveled 2   Deviation Decreased Base Of Support;Bradykinetic   Weight Bearing Status fwb   Skilled Intervention Verbal Cuing;Facilitation   Functional Mobility   Sit to Stand Contact Guard Assist   Bed, Chair, Wheelchair Transfer Contact Guard Assist   Transfer Method Stand Step   Mobility w/fww use   Skilled Intervention Verbal Cuing;Postural Facilitation   6 Clicks Assessment - How much HELP from from another person do you currently need... (If the patient hasn't done an activity recently, how much help from another person do you think he/she would need if he/she tried?)   Turning from your back to your side while in a flat bed without using bedrails? 4   Moving from lying on your back to sitting on the side of a flat bed without using bedrails? 4   Moving to and from a bed to a chair (including a wheelchair)? 3   Standing  up from a chair using your arms (e.g., wheelchair, or bedside chair)? 3   Walking in hospital room? 3   Climbing 3-5 steps with a railing? 3   6 clicks Mobility Score 20   Activity Tolerance   Sitting in Chair NT   Sitting Edge of Bed functional   Standing 2-3 mins x 2   Comments limited due to high HR and SOB   Short Term Goals    Short Term Goal # 1 in 6 V pt will perform transfers with quad cane and supervison   Goal Outcome # 1 Progressing as expected   Short Term Goal # 2 in 6 V pt will amb using quad cane 150 feet with SBA   Goal Outcome # 2 Progressing slower than expected   Education Group   Role of Physical Therapist Patient Response Patient;Family;Acceptance;Explanation;Verbal Demonstration   Exercises - Supine Patient Response Patient;Family;Acceptance;Explanation;Action Demonstration   Exercises - Seated Patient Response Patient;Family;Acceptance;Explanation;Action Demonstration   Physical Therapy Treatment Plan   Physical Therapy Treatment Plan Continue Current Treatment Plan   Anticipated Discharge Equipment and Recommendations   DC Equipment Recommendations Front-Wheel Walker   Discharge Recommendations Recommend home health for continued physical therapy services  (if family is able to provide 24/7 supvervision, otherwise post acute therapy)   Interdisciplinary Plan of Care Collaboration   IDT Collaboration with  Nursing   Patient Position at End of Therapy In Bed;Call Light within Reach;Bed Alarm On;Tray Table within Reach;Phone within Reach;Family / Friend in Room   Collaboration Comments aware of visit and recs   Session Information   Date / Session Number  4/24-3 (3/4, 4/24)

## 2025-04-25 ENCOUNTER — TELEPHONE (OUTPATIENT)
Dept: HOME HEALTH SERVICES | Facility: HOME HEALTHCARE | Age: 80
End: 2025-04-25
Payer: MEDICARE

## 2025-04-25 ENCOUNTER — TELEPHONE (OUTPATIENT)
Dept: PALLIATIVE MEDICINE | Facility: HOSPICE | Age: 80
End: 2025-04-25
Payer: MEDICARE

## 2025-04-25 PROBLEM — G40.119: Status: ACTIVE | Noted: 2025-04-25

## 2025-04-25 NOTE — TELEPHONE ENCOUNTER
"Home Transitional Care Management (TCM) Initial Contact Within 48 Hours of Discharge From Inpatient Setting:    This RN verfied the patient has [facility] ([facility]) and [facility] ([facility]).    This RN contacted soni Barker via telephone.  This RN introduced self and provided a brief discription of [facility] TCM program and explained what services Home TCM provides.  This RN asked if they would like to utilize Home TCM for optimal care.   Mj verbally consented to proceed with Home TCM services.     Discharge date from inpatient settin2025  Business date Home TCM began: 2025 Home TCM 30 day service period ends: 2025    Now that you are home, how are you feelings: \"fine\"  Do you have a follow up appointment scheduled with your PCP and or specialists: no  Appointment date(s): n/a  Are you able to get to your appointment(s): yes  Assistance needed scheduling follow up appointments or establishing with PCP: \"I need to call the doctor.\"  Has [facility] contacted you: no SOC scheduled: TBD      Did you receive any new prescriptions: yes  Where you able to get your new prescriptions: yes  Medication reconciliation completed. Medications reviewed with Mj. Mj has received instruction on special precautions for all high risk medications and has been instructed on how and when to report problems that may occur.  Medication questions answered: Educated on the indication of Doxazosin, Mj verbalized understanding.    Do you have and barriers to health care such as transportation, food, paying for medications, lack of durable medical equipment or lack of caregivers: no  Who is helping you at home: son and wife    Do you have questions or concerns regarding your reason for admission to the hospital: no  Do you have any questions regarding your discharge instructions: no  Education provided regarding signs and symptoms for infection and when to contact Home TCM before next " appointment with questions or concerns.    Current or previous attempts completed within 2 business days of discharge: 1

## 2025-04-25 NOTE — PROGRESS NOTES
[facility] Transitional Care Management Home Visit      Mj Cornejo  79 y.o. male  MRN [id number]  PCP JOHANA Raygoza  Location of visit: Patient home    : Nasreen #[id number]    History of Present Illness:    Hospital Course: Mj is an 79 yo male hospitalized 4/16/2025-4/24/2025 admitted with shortness of breath, fever, chills, flank pain and urine incontinence. He has also had dysphagia for past 2 years resulting in 80 pound weight loss. Upon admission, positive for influenza A, Tamiflu started. Evaluated by speech therapy and determined to be unsafe for all textures, NG tube placed. Chest x-ray with left lower lobe pneumonia. CT abdomen/pelvis with bibasilar pulmonary consolidation (left greater than right), 4mm left lower pole renal stone, mild bladder wall thickening consistent with chronic outlet obstruction. NG tube removed prior to discharge as patient wishes to eat as tolerated regardless of aspiration risk. SpO2 94% on 5L nasal cannula. Discharged home with oxygen and home health.       Today: Mj is seen today in his home for an initial transitional care management visit. Son, Mj, present as well as wife and daughter. Son reporting Mj with frequent bloody noses and cough in the morning. Mj confirms this. Current O2 5L per nasal cannula 24/7, SpO2 95%. Tolerating foods cut in very small pieces son reports. Son declines f/u with Urology. Patient denies any current urinary symptoms since stone passed. Son states he will call PCP to schedule a f/u. Denies any seizures. Questions/concerns addressed today.       Assessment and Plan:    Primary Problem #1: Hospital follow-up, Acute respiratory failure with hypoxemia    Plan: Continues O2 5L nasal cannula - reports frequent epistaxis, ordered humidification - denies SOB, c/o cough in AM, c/o mucus/increased phlegm - can try Mucinex OTC - encouraged activity as tolerated and deep breathing - [facility] pending start,  asking RN to bring incentive spirometer and teaching to promote deep breathing - albuterol inhaler sent to pharmacy for PRN SOB - pending PCP f/u appt - f/u Windy Lee APRN with TCM in home 5/5/2025      Other major issues not addressed during today's visit: history of seizure disorder, hyperparathyroidism, hypertension, dyslipidemia     Pertinent Physical Exam Findings:  /70 (BP Location: Left arm, Patient Position: Sitting, BP Cuff Size: Small adult)   Pulse 83   Temp 37.1 °C (98.7 °F) (Temporal)   Resp 18   SpO2 95%     Review of Systems   Constitutional:  Negative for chills, fever and malaise/fatigue.   HENT:  Positive for nosebleeds.    Respiratory:  Positive for cough. Negative for sputum production and shortness of breath.         + increased mucus in AM   Cardiovascular:  Negative for chest pain, palpitations and leg swelling.   Gastrointestinal:  Negative for abdominal pain, constipation, diarrhea, heartburn, nausea and vomiting.   Genitourinary:  Negative for dysuria, frequency and urgency.   Musculoskeletal:  Negative for back pain and joint pain.   Neurological:  Negative for dizziness, seizures and headaches.       Physical Exam  Constitutional:       Appearance: Normal appearance.      Interventions: Nasal cannula in place.   HENT:      Right Ear: Decreased hearing noted.      Left Ear: Decreased hearing noted.   Eyes:      Comments: Bilateral ptosis   Cardiovascular:      Rate and Rhythm: Normal rate and regular rhythm.      Pulses: Normal pulses.      Heart sounds: Normal heart sounds.   Pulmonary:      Effort: Pulmonary effort is normal.      Breath sounds: Examination of the right-lower field reveals decreased breath sounds and rales. Examination of the left-lower field reveals decreased breath sounds and rales. Decreased breath sounds and rales present.   Abdominal:      General: Bowel sounds are normal.      Palpations: Abdomen is soft.   Musculoskeletal:      Right lower leg: No  edema.      Left lower leg: No edema.   Skin:     General: Skin is warm and dry.   Neurological:      Mental Status: He is alert and oriented to person, place, and time.   Psychiatric:         Mood and Affect: Mood normal.         Behavior: Behavior normal.         Thought Content: Thought content normal.       Current Medications:    Current Outpatient Medications:     predniSONE (DELTASONE) 20 MG Tab, Take 40 mg by mouth every day., Disp: , Rfl:     amLODIPine (NORVASC) 5 MG Tab, Take 10 mg by mouth every day., Disp: , Rfl:     levETIRAcetam (KEPPRA) 500 MG Tab, Take 2,000 mg by mouth every day. Indications: Seizure, Disp: , Rfl:     famotidine (PEPCID) 20 MG Tab, Take 20 mg by mouth every day., Disp: , Rfl:     pyridostigmine (MESTINON) 60 MG Tab, Take 60 mg by mouth 3 times a day., Disp: , Rfl:     albuterol 108 (90 Base) MCG/ACT Aero Soln inhalation aerosol, Inhale 1-2 Puffs every four hours as needed for Shortness of Breath., Disp: 34 g, Rfl: 1    doxazosin (CARDURA) 2 MG Tab, Take 1 Tablet by mouth 2 times a day., Disp: 30 Tablet, Rfl: 0    Medication Allergies:  Patient has no known allergies.      Thank you for allowing me the opportunity to participate in the care of Mj Cornejo    This visit was conducted within 7 days from hospital discharge. This is a high medical complexity visit, which in addition to above, included, if applicable, medication reconciliation and management, obtaining and reviewing discharge information, reviewing the need for diagnostic tests/treatments and/or follow-up on pending diagnostic tests/treatments, medical education, establishing or re-establishing referrals with community providers and services and assistance with scheduling follow-up visits with providers and services.    SHERWIN Sam.  [facility] Transitional Care Management  [address] [place] [address]  P. [phone]  F. [phone]  Available on Voalte

## 2025-04-25 NOTE — TELEPHONE ENCOUNTER
Home Transitional Care Management called patients soni Barker and schedule appointment with Windy MARISCAL for 4/28/25.     Home TCM service period 4/24/25 - 4/24/25

## 2025-04-26 ENCOUNTER — TELEPHONE (OUTPATIENT)
Dept: HOME HEALTH SERVICES | Facility: HOME HEALTHCARE | Age: 80
End: 2025-04-26
Payer: MEDICARE

## 2025-04-26 LAB
BACTERIA BLD CULT: NORMAL
BACTERIA BLD CULT: NORMAL
SIGNIFICANT IND 70042: NORMAL
SIGNIFICANT IND 70042: NORMAL
SITE SITE: NORMAL
SITE SITE: NORMAL
SOURCE SOURCE: NORMAL
SOURCE SOURCE: NORMAL

## 2025-04-26 NOTE — TELEPHONE ENCOUNTER
This TCS called and spoke with soni Barker regarding their Home Health referral.  I communicated that we do have their referral and are currently waiting for an open time slot to get their admission visit scheduled.  I asked if they were okay waiting to be called back within the next few days or in the event that a spot opens up sooner, or if they wanted their referral sent to a different Home Health agency.  They voiced their understanding of the delay and asked that their referral stay with [facility] at this time.  I requested a later start of care date for 4/28 per their request.

## 2025-04-28 ENCOUNTER — TELEPHONE (OUTPATIENT)
Dept: HOME HEALTH SERVICES | Facility: HOME HEALTHCARE | Age: 80
End: 2025-04-28

## 2025-04-28 ENCOUNTER — OFF SITE VISIT (OUTPATIENT)
Dept: PALLIATIVE MEDICINE | Facility: HOSPICE | Age: 80
End: 2025-04-28
Payer: MEDICARE

## 2025-04-28 VITALS
TEMPERATURE: 98.7 F | RESPIRATION RATE: 18 BRPM | HEART RATE: 83 BPM | DIASTOLIC BLOOD PRESSURE: 70 MMHG | SYSTOLIC BLOOD PRESSURE: 136 MMHG | OXYGEN SATURATION: 95 %

## 2025-04-28 DIAGNOSIS — J96.01 ACUTE RESPIRATORY FAILURE WITH HYPOXEMIA (HCC): ICD-10-CM

## 2025-04-28 DIAGNOSIS — R05.8 SPASMODIC COUGH: ICD-10-CM

## 2025-04-28 RX ORDER — AMLODIPINE BESYLATE 5 MG/1
10 TABLET ORAL DAILY
COMMUNITY

## 2025-04-28 RX ORDER — ALBUTEROL SULFATE 90 UG/1
1-2 INHALANT RESPIRATORY (INHALATION) EVERY 4 HOURS PRN
Qty: 34 G | Refills: 1 | Status: SHIPPED | OUTPATIENT
Start: 2025-04-28

## 2025-04-28 RX ORDER — FAMOTIDINE 20 MG/1
20 TABLET, FILM COATED ORAL DAILY
COMMUNITY

## 2025-04-28 RX ORDER — PREDNISONE 20 MG/1
40 TABLET ORAL DAILY
COMMUNITY

## 2025-04-28 RX ORDER — PYRIDOSTIGMINE BROMIDE 60 MG/1
60 TABLET ORAL 2 TIMES DAILY
COMMUNITY

## 2025-04-28 RX ORDER — LEVETIRACETAM 500 MG/1
2000 TABLET ORAL DAILY
COMMUNITY

## 2025-04-28 ASSESSMENT — ENCOUNTER SYMPTOMS
SEIZURES: 0
CHILLS: 0
DIARRHEA: 0
BACK PAIN: 0
DIZZINESS: 0
SPUTUM PRODUCTION: 0
ABDOMINAL PAIN: 0
PALPITATIONS: 0
COUGH: 1
NAUSEA: 0
HEARTBURN: 0
HEADACHES: 0
CONSTIPATION: 0
FEVER: 0
VOMITING: 0
SHORTNESS OF BREATH: 0

## 2025-04-28 ASSESSMENT — PAIN SCALES - GENERAL: PAINLEVEL_OUTOF10: NO PAIN

## 2025-04-28 NOTE — TELEPHONE ENCOUNTER
Spoke to daughter and scheduled home health start of care for 12:30-1:30 on 4/29.  Later start of care requested for 4/29 per request.

## 2025-04-29 ENCOUNTER — HOME CARE VISIT (OUTPATIENT)
Dept: HOME HEALTH SERVICES | Facility: HOME HEALTHCARE | Age: 80
End: 2025-04-29
Payer: MEDICARE

## 2025-04-29 ENCOUNTER — DOCUMENTATION (OUTPATIENT)
Dept: CARDIOLOGY | Facility: MEDICAL CENTER | Age: 80
End: 2025-04-29
Payer: MEDICARE

## 2025-04-29 VITALS
SYSTOLIC BLOOD PRESSURE: 140 MMHG | DIASTOLIC BLOOD PRESSURE: 60 MMHG | HEIGHT: 60 IN | HEART RATE: 81 BPM | TEMPERATURE: 99.4 F | OXYGEN SATURATION: 96 % | RESPIRATION RATE: 18 BRPM | WEIGHT: 127.6 LBS | BODY MASS INDEX: 25.05 KG/M2

## 2025-04-29 PROCEDURE — G0299 HHS/HOSPICE OF RN EA 15 MIN: HCPCS

## 2025-04-29 PROCEDURE — 665005 NO-PAY RAP - HOME HEALTH

## 2025-04-29 SDOH — ECONOMIC STABILITY: HOUSING INSECURITY: EVIDENCE OF SMOKING MATERIAL: 0

## 2025-04-29 ASSESSMENT — ENCOUNTER SYMPTOMS
LAST BOWEL MOVEMENT: 67324
BOWEL PATTERN NORMAL: 1
DENIES PAIN: 1
NAUSEA: PT CURRENTLY DENIES ANY NAUSEA
VOMITING: PT CURRENTLY DENIES ANY VOMMITING
FATIGUES EASILY: 1
STOOL FREQUENCY: DAILY

## 2025-04-29 ASSESSMENT — FIBROSIS 4 INDEX: FIB4 SCORE: 1.32

## 2025-04-29 ASSESSMENT — ACTIVITIES OF DAILY LIVING (ADL): OASIS_M1830: 02

## 2025-04-29 NOTE — PROGRESS NOTES
Medication chart review for [facility] services    Received referral from [facility].   Medications reviewed  compared with discharge summary if available.  Discharge summary date, if applicable:   4/24    Current medication list per [facility]     Medication list one, patient is currently taking    Current Outpatient Medications:     PE-Triprolidine-DM-GG-APAP (MUCINEX FAST-MAX CNG/CGH/CD/FL PO), 20 mL, Oral, QDAY PRN    Phenyleph-Triprolidine-DM-APAP (MUCINEX NIGHTSHIFT COLD/FLU PO), 20 mL, Oral, QDAY PRN    predniSONE, 40 mg, Oral, DAILY    amLODIPine, 10 mg, Oral, DAILY    levETIRAcetam, 2,000 mg, Oral, DAILY    famotidine, 20 mg, Oral, DAILY    pyridostigmine, 60 mg, Oral, BID    albuterol, 1-2 Puff, Inhalation, Q4HRS PRN    doxazosin, 2 mg, Oral, BID      Medication list two, drugs that the patient has been prescribed or recommended to take by their healthcare provider on discharge summary        ASK your doctor about these medications         Instructions   acetaminophen 500 MG Tabs  Commonly known as: Tylenol    Take 1,000 mg by mouth every 6 hours as needed for Mild Pain.  Dose: 1,000 mg      albuterol 108 (90 Base) MCG/ACT Aers inhalation aerosol    Inhale 2 Puffs every 6 hours as needed for Shortness of Breath.  Dose: 2 Puff      alendronate 70 MG Tabs  Commonly known as: Fosamax    Take 1 Tablet by mouth every 7 days.  Dose: 70 mg      amLODIPine 5 MG Tabs  Commonly known as: Norvasc    Take 2 Tablets by mouth every day.  Dose: 10 mg      atorvastatin 10 MG Tabs  Commonly known as: Lipitor    TAKE 1 TABLET BY MOUTH ONCE DAILY IN THE MORNING  Dose: 10 mg           No Known Allergies    Labs     Lab Results   Component Value Date/Time    SODIUM 141 04/24/2025 12:20 AM    POTASSIUM 3.6 04/24/2025 12:20 AM    CHLORIDE 108 04/24/2025 12:20 AM    CO2 20 04/24/2025 12:20 AM    GLUCOSE 88 04/24/2025 12:20 AM    BUN 13 04/24/2025 12:20 AM    CREATININE 0.67 04/24/2025 12:20 AM     Lab Results    Component Value Date/Time    ALKPHOSPHAT 62 04/16/2025 08:15 AM    ASTSGOT 47 (H) 04/16/2025 08:15 AM    ALTSGPT 35 04/16/2025 08:15 AM    TBILIRUBIN 1.0 04/16/2025 08:15 AM    INR 1.07 01/23/2021 02:27 PM    ALBUMIN 3.4 04/16/2025 08:15 AM    ALBUMIN 4.31 08/20/2024 07:10 AM        Assessment for clinically significant drug interactions, drug omissions/additions, duplicative therapies.            CC   LIZZY RaygozaRAngelesN.  [address]  [place] [address]  Fax: [phone]    [facility]  Phone [phone] fax [phone]    This note was created using voice recognition software (Dragon). The accuracy of the dictation is limited by the abilities of the software. I have reviewed the note prior to signing, however some errors in grammar and context are still possible. If you have any questions related to this note please do not hesitate to contact our office.       No follow-ups on file.

## 2025-05-02 ENCOUNTER — HOME CARE VISIT (OUTPATIENT)
Dept: HOME HEALTH SERVICES | Facility: HOME HEALTHCARE | Age: 80
End: 2025-05-02
Payer: MEDICARE

## 2025-05-02 ENCOUNTER — HOSPITAL ENCOUNTER (OUTPATIENT)
Dept: RADIOLOGY | Facility: MEDICAL CENTER | Age: 80
End: 2025-05-02
Payer: MEDICARE

## 2025-05-02 DIAGNOSIS — J18.8 OTHER PNEUMONIA, UNSPECIFIED ORGANISM: ICD-10-CM

## 2025-05-02 PROCEDURE — 71250 CT THORAX DX C-: CPT

## 2025-05-05 ENCOUNTER — OFF SITE VISIT (OUTPATIENT)
Dept: PALLIATIVE MEDICINE | Facility: HOSPICE | Age: 80
End: 2025-05-05
Payer: MEDICARE

## 2025-05-05 VITALS
DIASTOLIC BLOOD PRESSURE: 70 MMHG | OXYGEN SATURATION: 91 % | RESPIRATION RATE: 18 BRPM | HEART RATE: 80 BPM | SYSTOLIC BLOOD PRESSURE: 150 MMHG | TEMPERATURE: 97.9 F

## 2025-05-05 DIAGNOSIS — R30.0 DYSURIA: ICD-10-CM

## 2025-05-05 DIAGNOSIS — R13.12 OROPHARYNGEAL DYSPHAGIA: ICD-10-CM

## 2025-05-05 DIAGNOSIS — J96.01 ACUTE RESPIRATORY FAILURE WITH HYPOXEMIA (HCC): ICD-10-CM

## 2025-05-05 PROCEDURE — 99350 HOME/RES VST EST HIGH MDM 60: CPT | Performed by: NURSE PRACTITIONER

## 2025-05-05 ASSESSMENT — ENCOUNTER SYMPTOMS
DIARRHEA: 0
FEVER: 0
CHILLS: 0
ABDOMINAL PAIN: 0
SHORTNESS OF BREATH: 1
CONSTIPATION: 0
PALPITATIONS: 0
COUGH: 1

## 2025-05-05 ASSESSMENT — PAIN SCALES - GENERAL: PAINLEVEL_OUTOF10: NO PAIN

## 2025-05-05 NOTE — PROGRESS NOTES
[facility] Transitional Care Management Home Visit      Mj Cornejo  80 y.o. male  MRN [id number]  PCP JOHANA Raygoza  Location of visit: Patient home    : Kel [id number]    History of Present Illness:    Hospital Course: Mj is an 81 yo male hospitalized 4/16/2025-4/24/2025 admitted with shortness of breath, fever, chills, flank pain and urine incontinence. He has also had dysphagia for past 2 years resulting in 80 pound weight loss. Upon admission, positive for influenza A, Tamiflu started. Evaluated by speech therapy and determined to be unsafe for all textures, NG tube placed. Chest x-ray with left lower lobe pneumonia. CT abdomen/pelvis with bibasilar pulmonary consolidation (left greater than right), 4mm left lower pole renal stone, mild bladder wall thickening consistent with chronic outlet obstruction. NG tube removed prior to discharge as patient wishes to eat as tolerated regardless of aspiration risk. SpO2 94% on 5L nasal cannula. Discharged home with oxygen and home health.       Today:  Mj is seen today in his home for a follow up transitional care management visit. Son, Mj, present as well as wife and daughter. Mj is without his oxygen during the visit as he wishes to wean off. RA SpO2 91-93%. Mj does have a home pulse oximeter to use to check his oxygen. He reports the oxygen humidifier has helped resolve the bloody noses he was previously getting. [facility] continues.       Assessment and Plan:    Primary Problem #1: Acute respiratory failure with hypoxemia     Plan: Continues to use O2 as needed per nasal cannula - during visit today, on RA with SpO2 91-93% - denies SOB - encouraged activity as tolerated and deep breathing - [facility] continues - would benefit from an incentive spirometer to promote deep breathing - scheduled PCP appt with Jacques Marrero 5/7/2025 - f/u Windy MARISCAL with TCM in home 5/13/2025     Primary Problem #2:  "Oropharyngeal dysphagia    Plan: CT chest completed 5/2/2025 with \"Patchy consolidations of the bilateral lower lobes with scattered tiny nodular densities of the lower lobes and dependent bilateral upper lobes. Findings concerning for multifocal pneumonia. Pattern is suggestive of aspiration\" - Discussed this test result with the patient and family today - had a long conversation r/t his inability to safely swallow food/drink and evidence of aspiration - Mj did have a NGT during most recent hospitalization but asked for it to be removed prior to discharge understanding this risk and desire to continue taking food/drink by mouth - educated jM and his family about a NGT vs G-tube and no oral foods - Mj states understanding of risks associated with eating/drinking, is concerned about possible infection r/t a feeding tube and would like to think more about this and discuss with his family as he feels he is following the recommended liquid/pureed diet - encouraged coughing/deep breathing - oxygenation today without oxygen 91-93%, afebrile, HR 80, he is not ill-appearing - call placed to PCP to discuss these concerns as Mj has a follow up appointment scheduled 5/7/2025 - call placed to  ST to discuss however no further interventions from a ST standpoint as already deemed unsafe for all textures while inpatient - f/u Windy MARISCAL with TCM in home 5/13/2025     Primary Problem #3: Dysuria    Plan: Mj concerned about a UTI, c/o burning, dribbling, urgency and frequency -  RN to obtain UA       Other major issues not addressed during today's visit: history of seizure disorder, hyperparathyroidism, hypertension, dyslipidemia     Pertinent Physical Exam Findings:  BP (!) 150/70 (BP Location: Left arm, Patient Position: Sitting, BP Cuff Size: Small adult)   Pulse 80   Temp 36.6 °C (97.9 °F) (Temporal)   Resp 18   SpO2 91%       Review of Systems   Constitutional:  Negative for chills and " fever.   Respiratory:  Positive for cough and shortness of breath.    Cardiovascular:  Negative for chest pain, palpitations and leg swelling.   Gastrointestinal:  Negative for abdominal pain, constipation and diarrhea.   Genitourinary:  Positive for dysuria, frequency and urgency. Negative for hematuria.       Physical Exam  Constitutional:       Appearance: Normal appearance.   Eyes:      Comments: Bilateral ptosis   Cardiovascular:      Rate and Rhythm: Normal rate. Rhythm irregular.   Pulmonary:      Effort: Pulmonary effort is normal.      Breath sounds: Examination of the right-lower field reveals rales. Examination of the left-lower field reveals rales. Rales present.   Abdominal:      General: Bowel sounds are normal.      Palpations: Abdomen is soft.   Musculoskeletal:      Right lower leg: No edema.      Left lower leg: No edema.   Skin:     General: Skin is warm and dry.   Neurological:      Mental Status: He is alert and oriented to person, place, and time.   Psychiatric:         Mood and Affect: Mood normal.         Behavior: Behavior normal.         Thought Content: Thought content normal.         Judgment: Judgment normal.       Current Medications:    Current Outpatient Medications:     PE-Triprolidine-DM-GG-APAP (MUCINEX FAST-MAX CNG/CGH/CD/FL PO), Take 20 mL by mouth 1 time a day as needed (congestion). Indications: congestion, Disp: , Rfl:     Phenyleph-Triprolidine-DM-APAP (MUCINEX NIGHTSHIFT COLD/FLU PO), Take 20 mL by mouth 1 time a day as needed (congestion). Indications: congestion, Disp: , Rfl:     predniSONE (DELTASONE) 20 MG Tab, Take 40 mg by mouth every day. Indications: Myasthenia Gravis, Disp: , Rfl:     amLODIPine (NORVASC) 5 MG Tab, Take 10 mg by mouth every day. Indications: High Blood Pressure, Disp: , Rfl:     levETIRAcetam (KEPPRA) 500 MG Tab, Take 2,000 mg by mouth every day. Indications: Seizure, Disp: , Rfl:     famotidine (PEPCID) 20 MG Tab, Take 20 mg by mouth every day.,  Disp: , Rfl:     pyridostigmine (MESTINON) 60 MG Tab, Take 60 mg by mouth 2 times a day. Indications: Myasthenia Gravis, Disp: , Rfl:     albuterol 108 (90 Base) MCG/ACT Aero Soln inhalation aerosol, Inhale 1-2 Puffs every four hours as needed for Shortness of Breath., Disp: 34 g, Rfl: 1    doxazosin (CARDURA) 2 MG Tab, Take 1 Tablet by mouth 2 times a day., Disp: 30 Tablet, Rfl: 0    Medication Allergies:  Patient has no known allergies.      Thank you for allowing me the opportunity to participate in the care of Mj Cornejo    This is a moderate-high medical complexity visit, which in addition to above, included, if applicable, medication reconciliation and management, obtaining and reviewing discharge information, reviewing the need for diagnostic tests/treatments and/or follow-up on pending diagnostic tests/treatments, medical education, establishing or re-establishing referrals with community providers and services and assistance with scheduling follow-up visits with providers and services.      SHERWIN Sam.  [facility] Transitional Care Management  [address]  [place] [address]  P. [phone]  F. [phone]  Available on Voalte

## 2025-05-06 ENCOUNTER — HOSPITAL ENCOUNTER (OUTPATIENT)
Facility: MEDICAL CENTER | Age: 80
End: 2025-05-06
Attending: NURSE PRACTITIONER
Payer: MEDICARE

## 2025-05-06 ENCOUNTER — HOME CARE VISIT (OUTPATIENT)
Dept: HOME HEALTH SERVICES | Facility: HOME HEALTHCARE | Age: 80
End: 2025-05-06
Payer: MEDICARE

## 2025-05-06 VITALS
SYSTOLIC BLOOD PRESSURE: 134 MMHG | RESPIRATION RATE: 18 BRPM | TEMPERATURE: 97.6 F | HEART RATE: 73 BPM | DIASTOLIC BLOOD PRESSURE: 70 MMHG | OXYGEN SATURATION: 96 %

## 2025-05-06 DIAGNOSIS — R30.0 DYSURIA: ICD-10-CM

## 2025-05-06 LAB
APPEARANCE UR: CLEAR
BILIRUB UR QL STRIP.AUTO: NEGATIVE
COLOR UR: YELLOW
GLUCOSE UR STRIP.AUTO-MCNC: NEGATIVE MG/DL
KETONES UR STRIP.AUTO-MCNC: NEGATIVE MG/DL
LEUKOCYTE ESTERASE UR QL STRIP.AUTO: NEGATIVE
MICRO URNS: NORMAL
NITRITE UR QL STRIP.AUTO: NEGATIVE
PH UR STRIP.AUTO: 6.5 [PH] (ref 5–8)
PROT UR QL STRIP: NEGATIVE MG/DL
RBC UR QL AUTO: NEGATIVE
SP GR UR STRIP.AUTO: 1.02
UROBILINOGEN UR STRIP.AUTO-MCNC: 1 EU/DL

## 2025-05-06 PROCEDURE — G0299 HHS/HOSPICE OF RN EA 15 MIN: HCPCS

## 2025-05-06 PROCEDURE — G0152 HHCP-SERV OF OT,EA 15 MIN: HCPCS

## 2025-05-06 PROCEDURE — 81003 URINALYSIS AUTO W/O SCOPE: CPT

## 2025-05-06 SDOH — ECONOMIC STABILITY: HOUSING INSECURITY
HOME SAFETY: PT HAS CONCENTRATOR SET TO APPROPRIATE SETTINGS 5L, ALL LINES CONNECTED W/O KINKS. PT MANAGES O2 LINE W/ SPOUSE ASSIST AS NEEDED. PT HAS PORTABLE O2 IN STAND BY FRONT DOOR FOR COMMUNITY OUTINGS.

## 2025-05-06 SDOH — ECONOMIC STABILITY: HOUSING INSECURITY: EVIDENCE OF SMOKING MATERIAL: 0

## 2025-05-06 ASSESSMENT — ENCOUNTER SYMPTOMS
MUSCLE WEAKNESS: 1
TREMORS: 1
TROUBLE SWALLOWING: 1
BOWEL PATTERN NORMAL: 1
LAST BOWEL MOVEMENT: 67331
DENIES PAIN: 1
DENIES PAIN: 1
EYE REDNESS: 1
FATIGUES EASILY: 1

## 2025-05-06 ASSESSMENT — ACTIVITIES OF DAILY LIVING (ADL)
GROOMING_CURRENT_FUNCTION: SUPERVISION
AMBULATION ASSISTANCE: 1
AMBULATION ASSISTANCE: SUPERVISION
PHYSICAL TRANSFERS ASSESSED: 1
FEEDING ASSESSED: 1
TOILETING: SUPERVISION
GROOMING ASSESSED: 1
BATHING_CURRENT_FUNCTION: STAND BY ASSIST
TOILETING: 1
BATHING ASSESSED: 1
CURRENT_FUNCTION: SUPERVISION
FEEDING: SUPERVISION
CURRENT_FUNCTION: STAND BY ASSIST

## 2025-05-06 ASSESSMENT — PATIENT HEALTH QUESTIONNAIRE - PHQ9: CLINICAL INTERPRETATION OF PHQ2 SCORE: 0

## 2025-05-07 ENCOUNTER — TELEPHONE (OUTPATIENT)
Dept: PALLIATIVE MEDICINE | Facility: HOSPICE | Age: 80
End: 2025-05-07
Payer: MEDICARE

## 2025-05-07 ASSESSMENT — ACTIVITIES OF DAILY LIVING (ADL)
DRESSING_LB_CURRENT_FUNCTION: SUPERVISION
DRESSING_UB_CURRENT_FUNCTION: SUPERVISION

## 2025-05-07 NOTE — TELEPHONE ENCOUNTER
Incoming call from Jacques Marrero, PCP, returning my call from yesterday. Updated Jacques on patient status included most recent hospital stay, recent CT results, recent UA results. Patient has f/u visit with PCP today for further discussion.

## 2025-05-08 ENCOUNTER — HOME CARE VISIT (OUTPATIENT)
Dept: HOME HEALTH SERVICES | Facility: HOME HEALTHCARE | Age: 80
End: 2025-05-08
Payer: MEDICARE

## 2025-05-08 PROCEDURE — G0151 HHCP-SERV OF PT,EA 15 MIN: HCPCS

## 2025-05-08 ASSESSMENT — ENCOUNTER SYMPTOMS
DENIES PAIN: 1
SEVERE DYSPNEA: 1

## 2025-05-09 ENCOUNTER — HOME CARE VISIT (OUTPATIENT)
Dept: HOME HEALTH SERVICES | Facility: HOME HEALTHCARE | Age: 80
End: 2025-05-09
Payer: MEDICARE

## 2025-05-09 VITALS
TEMPERATURE: 97.6 F | OXYGEN SATURATION: 96 % | DIASTOLIC BLOOD PRESSURE: 66 MMHG | RESPIRATION RATE: 18 BRPM | HEART RATE: 82 BPM | SYSTOLIC BLOOD PRESSURE: 132 MMHG

## 2025-05-09 VITALS
RESPIRATION RATE: 18 BRPM | OXYGEN SATURATION: 96 % | TEMPERATURE: 97.6 F | HEART RATE: 85 BPM | DIASTOLIC BLOOD PRESSURE: 70 MMHG | SYSTOLIC BLOOD PRESSURE: 120 MMHG

## 2025-05-09 PROCEDURE — G0495 RN CARE TRAIN/EDU IN HH: HCPCS

## 2025-05-09 SDOH — ECONOMIC STABILITY: HOUSING INSECURITY: EVIDENCE OF SMOKING MATERIAL: 0

## 2025-05-09 ASSESSMENT — ENCOUNTER SYMPTOMS
FATIGUES EASILY: 1
STOOL FREQUENCY: DAILY
ORTHOSTATIC HYPOTENSION: 1
BOWEL PATTERN NORMAL: 1
LAST BOWEL MOVEMENT: 67334
FATIGUE: 1
MUSCLE WEAKNESS: 1
DENIES PAIN: 1
MUSCLE WEAKNESS: 1
DIZZINESS: 1

## 2025-05-09 ASSESSMENT — ACTIVITIES OF DAILY LIVING (ADL)
AMBULATION ASSISTANCE ON FLAT SURFACES: 1
AMBULATION ASSISTANCE: STAND BY ASSIST
CURRENT_FUNCTION: STAND BY ASSIST

## 2025-05-12 ENCOUNTER — HOSPITAL ENCOUNTER (EMERGENCY)
Facility: MEDICAL CENTER | Age: 80
End: 2025-05-12
Attending: STUDENT IN AN ORGANIZED HEALTH CARE EDUCATION/TRAINING PROGRAM | Admitting: HOSPITALIST
Payer: MEDICARE

## 2025-05-12 ENCOUNTER — HOSPITAL ENCOUNTER (INPATIENT)
Facility: MEDICAL CENTER | Age: 80
LOS: 2 days | DRG: 101 | End: 2025-05-14
Attending: STUDENT IN AN ORGANIZED HEALTH CARE EDUCATION/TRAINING PROGRAM | Admitting: STUDENT IN AN ORGANIZED HEALTH CARE EDUCATION/TRAINING PROGRAM
Payer: MEDICARE

## 2025-05-12 ENCOUNTER — HOME CARE VISIT (OUTPATIENT)
Dept: HOME HEALTH SERVICES | Facility: HOME HEALTHCARE | Age: 80
End: 2025-05-12
Payer: MEDICARE

## 2025-05-12 ENCOUNTER — APPOINTMENT (OUTPATIENT)
Dept: RADIOLOGY | Facility: MEDICAL CENTER | Age: 80
End: 2025-05-12
Attending: STUDENT IN AN ORGANIZED HEALTH CARE EDUCATION/TRAINING PROGRAM
Payer: MEDICARE

## 2025-05-12 VITALS
SYSTOLIC BLOOD PRESSURE: 149 MMHG | HEIGHT: 60 IN | TEMPERATURE: 98.8 F | DIASTOLIC BLOOD PRESSURE: 67 MMHG | BODY MASS INDEX: 25.15 KG/M2 | RESPIRATION RATE: 23 BRPM | HEART RATE: 74 BPM | OXYGEN SATURATION: 100 % | WEIGHT: 128.09 LBS

## 2025-05-12 DIAGNOSIS — M54.50 CHRONIC BILATERAL LOW BACK PAIN WITHOUT SCIATICA: ICD-10-CM

## 2025-05-12 DIAGNOSIS — J96.01 ACUTE RESPIRATORY FAILURE WITH HYPOXEMIA (HCC): ICD-10-CM

## 2025-05-12 DIAGNOSIS — R97.20 RAISED PROSTATE SPECIFIC ANTIGEN: ICD-10-CM

## 2025-05-12 DIAGNOSIS — Z86.69 HISTORY OF SEIZURE DISORDER: ICD-10-CM

## 2025-05-12 DIAGNOSIS — G89.29 CHRONIC BILATERAL LOW BACK PAIN WITHOUT SCIATICA: ICD-10-CM

## 2025-05-12 DIAGNOSIS — R27.0 ATAXIA: ICD-10-CM

## 2025-05-12 DIAGNOSIS — R13.12 OROPHARYNGEAL DYSPHAGIA: ICD-10-CM

## 2025-05-12 DIAGNOSIS — G40.901 STATUS EPILEPTICUS (HCC): Primary | ICD-10-CM

## 2025-05-12 DIAGNOSIS — R73.01 ELEVATED FASTING GLUCOSE: ICD-10-CM

## 2025-05-12 DIAGNOSIS — E55.9 VITAMIN D DEFICIENCY: ICD-10-CM

## 2025-05-12 DIAGNOSIS — R79.89 ELEVATED FERRITIN: ICD-10-CM

## 2025-05-12 DIAGNOSIS — R53.1 LEFT-SIDED WEAKNESS: ICD-10-CM

## 2025-05-12 DIAGNOSIS — M54.2 NECK PAIN: ICD-10-CM

## 2025-05-12 DIAGNOSIS — N53.9 MALE SEXUAL DYSFUNCTION: ICD-10-CM

## 2025-05-12 DIAGNOSIS — R25.1 TREMOR OF BOTH HANDS: ICD-10-CM

## 2025-05-12 DIAGNOSIS — I10 ESSENTIAL HYPERTENSION: ICD-10-CM

## 2025-05-12 DIAGNOSIS — M81.8 OTHER OSTEOPOROSIS WITHOUT CURRENT PATHOLOGICAL FRACTURE: ICD-10-CM

## 2025-05-12 DIAGNOSIS — Z66 DNR (DO NOT RESUSCITATE): ICD-10-CM

## 2025-05-12 DIAGNOSIS — R63.4 WEIGHT LOSS: ICD-10-CM

## 2025-05-12 DIAGNOSIS — E87.6 HYPOKALEMIA: ICD-10-CM

## 2025-05-12 DIAGNOSIS — D53.9 MACROCYTIC ANEMIA: ICD-10-CM

## 2025-05-12 DIAGNOSIS — R91.1 PULMONARY NODULE, RIGHT: ICD-10-CM

## 2025-05-12 DIAGNOSIS — E83.52 HYPERCALCEMIA: ICD-10-CM

## 2025-05-12 DIAGNOSIS — E21.3 HYPERPARATHYROIDISM, UNSPECIFIED (HCC): ICD-10-CM

## 2025-05-12 DIAGNOSIS — R42 DIZZINESS: ICD-10-CM

## 2025-05-12 PROBLEM — R54 FRAILTY SYNDROME IN GERIATRIC PATIENT: Status: ACTIVE | Noted: 2025-05-12

## 2025-05-12 PROBLEM — Z71.89 ACP (ADVANCE CARE PLANNING): Status: ACTIVE | Noted: 2025-05-12

## 2025-05-12 PROBLEM — R29.90 STROKE-LIKE SYMPTOMS: Status: ACTIVE | Noted: 2021-01-25

## 2025-05-12 PROBLEM — R56.9 SEIZURE-LIKE ACTIVITY (HCC): Status: ACTIVE | Noted: 2025-05-12

## 2025-05-12 PROBLEM — E86.0 DEHYDRATION: Status: ACTIVE | Noted: 2025-05-12

## 2025-05-12 LAB
ALBUMIN SERPL BCP-MCNC: 3.8 G/DL (ref 3.2–4.9)
ALBUMIN/GLOB SERPL: 1.5 G/DL
ALP SERPL-CCNC: 64 U/L (ref 30–99)
ALT SERPL-CCNC: 20 U/L (ref 2–50)
AMMONIA PLAS-SCNC: 21 UMOL/L (ref 11–45)
ANION GAP SERPL CALC-SCNC: 10 MMOL/L (ref 7–16)
APPEARANCE UR: CLEAR
AST SERPL-CCNC: 19 U/L (ref 12–45)
BASOPHILS # BLD AUTO: 0.3 % (ref 0–1.8)
BASOPHILS # BLD: 0.03 K/UL (ref 0–0.12)
BILIRUB SERPL-MCNC: 0.8 MG/DL (ref 0.1–1.5)
BILIRUB UR QL STRIP.AUTO: NEGATIVE
BUN SERPL-MCNC: 9 MG/DL (ref 8–22)
CALCIUM ALBUM COR SERPL-MCNC: 10 MG/DL (ref 8.5–10.5)
CALCIUM SERPL-MCNC: 9.8 MG/DL (ref 8.5–10.5)
CHLORIDE SERPL-SCNC: 105 MMOL/L (ref 96–112)
CO2 SERPL-SCNC: 27 MMOL/L (ref 20–33)
COLOR UR: YELLOW
CREAT SERPL-MCNC: 0.61 MG/DL (ref 0.5–1.4)
EOSINOPHIL # BLD AUTO: 0.07 K/UL (ref 0–0.51)
EOSINOPHIL NFR BLD: 0.7 % (ref 0–6.9)
ERYTHROCYTE [DISTWIDTH] IN BLOOD BY AUTOMATED COUNT: 52.9 FL (ref 35.9–50)
FLUAV RNA SPEC QL NAA+PROBE: NEGATIVE
FLUBV RNA SPEC QL NAA+PROBE: NEGATIVE
GFR SERPLBLD CREATININE-BSD FMLA CKD-EPI: 97 ML/MIN/1.73 M 2
GLOBULIN SER CALC-MCNC: 2.5 G/DL (ref 1.9–3.5)
GLUCOSE SERPL-MCNC: 84 MG/DL (ref 65–99)
GLUCOSE UR STRIP.AUTO-MCNC: NEGATIVE MG/DL
HCT VFR BLD AUTO: 38.7 % (ref 42–52)
HGB BLD-MCNC: 12.3 G/DL (ref 14–18)
IMM GRANULOCYTES # BLD AUTO: 0.09 K/UL (ref 0–0.11)
IMM GRANULOCYTES NFR BLD AUTO: 0.9 % (ref 0–0.9)
KETONES UR STRIP.AUTO-MCNC: ABNORMAL MG/DL
LEUKOCYTE ESTERASE UR QL STRIP.AUTO: NEGATIVE
LYMPHOCYTES # BLD AUTO: 2.41 K/UL (ref 1–4.8)
LYMPHOCYTES NFR BLD: 25.3 % (ref 22–41)
MCH RBC QN AUTO: 34.1 PG (ref 27–33)
MCHC RBC AUTO-ENTMCNC: 31.8 G/DL (ref 32.3–36.5)
MCV RBC AUTO: 107.2 FL (ref 81.4–97.8)
MICRO URNS: ABNORMAL
MONOCYTES # BLD AUTO: 0.49 K/UL (ref 0–0.85)
MONOCYTES NFR BLD AUTO: 5.1 % (ref 0–13.4)
NEUTROPHILS # BLD AUTO: 6.44 K/UL (ref 1.82–7.42)
NEUTROPHILS NFR BLD: 67.7 % (ref 44–72)
NITRITE UR QL STRIP.AUTO: NEGATIVE
NRBC # BLD AUTO: 0 K/UL
NRBC BLD-RTO: 0 /100 WBC (ref 0–0.2)
PH UR STRIP.AUTO: 7.5 [PH] (ref 5–8)
PLATELET # BLD AUTO: 248 K/UL (ref 164–446)
PMV BLD AUTO: 11 FL (ref 9–12.9)
POTASSIUM SERPL-SCNC: 3.2 MMOL/L (ref 3.6–5.5)
PROT SERPL-MCNC: 6.3 G/DL (ref 6–8.2)
PROT UR QL STRIP: NEGATIVE MG/DL
RBC # BLD AUTO: 3.61 M/UL (ref 4.7–6.1)
RBC UR QL AUTO: NEGATIVE
RSV RNA SPEC QL NAA+PROBE: NEGATIVE
SARS-COV-2 RNA RESP QL NAA+PROBE: NOTDETECTED
SODIUM SERPL-SCNC: 142 MMOL/L (ref 135–145)
SP GR UR STRIP.AUTO: 1.01
SPECIMEN SOURCE: NORMAL
TSH SERPL DL<=0.005 MIU/L-ACNC: 4.14 UIU/ML (ref 0.38–5.33)
UROBILINOGEN UR STRIP.AUTO-MCNC: 0.2 EU/DL
WBC # BLD AUTO: 9.5 K/UL (ref 4.8–10.8)

## 2025-05-12 PROCEDURE — 70496 CT ANGIOGRAPHY HEAD: CPT

## 2025-05-12 PROCEDURE — 80053 COMPREHEN METABOLIC PANEL: CPT

## 2025-05-12 PROCEDURE — 85025 COMPLETE CBC W/AUTO DIFF WBC: CPT

## 2025-05-12 PROCEDURE — 99497 ADVNCD CARE PLAN 30 MIN: CPT | Mod: 25 | Performed by: STUDENT IN AN ORGANIZED HEALTH CARE EDUCATION/TRAINING PROGRAM

## 2025-05-12 PROCEDURE — 770020 HCHG ROOM/CARE - TELE (206)

## 2025-05-12 PROCEDURE — 700117 HCHG RX CONTRAST REV CODE 255: Performed by: STUDENT IN AN ORGANIZED HEALTH CARE EDUCATION/TRAINING PROGRAM

## 2025-05-12 PROCEDURE — 700105 HCHG RX REV CODE 258: Performed by: STUDENT IN AN ORGANIZED HEALTH CARE EDUCATION/TRAINING PROGRAM

## 2025-05-12 PROCEDURE — 81003 URINALYSIS AUTO W/O SCOPE: CPT

## 2025-05-12 PROCEDURE — 99285 EMERGENCY DEPT VISIT HI MDM: CPT

## 2025-05-12 PROCEDURE — 84443 ASSAY THYROID STIM HORMONE: CPT

## 2025-05-12 PROCEDURE — 700111 HCHG RX REV CODE 636 W/ 250 OVERRIDE (IP): Performed by: STUDENT IN AN ORGANIZED HEALTH CARE EDUCATION/TRAINING PROGRAM

## 2025-05-12 PROCEDURE — 99223 1ST HOSP IP/OBS HIGH 75: CPT | Mod: AI | Performed by: STUDENT IN AN ORGANIZED HEALTH CARE EDUCATION/TRAINING PROGRAM

## 2025-05-12 PROCEDURE — A9270 NON-COVERED ITEM OR SERVICE: HCPCS | Performed by: STUDENT IN AN ORGANIZED HEALTH CARE EDUCATION/TRAINING PROGRAM

## 2025-05-12 PROCEDURE — 36415 COLL VENOUS BLD VENIPUNCTURE: CPT

## 2025-05-12 PROCEDURE — 82140 ASSAY OF AMMONIA: CPT

## 2025-05-12 PROCEDURE — 700102 HCHG RX REV CODE 250 W/ 637 OVERRIDE(OP): Performed by: STUDENT IN AN ORGANIZED HEALTH CARE EDUCATION/TRAINING PROGRAM

## 2025-05-12 PROCEDURE — 0241U HCHG SARS-COV-2 COVID-19 NFCT DS RESP RNA 4 TRGT MIC: CPT

## 2025-05-12 PROCEDURE — 96365 THER/PROPH/DIAG IV INF INIT: CPT | Mod: XU

## 2025-05-12 PROCEDURE — 71045 X-RAY EXAM CHEST 1 VIEW: CPT

## 2025-05-12 RX ORDER — ASPIRIN 81 MG/1
81 TABLET ORAL DAILY
Status: DISCONTINUED | OUTPATIENT
Start: 2025-05-13 | End: 2025-05-14 | Stop reason: HOSPADM

## 2025-05-12 RX ORDER — SODIUM CHLORIDE 9 MG/ML
INJECTION, SOLUTION INTRAVENOUS ONCE
Status: COMPLETED | OUTPATIENT
Start: 2025-05-12 | End: 2025-05-12

## 2025-05-12 RX ORDER — MAGNESIUM SULFATE 1 G/100ML
1 INJECTION INTRAVENOUS ONCE
Status: COMPLETED | OUTPATIENT
Start: 2025-05-12 | End: 2025-05-12

## 2025-05-12 RX ORDER — ACETAMINOPHEN 325 MG/1
650 TABLET ORAL EVERY 6 HOURS PRN
Status: DISCONTINUED | OUTPATIENT
Start: 2025-05-12 | End: 2025-05-14 | Stop reason: HOSPADM

## 2025-05-12 RX ORDER — PYRIDOSTIGMINE BROMIDE 60 MG/1
60 TABLET ORAL 3 TIMES DAILY
Status: DISCONTINUED | OUTPATIENT
Start: 2025-05-12 | End: 2025-05-14 | Stop reason: HOSPADM

## 2025-05-12 RX ORDER — AMLODIPINE BESYLATE 5 MG/1
10 TABLET ORAL EVERY MORNING
Status: DISCONTINUED | OUTPATIENT
Start: 2025-05-13 | End: 2025-05-14 | Stop reason: HOSPADM

## 2025-05-12 RX ORDER — ERGOCALCIFEROL 1.25 MG/1
50000 CAPSULE, LIQUID FILLED ORAL
Status: SHIPPED | COMMUNITY
End: 2025-05-12

## 2025-05-12 RX ORDER — ONDANSETRON 4 MG/1
4 TABLET, ORALLY DISINTEGRATING ORAL EVERY 4 HOURS PRN
Status: DISCONTINUED | OUTPATIENT
Start: 2025-05-12 | End: 2025-05-14 | Stop reason: HOSPADM

## 2025-05-12 RX ORDER — LEVETIRACETAM 500 MG/1
2000 TABLET, FILM COATED, EXTENDED RELEASE ORAL DAILY
Status: ON HOLD | COMMUNITY
End: 2025-05-14

## 2025-05-12 RX ORDER — LABETALOL HYDROCHLORIDE 5 MG/ML
10 INJECTION, SOLUTION INTRAVENOUS EVERY 4 HOURS PRN
Status: DISCONTINUED | OUTPATIENT
Start: 2025-05-12 | End: 2025-05-14 | Stop reason: HOSPADM

## 2025-05-12 RX ORDER — ATORVASTATIN CALCIUM 40 MG/1
40 TABLET, FILM COATED ORAL EVERY EVENING
Status: DISCONTINUED | OUTPATIENT
Start: 2025-05-13 | End: 2025-05-14 | Stop reason: HOSPADM

## 2025-05-12 RX ORDER — PREDNISONE 20 MG/1
40 TABLET ORAL DAILY
Status: DISCONTINUED | OUTPATIENT
Start: 2025-05-13 | End: 2025-05-14 | Stop reason: HOSPADM

## 2025-05-12 RX ORDER — ZONISAMIDE 100 MG/1
200 CAPSULE ORAL
Status: SHIPPED | COMMUNITY
End: 2025-05-12

## 2025-05-12 RX ORDER — POTASSIUM CHLORIDE 7.45 MG/ML
10 INJECTION INTRAVENOUS ONCE
Status: COMPLETED | OUTPATIENT
Start: 2025-05-12 | End: 2025-05-12

## 2025-05-12 RX ORDER — POTASSIUM CHLORIDE 1500 MG/1
40 TABLET, EXTENDED RELEASE ORAL EVERY 4 HOURS
Status: COMPLETED | OUTPATIENT
Start: 2025-05-12 | End: 2025-05-13

## 2025-05-12 RX ORDER — LEVETIRACETAM 500 MG/5ML
1000 INJECTION, SOLUTION, CONCENTRATE INTRAVENOUS EVERY 12 HOURS
Status: DISCONTINUED | OUTPATIENT
Start: 2025-05-13 | End: 2025-05-13

## 2025-05-12 RX ORDER — POLYETHYLENE GLYCOL 3350 17 G/17G
1 POWDER, FOR SOLUTION ORAL
Status: DISCONTINUED | OUTPATIENT
Start: 2025-05-12 | End: 2025-05-14 | Stop reason: HOSPADM

## 2025-05-12 RX ORDER — ALBUTEROL SULFATE 90 UG/1
1-2 INHALANT RESPIRATORY (INHALATION) EVERY 4 HOURS PRN
Status: DISCONTINUED | OUTPATIENT
Start: 2025-05-12 | End: 2025-05-14 | Stop reason: HOSPADM

## 2025-05-12 RX ORDER — DIAZEPAM 10 MG/2ML
5 INJECTION, SOLUTION INTRAMUSCULAR; INTRAVENOUS
Status: DISCONTINUED | OUTPATIENT
Start: 2025-05-12 | End: 2025-05-14 | Stop reason: HOSPADM

## 2025-05-12 RX ORDER — ONDANSETRON 2 MG/ML
4 INJECTION INTRAMUSCULAR; INTRAVENOUS EVERY 4 HOURS PRN
Status: DISCONTINUED | OUTPATIENT
Start: 2025-05-12 | End: 2025-05-14 | Stop reason: HOSPADM

## 2025-05-12 RX ORDER — AMOXICILLIN 250 MG
2 CAPSULE ORAL EVERY EVENING
Status: DISCONTINUED | OUTPATIENT
Start: 2025-05-12 | End: 2025-05-14 | Stop reason: HOSPADM

## 2025-05-12 RX ORDER — SODIUM CHLORIDE 9 MG/ML
1000 INJECTION, SOLUTION INTRAVENOUS CONTINUOUS
Status: DISCONTINUED | OUTPATIENT
Start: 2025-05-12 | End: 2025-05-13

## 2025-05-12 RX ADMIN — POTASSIUM CHLORIDE 40 MEQ: 1500 TABLET, EXTENDED RELEASE ORAL at 22:39

## 2025-05-12 RX ADMIN — POTASSIUM CHLORIDE 10 MEQ: 7.46 INJECTION, SOLUTION INTRAVENOUS at 15:41

## 2025-05-12 RX ADMIN — SODIUM CHLORIDE: 9 INJECTION, SOLUTION INTRAVENOUS at 15:40

## 2025-05-12 RX ADMIN — IOHEXOL 80 ML: 350 INJECTION, SOLUTION INTRAVENOUS at 16:15

## 2025-05-12 RX ADMIN — SODIUM CHLORIDE 1000 ML: 9 INJECTION, SOLUTION INTRAVENOUS at 22:40

## 2025-05-12 RX ADMIN — MAGNESIUM SULFATE IN DEXTROSE 1 G: 10 INJECTION, SOLUTION INTRAVENOUS at 22:56

## 2025-05-12 ASSESSMENT — SOCIAL DETERMINANTS OF HEALTH (SDOH)
WITHIN THE LAST YEAR, HAVE YOU BEEN HUMILIATED OR EMOTIONALLY ABUSED IN OTHER WAYS BY YOUR PARTNER OR EX-PARTNER?: NO
WITHIN THE LAST YEAR, HAVE YOU BEEN KICKED, HIT, SLAPPED, OR OTHERWISE PHYSICALLY HURT BY YOUR PARTNER OR EX-PARTNER?: NO
WITHIN THE PAST 12 MONTHS, YOU WORRIED THAT YOUR FOOD WOULD RUN OUT BEFORE YOU GOT THE MONEY TO BUY MORE: NEVER TRUE
WITHIN THE LAST YEAR, HAVE TO BEEN RAPED OR FORCED TO HAVE ANY KIND OF SEXUAL ACTIVITY BY YOUR PARTNER OR EX-PARTNER?: NO
WITHIN THE PAST 12 MONTHS, THE FOOD YOU BOUGHT JUST DIDN'T LAST AND YOU DIDN'T HAVE MONEY TO GET MORE: NEVER TRUE
IN THE PAST 12 MONTHS, HAS THE ELECTRIC, GAS, OIL, OR WATER COMPANY THREATENED TO SHUT OFF SERVICE IN YOUR HOME?: NO
WITHIN THE LAST YEAR, HAVE YOU BEEN AFRAID OF YOUR PARTNER OR EX-PARTNER?: NO

## 2025-05-12 ASSESSMENT — PAIN DESCRIPTION - PAIN TYPE: TYPE: ACUTE PAIN

## 2025-05-12 ASSESSMENT — ENCOUNTER SYMPTOMS
SPEECH CHANGE: 0
CHILLS: 0
WEAKNESS: 1
FOCAL WEAKNESS: 1
DEPRESSION: 0
TINGLING: 0
FALLS: 0
COUGH: 0
BLURRED VISION: 0
BRUISES/BLEEDS EASILY: 0
SHORTNESS OF BREATH: 0
NAUSEA: 0
DOUBLE VISION: 0
TREMORS: 1
SEIZURES: 1
DIZZINESS: 0
HEADACHES: 0
ABDOMINAL PAIN: 0
SENSORY CHANGE: 0
HEARTBURN: 0
MYALGIAS: 1
FEVER: 0
LOSS OF CONSCIOUSNESS: 0
PALPITATIONS: 0
VOMITING: 0

## 2025-05-12 ASSESSMENT — COGNITIVE AND FUNCTIONAL STATUS - GENERAL
TOILETING: A LITTLE
HELP NEEDED FOR BATHING: A LITTLE
MOBILITY SCORE: 18
DAILY ACTIVITIY SCORE: 21
DRESSING REGULAR LOWER BODY CLOTHING: A LITTLE
SUGGESTED CMS G CODE MODIFIER MOBILITY: CK
TURNING FROM BACK TO SIDE WHILE IN FLAT BAD: A LITTLE
MOVING FROM LYING ON BACK TO SITTING ON SIDE OF FLAT BED: A LITTLE
STANDING UP FROM CHAIR USING ARMS: A LITTLE
MOVING TO AND FROM BED TO CHAIR: A LITTLE
CLIMB 3 TO 5 STEPS WITH RAILING: A LITTLE
WALKING IN HOSPITAL ROOM: A LITTLE
SUGGESTED CMS G CODE MODIFIER DAILY ACTIVITY: CJ

## 2025-05-12 ASSESSMENT — LIFESTYLE VARIABLES
DOES PATIENT WANT TO STOP DRINKING: NO
SUBSTANCE_ABUSE: 0
ALCOHOL_USE: NO
TOTAL SCORE: 0
ON A TYPICAL DAY WHEN YOU DRINK ALCOHOL HOW MANY DRINKS DO YOU HAVE: 0
EVER HAD A DRINK FIRST THING IN THE MORNING TO STEADY YOUR NERVES TO GET RID OF A HANGOVER: NO
AVERAGE NUMBER OF DAYS PER WEEK YOU HAVE A DRINK CONTAINING ALCOHOL: 0
EVER FELT BAD OR GUILTY ABOUT YOUR DRINKING: NO
CONSUMPTION TOTAL: NEGATIVE
TOTAL SCORE: 0
TOTAL SCORE: 0
HAVE PEOPLE ANNOYED YOU BY CRITICIZING YOUR DRINKING: NO
HOW MANY TIMES IN THE PAST YEAR HAVE YOU HAD 5 OR MORE DRINKS IN A DAY: 0
HAVE YOU EVER FELT YOU SHOULD CUT DOWN ON YOUR DRINKING: NO

## 2025-05-12 ASSESSMENT — FIBROSIS 4 INDEX
FIB4 SCORE: 1.32
FIB4 SCORE: 1.37
FIB4 SCORE: 1.37

## 2025-05-12 NOTE — ED NOTES
Patient up to stand to use urinal at bedside, 250 ml urine void. Patient back to [facility].

## 2025-05-12 NOTE — ED NOTES
Med rec complete per pt's family at bedside, rec outside, and Done In :[facility].   Family at bedside wasn't able to verify that the med print out they had was updated. Called [facility] pharmacy- added zonegran since was last filled on 4/6/25 for 90 days. Also pt last filled keppra ER not IR.  Per family, pt had AM meds today, however, unsure of what he had.    Dispense history available in EPIC? (Yes/No)  yes    Nasreen Peña, PhT

## 2025-05-12 NOTE — ED TRIAGE NOTES
Chief Complaint   Patient presents with    Numbness     Left Leg x 5 days    Shaking     X 5 days     /64   Pulse 87   Temp 37.2 °C (98.9 °F)   Resp 20   Ht 1.524 m (5')   Wt 58.1 kg (128 lb 1.4 oz)   SpO2 99%   BMI 25.02 kg/m²     Pt to ED via WC w/ visitors x 2 on 4L home oxygen for c/o ongoing LLE numbness and shaking x 5 days.  Visitor states pt was sent to ED by Neurologist for further eval.

## 2025-05-12 NOTE — ED NOTES
Patient assisted to tracey from wheelchair by renetta. Changed into hospital gown, on 4 L nasal cannula.

## 2025-05-12 NOTE — ED PROVIDER NOTES
ED Provider Note    CHIEF COMPLAINT  Chief Complaint   Patient presents with    Numbness     Left Leg x 5 days    Shaking     X 5 days       EXTERNAL RECORDS REVIEWED  Inpatient Notes discharge summary on 2025 following pneumonia secondary to influenza A.  Patient has also had dysphagia over the past 2 years with 80 pound weight loss.  Patient has a history of seizure disorder, hyperparathyroidism, hypertension and dyslipidemia.  Patient presented initially with shortness of breath, fever, chills, flank pain, urinary incontinence.    HPI/ROS  LIMITATION TO HISTORY   Select: : None  OUTSIDE HISTORIAN(S):   Family reports patient has had difficulty walking for the past 5 days, leading to the left    Mj Cornejo is a 80 y.o. male who presents with fatigue, intermittent shaking, left arm and leg numbness and weakness for the past 5 days.  Patient is leaning to his left while walking.  Patient is unstable while walking.  Patient has an MRI from last year which demonstrated encephalomalacia likely secondary to remote hemorrhage.  Patient denies chest pain or shortness of breath.  Patient denies new vision changes double vision blurry vision.  Patient denies any worsening shortness of breath.    PAST MEDICAL HISTORY   has a past medical history of BMI 22.0-22.9, adult (2023), Hypercholesteremia, Hypertension, and Seizure (HCC).    SURGICAL HISTORY  patient denies any surgical history    FAMILY HISTORY  Family History   Problem Relation Age of Onset    Stroke Mother        SOCIAL HISTORY  Social History     Tobacco Use    Smoking status: Former     Current packs/day: 0.00     Types: Cigarettes     Quit date:      Years since quittin.3    Smokeless tobacco: Never   Vaping Use    Vaping status: Never Used   Substance and Sexual Activity    Alcohol use: No    Drug use: No    Sexual activity: Not on file     Comment: , lives with wife and son, 2 sons       CURRENT MEDICATIONS  Home  Medications       Reviewed by Morris Hightower (Pharmacy Tech) on 05/12/25 at 1707  Med List Status: Complete     Medication Last Dose Status   albuterol 108 (90 Base) MCG/ACT Aero Soln inhalation aerosol Unknown Active   amLODIPine (NORVASC) 5 MG Tab 5/12/2025 Active   doxazosin (CARDURA) 2 MG Tab Not Taking Active   Efgartigimod samir-fcab (VYVGART IV) 4/21/2025 Active   Home Care Oxygen  Active   levetiracetam (KEPPRA) 500 MG TABLET SR 24 HR 5/11/2025 Active   predniSONE (DELTASONE) 20 MG Tab 5/11/2025 Active   pyridostigmine (MESTINON) 60 MG Tab 5/11/2025 Active                  Audit from Redirected Encounters    **Home medications have not yet been reviewed for this encounter**         ALLERGIES  No Known Allergies    PHYSICAL EXAM  VITAL SIGNS: BP (!) 149/67   Pulse 74   Temp 37.1 °C (98.8 °F) (Temporal)   Resp (!) 23   Ht 1.524 m (5')   Wt 58.1 kg (128 lb 1.4 oz)   SpO2 100%   BMI 25.02 kg/m²    Vitals and nursing note reviewed.   Constitutional:       Comments: Patient is lying in bed supine, pleasant, conversant, speaking in complete sentences, chronically ill-appearing, elderly appearing  HENT:      Head: Normocephalic and atraumatic.   Eyes:      Extraocular Movements: Extraocular movements intact.      Conjunctiva/sclera: Conjunctivae normal.      Pupils: Pupils are equal, round, and reactive to light.   Cardiovascular:      Pulses: Normal pulses.      Comments: HR 87  Pulmonary:      Effort: Pulmonary effort is normal. No respiratory distress.   Abdominal:      Comments: Abdomen is soft, non-tender, non-distended, non-rigid, no rebound, guarding, masses, no McBurney's point tenderness, no peritoneal signs, negative Rovsing sign, negative Avian sign.  No CVA tenderness to palpation. Benign abdomen.   Musculoskeletal:         General: No swelling. Normal range of motion.      Cervical back: Normal range of motion. No rigidity.   Skin:     General: Skin is warm and dry.      Capillary Refill:  Capillary refill takes less than 2 seconds.   Neurological:      Mental Status: Alert. CN II-XII intact, speech normal, motor strength 5/5 in all four extremities, normal  strength bilaterally, sensation to light touch grossly intact in all extremities, no finger to nose dysmetria, no pronator drift, no nystagmus, AO x3    RADIOLOGY/PROCEDURES   I have independently interpreted the diagnostic imaging associated with this visit and am waiting the final reading from the radiologist.   My preliminary interpretation is as follows: No intracranial hemorrhage    Radiologist interpretation:  CT-CTA HEAD WITH & W/O-POST PROCESS   Final Result      1.  Stable exam.   2.  No definite acute intracranial abnormality.   3.  Atherosclerosis without significant stenosis, occlusion, or aneurysm.      DX-CHEST-LIMITED (1 VIEW)   Final Result         No acute cardiac or pulmonary abnormality is identified.          COURSE & MEDICAL DECISION MAKING    ASSESSMENT, COURSE AND PLAN  Care Narrative: Mild hypokalemia, improving H&H compared to baseline, otherwise, CMP demonstrates no evidence of acute kidney injury, acute electrolyte abnormality, acute liver failure, CBC demonstrates no evidence of acute anemia or leukocytosis.  Urinalysis without evidence of UTI.  Ammonia within normal limits, respiratory viral panel pending.  Thyroid labs within normal limits.  Patient given IV fluids, IV potassium.  CTA head to evaluate for large vessel occlusion given 5 days of left-sided weakness with instability while ambulating.  Patient has good strength in both upper and lower extremities but he does feel that he is diminished on his left compared to baseline.  Chest x-ray without acute cardiopulmonary process.  Patient has been on oxygen since he was discharged from the hospital due to pneumonia.    Electronically signed by: Ernesto Yadav M.D., 5/12/2025 3:00 PM    CT angio demonstrates no large vessel occlusion, intracranial  hemorrhage, neoplasm.  Patient will be transferred to [facility] for EEG given persistent conscious shaking and for MRI to rule out CVA given recent ataxia and left-sided weakness.  Dr. Xavier has graciously sent to the patient to neurotelemetry service at [facility].    This dictation has been created using voice recognition software. I am continuously working with the software to minimize the number of voice recognition errors and I have made every attempt to manually correct the errors within my dictation. However errors  related to this voice recognition software may still exist and should be interpreted within the appropriate context.     Electronically signed by: Ernesto Yadav M.D., 5/12/2025 6:47 PM      Hydration: Based on the patient's presentation of Dehydration the patient was given IV fluids. IV Hydration was used because oral hydration was not adequate alone. Upon recheck following hydration, the patient was improved.        DISPOSITION AND DISCUSSIONS  I have discussed management of the patient with the following physicians and LUNA's: Dr Xavier    FINAL DIAGNOSIS  1. Ataxia    2. Hypokalemia    3. Left-sided weakness         Electronically signed by: Ernesto Yadav M.D., 5/12/2025 2:51 PM

## 2025-05-13 ENCOUNTER — APPOINTMENT (OUTPATIENT)
Dept: CARDIOLOGY | Facility: MEDICAL CENTER | Age: 80
DRG: 101 | End: 2025-05-13
Attending: STUDENT IN AN ORGANIZED HEALTH CARE EDUCATION/TRAINING PROGRAM
Payer: MEDICARE

## 2025-05-13 ENCOUNTER — HOME CARE VISIT (OUTPATIENT)
Dept: HOME HEALTH SERVICES | Facility: HOME HEALTHCARE | Age: 80
End: 2025-05-13
Payer: MEDICARE

## 2025-05-13 PROBLEM — R29.90 STROKE-LIKE SYMPTOMS: Status: RESOLVED | Noted: 2021-01-25 | Resolved: 2025-05-13

## 2025-05-13 PROBLEM — G40.901 STATUS EPILEPTICUS (HCC): Status: ACTIVE | Noted: 2025-05-13

## 2025-05-13 PROBLEM — G70.00 MYASTHENIA GRAVIS (HCC): Status: ACTIVE | Noted: 2025-05-13

## 2025-05-13 LAB
ALBUMIN SERPL BCP-MCNC: 3.8 G/DL (ref 3.2–4.9)
ALBUMIN/GLOB SERPL: 1.3 G/DL
ALP SERPL-CCNC: 70 U/L (ref 30–99)
ALT SERPL-CCNC: 16 U/L (ref 2–50)
ANION GAP SERPL CALC-SCNC: 11 MMOL/L (ref 7–16)
AST SERPL-CCNC: 22 U/L (ref 12–45)
BASOPHILS # BLD AUTO: 0.4 % (ref 0–1.8)
BASOPHILS # BLD: 0.04 K/UL (ref 0–0.12)
BILIRUB SERPL-MCNC: 1.2 MG/DL (ref 0.1–1.5)
BUN SERPL-MCNC: 6 MG/DL (ref 8–22)
CALCIUM ALBUM COR SERPL-MCNC: 10.2 MG/DL (ref 8.5–10.5)
CALCIUM SERPL-MCNC: 10 MG/DL (ref 8.5–10.5)
CHLORIDE SERPL-SCNC: 106 MMOL/L (ref 96–112)
CHOLEST SERPL-MCNC: 171 MG/DL (ref 100–199)
CK SERPL-CCNC: 27 U/L (ref 0–154)
CO2 SERPL-SCNC: 25 MMOL/L (ref 20–33)
CREAT SERPL-MCNC: 0.65 MG/DL (ref 0.5–1.4)
EOSINOPHIL # BLD AUTO: 0.12 K/UL (ref 0–0.51)
EOSINOPHIL NFR BLD: 1.2 % (ref 0–6.9)
ERYTHROCYTE [DISTWIDTH] IN BLOOD BY AUTOMATED COUNT: 51.9 FL (ref 35.9–50)
EST. AVERAGE GLUCOSE BLD GHB EST-MCNC: 91 MG/DL
GFR SERPLBLD CREATININE-BSD FMLA CKD-EPI: 95 ML/MIN/1.73 M 2
GLOBULIN SER CALC-MCNC: 2.9 G/DL (ref 1.9–3.5)
GLUCOSE SERPL-MCNC: 92 MG/DL (ref 65–99)
HBA1C MFR BLD: 4.8 % (ref 4–5.6)
HCT VFR BLD AUTO: 40.9 % (ref 42–52)
HDLC SERPL-MCNC: 63 MG/DL
HGB BLD-MCNC: 13.3 G/DL (ref 14–18)
IMM GRANULOCYTES # BLD AUTO: 0.09 K/UL (ref 0–0.11)
IMM GRANULOCYTES NFR BLD AUTO: 0.9 % (ref 0–0.9)
LDLC SERPL CALC-MCNC: 85 MG/DL
LV EJECT FRACT MOD 2C 99903: 58.93
LV EJECT FRACT MOD 4C 99902: 58.66
LV EJECT FRACT MOD BP 99901: 60.41
LYMPHOCYTES # BLD AUTO: 1.83 K/UL (ref 1–4.8)
LYMPHOCYTES NFR BLD: 18 % (ref 22–41)
MAGNESIUM SERPL-MCNC: 2.5 MG/DL (ref 1.5–2.5)
MCH RBC QN AUTO: 34.1 PG (ref 27–33)
MCHC RBC AUTO-ENTMCNC: 32.5 G/DL (ref 32.3–36.5)
MCV RBC AUTO: 104.9 FL (ref 81.4–97.8)
MONOCYTES # BLD AUTO: 0.56 K/UL (ref 0–0.85)
MONOCYTES NFR BLD AUTO: 5.5 % (ref 0–13.4)
NEUTROPHILS # BLD AUTO: 7.5 K/UL (ref 1.82–7.42)
NEUTROPHILS NFR BLD: 74 % (ref 44–72)
NRBC # BLD AUTO: 0 K/UL
NRBC BLD-RTO: 0 /100 WBC (ref 0–0.2)
PHOSPHATE SERPL-MCNC: 2.4 MG/DL (ref 2.5–4.5)
PLATELET # BLD AUTO: 237 K/UL (ref 164–446)
PMV BLD AUTO: 10.9 FL (ref 9–12.9)
POTASSIUM SERPL-SCNC: 4.2 MMOL/L (ref 3.6–5.5)
PROLACTIN SERPL-MCNC: 11.7 NG/ML (ref 2.1–17.7)
PROT SERPL-MCNC: 6.7 G/DL (ref 6–8.2)
RBC # BLD AUTO: 3.9 M/UL (ref 4.7–6.1)
SODIUM SERPL-SCNC: 142 MMOL/L (ref 135–145)
TRIGL SERPL-MCNC: 114 MG/DL (ref 0–149)
WBC # BLD AUTO: 10.1 K/UL (ref 4.8–10.8)

## 2025-05-13 PROCEDURE — 700102 HCHG RX REV CODE 250 W/ 637 OVERRIDE(OP): Performed by: INTERNAL MEDICINE

## 2025-05-13 PROCEDURE — 36415 COLL VENOUS BLD VENIPUNCTURE: CPT

## 2025-05-13 PROCEDURE — 82550 ASSAY OF CK (CPK): CPT

## 2025-05-13 PROCEDURE — 700111 HCHG RX REV CODE 636 W/ 250 OVERRIDE (IP): Mod: JZ | Performed by: INTERNAL MEDICINE

## 2025-05-13 PROCEDURE — 93306 TTE W/DOPPLER COMPLETE: CPT | Mod: 26 | Performed by: INTERNAL MEDICINE

## 2025-05-13 PROCEDURE — 80061 LIPID PANEL: CPT

## 2025-05-13 PROCEDURE — 700102 HCHG RX REV CODE 250 W/ 637 OVERRIDE(OP): Performed by: STUDENT IN AN ORGANIZED HEALTH CARE EDUCATION/TRAINING PROGRAM

## 2025-05-13 PROCEDURE — 85025 COMPLETE CBC W/AUTO DIFF WBC: CPT

## 2025-05-13 PROCEDURE — 99233 SBSQ HOSP IP/OBS HIGH 50: CPT | Performed by: INTERNAL MEDICINE

## 2025-05-13 PROCEDURE — A9270 NON-COVERED ITEM OR SERVICE: HCPCS | Performed by: INTERNAL MEDICINE

## 2025-05-13 PROCEDURE — 83036 HEMOGLOBIN GLYCOSYLATED A1C: CPT

## 2025-05-13 PROCEDURE — 700111 HCHG RX REV CODE 636 W/ 250 OVERRIDE (IP): Mod: JZ | Performed by: STUDENT IN AN ORGANIZED HEALTH CARE EDUCATION/TRAINING PROGRAM

## 2025-05-13 PROCEDURE — 80053 COMPREHEN METABOLIC PANEL: CPT

## 2025-05-13 PROCEDURE — 95714 VEEG EA 12-26 HR UNMNTR: CPT | Performed by: PSYCHIATRY & NEUROLOGY

## 2025-05-13 PROCEDURE — 95700 EEG CONT REC W/VID EEG TECH: CPT | Performed by: PSYCHIATRY & NEUROLOGY

## 2025-05-13 PROCEDURE — 84146 ASSAY OF PROLACTIN: CPT

## 2025-05-13 PROCEDURE — 83735 ASSAY OF MAGNESIUM: CPT

## 2025-05-13 PROCEDURE — A9270 NON-COVERED ITEM OR SERVICE: HCPCS | Performed by: STUDENT IN AN ORGANIZED HEALTH CARE EDUCATION/TRAINING PROGRAM

## 2025-05-13 PROCEDURE — 97550 CAREGIVER TRAING 1ST 30 MIN: CPT

## 2025-05-13 PROCEDURE — 95720 EEG PHY/QHP EA INCR W/VEEG: CPT | Performed by: PSYCHIATRY & NEUROLOGY

## 2025-05-13 PROCEDURE — C9254 INJECTION, LACOSAMIDE: HCPCS | Performed by: STUDENT IN AN ORGANIZED HEALTH CARE EDUCATION/TRAINING PROGRAM

## 2025-05-13 PROCEDURE — 99222 1ST HOSP IP/OBS MODERATE 55: CPT | Mod: 25,GC | Performed by: STUDENT IN AN ORGANIZED HEALTH CARE EDUCATION/TRAINING PROGRAM

## 2025-05-13 PROCEDURE — 80177 DRUG SCRN QUAN LEVETIRACETAM: CPT

## 2025-05-13 PROCEDURE — 93306 TTE W/DOPPLER COMPLETE: CPT

## 2025-05-13 PROCEDURE — 84100 ASSAY OF PHOSPHORUS: CPT

## 2025-05-13 PROCEDURE — 4A10X4Z MONITORING OF CENTRAL NERVOUS ELECTRICAL ACTIVITY, EXTERNAL APPROACH: ICD-10-PCS | Performed by: PSYCHIATRY & NEUROLOGY

## 2025-05-13 PROCEDURE — 770020 HCHG ROOM/CARE - TELE (206)

## 2025-05-13 RX ORDER — LEVETIRACETAM 500 MG/5ML
1500 INJECTION, SOLUTION, CONCENTRATE INTRAVENOUS EVERY 12 HOURS
Status: DISCONTINUED | OUTPATIENT
Start: 2025-05-13 | End: 2025-05-14 | Stop reason: HOSPADM

## 2025-05-13 RX ORDER — NYSTATIN 100000 [USP'U]/G
POWDER TOPICAL 2 TIMES DAILY
Status: DISCONTINUED | OUTPATIENT
Start: 2025-05-13 | End: 2025-05-14 | Stop reason: HOSPADM

## 2025-05-13 RX ORDER — LEVETIRACETAM 500 MG/5ML
3000 INJECTION, SOLUTION, CONCENTRATE INTRAVENOUS ONCE
Status: COMPLETED | OUTPATIENT
Start: 2025-05-13 | End: 2025-05-13

## 2025-05-13 RX ORDER — LACOSAMIDE 10 MG/ML
300 INJECTION, SOLUTION INTRAVENOUS ONCE
Status: COMPLETED | OUTPATIENT
Start: 2025-05-13 | End: 2025-05-13

## 2025-05-13 RX ORDER — ENOXAPARIN SODIUM 100 MG/ML
40 INJECTION SUBCUTANEOUS DAILY
Status: DISCONTINUED | OUTPATIENT
Start: 2025-05-13 | End: 2025-05-14 | Stop reason: HOSPADM

## 2025-05-13 RX ORDER — LACOSAMIDE 10 MG/ML
100 INJECTION, SOLUTION INTRAVENOUS 2 TIMES DAILY
Status: DISCONTINUED | OUTPATIENT
Start: 2025-05-13 | End: 2025-05-14 | Stop reason: HOSPADM

## 2025-05-13 RX ADMIN — LACOSAMIDE 100 MG: 10 INJECTION INTRAVENOUS at 17:42

## 2025-05-13 RX ADMIN — DIAZEPAM 5 MG: 10 INJECTION, SOLUTION INTRAMUSCULAR; INTRAVENOUS at 10:33

## 2025-05-13 RX ADMIN — LEVETIRACETAM 1000 MG: 100 INJECTION, SOLUTION INTRAVENOUS at 00:17

## 2025-05-13 RX ADMIN — POTASSIUM CHLORIDE 40 MEQ: 1500 TABLET, EXTENDED RELEASE ORAL at 01:57

## 2025-05-13 RX ADMIN — LEVETIRACETAM 3000 MG: 100 INJECTION, SOLUTION INTRAVENOUS at 11:10

## 2025-05-13 RX ADMIN — PYRIDOSTIGMINE BROMIDE 60 MG: 60 TABLET ORAL at 22:32

## 2025-05-13 RX ADMIN — LEVETIRACETAM 1500 MG: 100 INJECTION, SOLUTION INTRAVENOUS at 17:42

## 2025-05-13 RX ADMIN — PYRIDOSTIGMINE BROMIDE 60 MG: 60 TABLET ORAL at 01:57

## 2025-05-13 RX ADMIN — AMLODIPINE BESYLATE 10 MG: 5 TABLET ORAL at 05:34

## 2025-05-13 RX ADMIN — ATORVASTATIN CALCIUM 40 MG: 40 TABLET, FILM COATED ORAL at 17:43

## 2025-05-13 RX ADMIN — LACOSAMIDE 300 MG: 10 INJECTION INTRAVENOUS at 11:11

## 2025-05-13 RX ADMIN — PYRIDOSTIGMINE BROMIDE 60 MG: 60 TABLET ORAL at 11:14

## 2025-05-13 RX ADMIN — NYSTATIN: 100000 POWDER TOPICAL at 17:40

## 2025-05-13 RX ADMIN — DIAZEPAM 5 MG: 10 INJECTION, SOLUTION INTRAMUSCULAR; INTRAVENOUS at 10:51

## 2025-05-13 RX ADMIN — ENOXAPARIN SODIUM 40 MG: 100 INJECTION SUBCUTANEOUS at 17:41

## 2025-05-13 RX ADMIN — PREDNISONE 40 MG: 20 TABLET ORAL at 05:34

## 2025-05-13 RX ADMIN — ASPIRIN 81 MG: 81 TABLET, COATED ORAL at 05:34

## 2025-05-13 RX ADMIN — SENNOSIDES AND DOCUSATE SODIUM 2 TABLET: 50; 8.6 TABLET ORAL at 17:41

## 2025-05-13 ASSESSMENT — ENCOUNTER SYMPTOMS
FOCAL WEAKNESS: 1
SENSORY CHANGE: 1

## 2025-05-13 ASSESSMENT — PAIN DESCRIPTION - PAIN TYPE
TYPE: ACUTE PAIN
TYPE: ACUTE PAIN

## 2025-05-13 NOTE — PROGRESS NOTES
Hospital Medicine Daily Progress Note    Date of Service  5/13/2025    Chief Complaint  Mj Cornejo is a 80 y.o. male admitted 5/12/2025 with shaking    Hospital Course  79 yo man with history of seizure, hyperparathyroidism, hypertension, hyperlipidemia who presented with weakness and shaking of his left side. No LVO or dissection seen on CTA of head and neck.     Interval Problem Update   #[id number]  Patient says his left side bothers him  Wife and son bedside. Patient's hx seizures for over a decade. He's followed by Dr. Florence. Recently he's had left side shaking, intermittent confusion and dizziness. There were plans for outpatient EEG.  EEG is showing focal seizures. I consulted neurology. Hehad left leg shaking that improve with IV valium    I have discussed this patient's plan of care and discharge plan at IDT rounds today with Case Management, Nursing, Nursing leadership, and other members of the IDT team.    Consultants/Specialty  neurology, palliative care    Code Status  DNAR/DNI    Disposition  The patient is not medically cleared for discharge to home or a post-acute facility.  Anticipate discharge to: home with close outpatient follow-up    I have placed the appropriate orders for post-discharge needs.    Review of Systems  Review of Systems   Constitutional:  Positive for malaise/fatigue.   Neurological:  Positive for sensory change and focal weakness.        Physical Exam  Temp:  [36.4 °C (97.6 °F)-37.1 °C (98.8 °F)] 36.6 °C (97.9 °F)  Pulse:  [73-99] 73  Resp:  [16-24] 16  BP: (116-163)/(65-78) 116/69  SpO2:  [87 %-100 %] 94 %    Physical Exam  Vitals and nursing note reviewed.   Constitutional:       Comments: Thin, frail   HENT:      Head: Normocephalic.      Mouth/Throat:      Mouth: Mucous membranes are dry.   Eyes:      General:         Right eye: No discharge.         Left eye: No discharge.   Cardiovascular:      Rate and Rhythm: Normal rate and regular rhythm.    Pulmonary:      Effort: No respiratory distress.      Breath sounds: No wheezing or rales.   Abdominal:      Palpations: Abdomen is soft.      Tenderness: There is no abdominal tenderness.   Musculoskeletal:         General: No swelling.      Cervical back: Neck supple.   Skin:     General: Skin is warm and dry.   Neurological:      Mental Status: He is alert.      Comments: Oriented to self and place. Left leg tremulous         Fluids    Intake/Output Summary (Last 24 hours) at 5/13/2025 1619  Last data filed at 5/13/2025 0807  Gross per 24 hour   Intake 0 ml   Output 600 ml   Net -600 ml        Laboratory  Recent Labs     05/12/25  1339 05/13/25  0528   WBC 9.5 10.1   RBC 3.61* 3.90*   HEMOGLOBIN 12.3* 13.3*   HEMATOCRIT 38.7* 40.9*   .2* 104.9*   MCH 34.1* 34.1*   MCHC 31.8* 32.5   RDW 52.9* 51.9*   PLATELETCT 248 237   MPV 11.0 10.9     Recent Labs     05/12/25  1339 05/13/25  0528   SODIUM 142 142   POTASSIUM 3.2* 4.2   CHLORIDE 105 106   CO2 27 25   GLUCOSE 84 92   BUN 9 6*   CREATININE 0.61 0.65   CALCIUM 9.8 10.0             Recent Labs     05/13/25  0528   TRIGLYCERIDE 114   HDL 63   LDL 85       Imaging  EC-ECHOCARDIOGRAM COMPLETE W/O CONT              Assessment/Plan  Myasthenia gravis (HCC)  Assessment & Plan  Chronically on mestinon and prednisone    Status epilepticus (HCC)  Assessment & Plan  He is followed by Dr. Florence  See focal seizures    Dehydration  Assessment & Plan  IVF    Frailty syndrome in geriatric patient  Assessment & Plan  PT/OT/ST    ACP (advance care planning)  Assessment & Plan  DNR. He's been followed by palliative care, I consulted    Focal seizures (HCC)  Assessment & Plan  Seen on EEG, convert to continuous EEG  Neurology consulted, loading doses ordered  Ordered for keppra and vimpat  Valium PRN  Seizure precautions  No need for MRI brain per neuro    Hypokalemia- (present on admission)  Assessment & Plan  Level improved    Oropharyngeal dysphagia- (present on  admission)  Assessment & Plan  Noted last hospitalization. With weight loss, followed by palliative care  Speech to eval  Consult dietician         VTE prophylaxis:    enoxaparin ppx      I have performed a physical exam and reviewed and updated ROS and Plan today (5/13/2025). In review of yesterday's note (5/12/2025), there are no changes except as documented above.

## 2025-05-13 NOTE — PROGRESS NOTES
Monitor Summary: SR 72-88, NH 0.13, QRS 0.09, QT 0.33, with (F) PACs and (O) PVCs per strip from monitor room.

## 2025-05-13 NOTE — ASSESSMENT & PLAN NOTE
Seen on EEG, convert to continuous EEG  Neurology consulted, loading doses ordered  Ordered for keppra and vimpat  Valium PRN  Seizure precautions  No need for MRI brain per neuro

## 2025-05-13 NOTE — H&P
Hospital Medicine History & Physical Note    Date of Service  5/12/2025    Primary Care Physician  JOHANA Raygoza    Consultants  None    Code Status  DNAR/DNI    Chief Complaint  No chief complaint on file.  Left-sided weakness    History of Presenting Illness  Mj Cornejo is a 80 y.o. male with history of seizure, hyperparathyroidism, hypertension, hyperlipidemia who presented 5/12/2025 as transfer from [facility] emergency room for evaluation for left-sided weakness, shakiness.    History obtained from patient's son at bedside as patient is Yi-speaking only.  Per son, patient has been having ataxic gait, shakiness of his left side.  Patient also complaining of numbness and tingling, as well as weakness of the left side.  Since stated patient's symptoms have been ongoing for least 5 days.  Patient eventually presented to [facility] emergency room for evaluation.  No LVO or dissection seen on CTA of head and neck.  Due to concern of possible seizure and/or stroke, patient was transferred to [facility] neuroscience unit as  direct transfer.  Patient was seen by me at [facility] neuroscience unit, admitted to medicine service for further evaluation and treatment.    I discussed the plan of care with patient, family, bedside RN, charge RN, and pharmacy.    Review of Systems  Review of Systems   Constitutional:  Positive for malaise/fatigue. Negative for chills and fever.   HENT:  Negative for hearing loss and tinnitus.    Eyes:  Negative for blurred vision and double vision.   Respiratory:  Negative for cough and shortness of breath.    Cardiovascular:  Negative for chest pain and palpitations.   Gastrointestinal:  Negative for abdominal pain, heartburn, nausea and vomiting.   Genitourinary:  Negative for dysuria and urgency.   Musculoskeletal:  Positive for myalgias. Negative for falls.   Skin:  Negative for itching and rash.   Neurological:  Positive for tremors, focal weakness,  seizures and weakness. Negative for dizziness, tingling, sensory change, speech change, loss of consciousness and headaches.   Endo/Heme/Allergies:  Negative for environmental allergies. Does not bruise/bleed easily.   Psychiatric/Behavioral:  Negative for depression and substance abuse.    All other systems reviewed and are negative.      Past Medical History   has a past medical history of BMI 22.0-22.9, adult (07/28/2023), Hypercholesteremia, Hypertension, and Seizure (HCC).    Surgical History   has no past surgical history on file.     Family History  family history includes Stroke in his mother.   Family history reviewed with patient. There is family history that is pertinent to the chief complaint.     Social History   reports that he quit smoking about 20 years ago. His smoking use included cigarettes. He has never used smokeless tobacco. He reports that he does not drink alcohol and does not use drugs.    Allergies  No Known Allergies    Medications  Prior to Admission Medications   Prescriptions Last Dose Informant Patient Reported? Taking?   Efgartigimod samir-fcab (VYVGART IV)   Yes No   Sig: Infuse  into a venous catheter one time.   Home Care Oxygen   Yes No   Sig: Inhale 5 L/min continuous. Indications: Shortness of breath   albuterol 108 (90 Base) MCG/ACT Aero Soln inhalation aerosol   No No   Sig: Inhale 1-2 Puffs every four hours as needed for Shortness of Breath.   amLODIPine (NORVASC) 5 MG Tab   Yes No   Sig: Take 10 mg by mouth every morning. Indications: High Blood Pressure   doxazosin (CARDURA) 2 MG Tab   No No   Sig: Take 1 Tablet by mouth 2 times a day.   Patient not taking: Reported on 5/12/2025   levetiracetam (KEPPRA) 500 MG TABLET SR 24 HR   Yes No   Sig: Take 2,000 mg by mouth every day.   predniSONE (DELTASONE) 20 MG Tab   Yes No   Sig: Take 40 mg by mouth every day. Indications: Myasthenia Gravis   pyridostigmine (MESTINON) 60 MG Tab   Yes No   Sig: Take 60 mg by mouth 3 times a day.  Indications: Myasthenia Gravis      Facility-Administered Medications: None       Physical Exam  Temp:  [36.6 °C (97.9 °F)-37.2 °C (98.9 °F)] 36.6 °C (97.9 °F)  Pulse:  [67-99] 99  Resp:  [20-24] 20  BP: (132-165)/(64-77) 163/65  SpO2:  [92 %-100 %] 92 %                          Physical Exam  Vitals and nursing note reviewed.   Constitutional:       General: He is not in acute distress.  HENT:      Head: Normocephalic and atraumatic.      Nose: Nose normal.      Mouth/Throat:      Mouth: Mucous membranes are dry.      Pharynx: Oropharynx is clear.   Eyes:      General: No scleral icterus.        Left eye: No discharge.      Extraocular Movements: Extraocular movements intact.   Cardiovascular:      Rate and Rhythm: Normal rate and regular rhythm.      Pulses: Normal pulses.      Heart sounds:      No friction rub.   Pulmonary:      Effort: No respiratory distress.      Breath sounds: No wheezing or rales.   Chest:      Chest wall: No tenderness.   Abdominal:      General: There is no distension.      Tenderness: There is no abdominal tenderness. There is no guarding or rebound.   Musculoskeletal:         General: Normal range of motion.      Cervical back: Neck supple. No tenderness.      Right lower leg: No edema.      Left lower leg: No edema.   Skin:     General: Skin is warm and dry.      Capillary Refill: Capillary refill takes less than 2 seconds.   Neurological:      Mental Status: He is alert and oriented to person, place, and time.      Comments: No dysarthria  No facial droop  Mild left-sided weakness  Low amplitude tremors   Psychiatric:         Mood and Affect: Mood normal.         Laboratory:  Recent Labs     05/12/25  1339   WBC 9.5   RBC 3.61*   HEMOGLOBIN 12.3*   HEMATOCRIT 38.7*   .2*   MCH 34.1*   MCHC 31.8*   RDW 52.9*   PLATELETCT 248   MPV 11.0     Recent Labs     05/12/25  1339   SODIUM 142   POTASSIUM 3.2*   CHLORIDE 105   CO2 27   GLUCOSE 84   BUN 9   CREATININE 0.61   CALCIUM 9.8  "    Recent Labs     05/12/25  1339   ALTSGPT 20   ASTSGOT 19   ALKPHOSPHAT 64   TBILIRUBIN 0.8   GLUCOSE 84         No results for input(s): \"NTPROBNP\" in the last 72 hours.      No results for input(s): \"TROPONINT\" in the last 72 hours.    Imaging:  MR-BRAIN-W/O    (Results Pending)   EC-ECHOCARDIOGRAM COMPLETE W/O CONT    (Results Pending)       no X-Ray or EKG requiring interpretation    Assessment/Plan:  Justification for Admission Status  I anticipate this patient will require at least 2 midnights of hospitalization, therefore appropriate for inpatient status.      * Stroke-like symptoms- (present on admission)  Assessment & Plan  Ataxic gait, weakness and numbness of left side  No acute findings on CTA head    Aspirin, statin for now  PT/OT/ST  Check lipid panel, HbA1c  Check TTE  Check MRI brain    Seizure-like activity (HCC)  Assessment & Plan  Reported to have shakiness of the left side  He does have underlying seizure disorder  Check Keppra level    Continue Keppra 1000 mg IV twice daily  Seizure precautions, aspiration precautions  As needed IV benzodiazepine for breakthrough seizure  Check EEG  Check CPK, prolactin, lactic acid  Check MRI brain    Frailty syndrome in geriatric patient  Assessment & Plan  PT/OT/ST    Hypokalemia- (present on admission)  Assessment & Plan  Replace as appropriate  Replace Mg    Dehydration  Assessment & Plan  IVF    ACP (advance care planning)  Assessment & Plan  Goal of care discussed with patient and patient's son at the bedside in neuroscience unit.  Both confirmed the patient would not wish to have aggressive/heroic life-sustaining measures-no CPR/defibrillation/intubation or mechanical ventilation.  Patient is however agreeable for hospitalization, further diagnostic workup with EEG and MRI brain, possible subsequent invasive intervention if clinically warranted.  Diagnosis, prognosis, question or concern addressed.  DNR/DNI status confirmed.  ACP: 16 " minutes        VTE prophylaxis: SCDs/TEDs

## 2025-05-13 NOTE — ED NOTES
Report given to EMS. Patient and belongings packed up for transfer to [facility] with EMS.

## 2025-05-13 NOTE — DISCHARGE PLANNING
PM&R referral from Dr. LIZBETH Mckeon. Stroke protocol. Ongoing work up consult pended at this time. SCP provider potential son support.

## 2025-05-13 NOTE — CARE PLAN
The patient is Stable - Low risk of patient condition declining or worsening    Shift Goals  Clinical Goals: hemodynamic stability; admissionl MRI  Patient Goals: rest; feel better  Family Goals: updated on poc    Progress made toward(s) clinical / shift goals:    Problem: Knowledge Deficit - Standard  Goal: Patient and family/care givers will demonstrate understanding of plan of care, disease process/condition, diagnostic tests and medications  Outcome: Progressing     Problem: Psychosocial - Patient Condition  Goal: Patient's ability to verbalize feelings about condition will improve  Outcome: Progressing     Problem: Neuro Status  Goal: Neuro status will remain stable or improve  Outcome: Progressing     Problem: Hemodynamic Monitoring  Goal: Patient's hemodynamics, fluid balance and neurologic status will be stable or improve  Outcome: Progressing       Patient is not progressing towards the following goals:

## 2025-05-13 NOTE — CONSULTS
Neurology Initial Consult H&P  Neurohospitalist Service, [facility]    Referring Physician: Priyanka Leigh M.D.    CC: Witnessed Seizure at home    HPI: Mj Cornejo is a 80 y.o. male with history of seizure, hyperparathyroidism, hypertension, hyperlipidemia who presented 5/12/2025 as transfer from [facility] emergency room for evaluation for left-sided weakness and convulsion.     Patient is Malaysian speaking, son gave most of the history. Says he was witnessed having full body jerking on the 12th by his grandson. Patient had impaired awareness. He has a known history of epileptic seizures, on Keppra 2000mg BID. He follows with neurology every 3 months, and there has been no changes in his anti-seizure medication. Patient was recently hospitalized for pneumonia about 2 weeks ago, and was in the hospital for a week before discharge.     Discussed with patient using a , says he does not remember this episode. Says he has been complaint with is medications. Denies recent fever, head trauma, alcohol use. Says sleep is adequate and no recent stressors. Son says he was started on another blood pressure medication during his previous admission, unsure of which one, suspect doxazosin in the setting of ureterolithiasis.     Review of systems: In addition to what is detailed in the HPI above, all other systems reviewed and are negative.    Past Medical History:    has a past medical history of BMI 22.0-22.9, adult (07/28/2023), Hypercholesteremia, Hypertension, and Seizure (HCC).    FHx:  family history includes Stroke in his mother.    SHx:   reports that he quit smoking about 20 years ago. His smoking use included cigarettes. He has never used smokeless tobacco. He reports that he does not drink alcohol and does not use drugs.    Allergies:  No Known Allergies    Medications:    Current Facility-Administered Medications:     levETIRAcetam (Keppra) injection 1,500 mg, 1,500 mg,  Intravenous, Q12HRS, Ziyad Washburn D.O.    lacosamide (Vimpat) injection 100 mg, 100 mg, Intravenous, BID, Ziyad Washburn D.O.    acetaminophen (Tylenol) tablet 650 mg, 650 mg, Oral, Q6HRS PRN, Kalyan Mckeon M.D.    labetalol (Normodyne/Trandate) injection 10 mg, 10 mg, Intravenous, Q4HRS PRN, Kalyan Mckeon M.D.    ondansetron (Zofran) syringe/vial injection 4 mg, 4 mg, Intravenous, Q4HRS PRN **OR** ondansetron (Zofran ODT) dispertab 4 mg, 4 mg, Oral, Q4HRS PRN, Kalyan Mckeon M.D.    diazePAM (Valium) injection 5 mg, 5 mg, Intravenous, Q5 MIN PRN, Kalyan Mckeon M.D., 5 mg at 05/13/25 1051    senna-docusate (Pericolace Or Senokot S) 8.6-50 MG per tablet 2 Tablet, 2 Tablet, Oral, Q EVENING **AND** polyethylene glycol/lytes (Miralax) Packet 1 Packet, 1 Packet, Oral, QDAY PRN, Kalyan Mckeon M.D.    albuterol inhaler 1-2 Puff, 1-2 Puff, Inhalation, Q4HRS PRN, Kalyan Mckeon M.D.    amLODIPine (Norvasc) tablet 10 mg, 10 mg, Oral, QAM, Kalyan Mckeon M.D., 10 mg at 05/13/25 0534    pyridostigmine (Mestinon) tablet 60 mg, 60 mg, Oral, TID, Kalyan Mckeon M.D., 60 mg at 05/13/25 1114    predniSONE (Deltasone) tablet 40 mg, 40 mg, Oral, DAILY, Kalyan Mckeon M.D., 40 mg at 05/13/25 0534    atorvastatin (Lipitor) tablet 40 mg, 40 mg, Oral, Q EVENING, Kalyan Mckeon M.D.    aspirin EC tablet 81 mg, 81 mg, Oral, DAILY, Kalyan Mckeon M.D., 81 mg at 05/13/25 0534    Physical Examination:     General: Patient is awake and in no acute distress  Eye: Examination of optic disks not indicated at this time given acuity of consult  Neck: There is normal range of motion  CV: regular rate   Extremities:  clear, dry, intact, without peripheral edema    NEUROLOGICAL EXAM:     /69   Pulse 73   Temp 36.6 °C (97.9 °F) (Temporal)   Resp 16   Wt 56.2 kg (123 lb 14.4 oz)   SpO2 94%   BMI 24.20 kg/m²       Mental status: Awake, alert and oriented to self, place, year  Speech and language: Speech is clear and fluent. The patient is  able to name and repeat, and follow commands  Cranial nerve exam: Pupils are equal, round and reactive to light bilaterally. There is no nystagmus. Extraocular muscles are intact. Face is symmetric. Sensation in the face is intact to light touch. Palate elevates symmetrically. Tongue is midline.  Motor exam: There is sustained antigravity with no downward drift in bilateral arms and legs.  There is no pronator drift. Tone is normal. There is decreased strength on the left lower extremity against resistance.  No abnormal movements were seen on exam.  Sensory exam: Reacts to tactile in all 4 extremities, no neglect to double stim   Deep tendon reflexes:  2+ throughout.   Coordination: No dysmetria on bilateral finger-to-nose testing  Gait: Deferred due to patient preference      Objective Data:    Labs:  Lab Results   Component Value Date/Time    PROTHROMBTM 14.2 01/23/2021 02:27 PM    INR 1.07 01/23/2021 02:27 PM      Lab Results   Component Value Date/Time    WBC 10.1 05/13/2025 05:28 AM    RBC 3.90 (L) 05/13/2025 05:28 AM    HEMOGLOBIN 13.3 (L) 05/13/2025 05:28 AM    HEMATOCRIT 40.9 (L) 05/13/2025 05:28 AM    .9 (H) 05/13/2025 05:28 AM    MCH 34.1 (H) 05/13/2025 05:28 AM    MCHC 32.5 05/13/2025 05:28 AM    MPV 10.9 05/13/2025 05:28 AM    NEUTSPOLYS 74.00 (H) 05/13/2025 05:28 AM    LYMPHOCYTES 18.00 (L) 05/13/2025 05:28 AM    MONOCYTES 5.50 05/13/2025 05:28 AM    EOSINOPHILS 1.20 05/13/2025 05:28 AM    BASOPHILS 0.40 05/13/2025 05:28 AM      Lab Results   Component Value Date/Time    SODIUM 142 05/13/2025 05:28 AM    POTASSIUM 4.2 05/13/2025 05:28 AM    CHLORIDE 106 05/13/2025 05:28 AM    CO2 25 05/13/2025 05:28 AM    GLUCOSE 92 05/13/2025 05:28 AM    BUN 6 (L) 05/13/2025 05:28 AM    CREATININE 0.65 05/13/2025 05:28 AM      Lab Results   Component Value Date/Time    CHOLSTRLTOT 171 05/13/2025 05:28 AM    LDL 85 05/13/2025 05:28 AM    HDL 63 05/13/2025 05:28 AM    TRIGLYCERIDE 114 05/13/2025 05:28 AM       Lab  Results   Component Value Date/Time    ALKPHOSPHAT 70 05/13/2025 05:28 AM    ASTSGOT 22 05/13/2025 05:28 AM    ALTSGPT 16 05/13/2025 05:28 AM    TBILIRUBIN 1.2 05/13/2025 05:28 AM        Imaging/Testing:    I interpreted and/or reviewed the patient's neuroimaging    MR-BRAIN-W/O    (Results Pending)   EC-ECHOCARDIOGRAM COMPLETE W/O CONT    (Results Pending)       IMPRESSIONS AND RECOMMENDATIONS   Mj Cornejo is a 80 y.o. presenting for whom neurology has been consulted for seizure.     Status epilepticus (HCC)  Assessment & Plan  The patient presented with left-sided weakness and concern for epileptic seizure. EEG confirmed focal status epilepticus originating from the right posterior quadrant. He has a known history of seizures and is currently on Keppra 2000 mg daily. Despite this, he is experiencing breakthrough seizures. Possible etiologies include medication noncompliance, subtherapeutic dosing, infection, metabolic abnormalities, underlying brain lesion, or idiopathic causes.    A non-contrast head CT revealed a stable calcification in the right parietal lobe with no acute findings. Electrolytes are grossly within normal limits. The patient reports adherence to his medication regimen and states that this is his first breakthrough seizure while on Keppra. He was previously on dual therapy with Depakote, which, per the son’s report, was discontinued due to kidney stones. However, this cannot be verified at present as we do not have access to Dr. Florence’s current documentation.    Given the patient’s prior need for two antiseizure medications, it is likely he will require dual therapy again. Vimpat has been initiated during this admission. The leading differential includes post-infectious seizures (recent admission for Influenza A with associated hypoxia) versus refractory epilepsy requiring multi-drug management. Neurological exam grossly normal and no longer in SE.     - Continuous EEG monitoring  -  MRI brain is not indicated at this time unless there are new neurological deficits.   - Neuro checks Q4H  - Continue Keppra 1500mg BID and Vimpat 100mg BID.   - Benzodiazepine PRN  - Seizure precautions   - Outpatient neurology follow up.     Neurology will follow for EEG results. Please see Dr. Washburn's addendum for further recommendations.       Aura Ingram  PGY I, Internal Medicine

## 2025-05-13 NOTE — ED NOTES
After discussing med list with pt's daughter at bedside. Daughter was able to verify pt's med list. Changes were made. Pharmacist was notified.  Per daughter, pt last received Vyvgart infusions 3 weeks ago.

## 2025-05-13 NOTE — DISCHARGE PLANNING
Post Acute Navigator Team    Per chart review, patient has been identified as a candidate for a goals of care discussion from following data:    High End of Life Care Index score 77  Medium LACE + score 56  6 click Mobility score 18   6 click ADL score 21   Readmission: No     Code Status:DNAR/DNI   Advanced Directive present in EMR: No   POLST present in EMR: No     Per chart review, patient was on service with [facility] and being followed by [facility]. Palliative Care was following patient during previous admission and has an established relationship with patient and family. Due to patient's complexity, he would benefit from an IP Palliative Care consult for this admission for goals of care.    Please reach out to me directly should care team feel patient will benefit from a discharge goals of care conversation regarding outpatient and home palliative care or hospice.

## 2025-05-13 NOTE — ASSESSMENT & PLAN NOTE
The patient presented with left-sided weakness and concern for epileptic seizure. EEG confirmed focal status epilepticus originating from the right posterior quadrant. He has a known history of seizures and is currently on Keppra 2000 mg daily. Despite this, he is experiencing breakthrough seizures. Possible etiologies include medication noncompliance, subtherapeutic dosing, infection, metabolic abnormalities, underlying brain lesion, or idiopathic causes.     A non-contrast head CT revealed a stable calcification in the right parietal lobe with no acute findings. Electrolytes are grossly within normal limits. The patient reports adherence to his medication regimen and states that this is his first breakthrough seizure while on Keppra. He was previously on dual therapy with Depakote, which, per the son’s report, was discontinued due to kidney stones. However, this cannot be verified at present as we do not have access to Dr. Florence’s current documentation.    Given the patient’s prior need for two antiseizure medications, it is likely he will require dual therapy again. Vimpat has been initiated during this admission. The leading differential includes post-infectious seizures (recent admission for Influenza A with associated hypoxia) versus refractory epilepsy requiring multi-drug management. Neurological exam grossly normal and no longer in SE.     5/14: Patient has not had any seizures since 1119 on 5/13 on EEG monitoring, stable on Keppra and Vimpat. Patient is back to baseline. Discontinue EEG monitoring and continue medications.     Neurology will sign off at this time. He will follow up with Dr. Florence outpatient.

## 2025-05-13 NOTE — ASSESSMENT & PLAN NOTE
Ataxic gait, weakness and numbness of left side  No acute findings on CTA head    Aspirin, statin for now  PT/OT/ST  Check lipid panel, HbA1c  Check TTE  Check MRI brain

## 2025-05-13 NOTE — PROGRESS NOTES
4 Eyes Skin Assessment Completed by LUIS Us and LUIS Zendejas.    Head WDL  Ears WDL  Nose WDL  Mouth WDL  Neck WDL  Breast/Chest WDL  Shoulder Blades WDL  Spine large area of skin dryness/discoloration from left flank to left buttock  (R) Arm/Elbow/Hand WDL  (L) Arm/Elbow/Hand WDL  Abdomen WDL  Groin dry open spots to bilateral inner thighs  Scrotum/Coccyx/Buttocks Discoloration  (R) Leg WDL  (L) Leg WDL  (R) Heel/Foot/Toe Redness, Blanching, and Boggy  (L) Heel/Foot/Toe Redness, Blanching, and Boggy                Devices In Places Tele Box and Pulse Ox      Interventions In Place Pillows and Heels Loaded W/Pillows    Possible Skin Injury No    Pictures Uploaded Into Epic Yes  Wound Consult Placed Yes  RN Wound Prevention Protocol Ordered No

## 2025-05-13 NOTE — THERAPY
"Speech Language Family Education Note    Patient Name: Mj Cronejo  Age:  80 y.o., Sex:  male  Medical Record #: [id number]  Today's Date: 5/13/2025      Patient was unable to participate due to lethargy after seizure/seizure medication. Did not rouse given auditory stimulation. SLP provided family training/education in lieu of CSE as patient was unable to participate and has documented dysphagia.     Discussed patient's dysphagia at length with family using [employer]  (\"Nickolas Andrews\" #[id number]). Explained that patient has a recently documented \"severe to profound oropharyngeal dysphagia.\" Family reported that they were told that he was safe to eat puree foods but not solid foods and that he has been eating puree foods at home. Reinforced MBSS findings of severe deficits with liquids, semi-solids, and regular solids. Clarified that regular solids are likely more risky due to airway occlusion risk, but that aspiration risk is equal with solids and greater with liquids. Further explained that risk is likely greater at this time due to medically compromised state s/p seizures and with reduced independence and mobility due to hospitalization and weakness. Clarified family questions.    Discussed options for management including NGT, PEG, and eating despite risk. Family reports that they were told that he cannot have a tube feed while on oxygen. Clarified that this SLP in unaware of such a policy. Discussed incursion of risk with any options and that eating despite risk would likely be more comfortable for patient and would be better in line with his previous decisions related to dysphagia and nutrition. Family informed SLP that he has not been awake enough to eat; provided education on not force-feeding and that fatigue after seizure and seizure medications is not unexpected.  Family asked what a \"perfect\" option would be, given that neither eating or a tube feed are without risk. Expressed " "that, given there severity and chronicity of dysphagia, that there may not be a \"perfect\" option. Reinforced role of Palliative care in terms of delineating goals. Family expressed gratitude for education and is agreeable to eat despite risk when awake. Acute SLP service is not indicated at this time unless goals related to dysphagia/nutrition change.     SLP Hx:   MBSS 4/2025 - severe to profound orophayngeal dysphagia; recommendations made for PEG vs. eating despite risk (PU4/TN0). MBSS significant for severe residue with PU4, silent aspiration of TN0 and MT2, and penetration to the vocal folds after the swallow with PU4. Deficits were not improved with use of compensatory strategies    OP MBSS 2/2025 - \"severe oropharyngeal dysphagia...patient with blunted sensation to aspiration events, without consistent cough response. Cued cough was effective to eliminate penetrated material, but not effective to clear aspirated material...given patient with good oral hygiene, good mobility, few other commodities reported this date, and no reports of recent pneumonia or other decline in respiratory status, continued PO nutrition/hydration is reasonable.\" Silent aspiration of TN0, MT2, LQ3, and PU4.     Recommendations  Puree solids / thin liquids - DESPITE RISK  Medication: Non Oral  Supervision: 1:1 spv from staff or family  Oral Care: Q2h  Risk Management : Small bites/sips, Alternate bites and sips, Slow rate of intake, No straws, Reduce environmental distractions, Physical mobility, as tolerated    Consult referrals: Palliative Care       05/13/25 1510   Total Treatment Time   SLP Time Spent Yes   SLP Total Time 35   Initial Contact Note    Initial Contact Note  Order Received and Verified. Speech Therapy Evaluation NOT Completed Because Patient Does Not Require Acute Speech Therapy at this Time.   Education Group   Dysphagia Patient Response Family;Acceptance;Explanation;Teach Back;Verbal Demonstration " "  Interdisciplinary Plan of Care Collaboration   IDT Collaboration with  Family / Caregiver;Nursing   Collaboration Comments Orders recieved for CSE, chart reviewed. Discussed with RN and family - patient is \"groggy\" after seizure and seizure medications and was not able to reasonably rouse to participate in CSE. Patient also has documented history of severe dysphagia. Discussed this with family at [facility]; conversation summarized above.     - Laura Hernandez, SLP  "

## 2025-05-13 NOTE — WOUND TEAM
[facility]  Inpatient Services  Initial Wound and Skin Care Evaluation    Admission Date: 2025     Last order of IP CONSULT TO WOUND CARE was found on 2025 from Hospital Encounter on 2025     HPI, PMH, SH: Reviewed    No past surgical history on file.  Social History     Tobacco Use    Smoking status: Former     Current packs/day: 0.00     Types: Cigarettes     Quit date:      Years since quittin.3    Smokeless tobacco: Never   Substance Use Topics    Alcohol use: No     No chief complaint on file.    Diagnosis: Dizziness [R42]    Unit where seen by Wound Team: S197/02     WOUND CONSULT RELATED TO:  left flank, groin    WOUND TEAM PLAN OF CARE - Frequency of Follow-up:   Nursing to follow dressing orders written for wound care. Contact wound team if area fails to progress, deteriorates or with any questions/concerns if something comes up before next scheduled follow up (See below as to whether wound is following and frequency of wound follow up)   Not following, consult as needed  - if areas worsen    WOUND HISTORY:   Patient with moisture related skin damage to groin and left flank       WOUND ASSESSMENT/LDA  Wound 25 Thigh Anterior Bilateral Dry/Open Area (Active)   Date First Assessed/Time First Assessed: 25   Present on Original Admission: Yes  Hand Hygiene Completed: Yes  Location: Thigh  Wound Orientation: Anterior  Laterality: Bilateral  Wound Description (Comments): Dry/Open Area      Assessments 2025  2:00 PM   Site Assessment Pink;Clean   Periwound Assessment Clean;Dry;Intact   Margins Defined edges;Attached edges   Closure Open to air   Drainage Amount None   Dressing Status Open to Air                Vascular:    MARY:   No results found.    Lab Values:    Lab Results   Component Value Date/Time    WBC 10.1 2025 05:28 AM    RBC 3.90 (L) 2025 05:28 AM    HEMOGLOBIN 13.3 (L) 2025 05:28 AM    HEMATOCRIT 40.9 (L) 2025 05:28  AM    CREACTPROT 13.10 (H) 04/21/2025 12:22 PM    SEDRATEWES 42 (H) 04/16/2025 08:15 AM    HBA1C 4.8 05/13/2025 05:28 AM    PLATELETCT 237 05/13/2025 05:28 AM         Culture Results show:  No results found for this or any previous visit (from the past 720 hours).    Pain Level/Medicated:  None, Tolerated without pain medication       INTERVENTIONS BY WOUND TEAM:  Chart and images reviewed. Discussed with bedside RN. All areas of concern (based on picture review, LDA review and discussion with bedside RN) have been thoroughly assessed. Documentation of areas based on significant findings. This RN in to assess patient. Performed standard wound care which includes appropriate positioning, dressing removal and non-selective debridement. Pictures and measurements obtained weekly if/when required.    Wound:  groin/left flank  Cleansed/Non-selectively Debrided with:  Gauze  Gayle wound: Cleansed with N/A, Prepped with N/A  Primary Dressing:  antifungal powder ordered    Advanced Wound Care Discharge Planning  Number of Clinicians necessary to complete wound care: 1  Is patient requiring IV pain medications for dressing changes:  No   Length of time for dressing change 15 min. (This does not include chart review, pre-medication time, set up, clean up or time spent charting.)    Interdisciplinary consultation: Patient, Bedside RN, New Grad LUIS Obando .  Pressure injury and staging reviewed with N/A.    EVALUATION / RATIONALE FOR TREATMENT:     Date:  05/13/25  Wound Status:  Initial evaluation    Patient with MASD to groin and buttocks. Groin appears yeast-like due to it being diffuse and scattered. Left flank and buttocks with discoloration, appears to be a resolving rash. Patient sacrum with natural discoloration for patient's skin tone. No pressure injuries identified. Antifungal powder ordered for groin.   Heels intact, dry, with callus.          Goals: Steady decrease in wound area and depth weekly.    NURSING PLAN OF  CARE ORDERS:  Skin care: See Skin Care orders    NUTRITION RECOMMENDATIONS   Wound Team Recommendations:  N/A    DIET ORDERS (From admission to next 24h)       Start     Ordered    05/12/25 2142  Diet Order Diet: Level 4 - Pureed; Liquid level: Level 0 - Thin  ALL MEALS        Question Answer Comment   Diet: Level 4 - Pureed    Liquid level Level 0 - Thin        05/12/25 2141                    PREVENTATIVE INTERVENTIONS:    Q shift Carlin - performed per nursing policy  Q shift pressure point assessments - performed per nursing policy    Surface/Positioning  Standard/trauma mattress - Currently in Place  Reposition q 2 hours with pillows - Currently in Place    Containment/Moisture Prevention    Dri-marychuy pad - Currently in Place  Antifungal treatment - Ordered for nursing to apply    Anticipated discharge plans:  TBD        Vac Discharge Needs:  Vac Discharge plan is purely a recommendation from wound team and not a requirement for discharge unless otherwise stated by physician.  Not Applicable Pt not on a wound vac

## 2025-05-13 NOTE — PROCEDURES
Former[facility]    Continuous Video Electroencephalogram Report          Patient Name: Mj Cornejo  MRN: [id number]  #: [id number]  Date of Service: 0912 on 5/13/2025 to 07:00 on 05/14/25  Total Recording Time: 22 hours   Referring Provider: Priyanka Leigh M.D.    INDICATION:  Mj Cornejo 80 y.o. male presenting with seizure(s) and status epilepticus    CURRENT ANTI-SEIZURE AND OTHER PERTINENT MEDICATIONS:   Keppra and Vimpat    TECHNIQUE: CVEEG was set up by a Neurodiagnostic technologist who performed education to the patient and staff. A minimum of 23 electrodes and 23 channel recording was setup and performed by Neurodiagnostic technologist, in accordance with the international 10-20 system. Impedence, electrode integrity, and technical impressions were documented a minimum of every 2-24 hour period by a Neurodiagnostic Technologist and reviewed by Interpreting Physician. The study was reviewed in bipolar and referential montages. The recording examined the patient in the awake, drowsy, and sleep state(s).     DESCRIPTION OF THE RECORD:  EEG background:  *During wakefulness the background was symmetric and well organized with a 8-9 hz posterior dominant rhythm with a normal mixture of frequencies expected for age and state.     Sleep was captured and was characterized by diffuse background delta/theta activity with a loss of myogenic artifact.  N2 sleep transients in the form of sleep spindles and vertex waves were seen in the leads over the central regions.     ACTIVATION PROCEDURES:   NA    ICTAL AND INTERICTAL FINDINGS:     From wakefulness there were recurrent focal electro-clinical seizures arising from the right posterior quadrant region. Clinically bland at times clonic activity of the foot was noted however laterality is unclear due to blankets covering the lower extremities. EEG consistently shows right occipital fast (beta) activity with propagation over the right posterior and central  regions then involving the right hemisphere and left posterior-central region. Does not clearly generalized. Seizure duration approximately 2 minutes.     Last seizure at 1119 (5/13).     No clear interictal epileptiform activity and no clear persistent focal slowing.     EKG: Sampling of the EKG recording Occasional ectopy.     EVENTS:  As described above.    INTERPRETATION:  Abnormal - Video EEG recording in the drowsy/sleep and/or comatose state(s):    - Focal status epilepticus arising from the right posterior region. Last seizure was 1119 (5/13).    Care team updated.       Robert Agosto MD  Neurology Attending, Epilepsy Program  [facility]

## 2025-05-13 NOTE — EEG PROGRESS NOTE
Summary: Initial review of stat cEEG    First 30 minutes reviewed.     No sedation. Keppra    Right posterior quadrant onset focal status epilepticus.    Recommend stat benzodiazepine and consultation with neurology. Convert to cEEG.      Full report to follow in the morning. Please reach out with any questions. Push event button for seizure-like activity.     Robert Agosto MD  Neurology/Epilepsy/EEG  [facility]

## 2025-05-13 NOTE — ED NOTES
Patient report called to LUIS Valdes at [facility] Neuroscience unit. Patient and patient's family member updated on pending transport to room 197-2.

## 2025-05-13 NOTE — PROGRESS NOTES
[facility]PITAL[facility] TRIAGE OFFICER DIRECT ADMISSION REPORT  Transferring facility: [facility]  Transferring physician: Dr. Yadav  Chief complaint: Dizziness and possible seizure  Pertinent history & patient course: 80-year-old male presents with dizziness, intermittent shaking, left upper and lower extremity weakness for the last 5 days, leading to his left while ambulating.  Request be transferred for MRI brain and EEG  Pertinent imaging & lab results: Available in epic  Code Status: DNR/DNI  Further work up or recommendations per triage officer prior to transfer: None  Consultants called prior to transfer and pertinent input from consultants: None  Patient accepted for transfer: Yes  Consultants to be called upon arrival: None  Admission status: Inpatient.   Floor requested: Neurology     Please inform the triage officer upon arrival of the patient to [facility] for assignment of a hospitalist to perform admission.      For any question or concerns regarding the care of this patient, please reach out to the assigned hospitalist

## 2025-05-13 NOTE — PROGRESS NOTES
Patient arrived on unit AAOx4 , VSS on RA. Patient and son educated on safety and updated on plan of care, verbalizes understanding. Bed in low locked position, call light within reach. Needs met.

## 2025-05-13 NOTE — ASSESSMENT & PLAN NOTE
Noted last hospitalization. With weight loss, followed by palliative care  Speech to eval  Consult dietician

## 2025-05-14 ENCOUNTER — PHARMACY VISIT (OUTPATIENT)
Dept: PHARMACY | Facility: MEDICAL CENTER | Age: 80
End: 2025-05-14
Payer: COMMERCIAL

## 2025-05-14 ENCOUNTER — HOME CARE VISIT (OUTPATIENT)
Dept: HOME HEALTH SERVICES | Facility: HOME HEALTHCARE | Age: 80
End: 2025-05-14
Payer: MEDICARE

## 2025-05-14 VITALS
BODY MASS INDEX: 22.91 KG/M2 | HEART RATE: 82 BPM | OXYGEN SATURATION: 93 % | WEIGHT: 117.28 LBS | TEMPERATURE: 97.2 F | SYSTOLIC BLOOD PRESSURE: 130 MMHG | RESPIRATION RATE: 18 BRPM | DIASTOLIC BLOOD PRESSURE: 67 MMHG

## 2025-05-14 LAB
ALBUMIN SERPL BCP-MCNC: 3.4 G/DL (ref 3.2–4.9)
ANION GAP SERPL CALC-SCNC: 7 MMOL/L (ref 7–16)
BUN SERPL-MCNC: 28 MG/DL (ref 8–22)
CALCIUM ALBUM COR SERPL-MCNC: 11.3 MG/DL (ref 8.5–10.5)
CALCIUM SERPL-MCNC: 10.8 MG/DL (ref 8.5–10.5)
CHLORIDE SERPL-SCNC: 108 MMOL/L (ref 96–112)
CO2 SERPL-SCNC: 26 MMOL/L (ref 20–33)
CREAT SERPL-MCNC: 0.88 MG/DL (ref 0.5–1.4)
ERYTHROCYTE [DISTWIDTH] IN BLOOD BY AUTOMATED COUNT: 51.2 FL (ref 35.9–50)
GFR SERPLBLD CREATININE-BSD FMLA CKD-EPI: 87 ML/MIN/1.73 M 2
GLUCOSE SERPL-MCNC: 108 MG/DL (ref 65–99)
HCT VFR BLD AUTO: 36.4 % (ref 42–52)
HGB BLD-MCNC: 12 G/DL (ref 14–18)
LEVETIRACETAM SERPL-MCNC: 25 UG/ML (ref 10–40)
MCH RBC QN AUTO: 34.1 PG (ref 27–33)
MCHC RBC AUTO-ENTMCNC: 33 G/DL (ref 32.3–36.5)
MCV RBC AUTO: 103.4 FL (ref 81.4–97.8)
PHOSPHATE SERPL-MCNC: 3.1 MG/DL (ref 2.5–4.5)
PLATELET # BLD AUTO: 222 K/UL (ref 164–446)
PMV BLD AUTO: 11.4 FL (ref 9–12.9)
POTASSIUM SERPL-SCNC: 3.9 MMOL/L (ref 3.6–5.5)
RBC # BLD AUTO: 3.52 M/UL (ref 4.7–6.1)
SODIUM SERPL-SCNC: 141 MMOL/L (ref 135–145)
WBC # BLD AUTO: 10.4 K/UL (ref 4.8–10.8)

## 2025-05-14 PROCEDURE — 4A10X4Z MONITORING OF CENTRAL NERVOUS ELECTRICAL ACTIVITY, EXTERNAL APPROACH: ICD-10-PCS | Performed by: PSYCHIATRY & NEUROLOGY

## 2025-05-14 PROCEDURE — 36415 COLL VENOUS BLD VENIPUNCTURE: CPT

## 2025-05-14 PROCEDURE — 97162 PT EVAL MOD COMPLEX 30 MIN: CPT

## 2025-05-14 PROCEDURE — 99232 SBSQ HOSP IP/OBS MODERATE 35: CPT | Mod: 25,GC | Performed by: STUDENT IN AN ORGANIZED HEALTH CARE EDUCATION/TRAINING PROGRAM

## 2025-05-14 PROCEDURE — C9254 INJECTION, LACOSAMIDE: HCPCS | Performed by: STUDENT IN AN ORGANIZED HEALTH CARE EDUCATION/TRAINING PROGRAM

## 2025-05-14 PROCEDURE — 700102 HCHG RX REV CODE 250 W/ 637 OVERRIDE(OP): Performed by: STUDENT IN AN ORGANIZED HEALTH CARE EDUCATION/TRAINING PROGRAM

## 2025-05-14 PROCEDURE — 85027 COMPLETE CBC AUTOMATED: CPT

## 2025-05-14 PROCEDURE — 95718 EEG PHYS/QHP 2-12 HR W/VEEG: CPT | Performed by: PSYCHIATRY & NEUROLOGY

## 2025-05-14 PROCEDURE — 99239 HOSP IP/OBS DSCHRG MGMT >30: CPT | Performed by: INTERNAL MEDICINE

## 2025-05-14 PROCEDURE — 97535 SELF CARE MNGMENT TRAINING: CPT

## 2025-05-14 PROCEDURE — 95711 VEEG 2-12 HR UNMONITORED: CPT | Performed by: PSYCHIATRY & NEUROLOGY

## 2025-05-14 PROCEDURE — 97166 OT EVAL MOD COMPLEX 45 MIN: CPT

## 2025-05-14 PROCEDURE — 700111 HCHG RX REV CODE 636 W/ 250 OVERRIDE (IP): Performed by: STUDENT IN AN ORGANIZED HEALTH CARE EDUCATION/TRAINING PROGRAM

## 2025-05-14 PROCEDURE — A9270 NON-COVERED ITEM OR SERVICE: HCPCS | Performed by: STUDENT IN AN ORGANIZED HEALTH CARE EDUCATION/TRAINING PROGRAM

## 2025-05-14 PROCEDURE — RXMED WILLOW AMBULATORY MEDICATION CHARGE: Performed by: INTERNAL MEDICINE

## 2025-05-14 PROCEDURE — 80069 RENAL FUNCTION PANEL: CPT

## 2025-05-14 RX ORDER — LACOSAMIDE 100 MG/1
100 TABLET ORAL 2 TIMES DAILY
Qty: 60 TABLET | Refills: 0 | Status: SHIPPED | OUTPATIENT
Start: 2025-05-14 | End: 2025-06-13

## 2025-05-14 RX ORDER — LEVETIRACETAM 500 MG/1
1500 TABLET, FILM COATED, EXTENDED RELEASE ORAL 2 TIMES DAILY
Qty: 180 TABLET | Refills: 0 | Status: SHIPPED | OUTPATIENT
Start: 2025-05-14

## 2025-05-14 RX ADMIN — PREDNISONE 40 MG: 20 TABLET ORAL at 05:52

## 2025-05-14 RX ADMIN — PYRIDOSTIGMINE BROMIDE 60 MG: 60 TABLET ORAL at 05:52

## 2025-05-14 RX ADMIN — LEVETIRACETAM 1500 MG: 100 INJECTION, SOLUTION INTRAVENOUS at 05:50

## 2025-05-14 RX ADMIN — NYSTATIN: 100000 POWDER TOPICAL at 05:52

## 2025-05-14 RX ADMIN — AMLODIPINE BESYLATE 10 MG: 5 TABLET ORAL at 05:52

## 2025-05-14 RX ADMIN — ASPIRIN 81 MG: 81 TABLET, COATED ORAL at 05:52

## 2025-05-14 RX ADMIN — LACOSAMIDE 100 MG: 10 INJECTION INTRAVENOUS at 05:52

## 2025-05-14 ASSESSMENT — FIBROSIS 4 INDEX: FIB4 SCORE: 1.98

## 2025-05-14 ASSESSMENT — GAIT ASSESSMENTS
GAIT LEVEL OF ASSIST: CONTACT GUARD ASSIST
DISTANCE (FEET): 100
ASSISTIVE DEVICE: FRONT WHEEL WALKER
DEVIATION: BRADYKINETIC;DECREASED BASE OF SUPPORT

## 2025-05-14 ASSESSMENT — COGNITIVE AND FUNCTIONAL STATUS - GENERAL
MOVING FROM LYING ON BACK TO SITTING ON SIDE OF FLAT BED: A LITTLE
HELP NEEDED FOR BATHING: A LITTLE
TURNING FROM BACK TO SIDE WHILE IN FLAT BAD: A LITTLE
PERSONAL GROOMING: A LITTLE
MOBILITY SCORE: 18
TOILETING: A LITTLE
MOVING TO AND FROM BED TO CHAIR: A LITTLE
DAILY ACTIVITIY SCORE: 19
WALKING IN HOSPITAL ROOM: A LITTLE
DRESSING REGULAR LOWER BODY CLOTHING: A LITTLE
SUGGESTED CMS G CODE MODIFIER DAILY ACTIVITY: CK
CLIMB 3 TO 5 STEPS WITH RAILING: A LITTLE
DRESSING REGULAR UPPER BODY CLOTHING: A LITTLE
STANDING UP FROM CHAIR USING ARMS: A LITTLE
SUGGESTED CMS G CODE MODIFIER MOBILITY: CK

## 2025-05-14 ASSESSMENT — PAIN DESCRIPTION - PAIN TYPE: TYPE: ACUTE PAIN

## 2025-05-14 ASSESSMENT — ACTIVITIES OF DAILY LIVING (ADL): TOILETING: INDEPENDENT

## 2025-05-14 NOTE — DISCHARGE SUMMARY
Discharge Summary    CHIEF COMPLAINT ON ADMISSION  No chief complaint on file.      Reason for Admission  Neurology (non-stroke) (Ataxia)     Admission Date  5/12/2025    CODE STATUS  DNAR/DNI    HPI & HOSPITAL COURSE  79 yo man with history of seizure, hyperparathyroidism, hypertension, hyperlipidemia who presented with weakness and shaking of his left side. No LVO or dissection seen on CTA of head and neck.  His EEG showed focal seizures and neurology was consulted. He was given IV valium and loading dose of keppra. Oral keppra dose increased and vimpat added. Continuous EEG showed resolution of focal seizures. Patient will discharge and follow up with his neurologist Dr. Florence, I discussed with his family and patient.  Prior to discharge, patient ambulated and did not require supplemental oxygen.    Therefore, he is discharged in good and stable condition to home with organized home healthcare and close outpatient follow-up.    The patient met 2-midnight criteria for an inpatient stay at the time of discharge.    Discharge Date  5/14/2025    FOLLOW UP ITEMS POST DISCHARGE  Neurology for seizure management    DISCHARGE DIAGNOSES  Principal Problem (Resolved):    Stroke-like symptoms (POA: Yes)  Active Problems:    Oropharyngeal dysphagia (POA: Yes)    Hypokalemia (POA: Yes)    Focal seizures (HCC) (POA: Unknown)    ACP (advance care planning) (POA: Unknown)    Frailty syndrome in geriatric patient (POA: Unknown)    Dehydration (POA: Unknown)    Status epilepticus (HCC) (POA: Unknown)    Myasthenia gravis (HCC) (POA: Unknown)  Resolved Problems:    Dizziness (POA: Yes)      FOLLOW UP  Morris Florence M.D.  [address]  [place] [id number] [phone]    Schedule an appointment as soon as possible for a visit in 2 week(s)      [facility]  [address]  [place] [address] [phone]          MEDICATIONS ON DISCHARGE     Medication List        START taking these medications         Instructions   lacosamide 100 MG Tabs tablet  Commonly known as: Vimpat   Take 1 Tablet by mouth 2 times a day for 30 days.  Dose: 100 mg            CHANGE how you take these medications        Instructions   levetiracetam 500 MG Tb24  What changed:   how much to take  when to take this  Commonly known as: Keppra   Take 3 Tablets by mouth 2 times a day.  Dose: 1,500 mg            CONTINUE taking these medications        Instructions   albuterol 108 (90 Base) MCG/ACT Aers inhalation aerosol   Inhale 1-2 Puffs every four hours as needed for Shortness of Breath.  Dose: 1-2 Puff     amLODIPine 5 MG Tabs  Commonly known as: Norvasc   Take 10 mg by mouth every morning. Indications: High Blood Pressure  Dose: 10 mg     Home Care Oxygen   Inhale 5 L/min continuous. Indications: Shortness of breath  Dose: 5 L/min     predniSONE 20 MG Tabs  Commonly known as: Deltasone   Take 40 mg by mouth every day. Indications: Myasthenia Gravis  Dose: 40 mg     pyridostigmine 60 MG Tabs  Commonly known as: Mestinon   Take 60 mg by mouth 3 times a day. Indications: Myasthenia Gravis  Dose: 60 mg     VYVGART IV   Infuse  into a venous catheter one time.            STOP taking these medications      doxazosin 2 MG Tabs  Commonly known as: Cardura              Allergies  Allergies[1]    DIET  Orders Placed This Encounter   Procedures    Diet Order Diet: Level 4 - Pureed; Liquid level: Level 0 - Thin     Standing Status:   Standing     Number of Occurrences:   1     Diet::   Level 4 - Pureed [25]     Liquid level:   Level 0 - Thin       ACTIVITY  As tolerated.    CONSULTATIONS  neurology    PROCEDURES  EC-ECHOCARDIOGRAM COMPLETE W/O CONT   Final Result            LABORATORY  Lab Results   Component Value Date    SODIUM 141 05/14/2025    POTASSIUM 3.9 05/14/2025    CHLORIDE 108 05/14/2025    CO2 26 05/14/2025    GLUCOSE 108 (H) 05/14/2025    BUN 28 (H) 05/14/2025    CREATININE 0.88 05/14/2025        Lab Results   Component Value Date     WBC 10.4 05/14/2025    HEMOGLOBIN 12.0 (L) 05/14/2025    HEMATOCRIT 36.4 (L) 05/14/2025    PLATELETCT 222 05/14/2025        Total time of the discharge process exceeds 35 minutes.       [1] No Known Allergies

## 2025-05-14 NOTE — DISCHARGE INSTRUCTIONS
Discharge Instructions per Priyanka Leigh M.D.    You had focal seizures seen on EEG. Neurology has increased your Keppra and started you on Vimpat.    Neurology note 5/14/25:  The patient presented with left-sided weakness and concern for epileptic seizure. EEG confirmed focal status epilepticus originating from the right posterior quadrant. He has a known history of seizures and is currently on Keppra 2000 mg daily. Despite this, he is experiencing breakthrough seizures. Possible etiologies include medication noncompliance, subtherapeutic dosing, infection, metabolic abnormalities, underlying brain lesion, or idiopathic causes.     A non-contrast head CT revealed a stable calcification in the right parietal lobe with no acute findings. Electrolytes are grossly within normal limits. The patient reports adherence to his medication regimen and states that this is his first breakthrough seizure while on Keppra. He was previously on dual therapy with Depakote, which, per the son’s report, was discontinued due to kidney stones. However, this cannot be verified at present as we do not have access to Dr. Florence’s current documentation.    Given the patient’s prior need for two antiseizure medications, it is likely he will require dual therapy again. Vimpat has been initiated during this admission. The leading differential includes post-infectious seizures (recent admission for Influenza A with associated hypoxia) versus refractory epilepsy requiring multi-drug management. Neurological exam grossly normal and no longer in SE.       Discharge Instructions    Discharged to home by taxi with relative. Discharged via wheelchair, hospital escort: Yes.  Special equipment needed: Walker    Be sure to schedule a follow-up appointment with your primary care doctor or any specialists as instructed.     Discharge Plan:   Diet Plan: Discussed  Activity Level: Discussed  Confirmed Follow up Appointment: Patient to Call and Schedule  Appointment  Confirmed Symptoms Management: Discussed  Medication Reconciliation Updated: Yes    I understand that a diet low in cholesterol, fat, and sodium is recommended for good health. Unless I have been given specific instructions below for another diet, I accept this instruction as my diet prescription.   Other diet: Level 4 puree 1:1 supervision thin liquids      Special Instructions: None    -Is this patient being discharged with medication to prevent blood clots?  No    Is patient discharged on Warfarin / Coumadin?   No

## 2025-05-14 NOTE — CARE PLAN
The patient is Stable - Low risk of patient condition declining or worsening    Shift Goals  Clinical Goals: monitor neuro status, continuous EEG, O2, increase mobility  Patient Goals: rest  Family Goals: updates    Progress made toward(s) clinical / shift goals:    Problem: Knowledge Deficit - Standard  Goal: Patient and family/care givers will demonstrate understanding of plan of care, disease process/condition, diagnostic tests and medications  Outcome: Progressing  Note: Patient verbalized understanding plan of care.      Problem: Neuro Status  Goal: Neuro status will remain stable or improve  Outcome: Progressing  Note: Patient aox4. Patient able to move all extremities.      Problem: Hemodynamic Monitoring  Goal: Patient's hemodynamics, fluid balance and neurologic status will be stable or improve  Outcome: Progressing  Note: SBP <160     Problem: Respiratory - Stroke Patient  Goal: Patient will achieve/maintain optimum respiratory rate/effort  Outcome: Progressing  Note:  Patient SPO2 > 90% on 0.5L NC.     Problem: Mobility - Stroke  Goal: Patient's capacity to carry out activities will improve  Outcome: Progressing  Note: Patient able to ambulate to bathroom x2/FWW     Problem: Fall Risk  Goal: Patient will remain free from falls  Outcome: Progressing  Note: Bed alarm in place. Bed locked and in lowest position.        Patient is not progressing towards the following goals:

## 2025-05-14 NOTE — THERAPY
Occupational Therapy   Initial Evaluation     Patient Name: Mj Cornejo  Age:  80 y.o., Sex:  male  Medical Record #: [id number]  Today's Date: 5/14/2025     Precautions  Precautions: Fall Risk  Comments: seizure precautions, cEEG    Assessment    Patient is 80 y.o. male admitted for L sided weakness and convulsion, found to be status epilepticus. Other pertinent medical history includes prior seizures, HTN, HLD, myasthenia gravis, cerebral infarct, and recent admit for PNA. Pt reported history of poor vision. Pt seen for OT evaluation and treatment. Pt required supv-CGA for functional mobility, toilet transfer, and donning/doffing socks. Pt followed 1 step commands ~50% of the time. Pt current functional performance limited by impaired cognition, impaired balance, and impaired coordination. Pt will benefit from skilled OT while admitted to acute care.     Plan    Occupational Therapy Initial Treatment Plan   Treatment Interventions: Self Care / Activities of Daily Living, Adaptive Equipment, Neuro Re-Education / Balance, Therapeutic Exercises, Therapeutic Activity, Family / Caregiver Training  Treatment Frequency: 3 Times per Week  Duration: Until Therapy Goals Met    DC Equipment Recommendations: Tub transfer bench  Discharge Recommendations: Recommend home health for continued occupational therapy services (with strong family support)      Objective     05/14/25 1006   Prior Living Situation   Prior Services Skilled Home Health Services   Housing / Facility 1 Story House   Bathroom Set up Bathtub / Shower Combination;Grab Bars   Equipment Owned Grab Bar(s) In Tub / Shower   Lives with - Patient's Self Care Capacity Spouse;Adult Children   Comments Pt lives with his wife who was present for eval, his adult son, and his daughter in law. Pt's spouse present and assisted with history. She reported good support at home and that someone is usually home with him.   Prior Level of ADL Function   Self Feeding  Independent   Grooming / Hygiene Independent   Bathing Independent   Dressing Requires Assist  (recently)   Toileting Independent   Prior Level of IADL Function   Medication Management Independent   Laundry Requires Assist   Kitchen Mobility Requires Assist   Home Management Requires Assist   Shopping Dependent   Prior Level Of Mobility Supervision With Device in Community;Supervision With Device in Home  (recently has needed some assist with walking per spouse)   Driving / Transportation Relatives / Others Provide Transportation   Occupation (Pre-Hospital Vocational) Retired Due To Age   History of Falls   History of Falls No   Date of Last Fall   (Pt and spouse denied history of falls.)   Precautions   Precautions Fall Risk   Comments seizure precautions, cEEG   Vitals   Vitals Comments remained stable   Pain   Pain Scales 0 to 10 Scale    Pain 0 - 10 Group   Therapist Pain Assessment Post Activity Pain Same as Prior to Activity;Nurse Notified  (not rated, agreeable to eval)   Cognition    Cognition / Consciousness X   Orientation Level Not Oriented to Year;Not Oriented to Day   Level of Consciousness Alert   Ability To Follow Commands   (followed 1 step commands ~50% of the time)   Safety Awareness Impaired   New Learning Impaired   Attention Impaired   Comments very pleasant and cooperative   Passive ROM Upper Body   Comments WFL from observation   Active ROM Upper Body   Comments WFL from observation, did not follow commands for formal testing   Strength Upper Body   Comments  and biceps WFL, did not follow commands for other testing   Sensation Upper Body   Upper Extremity Sensation  Not Tested   Comments due to cognition   Upper Body Muscle Tone   Upper Body Muscle Tone  WDL   Coordination Upper Body   Comments tremor noted in B UE, worse to L than R   Balance Assessment   Sitting Balance (Static) Fair -   Sitting Balance (Dynamic) Fair -   Standing Balance (Static) Fair -   Standing Balance (Dynamic)  Fair -   Weight Shift Sitting Fair   Weight Shift Standing Fair   Comments w/ FWW   Bed Mobility    Supine to Sit Standby Assist   Sit to Supine Standby Assist   Scooting Standby Assist   Rolling Standby Assist   Comments HOB slightly elevated   ADL Assessment   Lower Body Dressing Contact Guard Assist  (socks)   Toileting Contact Guard Assist  (urine on toilet)   Functional Mobility   Sit to Stand Standby Assist   Bed, Chair, Wheelchair Transfer Contact Guard Assist   Toilet Transfers Contact Guard Assist   Transfer Method Stand Step   Mobility EOB>stand>EOB>bathroom>bed   Comments w/ FWW   Visual Perception   Comments Visual tracking WFL. Pt reported poor vision at baseline.   Activity Tolerance   Sitting in Chair NT   Sitting Edge of Bed >5 min   Standing <5 min   Comments functional for eval   Patient / Family Goals   Patient / Family Goal #1 to go home   Short Term Goals   Short Term Goal # 1 Pt will perform ADL transfer w/ supv   Short Term Goal # 2 Pt will perform LB dressing w/ supv   Short Term Goal # 3 Pt will perform standing g/h w/ supv   Short Term Goal # 4 Pt will perform toilet hygiene w/ supv   Education Group   Education Provided Role of Occupational Therapist;Activities of Daily Living;Home Safety;Pathology of bedrest  (use of IS per spouse request)   Role of Occupational Therapist Patient Response Patient;Significant Other;Acceptance;Explanation;Verbal Demonstration   Home Safety Patient Response Patient;Significant Other;Acceptance;Explanation;Verbal Demonstration   ADL Patient Response Patient;Significant Other;Acceptance;Explanation;Demonstration;Verbal Demonstration;Action Demonstration   Pathology of Bedrest Patient Response Patient;Significant Other;Acceptance;Explanation;Verbal Demonstration   Occupational Therapy Initial Treatment Plan    Treatment Interventions Self Care / Activities of Daily Living;Adaptive Equipment;Neuro Re-Education / Balance;Therapeutic Exercises;Therapeutic  Activity;Family / Caregiver Training   Treatment Frequency 3 Times per Week   Duration Until Therapy Goals Met   Problem List   Problem List Decreased Active Daily Living Skills;Decreased Homemaking Skills;Decreased Functional Mobility;Decreased Activity Tolerance;Safety Awareness Deficits / Cognition;Impaired Coordination Right Upper Extremity;Impaired Coordination Left Upper Extremity;Impaired Cognitive Function;Impaired Vision;Impaired Postural Control / Balance

## 2025-05-14 NOTE — PROCEDURES
[facility]    Continuous Video Electroencephalogram Report          Patient Name: Mj Cornejo  MRN: [id number]  #: [id number]  Date of Service: 05/14/25  Total Recording Time: 3 hours   Referring Provider: Priyanka Leigh M.D.    INDICATION:  Mj Cornejo 80 y.o. male presenting with seizure(s) and status epilepticus    CURRENT ANTI-SEIZURE AND OTHER PERTINENT MEDICATIONS:   Keppra and Vimpat    TECHNIQUE: CVEEG was set up by a Neurodiagnostic technologist who performed education to the patient and staff. A minimum of 23 electrodes and 23 channel recording was setup and performed by Neurodiagnostic technologist, in accordance with the international 10-20 system. Impedence, electrode integrity, and technical impressions were documented a minimum of every 2-24 hour period by a Neurodiagnostic Technologist and reviewed by Interpreting Physician. The study was reviewed in bipolar and referential montages. The recording examined the patient in the awake, drowsy, and sleep state(s).     DESCRIPTION OF THE RECORD:  EEG background:  *During wakefulness the background was symmetric and a moderately well organized with a 8-9 hz posterior dominant rhythm. There was excess diffuse theta activity.    Sleep was captured and was characterized by diffuse background delta/theta activity with a loss of myogenic artifact.  N2 sleep transients in the form of sleep spindles and vertex waves were seen in the leads over the central regions.     ACTIVATION PROCEDURES:   NA    ICTAL AND INTERICTAL FINDINGS:     No seizures.     No clear interictal epileptiform activity and no clear persistent focal slowing.     EKG: Sampling of the EKG recording Occasional ectopy.     EVENTS:  None    INTERPRETATION:  Abnormal - Video EEG recording in the awake, drowsy, and sleep state(s):    - Mild diffuse slowing of the background is a non-specific indicator of encephalopathy.  - There were no seizures during this period of  recording.      Robert Agosto MD  Neurology Attending, Epilepsy Program  [facility]

## 2025-05-14 NOTE — PROGRESS NOTES
Monitor summary: SR 67-84, OH .13, QRS .13, QT .39 with occasional PAC's, rare PVC's per strip from monitor room.

## 2025-05-14 NOTE — DISCHARGE PLANNING
Case Management Discharge Planning    Admission Date: 5/12/2025  GMLOS: 2.7  ALOS: 2    6-Clicks ADL Score: 21  6-Clicks Mobility Score: 18      Anticipated Discharge Dispo: Discharge Disposition: D/T to home under HHA care in anticipation of covered skilled care (06)    DME Needed: Yes    DME Ordered: Yes    Action(s) Taken: DC Assessment Complete (See below), Choice obtained, and Referral(s) sent    Escalations Completed: Provider    Medically Clear: No    Next Steps: RN CM to continue to assist in Pt's discharge needs.     Barriers to Discharge: Medical clearance    Is the patient up for discharge tomorrow: No  Pt I anticipated to be medically cleared today.       Care Transition Team Assessment  RN CM attempted to talk to Pt, Pt on CEEG. LUIS Ledezma called Pt's daughter Genoveva to obtain assessment. Pt lives with his spouse in a one story house in [facility]. At baseline, Pt is ambulatory and independent with ADL's. Pt uses O2 at 2lpm from [employer]. Pt was in service with [facility]. PCP is Jacques Marrero.    HH order in, HH choice obtained from Pt's daughter. Choice form faxed to [facility] for processing.    Pt has been accepted by [facility].  Pt's daughter can provide transport home and will bring Pt's portable O2.     Information Source  Orientation Level: Oriented X4  Information Given By: Other (Comments)  Informant's Name: Genoveva Worthington  Who is responsible for making decisions for patient? : Patient    Readmission Evaluation  Is this a readmission?: Yes - unplanned readmission  Did you have enough support after your last discharge?: Yes    Elopement Risk  Legal Hold: No  Ambulatory or Self Mobile in Wheelchair: No-Not an Elopement Risk  Elopement Risk: Not at Risk for Elopement    Interdisciplinary Discharge Planning  Lives with - Patient's Self Care Capacity: Adult Children  Patient or legal guardian wants to designate a caregiver: No  Support Systems: Family Member(s), Friends / Neighbors  Housing / Facility: 1 Story  House    Discharge Preparedness  What is your plan after discharge?: Home health care  What are your discharge supports?: Child, Spouse  Prior Functional Level: Ambulatory, Independent with Activities of Daily Living  Difficulity with ADLs: None  Difficulity with IADLs: Driving    Functional Assesment  Prior Functional Level: Ambulatory, Independent with Activities of Daily Living    Finances  Financial Barriers to Discharge: No  Prescription Coverage: Yes    Vision / Hearing Impairment  Vision Impairment : No  Hearing Impairment : No         Advance Directive  Advance Directive?: None    Domestic Abuse  Possible Abuse/Neglect Reported to:: Not Applicable    Psychological Assessment  History of Substance Abuse: None    Discharge Risks or Barriers  Discharge risks or barriers?: Complex medical needs  Patient risk factors: Complex medical needs    Anticipated Discharge Information  Discharge Disposition: D/T to home under A care in anticipation of covered skilled care (06)

## 2025-05-14 NOTE — DISCHARGE PLANNING
Case Management Discharge Planning    Admission Date: 5/12/2025  GMLOS: 2.7  ALOS: 2    6-Clicks ADL Score: 19  6-Clicks Mobility Score: 18      Anticipated Discharge Dispo: Discharge Disposition: D/T to home under HHA care in anticipation of covered skilled care (06)    DME Needed: Yes    DME Ordered: Yes    Action(s) Taken: Updated Provider/Nurse on Discharge Plan and Choice obtained    Choice for DME walker obtained from Pt, faxed to [facility] fro processing  Per Dr. Leigh, Pt is medically cleared.    1225- DME walker delivered at bedside. Pt's family unable to provide transport . Cab voucher provided per request.      Escalations Completed: Provider    Medically Clear: Yes    Next Steps: RN CM to continue to assist in Pt's discharge needs.     Barriers to Discharge: None    Is the patient up for discharge tomorrow: No  Pt is discharging today.

## 2025-05-14 NOTE — DISCHARGE PLANNING
ATTN: Case Management  RE: Referral for Home Health    As of 5/14/25, we have accepted the Home Health referral for the patient listed above.    [facility]  will contact the patient within 48 hours. If you have any questions or concerns regarding the patient’s transition to Home Health, please do not hesitate to contact us at [phone].      We look forward to collaborating with you,  [facility] Team

## 2025-05-14 NOTE — PROGRESS NOTES
Monitor Summary: SR-ST , HI .0.12, QRS .0.10, QT .0.39 with rare PVCs per strip from monitor room

## 2025-05-14 NOTE — FACE TO FACE
Face to Face Note  -  Durable Medical Equipment    Priyanka Leigh M.D. - NPI: [id number]  I certify that this patient is under my care and that they had a durable medical equipment(DME)face to face encounter by myself that meets the physician DME face-to-face encounter requirements with this patient on:    Date of encounter:   Patient:                    MRN:                       YOB: 2025  Mj Cornejo  [id number]  1945     The encounter with the patient was in whole, or in part, for the following medical condition, which is the primary reason for durable medical equipment:  Other - seizure    I certify that, based on my findings, the following durable medical equipment is medically necessary:    Walkers.    My Clinical findings support the need for the above equipment due to:  Abnormal Gait

## 2025-05-14 NOTE — PROGRESS NOTES
Neurology Follow-up  Neurohospitalist Service, [facility]    Referring Physician: Priyanka Leigh M.D.    CC- Witnessed seizure    HPI: Mj Cornejo is a 80 y.o. male with history of seizure, hyperparathyroidism, hypertension, hyperlipidemia who presented 5/12/2025 as transfer from [facility] emergency room for evaluation for left-sided weakness and convulsion, found to have focal status epilepticus.     Interval history:   5/14: No acute events overnight. EEG reviewed, no seizures since 1119 on 5/13. He is back to his baseline, stable on 2 anti-epileptic medications.     Review of systems: In addition to what is detailed in the HPI above, all other systems reviewed and are negative.    Past Medical History:    has a past medical history of BMI 22.0-22.9, adult (07/28/2023), Hypercholesteremia, Hypertension, and Seizure (HCC).    FHx:  family history includes Stroke in his mother.    SHx:   reports that he quit smoking about 20 years ago. His smoking use included cigarettes. He has never used smokeless tobacco. He reports that he does not drink alcohol and does not use drugs.    Allergies:  Allergies[1]    Medications:  Current Medications[2]    Physical Examination:     General: Patient is awake and in no acute distress  Eye: Examination of optic disks not indicated at this time given acuity of consult  Neck: There is normal range of motion  CV: regular rate   Extremities:  clear, dry, intact, without peripheral edema    NEUROLOGICAL EXAM:     BP (!) 142/71   Pulse 78   Temp 36.4 °C (97.5 °F) (Temporal)   Resp 18   Wt 53.2 kg (117 lb 4.6 oz)   SpO2 94%   BMI 22.91 kg/m²       Mental status: Awake, alert and oriented to self, place, year  Speech and language: Speech is clear and fluent. The patient is able to name and repeat, and follow commands  Cranial nerve exam: Pupils are equal, round and reactive to light bilaterally. There is no nystagmus. Extraocular muscles are  intact. Face is symmetric. Sensation in the face is intact to light touch. Palate elevates symmetrically. Tongue is midline.  Motor exam: There is sustained antigravity with no downward drift in bilateral arms and legs.  There is no pronator drift. Tone is normal. There is decreased strength on the left lower extremity against resistance.  No abnormal movements were seen on exam.  Sensory exam: Reacts to tactile in all 4 extremities, no neglect to double stim   Deep tendon reflexes:  2+ throughout.   Coordination: No dysmetria on bilateral finger-to-nose testing  Gait: Deferred due to patient preference    Objective Data:    Labs:  Lab Results   Component Value Date/Time    PROTHROMBTM 14.2 01/23/2021 02:27 PM    INR 1.07 01/23/2021 02:27 PM      Lab Results   Component Value Date/Time    WBC 10.4 05/14/2025 03:46 AM    RBC 3.52 (L) 05/14/2025 03:46 AM    HEMOGLOBIN 12.0 (L) 05/14/2025 03:46 AM    HEMATOCRIT 36.4 (L) 05/14/2025 03:46 AM    .4 (H) 05/14/2025 03:46 AM    MCH 34.1 (H) 05/14/2025 03:46 AM    MCHC 33.0 05/14/2025 03:46 AM    MPV 11.4 05/14/2025 03:46 AM    NEUTSPOLYS 74.00 (H) 05/13/2025 05:28 AM    LYMPHOCYTES 18.00 (L) 05/13/2025 05:28 AM    MONOCYTES 5.50 05/13/2025 05:28 AM    EOSINOPHILS 1.20 05/13/2025 05:28 AM    BASOPHILS 0.40 05/13/2025 05:28 AM      Lab Results   Component Value Date/Time    SODIUM 141 05/14/2025 03:46 AM    POTASSIUM 3.9 05/14/2025 03:46 AM    CHLORIDE 108 05/14/2025 03:46 AM    CO2 26 05/14/2025 03:46 AM    GLUCOSE 108 (H) 05/14/2025 03:46 AM    BUN 28 (H) 05/14/2025 03:46 AM    CREATININE 0.88 05/14/2025 03:46 AM      Lab Results   Component Value Date/Time    CHOLSTRLTOT 171 05/13/2025 05:28 AM    LDL 85 05/13/2025 05:28 AM    HDL 63 05/13/2025 05:28 AM    TRIGLYCERIDE 114 05/13/2025 05:28 AM       Lab Results   Component Value Date/Time    ALKPHOSPHAT 70 05/13/2025 05:28 AM    ASTSGOT 22 05/13/2025 05:28 AM    ALTSGPT 16 05/13/2025 05:28 AM    TBILIRUBIN 1.2  05/13/2025 05:28 AM        Imaging/Testing:    I interpreted and/or reviewed the patient's neuroimaging    EC-ECHOCARDIOGRAM COMPLETE W/O CONT   Final Result          ASSESSMENT AND PLAN  Status epilepticus (HCC)  Assessment & Plan  The patient presented with left-sided weakness and concern for epileptic seizure. EEG confirmed focal status epilepticus originating from the right posterior quadrant. He has a known history of seizures and is currently on Keppra 2000 mg daily. Despite this, he is experiencing breakthrough seizures. Possible etiologies include medication noncompliance, subtherapeutic dosing, infection, metabolic abnormalities, underlying brain lesion, or idiopathic causes.     A non-contrast head CT revealed a stable calcification in the right parietal lobe with no acute findings. Electrolytes are grossly within normal limits. The patient reports adherence to his medication regimen and states that this is his first breakthrough seizure while on Keppra. He was previously on dual therapy with Depakote, which, per the son’s report, was discontinued due to kidney stones. However, this cannot be verified at present as we do not have access to Dr. Florence’s current documentation.    Given the patient’s prior need for two antiseizure medications, it is likely he will require dual therapy again. Vimpat has been initiated during this admission. The leading differential includes post-infectious seizures (recent admission for Influenza A with associated hypoxia) versus refractory epilepsy requiring multi-drug management. Neurological exam grossly normal and no longer in SE.     5/14: Patient has not had any seizures since 1119 on 5/13 on EEG monitoring, stable on Keppra and Vimpat. Patient is back to baseline. Discontinue EEG monitoring and continue medications.     Neurology will sign off at this time. He will follow up with Dr. Florence outpatient.       Aura Ingram  PGY I, Internal Medicine         [1] No Known  Allergies  [2]   Current Facility-Administered Medications:     levETIRAcetam (Keppra) injection 1,500 mg, 1,500 mg, Intravenous, Q12HRS, Ziyad Washburn, D.O., 1,500 mg at 05/14/25 0550    lacosamide (Vimpat) injection 100 mg, 100 mg, Intravenous, BID, Ziyad Washburn, D.O., 100 mg at 05/14/25 0552    enoxaparin (Lovenox) inj 40 mg, 40 mg, Subcutaneous, DAILY AT 1800, Priyanka Leigh M.D., 40 mg at 05/13/25 1741    nystatin (Mycostatin) powder, , Topical, BID, rPiyanka Leigh M.D., Given at 05/14/25 0552    acetaminophen (Tylenol) tablet 650 mg, 650 mg, Oral, Q6HRS PRN, Kalyan Mckeon M.D.    labetalol (Normodyne/Trandate) injection 10 mg, 10 mg, Intravenous, Q4HRS PRN, Kalyan Mckeon M.D.    ondansetron (Zofran) syringe/vial injection 4 mg, 4 mg, Intravenous, Q4HRS PRN **OR** ondansetron (Zofran ODT) dispertab 4 mg, 4 mg, Oral, Q4HRS PRN, Kalyan Mckeon M.D.    diazePAM (Valium) injection 5 mg, 5 mg, Intravenous, Q5 MIN PRN, Kalyan Mckeon M.D., 5 mg at 05/13/25 1051    senna-docusate (Pericolace Or Senokot S) 8.6-50 MG per tablet 2 Tablet, 2 Tablet, Oral, Q EVENING, 2 Tablet at 05/13/25 1741 **AND** polyethylene glycol/lytes (Miralax) Packet 1 Packet, 1 Packet, Oral, QDAY PRN, Kalyan Mckeon M.D.    albuterol inhaler 1-2 Puff, 1-2 Puff, Inhalation, Q4HRS PRN, Kalyan Mckeon M.D.    amLODIPine (Norvasc) tablet 10 mg, 10 mg, Oral, QAM, Kalyan Mckeon M.D., 10 mg at 05/14/25 0552    pyridostigmine (Mestinon) tablet 60 mg, 60 mg, Oral, TID, Kalyan Mckeon M.D., 60 mg at 05/14/25 0552    predniSONE (Deltasone) tablet 40 mg, 40 mg, Oral, DAILY, Kalyan Mckeon M.D., 40 mg at 05/14/25 0552    atorvastatin (Lipitor) tablet 40 mg, 40 mg, Oral, Q EVENING, Kalyan Mckeon M.D., 40 mg at 05/13/25 1743    aspirin EC tablet 81 mg, 81 mg, Oral, DAILY, Kalyan Mckeon M.D., 81 mg at 05/14/25 0552

## 2025-05-14 NOTE — CARE PLAN
The patient is Stable - Low risk of patient condition declining or worsening    Shift Goals  Clinical Goals: Monitor Neuro Status, EEG,  Patient Goals: Rest, Comfort  Family Goals: updates    Progress made toward(s) clinical / shift goals:    Problem: Neuro Status  Goal: Neuro status will remain stable or improve  Outcome: Progressing  Note: Patient found to be in Status Epilepticus through EEG monitoring. Vimpat and Keppra Given     Problem: Dysphagia  Goal: Dysphagia will improve  Outcome: Progressing  Note: Patient to see SLP tomorrow       Patient is not progressing towards the following goals:

## 2025-05-14 NOTE — CONSULTS
Palliative Care follow-up  Consult received and EMR reviewed. Case discussed with MD, patient is medically cleared pending discharge today. Recommend consideration of POLST completion prior to discharge and consideration of outpatient PC referral for ongoing GOC discussions if warranted.     Thank you for allowing Palliative Care to support this patient and family. Contact [phone] for additional assistance, change in patient status, or with any questions/concerns.      DAVEY Woodward  Palliative Care Nurse Practitioner   [phone]

## 2025-05-14 NOTE — PROGRESS NOTES
Pt given discharge instructions and education. IV's removed, pt has all of his belongings including FWW, taxi voucher and [id number]. Pt escorted to taxi via wheelchair by nursing staff.

## 2025-05-14 NOTE — THERAPY
Physical Therapy   Initial Evaluation     Patient Name: Mj Cornejo  Age:  80 y.o., Sex:  male  Medical Record #: [id number]  Today's Date: 5/14/2025     Precautions  Precautions: Fall Risk;Swallow Precautions;Other (See Comments) (Seizure)  Comments: seizure precautions, cEEG    Assessment  Patient is 80 y.o. male who presented 5/12/2025 as transfer from [facility] emergency room for evaluation for left-sided weakness, shakiness    Currently being treated for: dehydration, status epilepticus, focal seizures    PMH: Hypercholesteremia, Hypertension, and Seizure, myasthenia gravis    Patient seen for PT evaluation. Patient in bed, agreeable for the session. Patient able to participate with functional mobility as detailed below. Will continue to benefit from PT services to help improve functional mobility, facilitate return to prior level of function and ensure safe DC home. Recommend HH PT with family supervision/assistance as needed.     Recommend nursing to encourage OOB to chair for meals and ambulate with supervision/assistance few times/day.     Plan    Physical Therapy Initial Treatment Plan   Treatment Plan : (P) Bed Mobility, Equipment, Gait Training, Neuro Re-Education / Balance, Therapeutic Exercise, Therapeutic Activities, Family / Caregiver Training, Stair Training  Treatment Frequency: 3 Times per Week  Duration: Until Therapy Goals Met    DC Equipment Recommendations: Front-Wheel Walker  Discharge Recommendations: Recommend home health for continued physical therapy services       Objective     05/14/25 1153   Time In/Time Out   Therapy Start Time 1121   Therapy End Time 1153   Total Therapy Time 32    Services   Is patient using  services for this encounter? Yes   Language Interpreted Khmer    Name Lenin [id number]    Mode iPad   Content of Interpretation (select all) Other  (PT eval)   Initial Contact Note    Initial Contact Note Order Received and  Verified, Physical Therapy Evaluation in Progress with Full Report to Follow.   Precautions   Precautions Fall Risk;Swallow Precautions;Other (See Comments)  (Seizure)   Vitals   Pulse 99   Patient BP Position Sitting  (EOB)   Blood Pressure  (!) 168/84   Pulse Oximetry 97 %   O2 Delivery Device None - Room Air   Vitals Comments Pt HR increased to 130s during ambulation. RN made aware.   Pain   Pain Scales 0 to 10 Scale    Intervention Declines   Pain 0 - 10 Group   Therapist Pain Assessment Prior to Activity;During Activity;Post Activity   Prior Living Situation   Prior Services Skilled Home Health Services  ( PT and  RN)   Housing / Facility 1 Story House   Steps Into Home 1  (1 small 1 inch step to get into the house)   Equipment Owned None   Lives with - Patient's Self Care Capacity Spouse;Adult Children   Comments Pt lives with his wife and his son and daughter in law. His wife and daughter were present during the session and assisted with history.   Prior Level of Functional Mobility   Bed Mobility Independent   Transfer Status Independent   Ambulation Independent   Ambulation Distance Community   Assistive Devices Used None   Stairs Independent   History of Falls   History of Falls No   Cognition    Cognition / Consciousness X   Level of Consciousness Alert   Ability To Follow Commands 1 Step   Safety Awareness Impaired   Attention Impaired   Passive ROM Lower Body   Passive ROM Lower Body WDL   Active ROM Lower Body    Active ROM Lower Body  X   Comments L hip flexion impaired   Strength Lower Body   Lower Body Strength  X   Comments L grossly 3+/5, R grossly 4/5   Sensation Lower Body   Lower Extremity Sensation   WDL   Lower Body Muscle Tone   Lower Body Muscle Tone  WDL   Coordination Lower Body    Coordination Lower Body  X   Comments Heel to shin: B/L difficulty, L>R   Vision   Vision Comments Wears glasses for reading   Other Treatments   Other Treatments Provided Educated pt on use of AD when  walking, activity pacing and continued mobility, sitting in chair for meals and increasing mobility. Educated pt on monitoring BP, HR, and SPO2 at home regularly. Discussed discharge plans with pt and family and were agreeable to HH upon discharge.   Balance Assessment   Sitting Balance (Static) Fair   Sitting Balance (Dynamic) Fair -   Standing Balance (Static) Fair -   Standing Balance (Dynamic) Fair -   Weight Shift Sitting Fair   Weight Shift Standing Fair   Comments Seated EOB, Standing w/ FWW   Bed Mobility    Supine to Sit Standby Assist   Sit to Supine Standby Assist   Scooting Standby Assist   Rolling Standby Assist   Comments HOB flat. Cueing for log roll to R side to sit up from supine, and scooting up in the bed   Gait Analysis   Gait Level Of Assist Contact Guard Assist   Assistive Device Front Wheel Walker   Distance (Feet) 100   Deviation Bradykinetic;Decreased Base Of Support   Comments w/ FWW. Cueing for hand placement on walker and when he should take breaks while walking.   Functional Mobility   Sit to Stand Standby Assist   Bed, Chair, Wheelchair Transfer Refused   Mobility [id number]-walk in [id number]   Comments W/ FWW. Cueing for hand placement to push off of the bed when standing and reach for surface when sitting.   6 Clicks Assessment - How much HELP from from another person do you currently need... (If the patient hasn't done an activity recently, how much help from another person do you think he/she would need if he/she tried?)   Turning from your back to your side while in a flat bed without using bedrails? 3   Moving from lying on your back to sitting on the side of a flat bed without using bedrails? 3   Moving to and from a bed to a chair (including a wheelchair)? 3   Standing up from a chair using your arms (e.g., wheelchair, or bedside chair)? 3   Walking in hospital room? 3   Climbing 3-5 steps with a railing? 3   6 clicks Mobility Score 18   Patient / Family Goals     Patient / Family Goal #1 Return home   Short Term Goals    Short Term Goal # 1 Pt will be able to ambulate 150 ft with FWW with supervision to safely return home/community in 6 visits.   Short Term Goal # 2 Pt will be able to perform supine to sit, sit to supine with supervision to safely get in & out of bed within 6 visits.   Short Term Goal # 3 Pt will be able to complete sit to stand with FWW with supervision to return home within 6 visits.   Short Term Goal # 4 Pt will be able to navigate 1 step with supervision to safely enter the house within 6 visits.   Education Group   Education Provided Role of Physical Therapist   Role of Physical Therapist Patient Response Patient;Acceptance;Explanation;Verbal Demonstration;Family;Significant Other   Physical Therapy Initial Treatment Plan    Treatment Plan  Bed Mobility;Equipment;Gait Training;Neuro Re-Education / Balance;Therapeutic Exercise;Therapeutic Activities;Family / Caregiver Training;Stair Training   Treatment Frequency 3 Times per Week   Duration Until Therapy Goals Met   Problem List    Problems Impaired Bed Mobility;Impaired Transfers;Impaired Ambulation;Functional Strength Deficit;Impaired Balance;Decreased Activity Tolerance;Impaired Coordination;Safety Awareness Deficits / Cognition   Anticipated Discharge Equipment and Recommendations   DC Equipment Recommendations Front-Wheel Walker   Discharge Recommendations Recommend home health for continued physical therapy services   Interdisciplinary Plan of Care Collaboration   IDT Collaboration with  Certified Nursing Assistant;Nursing;Occupational Therapist;Family / Caregiver;   Patient Position at End of Therapy Tray Table within Reach;Call Light within Reach;Bed Alarm On;In Bed;Family / Friend in Room   Session Information   Date / Session Number  5/14- 1(1/3, 5/20)

## 2025-05-15 ENCOUNTER — HOME CARE VISIT (OUTPATIENT)
Dept: HOME HEALTH SERVICES | Facility: HOME HEALTHCARE | Age: 80
End: 2025-05-15
Payer: MEDICARE

## 2025-05-15 ENCOUNTER — TELEPHONE (OUTPATIENT)
Dept: HOME HEALTH SERVICES | Facility: HOME HEALTHCARE | Age: 80
End: 2025-05-15
Payer: MEDICARE

## 2025-05-15 VITALS
SYSTOLIC BLOOD PRESSURE: 142 MMHG | TEMPERATURE: 98.6 F | OXYGEN SATURATION: 93 % | DIASTOLIC BLOOD PRESSURE: 68 MMHG | RESPIRATION RATE: 18 BRPM | HEART RATE: 79 BPM

## 2025-05-15 PROCEDURE — G0493 RN CARE EA 15 MIN HH/HOSPICE: HCPCS

## 2025-05-15 ASSESSMENT — ENCOUNTER SYMPTOMS
DENIES PAIN: 1
NAUSEA: DENIES
LAST BOWEL MOVEMENT: 67340

## 2025-05-15 NOTE — PROGRESS NOTES
[facility] Transitional Care Management Home Visit      Mj Cornejo  80 y.o. male  MRN [id number]  PCP JOHANA Raygoza  Location of visit: Patient home    : [place] [id number]    History of Present Illness:    Hospital Course: Mj is an 79 yo male hospitalized 5/12/2025-5/14/2025 d/t weakness and shaking of his left side, concern for possible stroke. CTA head and neck with no LVO or dissection. EEG with focal seizures, Neurology consulted. Oral Keppra dose increased and Vimpat added. Continuous EEG showed resolution of focal seizures.     Previously hospitalized 4/16/2025-4/24/2025 admitted with shortness of breath, fever, chills, flank pain and urine incontinence. He has also had dysphagia for past 2 years resulting in 80 pound weight loss. Upon admission, positive for influenza A, Tamiflu started. Evaluated by speech therapy and determined to be unsafe for all textures, NG tube placed. Chest x-ray with left lower lobe pneumonia. CT abdomen/pelvis with bibasilar pulmonary consolidation (left greater than right), 4mm left lower pole renal stone, mild bladder wall thickening consistent with chronic outlet obstruction. NG tube removed prior to discharge as patient wishes to eat as tolerated regardless of aspiration risk. SpO2 94% on 5L nasal cannula. Discharged home with oxygen and home health.       Today: Mj is seen today in his home for a follow up transitional care management visit after his most recent hospitalization. Wife present.  used during entire visit. Mj is on RA, states he still has O2 but only using PRN and this is rare. Did see PCP after my last visit. States PCP ordered 4WW but he has not yet received it. PCP office closed today so unable to call and inquire about order. Living room has been rearranged as well as other home spaces with chairs placed so Mj can sit and rest as needed when up ambulating. Denies pain today or any other concerns. States great  appreciation for home TCM visits as well as [facility].      Assessment and Plan:    Primary Problem #1: Hospital follow up, seizures    Plan: Continue Keppra and Vimpat - states f/u with Neurology, Dr Florence, today at 12:30 - [facility] continues    Primary Problem #2: Acute respiratory failure with hypoxemia     Plan: RA today, SpO2 94%, states use of O2 PRN - denies SOB - encouraged activity as tolerated and deep breathing - [facility] continues - f/u Windy MARISCAL with TCM in home 5/21/2025       Other major issues not addressed during today's visit: hyperparathyroidism, hypertension, dyslipidemia     Pertinent Physical Exam Findings:  /68 (BP Location: Right arm, Patient Position: Sitting, BP Cuff Size: Small adult)   Pulse 71   Temp 36.7 °C (98 °F) (Temporal)   Resp 16   SpO2 94%     Review of Systems   Constitutional:  Negative for chills and fever.   Respiratory:  Negative for shortness of breath.    Cardiovascular:  Negative for chest pain, palpitations and leg swelling.   Gastrointestinal:  Negative for abdominal pain.   Genitourinary:  Negative for dysuria.   Neurological:  Positive for dizziness and weakness.       Physical Exam  Constitutional:       Appearance: Normal appearance.   Eyes:      Comments: Bilateral ptosis   Cardiovascular:      Rate and Rhythm: Normal rate and regular rhythm.   Pulmonary:      Effort: Pulmonary effort is normal.      Breath sounds: Normal breath sounds.   Abdominal:      General: Bowel sounds are normal.      Palpations: Abdomen is soft.   Musculoskeletal:      Right lower leg: No edema.      Left lower leg: No edema.   Skin:     General: Skin is warm and dry.   Neurological:      Mental Status: He is alert and oriented to person, place, and time.   Psychiatric:         Mood and Affect: Mood normal.         Behavior: Behavior normal.       Current Medications:  Current Outpatient Medications   Medication Instructions    albuterol 108  (90 Base) MCG/ACT Aero Soln inhalation aerosol 1-2 Puffs, Inhalation, EVERY 4 HOURS PRN    amLODIPine (NORVASC) 10 mg, EVERY MORNING    doxazosin (CARDURA) 2 mg, 2 Times Daily (BID)    Home Care Oxygen 2 L/min, Inhalation, PRN    lacosamide (VIMPAT) 100 mg, Oral, 2 TIMES DAILY    levetiracetam (KEPPRA) 1,500 mg, Oral, 2 TIMES DAILY    predniSONE (DELTASONE) 40 mg, DAILY    pyridostigmine (MESTINON) 60 mg, Oral, 3 TIMES DAILY        Medication Allergies:  Patient has no known allergies.      Thank you for allowing me the opportunity to participate in the care of Mj Cornejo    This is a moderate medical complexity visit, which in addition to above, included, if applicable, medication reconciliation and management, obtaining and reviewing discharge information, reviewing the need for diagnostic tests/treatments and/or follow-up on pending diagnostic tests/treatments, medical education, establishing or re-establishing referrals with community providers and services and assistance with scheduling follow-up visits with providers and services.      SHERWIN Sam.  [facility] Transitional Care Management  [address]  [place] [address]  P. [phone]  F. [phone]  Available on Voalte

## 2025-05-16 ENCOUNTER — TELEPHONE (OUTPATIENT)
Dept: HOME HEALTH SERVICES | Facility: HOME HEALTHCARE | Age: 80
End: 2025-05-16

## 2025-05-16 ENCOUNTER — HOME CARE VISIT (OUTPATIENT)
Dept: HOME HEALTH SERVICES | Facility: HOME HEALTHCARE | Age: 80
End: 2025-05-16
Payer: MEDICARE

## 2025-05-16 ENCOUNTER — OFF SITE VISIT (OUTPATIENT)
Dept: PALLIATIVE MEDICINE | Facility: HOSPICE | Age: 80
End: 2025-05-16
Payer: MEDICARE

## 2025-05-16 VITALS
TEMPERATURE: 98 F | OXYGEN SATURATION: 94 % | DIASTOLIC BLOOD PRESSURE: 68 MMHG | HEART RATE: 71 BPM | SYSTOLIC BLOOD PRESSURE: 138 MMHG | RESPIRATION RATE: 16 BRPM

## 2025-05-16 DIAGNOSIS — J96.01 ACUTE RESPIRATORY FAILURE WITH HYPOXEMIA (HCC): ICD-10-CM

## 2025-05-16 DIAGNOSIS — R56.9 FOCAL SEIZURES (HCC): Primary | ICD-10-CM

## 2025-05-16 PROCEDURE — 99349 HOME/RES VST EST MOD MDM 40: CPT | Performed by: NURSE PRACTITIONER

## 2025-05-16 ASSESSMENT — ENCOUNTER SYMPTOMS
PALPITATIONS: 0
WEAKNESS: 1
ABDOMINAL PAIN: 0
CHILLS: 0
SHORTNESS OF BREATH: 0
FEVER: 0
DIZZINESS: 1

## 2025-05-16 ASSESSMENT — PAIN SCALES - GENERAL: PAINLEVEL_OUTOF10: NO PAIN

## 2025-05-16 NOTE — TELEPHONE ENCOUNTER
"Telephone call from Dr. Florence neurologist with Mj in his office stating that Mj is aspirating, has crackles in lungs, and that Mj has a \"residual pneumonia.\" Dr. Florence is requesting that Windy Lee prescribe an antibiotic. Windy Lee APRN notified by this RN.   Dr. Candelario [phone]  "

## 2025-05-16 NOTE — CASE COMMUNICATION
Primary dx/Skilled need: 81 yo male HH Resumption after [facility] 5/12/25 -5/14/25 for seizures, stroke-like symptoms, weakness and exacerbation of chronic conditions.  PMH: Myasthenia Gravis, seizures, geriatric related frailty, HTN, HLD, and CVA 2014.  SN frequency: 1w1, 2w2, 1w2  Zip code: [id number]  Disciplines ordered: SN, PT, OT, SLP  Insurance and authorization: [facility]  Certification period: 4/29/25 - 6/27/25  Special considerations: Needs i nterpretor services

## 2025-05-16 NOTE — PROGRESS NOTES
Reena Agosto RN received call from Dr. Florence requesting patient be treated for aspiration pneumonia. Augmentin suspension BID x 10days called into [facility] pharmacy, spoke to pharmacist for appropriate dosing given suspension. Patient daughter, Genoveva hanna.

## 2025-05-18 SDOH — ECONOMIC STABILITY: HOUSING INSECURITY: EVIDENCE OF SMOKING MATERIAL: 0

## 2025-05-18 ASSESSMENT — ENCOUNTER SYMPTOMS
HYPERTENSION: 1
VOMITING: DENIES
FATIGUES EASILY: 1

## 2025-05-18 ASSESSMENT — ACTIVITIES OF DAILY LIVING (ADL): OASIS_M1830: 03

## 2025-05-19 ENCOUNTER — HOME CARE VISIT (OUTPATIENT)
Dept: HOME HEALTH SERVICES | Facility: HOME HEALTHCARE | Age: 80
End: 2025-05-19
Payer: MEDICARE

## 2025-05-19 VITALS
DIASTOLIC BLOOD PRESSURE: 82 MMHG | SYSTOLIC BLOOD PRESSURE: 154 MMHG | HEART RATE: 65 BPM | RESPIRATION RATE: 16 BRPM | OXYGEN SATURATION: 96 % | TEMPERATURE: 98.7 F

## 2025-05-19 PROCEDURE — G0299 HHS/HOSPICE OF RN EA 15 MIN: HCPCS

## 2025-05-19 ASSESSMENT — ENCOUNTER SYMPTOMS
VOMITING: PT CURRENTLY DENIES ANY VOMMITING
NAUSEA: PT CURRENTLY DENIES ANY NAUSEA
STOOL FREQUENCY: DAILY
DRY SKIN: 1
BOWEL PATTERN NORMAL: 1
LAST BOWEL MOVEMENT: 67344
DENIES PAIN: 1

## 2025-05-19 ASSESSMENT — PATIENT HEALTH QUESTIONNAIRE - PHQ9: CLINICAL INTERPRETATION OF PHQ2 SCORE: 0

## 2025-05-20 NOTE — PROGRESS NOTES
[facility] Transitional Care Management Home Visit      Mj Cornejo  80 y.o. male  MRN [id number]  PCP JOHANA Ryagoza  Location of visit: Patient home    : [place] [id number]    History of Present Illness:    Hospital Course: Mj is an 79 yo male hospitalized 5/12/2025-5/14/2025 d/t weakness and shaking of his left side, concern for possible stroke. CTA head and neck with no LVO or dissection. EEG with focal seizures, Neurology consulted. Oral Keppra dose increased and Vimpat added. Continuous EEG showed resolution of focal seizures.      Previously hospitalized 4/16/2025-4/24/2025 admitted with shortness of breath, fever, chills, flank pain and urine incontinence. He has also had dysphagia for past 2 years resulting in 80 pound weight loss. Upon admission, positive for influenza A, Tamiflu started. Evaluated by speech therapy and determined to be unsafe for all textures, NG tube placed. Chest x-ray with left lower lobe pneumonia. CT abdomen/pelvis with bibasilar pulmonary consolidation (left greater than right), 4mm left lower pole renal stone, mild bladder wall thickening consistent with chronic outlet obstruction. NG tube removed prior to discharge as patient wishes to eat as tolerated regardless of aspiration risk. SpO2 94% on 5L nasal cannula. Discharged home with oxygen and home health.    Today: Mj is seen today in his home for a follow up transitional care management visit. Wife and daughter present. Saw Dr. Florence, Neurology, 5/16/2025. Augmentin started per his recommendation for concerns of aspiration pneumonia. Continuous EEG placed yesterday, off Friday. Mj reports no seizure like activity. States he has no complaints and is feeling good. Has an appointment with PCP, Jacques Marrero 6/4/2025. [facility] continues, PT was finishing their visit upon my arrival today. Overall no questions or concerns today, thanked Mj and his family for allowing me to be part of his  care as this would be my last visit today.       Assessment and Plan:    Primary Problem #1: Seizure disorder    Plan:  Continue Keppra and Vimpat - completed f/u with Neurology, Dr Florence, 5/16/2025 - continuous EEG placed yesterday, to be removed Friday, denies any seizure activity - [facility] continues     Primary Problem #2: Oropharyngeal dysphagia     Plan: Continues Augmentin suspension to complete course - Mj continues to eat and drink despite the risk of aspiration    Primary Problem #3:  Acute respiratory failure with hypoxemia     Plan: RA today, SpO2 93%, states use of O2 PRN - denies SOB - encouraged activity as tolerated and deep breathing - [facility] continues     Other major issues not addressed during today's visit: hyperparathyroidism, hypertension, dyslipidemia     Pertinent Physical Exam Findings:  Home Health PT vitals today: /68, HR 88, RR 16, Temp 98.0, SpO2 93%, 0/10 pain     Review of Systems   Constitutional:  Negative for chills and fever.   Respiratory:  Negative for shortness of breath.    Cardiovascular:  Negative for chest pain, palpitations and leg swelling.   Gastrointestinal:  Negative for abdominal pain.   Genitourinary:  Negative for dysuria.       Physical Exam  Constitutional:       Appearance: Normal appearance.   Cardiovascular:      Rate and Rhythm: Normal rate and regular rhythm.   Pulmonary:      Effort: Pulmonary effort is normal.      Breath sounds: Normal breath sounds.   Abdominal:      General: Bowel sounds are normal.      Palpations: Abdomen is soft.   Musculoskeletal:      Right lower leg: No edema.      Left lower leg: No edema.   Skin:     General: Skin is warm and dry.   Neurological:      Mental Status: He is alert.   Psychiatric:         Mood and Affect: Mood normal.         Behavior: Behavior normal.       Current Medications:  Current Medications[1]    Medication Allergies:  Patient has no known allergies.      Thank you for allowing  me the opportunity to participate in the care of Mj Cornejo    This is a low-moderate medical complexity visit, which in addition to above, included, if applicable, medication reconciliation and management, obtaining and reviewing discharge information, reviewing the need for diagnostic tests/treatments and/or follow-up on pending diagnostic tests/treatments, medical education, establishing or re-establishing referrals with community providers and services and assistance with scheduling follow-up visits with providers and services.      SHERWIN Sam.  [facility] Transitional Care Management  [address] [place] [address]  P. [phone]  F. [phone]  Available on Voalte         [1]   Current Outpatient Medications:     amoxicillin-clavulanate (AUGMENTIN) 600-42.9 MG/5ML Recon Susp suspension, Take 7.5 mL by mouth 2 times a day. Indications: Acute Infection of the Middle Ear, Disp: , Rfl:     doxazosin (CARDURA) 2 MG Tab, Take 2 mg by mouth 2 times a day. Indications: High Blood Pressure, Disp: , Rfl:     levetiracetam (KEPPRA) 500 MG TABLET SR 24 HR, Take 3 Tablets by mouth 2 times a day., Disp: 180 Tablet, Rfl: 0    lacosamide (VIMPAT) 100 MG Tab tablet, Take 1 Tablet by mouth 2 times a day for 30 days., Disp: 60 Tablet, Rfl: 0    Home Care Oxygen, Inhale 2 L/min as needed for Shortness of Breath. Indications: Shortness of breath, Disp: , Rfl:     predniSONE (DELTASONE) 20 MG Tab, Take 40 mg by mouth every day. Indications: Myasthenia Gravis, Disp: , Rfl:     amLODIPine (NORVASC) 5 MG Tab, Take 10 mg by mouth every morning. Indications: High Blood Pressure, Disp: , Rfl:     pyridostigmine (MESTINON) 60 MG Tab, Take 60 mg by mouth 3 times a day. Indications: Myasthenia Gravis, Disp: , Rfl:     albuterol 108 (90 Base) MCG/ACT Aero Soln inhalation aerosol, Inhale 1-2 Puffs every four hours as needed for Shortness of Breath., Disp: 34 g, Rfl: 1

## 2025-05-21 ENCOUNTER — HOME CARE VISIT (OUTPATIENT)
Dept: HOME HEALTH SERVICES | Facility: HOME HEALTHCARE | Age: 80
End: 2025-05-21
Payer: MEDICARE

## 2025-05-21 ENCOUNTER — OFF SITE VISIT (OUTPATIENT)
Dept: PALLIATIVE MEDICINE | Facility: HOSPICE | Age: 80
End: 2025-05-21
Payer: MEDICARE

## 2025-05-21 VITALS
DIASTOLIC BLOOD PRESSURE: 68 MMHG | RESPIRATION RATE: 16 BRPM | SYSTOLIC BLOOD PRESSURE: 128 MMHG | OXYGEN SATURATION: 93 % | HEART RATE: 88 BPM | TEMPERATURE: 98 F

## 2025-05-21 DIAGNOSIS — R56.9 FOCAL SEIZURES (HCC): Primary | ICD-10-CM

## 2025-05-21 DIAGNOSIS — J96.01 ACUTE RESPIRATORY FAILURE WITH HYPOXEMIA (HCC): ICD-10-CM

## 2025-05-21 DIAGNOSIS — R13.12 OROPHARYNGEAL DYSPHAGIA: ICD-10-CM

## 2025-05-21 PROCEDURE — 99348 HOME/RES VST EST LOW MDM 30: CPT | Performed by: NURSE PRACTITIONER

## 2025-05-21 PROCEDURE — G0151 HHCP-SERV OF PT,EA 15 MIN: HCPCS

## 2025-05-21 ASSESSMENT — ENCOUNTER SYMPTOMS
PALPITATIONS: 0
ABDOMINAL PAIN: 0
CHILLS: 0
SHORTNESS OF BREATH: 0
DENIES PAIN: 1
FEVER: 0

## 2025-05-21 ASSESSMENT — ACTIVITIES OF DAILY LIVING (ADL)
AMBULATION ASSISTANCE ON FLAT SURFACES: 1
AMBULATION_DISTANCE/DURATION_TOLERATED: >200 FEET

## 2025-05-22 ENCOUNTER — HOME CARE VISIT (OUTPATIENT)
Dept: HOME HEALTH SERVICES | Facility: HOME HEALTHCARE | Age: 80
End: 2025-05-22
Payer: MEDICARE

## 2025-05-22 ENCOUNTER — TELEPHONE (OUTPATIENT)
Dept: HOME HEALTH SERVICES | Facility: HOME HEALTHCARE | Age: 80
End: 2025-05-22
Payer: MEDICARE

## 2025-05-22 VITALS
DIASTOLIC BLOOD PRESSURE: 62 MMHG | OXYGEN SATURATION: 95 % | TEMPERATURE: 99.4 F | HEART RATE: 85 BPM | SYSTOLIC BLOOD PRESSURE: 132 MMHG | RESPIRATION RATE: 16 BRPM

## 2025-05-22 PROCEDURE — G0299 HHS/HOSPICE OF RN EA 15 MIN: HCPCS

## 2025-05-22 ASSESSMENT — ENCOUNTER SYMPTOMS
DRY SKIN: 1
BOWEL PATTERN NORMAL: 1
STOOL FREQUENCY: DAILY
NAUSEA: PT CURRENTLY DENIES ANY NAUSEA
VOMITING: PT CURRENTLY DENIES ANY VOMMITING
LAST BOWEL MOVEMENT: 67347
DENIES PAIN: 1

## 2025-05-22 ASSESSMENT — PATIENT HEALTH QUESTIONNAIRE - PHQ9: CLINICAL INTERPRETATION OF PHQ2 SCORE: 0

## 2025-05-22 NOTE — TELEPHONE ENCOUNTER
Mj is discharged from Home Transitional Care Management on 5/24/2025. MARTA Marrero APRN to continue health care.

## 2025-05-26 ENCOUNTER — HOME CARE VISIT (OUTPATIENT)
Dept: HOME HEALTH SERVICES | Facility: HOME HEALTHCARE | Age: 80
End: 2025-05-26
Payer: MEDICARE

## 2025-05-26 VITALS
DIASTOLIC BLOOD PRESSURE: 78 MMHG | TEMPERATURE: 98.7 F | OXYGEN SATURATION: 95 % | RESPIRATION RATE: 16 BRPM | SYSTOLIC BLOOD PRESSURE: 150 MMHG | HEART RATE: 80 BPM

## 2025-05-26 PROCEDURE — G0299 HHS/HOSPICE OF RN EA 15 MIN: HCPCS

## 2025-05-26 ASSESSMENT — ENCOUNTER SYMPTOMS
STOOL FREQUENCY: DAILY
LAST BOWEL MOVEMENT: 67351
BOWEL PATTERN NORMAL: 1
DRY SKIN: 1
VOMITING: PT CURRENTLY DENIES ANY VOMMITING
NAUSEA: PT CURRENTLY DENIES ANY NAUSEA
DENIES PAIN: 1

## 2025-05-26 ASSESSMENT — PATIENT HEALTH QUESTIONNAIRE - PHQ9: CLINICAL INTERPRETATION OF PHQ2 SCORE: 0

## 2025-05-27 ENCOUNTER — HOME CARE VISIT (OUTPATIENT)
Dept: HOME HEALTH SERVICES | Facility: HOME HEALTHCARE | Age: 80
End: 2025-05-27
Payer: MEDICARE

## 2025-05-27 VITALS
OXYGEN SATURATION: 95 % | RESPIRATION RATE: 16 BRPM | SYSTOLIC BLOOD PRESSURE: 132 MMHG | TEMPERATURE: 98.3 F | DIASTOLIC BLOOD PRESSURE: 68 MMHG | HEART RATE: 84 BPM

## 2025-05-27 PROCEDURE — G0152 HHCP-SERV OF OT,EA 15 MIN: HCPCS

## 2025-05-27 SDOH — ECONOMIC STABILITY: HOUSING INSECURITY: HOME SAFETY: DTR PRESENT TO INTERPRET DURING OT VISIT.

## 2025-05-27 ASSESSMENT — ENCOUNTER SYMPTOMS: DENIES PAIN: 1

## 2025-05-28 ENCOUNTER — HOME CARE VISIT (OUTPATIENT)
Dept: HOME HEALTH SERVICES | Facility: HOME HEALTHCARE | Age: 80
End: 2025-05-28
Payer: MEDICARE

## 2025-05-28 VITALS
RESPIRATION RATE: 16 BRPM | DIASTOLIC BLOOD PRESSURE: 70 MMHG | HEART RATE: 75 BPM | OXYGEN SATURATION: 93 % | TEMPERATURE: 98.5 F | SYSTOLIC BLOOD PRESSURE: 140 MMHG

## 2025-05-28 PROCEDURE — G0151 HHCP-SERV OF PT,EA 15 MIN: HCPCS

## 2025-05-28 ASSESSMENT — ACTIVITIES OF DAILY LIVING (ADL)
FEEDING ASSESSED: 1
PHYSICAL TRANSFERS ASSESSED: 1
AMBULATION ASSISTANCE: INDEPENDENT
TOILETING: INDEPENDENT
GROOMING_CURRENT_FUNCTION: INDEPENDENT
BATHING_CURRENT_FUNCTION: INDEPENDENT
GROOMING ASSESSED: 1
CURRENT_FUNCTION: INDEPENDENT
AMBULATION ASSISTANCE: 1
BATHING ASSESSED: 1
TOILETING: 1
DRESSING_LB_CURRENT_FUNCTION: INDEPENDENT
DRESSING_UB_CURRENT_FUNCTION: INDEPENDENT
GROOMING EQUIPMENT USED: STANDING AT SINK
FEEDING: INDEPENDENT
PREPARING MEALS: DEPENDENT

## 2025-05-28 ASSESSMENT — ENCOUNTER SYMPTOMS: DENIES PAIN: 1

## 2025-05-29 ENCOUNTER — HOME CARE VISIT (OUTPATIENT)
Dept: HOME HEALTH SERVICES | Facility: HOME HEALTHCARE | Age: 80
End: 2025-05-29
Payer: MEDICARE

## 2025-05-29 ENCOUNTER — TELEPHONE (OUTPATIENT)
Dept: HEALTH INFORMATION MANAGEMENT | Facility: OTHER | Age: 80
End: 2025-05-29
Payer: MEDICARE

## 2025-05-29 PROCEDURE — G0299 HHS/HOSPICE OF RN EA 15 MIN: HCPCS

## 2025-05-30 VITALS
DIASTOLIC BLOOD PRESSURE: 66 MMHG | HEART RATE: 77 BPM | TEMPERATURE: 99.5 F | RESPIRATION RATE: 16 BRPM | SYSTOLIC BLOOD PRESSURE: 136 MMHG | OXYGEN SATURATION: 94 %

## 2025-05-30 SDOH — ECONOMIC STABILITY: FOOD INSECURITY: MEALS PER DAY: 3

## 2025-05-30 ASSESSMENT — ENCOUNTER SYMPTOMS
NAUSEA: PT CURRENTLY DENIES ANY NAUSEA
DENIES PAIN: 1
VOMITING: PT CURRENTLY DENIES ANY VOMMITING
APPETITE LEVEL: GOOD
LAST BOWEL MOVEMENT: 67354
STOOL FREQUENCY: DAILY
CHANGE IN APPETITE: UNCHANGED
BOWEL PATTERN NORMAL: 1

## 2025-05-30 ASSESSMENT — ACTIVITIES OF DAILY LIVING (ADL)
HOME_HEALTH_OASIS: 00
OASIS_M1830: 00